# Patient Record
Sex: FEMALE | Race: WHITE | NOT HISPANIC OR LATINO | Employment: UNEMPLOYED | URBAN - NONMETROPOLITAN AREA
[De-identification: names, ages, dates, MRNs, and addresses within clinical notes are randomized per-mention and may not be internally consistent; named-entity substitution may affect disease eponyms.]

---

## 2017-06-22 ENCOUNTER — OFFICE VISIT (OUTPATIENT)
Dept: OTOLARYNGOLOGY | Facility: OTHER | Age: 37
End: 2017-06-22
Attending: PHYSICIAN ASSISTANT
Payer: MEDICARE

## 2017-06-22 VITALS
HEIGHT: 55 IN | DIASTOLIC BLOOD PRESSURE: 70 MMHG | BODY MASS INDEX: 17.13 KG/M2 | TEMPERATURE: 97.9 F | SYSTOLIC BLOOD PRESSURE: 106 MMHG | HEART RATE: 101 BPM | WEIGHT: 74 LBS

## 2017-06-22 DIAGNOSIS — H61.23 IMPACTED CERUMEN OF BOTH EARS: Primary | ICD-10-CM

## 2017-06-22 DIAGNOSIS — H90.6 MIXED CONDUCTIVE AND SENSORINEURAL HEARING LOSS, BILATERAL: ICD-10-CM

## 2017-06-22 DIAGNOSIS — H61.303 EXTERNAL EAR CANAL STENOSIS, ACQUIRED, BILATERAL: ICD-10-CM

## 2017-06-22 DIAGNOSIS — H66.93 BILATERAL CHRONIC OTITIS MEDIA: ICD-10-CM

## 2017-06-22 DIAGNOSIS — L30.9 DERMATITIS: ICD-10-CM

## 2017-06-22 PROCEDURE — 99212 OFFICE O/P EST SF 10 MIN: CPT

## 2017-06-22 PROCEDURE — 99213 OFFICE O/P EST LOW 20 MIN: CPT | Performed by: PHYSICIAN ASSISTANT

## 2017-06-22 ASSESSMENT — PAIN SCALES - GENERAL: PAINLEVEL: NO PAIN (0)

## 2017-06-22 NOTE — MR AVS SNAPSHOT
"              After Visit Summary   6/22/2017    Efren Saavedra    MRN: [id number]           Patient Information     Date Of Birth          1980        Visit Information        Provider Department      6/22/2017 9:00 AM Marcel[facility]al [facility]        Care Instructions    Ears look good.   Violet was applied to both canals-   Keep ears dry  Follow up in 6 months  If there are concerns or questions, Call [phone] and ask for nurse          Follow-ups after your visit        Follow-up notes from your care team     Return in about 6 months (around 12/22/2017).      Who to contact     If you have questions or need follow up information about today's clinic visit or your schedule please contact [facility] directly at [phone].  Normal or non-critical lab and imaging results will be communicated to you by MyChart, letter or phone within 4 business days after the clinic has received the results. If you do not hear from us within 7 days, please contact the clinic through MyChart or phone. If you have a critical or abnormal lab result, we will notify you by phone as soon as possible.  Submit refill requests through [employer] or call your pharmacy and they will forward the refill request to us. Please allow 3 business days for your refill to be completed.          Additional Information About Your Visit        MyChart Information     [employer] lets you send messages to your doctor, view your test results, renew your prescriptions, schedule appointments and more. To sign up, go to [email or web address] . Click on \"Log in\" on the left side of the screen, which will take you to the Welcome page. Then click on \"Sign up Now\" on the right side of the page.     You will be asked to enter the access code listed below, as well as some personal information. Please follow the directions to create your username and password.     Your access code is: [email or web address]  Expires: 9/20/2017  9:29 AM   " "  Your access code will  in 90 days. If you need help or a new code, please call your [place] clinic or [phone].        Care EveryWhere ID     This is your Care EveryWhere ID. This could be used by other organizations to access your [place] medical records  [id number]        Your Vitals Were     Pulse Temperature Height BMI (Body Mass Index)          101 97.9  F (36.6  C) (Tympanic) 4' 4.5\" (1.334 m) 18.88 kg/m2         Blood Pressure from Last 3 Encounters:   17 106/70   16 114/70   16 110/62    Weight from Last 3 Encounters:   17 74 lb (33.6 kg)   16 74 lb (33.6 kg)   16 74 lb (33.6 kg)              Today, you had the following     No orders found for display       Primary Care Provider Office Phone # Fax #    Dominic DAVI Sweeney [phone]       [place] [address] [place] [id number]        Equal Access to Services     [facility]: Hadii aad ku hadasho Soomaali, waaxda luqadaha, qaybta kaalmada adeegyastacy, yahir ortez . So [facility] [phone].    ATENCIÓN: Si habla español, tiene a roa disposición servicios gratuitos de asistencia lingüística. [facility] [phone].    We comply with applicable federal civil rights laws and [place] laws. We do not discriminate on the basis of race, color, national origin, age, disability sex, sexual orientation or gender identity.            Thank you!     Thank you for choosing [facility]  for your care. Our goal is always to provide you with excellent care. Hearing back from our patients is one way we can continue to improve our services. Please take a few minutes to complete the written survey that you may receive in the mail after your visit with us. Thank you!             Your Updated Medication List - Protect others around you: Learn how to safely use, store and throw away your medicines at www.disposemymeds.org.          This list is accurate as " of: 6/22/17  9:29 AM.  Always use your most recent med list.                   Brand Name Dispense Instructions for use Diagnosis    ciprofloxacin 0.3 % ophthalmic solution    CILOXAN    1 Bottle    Apply 4 drops to both ears twice a day for 5 days    Impacted cerumen of both ears, Mixed conductive and sensorineural hearing loss, bilateral, Irritation of external ear canal, left, External ear canal stenosis, acquired, bilateral       mometasone 0.1 % cream    ELOCON    15 g    Apply sparingly to affected area twice daily for 7-10 days. Do not apply for longer than 14 days    Dermatitis

## 2017-06-22 NOTE — PROGRESS NOTES
"Chief Complaint   Patient presents with     Cerumen Impaction     Ear cleaning.  Decreased hearing.  Resolved right ear drainage.     She has been c/o plugged feeling. Efren did have drainage, and used otics and symptoms improvement. She has decreased hearing.   No otalgia.  She has aids but does not wear them at this time.   She did have URI symptoms but did not feel like it was related to otorrhea.   She has been without nasal congestion, runny nose, sneezing or other symptoms at this time.     Her talita bowls improve with Elocon. She does not use her aids.   No recent otorrhea.    No bloody drainage.    She has bilateral t-tubes and reports that she has been c/o popping/ opening.      Distant hx of BTT with placement of t-tubes. Tubes have been in place for several years.   History reviewed. No pertinent past medical history.   No Known Allergies  Current Outpatient Prescriptions   Medication     mometasone (ELOCON) 0.1 % cream     ciprofloxacin (CILOXAN) 0.3 % ophthalmic solution     No current facility-administered medications for this visit.       ROS: 10 point ROS neg other than the symptoms noted above in the HPI.   /70 (BP Location: Left arm, Patient Position: Sitting, Cuff Size: Child)  Pulse 101  Temp 97.9  F (36.6  C) (Tympanic)  Ht 4' 4.5\" (1.334 m)  Wt 74 lb (33.6 kg)  BMI 18.88 kg/m2  Gerenal: Craniofacial abnormality.   Eyes: conjuctiva clear, PERRL, EOM intact   Ears: Otorrhea left. Right with cerumen. Narrow canals. . Low set auricles with poor antihelical folds, defined. t-tube bilaterally. Talita bowl bilateral dermatitis   Nose: Nares normal   Mouth: normal   Neck: Supple, no cervical adenopathy, no thyromegaly   The ear canals were examined underneath the operating microscope and with an otologic speculum. The canals were cleaned of all debris with gently suctioning and use of alligator forceps. There is no granulation tissue or sign of cholesteatoma.   Canals, stenotic   Bilateral " t-tubes patent. Cerumen removed  Roxann was applied to bilateral ear canals.       Audiogram from 7/4/16.   Unable to seal for tympanograms.   Thresholds are improved from 2014.   ASSESSMENT:    ICD-10-CM    1. Impacted cerumen of both ears H61.23    2. Bilateral chronic otitis media H66.93    3. Mixed conductive and sensorineural hearing loss, bilateral H90.6    4. External ear canal stenosis, acquired, bilateral H61.303    5. Dermatitis L30.9      Her ears overall look well today on exam  Canals were cleaned. I did apply Violet to bilateral canals. Hopefully this will aid in relief of irritation/ otorrhea.  If drainage occurs, contact. May need to have powder applied as drops tend not to get in her ears very well.     Follow up in 6 months  Tubes are in good place and in position.      Monitor hearing loss, if progressive hearing loss, consider MRI.   She has aids, but defers to use them.      Elocon to outer ear as directed.   Consider Clotrimazole cream if no improvement.        Marcelina Oneal PA-C  ENT  [facility], [place]  [phone]

## 2017-06-22 NOTE — PATIENT INSTRUCTIONS
Ears look good.   Violet was applied to both canals-   Keep ears dry  Follow up in 6 months  If there are concerns or questions, Call [phone] and ask for nurse

## 2017-06-22 NOTE — NURSING NOTE
"Chief Complaint   Patient presents with     Cerumen Impaction     Ear cleaning.  Decreased hearing.  Resolved right ear drainage.       Initial /70 (BP Location: Left arm, Patient Position: Sitting, Cuff Size: Child)  Pulse 101  Temp 97.9  F (36.6  C) (Tympanic)  Ht 4' 4.5\" (1.334 m)  Wt 74 lb (33.6 kg)  BMI 18.88 kg/m2 Estimated body mass index is 18.88 kg/(m^2) as calculated from the following:    Height as of this encounter: 4' 4.5\" (1.334 m).    Weight as of this encounter: 74 lb (33.6 kg).  Medication Reconciliation: complete     HECTOR TYSON      "

## 2017-12-07 ENCOUNTER — OFFICE VISIT (OUTPATIENT)
Dept: OTOLARYNGOLOGY | Facility: OTHER | Age: 37
End: 2017-12-07
Attending: PHYSICIAN ASSISTANT
Payer: MEDICARE

## 2017-12-07 VITALS
HEART RATE: 100 BPM | BODY MASS INDEX: 18.05 KG/M2 | WEIGHT: 78 LBS | OXYGEN SATURATION: 99 % | SYSTOLIC BLOOD PRESSURE: 88 MMHG | DIASTOLIC BLOOD PRESSURE: 58 MMHG | TEMPERATURE: 98.2 F | RESPIRATION RATE: 16 BRPM | HEIGHT: 55 IN

## 2017-12-07 DIAGNOSIS — H90.5 CONGENITAL HEARING LOSS: ICD-10-CM

## 2017-12-07 DIAGNOSIS — H90.6 MIXED CONDUCTIVE AND SENSORINEURAL HEARING LOSS, BILATERAL: ICD-10-CM

## 2017-12-07 DIAGNOSIS — H92.11 OTORRHEA, RIGHT: ICD-10-CM

## 2017-12-07 DIAGNOSIS — H66.91 RIGHT ACUTE OTITIS MEDIA: Primary | ICD-10-CM

## 2017-12-07 DIAGNOSIS — H61.23 IMPACTED CERUMEN OF BOTH EARS: ICD-10-CM

## 2017-12-07 DIAGNOSIS — H61.303 EXTERNAL EAR CANAL STENOSIS, ACQUIRED, BILATERAL: ICD-10-CM

## 2017-12-07 PROCEDURE — 92504 EAR MICROSCOPY EXAMINATION: CPT | Performed by: PHYSICIAN ASSISTANT

## 2017-12-07 PROCEDURE — 99213 OFFICE O/P EST LOW 20 MIN: CPT | Mod: 25 | Performed by: PHYSICIAN ASSISTANT

## 2017-12-07 PROCEDURE — 92504 EAR MICROSCOPY EXAMINATION: CPT

## 2017-12-07 PROCEDURE — 99213 OFFICE O/P EST LOW 20 MIN: CPT

## 2017-12-07 RX ORDER — CEFDINIR 250 MG/5ML
14 POWDER, FOR SUSPENSION ORAL DAILY
Qty: 100 ML | Refills: 0 | Status: SHIPPED | OUTPATIENT
Start: 2017-12-07 | End: 2017-12-17

## 2017-12-07 RX ORDER — OFLOXACIN 3 MG/ML
10 SOLUTION AURICULAR (OTIC) 2 TIMES DAILY
Qty: 10 ML | Refills: 1 | Status: SHIPPED | OUTPATIENT
Start: 2017-12-07 | End: 2017-12-14

## 2017-12-07 ASSESSMENT — PAIN SCALES - GENERAL: PAINLEVEL: NO PAIN (0)

## 2017-12-07 NOTE — NURSING NOTE
"Chief Complaint   Patient presents with     Ear Problem     Ear cleaning last seen 06/22/2017.       Initial BP (!) 88/58  Pulse 100  Temp 98.2  F (36.8  C) (Tympanic)  Resp 16  Ht 4' 4.5\" (1.334 m)  Wt 78 lb (35.4 kg)  SpO2 99%  BMI 19.9 kg/m2 Estimated body mass index is 19.9 kg/(m^2) as calculated from the following:    Height as of this encounter: 4' 4.5\" (1.334 m).    Weight as of this encounter: 78 lb (35.4 kg).  Medication Reconciliation: complete   India Maddox      "

## 2017-12-07 NOTE — PATIENT INSTRUCTIONS
Start Floxin ear drops. Twice a day for 7 days to right ear  Start oral Omnicef once a day for 10 days.     If right ear hearing/ drainage does not improve within 2 weeks return for recheck    Due in June/ July for hearing test and ear cleaning/ tube check    Thank you for allowing [facility] and our ENT team to participate in your care.  If your medications are too expensive, please give the nurse a call.  We can possibly change this medication.  If you have a scheduling or an appointment question please contact [facility] Unit Coordinator at their direct line [phone].   ALL nursing questions or concerns can be directed to your ENT nurse at: [phone] [place]

## 2017-12-07 NOTE — PROGRESS NOTES
"Chief Complaint   Patient presents with     Ear Problem     Ear cleaning last seen 06/22/2017.   She has bilateral t-tubes and reports that she has been c/o popping/ opening. Over the last few weeks and parents have noticed possible decreased hearing.       Her talita bowls improve with Elocon. She does not use her aids.   No recent otorrhea.    No bloody drainage.      Distant hx of BTT with placement of t-tubes. Tubes have been in place for several years. She has aids but does not wear them at this time.   No recent illness. No other concerns.   She has been without nasal congestion, runny nose, sneezing or other symptoms at this time     History reviewed. No pertinent past medical history.   No Known Allergies  Current Outpatient Prescriptions   Medication     mometasone (ELOCON) 0.1 % cream     No current facility-administered medications for this visit.       ROS: 10 point ROS neg other than the symptoms noted above in the HPI.  BP (!) 88/58  Pulse 100  Temp 98.2  F (36.8  C) (Tympanic)  Resp 16  Ht 4' 4.5\" (1.334 m)  Wt 78 lb (35.4 kg)  SpO2 99%  BMI 19.9 kg/m2  Gerenal: Craniofacial abnormality.   Eyes: conjuctiva clear, PERRL, EOM intact   Ears: Otorrhea left. Right with cerumen. Narrow canals. . Low set auricles with poor antihelical folds, defined. t-tube bilaterally. Talita bowl bilateral dermatitis   Nose: Nares normal   Mouth: normal   Neck: Supple, no cervical adenopathy, no thyromegaly   The ear canals were examined underneath the operating microscope and with an otologic speculum. The canals were cleaned of all debris with gently suctioning and use of alligator forceps. There is no granulation tissue or sign of cholesteatoma.   Canals, stenotic   Bilateral t-tubes. Right TM with erythema, effusion, otorrhea. Canal suctioned.   Audiogram from 7/4/16.   Unable to seal for tympanograms.   Thresholds are improved from 2014. Moderate to severe mixed hearing loss, bilateral    ASSESSMENT:    " ICD-10-CM    1. Right acute otitis media H66.91 ofloxacin (FLOXIN) 0.3 % otic solution     cefdinir (OMNICEF) 250 MG/5ML suspension   2. Otorrhea, right H92.11 ofloxacin (FLOXIN) 0.3 % otic solution     cefdinir (OMNICEF) 250 MG/5ML suspension   3. Impacted cerumen of both ears H61.23    4. 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 External ear canal stenosis, acquired, bilateral H61.303    6. Congenital hearing loss H90.5      Start Floxin BID for 7 days  Start Omnicef per weight based. Provided oral suspension.   Recheck ears in 2 weeks. If otorrhea/ effusion remain, will need culture obtained.     Monitor hearing loss, if progressive hearing loss, consider MRI.   She has aids, but defers to use them.       Elocon to outer ear as directed.   Consider Clotrimazole cream if no improvement.     Marcelina Oneal PA-C  ENT  [facility], [place]  [phone]

## 2017-12-07 NOTE — MR AVS SNAPSHOT
After Visit Summary   12/7/2017    Efren Saavedra    MRN: [id number]           Patient Information     Date Of Birth          1980        Visit Information        Provider Department      12/7/2017 11:15 AM Marcel[facility]        Today's Diagnoses     Right acute otitis media    -  1    Otorrhea, right          Care Instructions    Start Floxin ear drops. Twice a day for 7 days to right ear  Start oral Omnicef once a day for 10 days.     If right ear hearing/ drainage does not improve within 2 weeks return for recheck    Due in June/ July for hearing test and ear cleaning/ tube check    Thank you for allowing [facility] and our ENT team to participate in your care.  If your medications are too expensive, please give the nurse a call.  We can possibly change this medication.  If you have a scheduling or an appointment question please contact [facility] Unit Coordinator at their direct line [phone].   ALL nursing questions or concerns can be directed to your ENT nurse at: [phone] [facility]              Follow-ups after your visit        Follow-up notes from your care team     Return in about 2 weeks (around 12/21/2017).      Who to contact     If you have questions or need follow up information about today's clinic visit or your schedule please contact [facility] directly at [phone].  Normal or non-critical lab and imaging results will be communicated to you by MyChart, letter or phone within 4 business days after the clinic has received the results. If you do not hear from us within 7 days, please contact the clinic through MyChart or phone. If you have a critical or abnormal lab result, we will notify you by phone as soon as possible.  Submit refill requests through [employer] or call your pharmacy and they will forward the refill request to us. Please allow 3 business days for your refill to be completed.          Additional Information  "About Your Visit        [employer] Information     [employer] lets you send messages to your doctor, view your test results, renew your prescriptions, schedule appointments and more. To sign up, go to [email or web address] . Click on \"Log in\" on the left side of the screen, which will take you to the Welcome page. Then click on \"Sign up Now\" on the right side of the page.     You will be asked to enter the access code listed below, as well as some personal information. Please follow the directions to create your username and password.     Your access code is: [email or web address]  Expires: 3/7/2018 11:37 AM     Your access code will  in 90 days. If you need help or a new code, please call your [place] clinic or [phone].        Care EveryWhere ID     This is your Care EveryWhere ID. This could be used by other organizations to access your [place] medical records  [id number]        Your Vitals Were     Pulse Temperature Respirations Height Pulse Oximetry BMI (Body Mass Index)    100 98.2  F (36.8  C) (Tympanic) 16 4' 4.5\" (1.334 m) 99% 19.9 kg/m2       Blood Pressure from Last 3 Encounters:   17 (!) 88/58   17 106/70   16 114/70    Weight from Last 3 Encounters:   17 78 lb (35.4 kg)   17 74 lb (33.6 kg)   16 74 lb (33.6 kg)              Today, you had the following     No orders found for display         Today's Medication Changes          These changes are accurate as of: 17 11:37 AM.  If you have any questions, ask your nurse or doctor.               Start taking these medicines.        Dose/Directions    cefdinir 250 MG/5ML suspension   Commonly known as:  OMNICEF   Used for:  Right acute otitis media, Otorrhea, right   Started by:  Marcelina Oneal PA-C        Dose:  14 mg/kg/day   Take 10 mLs (500 mg) by mouth daily for 10 days   Quantity:  100 mL   Refills:  0       ofloxacin 0.3 % otic solution   Commonly known as:  FLOXIN   Used for:  Right acute otitis media, Otorrhea, " right   Started by:  Marcelina Oneal PA-C        Dose:  10 drop   Place 10 drops into the right ear 2 times daily for 7 days   Quantity:  10 mL   Refills:  1         Stop taking these medicines if you haven't already. Please contact your care team if you have questions.     ciprofloxacin 0.3 % ophthalmic solution   Commonly known as:  CILOXAN   Stopped by:  Marcelina Oneal PA-C                Where to get your medicines      These medications were sent to [id number] - [place], [place] - [address]  [address], [facility] [address]     Phone:  [phone]     cefdinir 250 MG/5ML suspension    ofloxacin 0.3 % otic solution                Primary Care Provider Office Phone # Fax #    Dominic Sweeney [phone]       [facility] [address] [facility] [address]        Equal Access to Services     [facility]: Hadii aad ku hadasho Soomaali, waaxda luqadaha, qaybta kaalmada adeegyada, waxay idiin hayaan hima ortez . So [facility] [phone].    ATENCIÓN: Si habla español, tiene a roa disposición servicios gratuitos de asistencia lingüística. Llame al [phone].    We comply with applicable federal civil rights laws and [place] laws. We do not discriminate on the basis of race, color, national origin, age, disability, sex, sexual orientation, or gender identity.            Thank you!     Thank you for choosing [facility]  for your care. Our goal is always to provide you with excellent care. Hearing back from our patients is one way we can continue to improve our services. Please take a few minutes to complete the written survey that you may receive in the mail after your visit with us. Thank you!             Your Updated Medication List - Protect others around you: Learn how to safely use, store and throw away your medicines at www.disposemymeds.org.          This list is accurate as of: 12/7/17 11:37 AM.  Always use your most recent med list.                    Brand Name Dispense Instructions for use Diagnosis    cefdinir 250 MG/5ML suspension    OMNICEF    100 mL    Take 10 mLs (500 mg) by mouth daily for 10 days    Right acute otitis media, Otorrhea, right       mometasone 0.1 % cream    ELOCON    15 g    Apply sparingly to affected area twice daily for 7-10 days. Do not apply for longer than 14 days    Dermatitis       ofloxacin 0.3 % otic solution    FLOXIN    10 mL    Place 10 drops into the right ear 2 times daily for 7 days    Right acute otitis media, Otorrhea, right

## 2017-12-21 ENCOUNTER — OFFICE VISIT (OUTPATIENT)
Dept: OTOLARYNGOLOGY | Facility: OTHER | Age: 37
End: 2017-12-21
Attending: PHYSICIAN ASSISTANT
Payer: MEDICARE

## 2017-12-21 VITALS
BODY MASS INDEX: 18.05 KG/M2 | SYSTOLIC BLOOD PRESSURE: 92 MMHG | TEMPERATURE: 97.8 F | DIASTOLIC BLOOD PRESSURE: 50 MMHG | OXYGEN SATURATION: 98 % | HEART RATE: 92 BPM | RESPIRATION RATE: 16 BRPM | HEIGHT: 55 IN | WEIGHT: 78 LBS

## 2017-12-21 DIAGNOSIS — H92.11 OTORRHEA, RIGHT: Primary | ICD-10-CM

## 2017-12-21 DIAGNOSIS — H90.6 MIXED CONDUCTIVE AND SENSORINEURAL HEARING LOSS, BILATERAL: ICD-10-CM

## 2017-12-21 DIAGNOSIS — H90.5 CONGENITAL HEARING LOSS: ICD-10-CM

## 2017-12-21 DIAGNOSIS — H61.303 EXTERNAL EAR CANAL STENOSIS, ACQUIRED, BILATERAL: ICD-10-CM

## 2017-12-21 PROCEDURE — 99213 OFFICE O/P EST LOW 20 MIN: CPT

## 2017-12-21 PROCEDURE — 99213 OFFICE O/P EST LOW 20 MIN: CPT | Performed by: PHYSICIAN ASSISTANT

## 2017-12-21 ASSESSMENT — PAIN SCALES - GENERAL: PAINLEVEL: NO PAIN (0)

## 2017-12-21 NOTE — MR AVS SNAPSHOT
After Visit Summary   12/21/2017    Efren Saavedra    MRN: [id number]           Patient Information     Date Of Birth          1980        Visit Information        Provider Department      12/21/2017 1:15 PM Marcelina Oneal PA-C [facility]        Today's Diagnoses     Otorrhea, right    -  1    Mixed conductive and sensorineural hearing loss, bilateral        Congenital hearing loss        External ear canal stenosis, acquired, bilateral          Care Instructions    Ears look well.   Tubes are in good position and open.     Recheck ears in 6 months with cleaning and audiogram    Thank you for allowing [facility]  and our ENT team to participate in your care.  If your medications are too expensive, please give the nurse a call.  We can possibly change this medication.  If you have a scheduling or an appointment question please contact [facility] Unit Coordinator at their direct line [phone].   ALL nursing questions or concerns can be directed to your ENT nurse at: [phone] [place]              Follow-ups after your visit        Who to contact     If you have questions or need follow up information about today's clinic visit or your schedule please contact [facility] directly at [phone].  Normal or non-critical lab and imaging results will be communicated to you by [employer], letter or phone within 4 business days after the clinic has received the results. If you do not hear from us within 7 days, please contact the clinic through [employer] or phone. If you have a critical or abnormal lab result, we will notify you by phone as soon as possible.  Submit refill requests through [employer] or call your pharmacy and they will forward the refill request to us. Please allow 3 business days for your refill to be completed.          Additional Information About Your Visit        Dash [employer]ro Jock Information     [employer] lets you send messages to your doctor, view your test  "results, renew your prescriptions, schedule appointments and more. To sign up, go to [email or web address] . Click on \"Log in\" on the left side of the screen, which will take you to the Welcome page. Then click on \"Sign up Now\" on the right side of the page.     You will be asked to enter the access code listed below, as well as some personal information. Please follow the directions to create your username and password.     Your access code is: [email or web address]  Expires: 3/7/2018 11:37 AM     Your access code will  in 90 days. If you need help or a new code, please call your [place] clinic or [phone].        Care EveryWhere ID     This is your Care EveryWhere ID. This could be used by other organizations to access your [place] medical records  [id number]        Your Vitals Were     Pulse Temperature Respirations Height Pulse Oximetry BMI (Body Mass Index)    92 97.8  F (36.6  C) (Tympanic) 16 4' 4.5\" (1.334 m) 98% 19.9 kg/m2       Blood Pressure from Last 3 Encounters:   17 92/50   17 (!) 88/58   17 106/70    Weight from Last 3 Encounters:   17 78 lb (35.4 kg)   17 78 lb (35.4 kg)   17 74 lb (33.6 kg)              Today, you had the following     No orders found for display       Primary Care Provider Office Phone # Fax #    Dominic DAVI Sweeney [phone]       [facility] [place] [address]  [facility] [address]        Equal Access to Services     [facility]: Hadii leah Hoskins, dylan griffin, qayahir mcnally . So [facility] [phone].    ATENCIÓN: Si habla español, tiene a roa disposición servicios gratuitos de asistencia lingüística. Llame al [phone].    We comply with applicable federal civil rights laws and [place] laws. We do not discriminate on the basis of race, color, national origin, age, disability, sex, sexual orientation, or gender identity.            Thank you! "     Thank you for choosing [facility]  [facility]. Our goal is always to provide you with excellent care. Hearing back from our patients is one way we can continue to improve our services. Please take a few minutes to complete the written survey that you may receive in the mail after your visit with us. Thank you!             Your Updated Medication List - Protect others around you: Learn how to safely use, store and throw away your medicines at www.disposemymeds.org.          This list is accurate as of: 12/21/17  1:16 PM.  Always use your most recent med list.                   Brand Name Dispense Instructions for use Diagnosis    mometasone 0.1 % cream    ELOCON    15 g    Apply sparingly to affected area twice daily for 7-10 days. Do not apply for longer than 14 days    Dermatitis

## 2017-12-21 NOTE — PATIENT INSTRUCTIONS
Ears look well.   Tubes are in good position and open.     Recheck ears in 6 months with cleaning and audiogram    Thank you for allowing [facility] Avit[facility]  and our ENT team to participate in your care.  If your medications are too expensive, please give the nurse a call.  We can possibly change this medication.  If you have a scheduling or an appointment question please contact [facility] Unit Coordinator at their direct line [phone].   ALL nursing questions or concerns can be directed to your ENT nurse at: [phone] [place]

## 2017-12-21 NOTE — NURSING NOTE
"Chief Complaint   Patient presents with     Ear Problem     Follow up from 12/07/2017 visit regarding right acute OM, right otorrhea-floxin and omnicef       Initial BP 92/50  Pulse 92  Temp 97.8  F (36.6  C) (Tympanic)  Resp 16  Ht 4' 4.5\" (1.334 m)  Wt 78 lb (35.4 kg)  SpO2 98%  BMI 19.9 kg/m2 Estimated body mass index is 19.9 kg/(m^2) as calculated from the following:    Height as of this encounter: 4' 4.5\" (1.334 m).    Weight as of this encounter: 78 lb (35.4 kg).  Medication Reconciliation: complete   India Maddox      "

## 2017-12-21 NOTE — PROGRESS NOTES
"Chief Complaint   Patient presents with     Ear Problem     Follow up from 12/07/2017 visit regarding right acute OM, right otorrhea-floxin and omnicef         Efren returns for recheck of right ear. She was last seen on 12/7/17 for ear cleaning. At her visit, she had right otorrhea/ OM. Efren was started on Floxin and Omnicef Recommended recheck to ensure resolution.   Otics went well. No concerns with continued otorrhea.     Distant hx of BTT with placement of t-tubes. Tubes have been in place for several years. She has aids but does not wear them at this time.   No recent illness. No other concerns.   She has been without nasal congestion, runny nose, sneezing or other symptoms at this time      No past medical history on file.   No Known Allergies  Current Outpatient Prescriptions   Medication     mometasone (ELOCON) 0.1 % cream     No current facility-administered medications for this visit.       ROS: 10 point ROS neg other than the symptoms noted above in the HPI.  BP 92/50  Pulse 92  Temp 97.8  F (36.6  C) (Tympanic)  Resp 16  Ht 4' 4.5\" (1.334 m)  Wt 78 lb (35.4 kg)  SpO2 98%  BMI 19.9 kg/m2      Gerenal: Craniofacial abnormality.   Eyes: conjuctiva clear, PERRL, EOM intact   Ears: Otorrhea left. Right with cerumen. Narrow canals. . Low set auricles with poor antihelical folds, defined. t-tube bilaterally. Gila bowl bilateral dermatitis   Nose: Nares normal   Mouth: normal   Neck: Supple, no cervical adenopathy, no thyromegaly   The ear canals were examined underneath the operating microscope and with an otologic speculum. No otorrhea.  There is no granulation tissue or sign of cholesteatoma.   Canals, stenotic   Bilateral t-tubes. Resolved OM, right. No granulation tissue.       Audiogram from 7/4/16.   Unable to seal for tympanograms.   Thresholds are improved from 2014. Moderate to severe mixed hearing loss, bilateral       ASSESSMENT:    ICD-10-CM    1. Otorrhea, right H92.11    2. Mixed " conductive and sensorineural hearing loss, bilateral H90.6    3. External ear canal stenosis, acquired, bilateral H61.303    4. Congenital hearing loss H90.5      Ears look well.   Tubes are in good position and open.     Recheck ears in 6 months with cleaning and audiogram      Marcelina Oneal PA-C  ENT  [facility], [place]  [phone]

## 2018-04-25 ENCOUNTER — HOSPITAL ENCOUNTER (OUTPATIENT)
Dept: BONE DENSITY | Facility: HOSPITAL | Age: 38
Discharge: HOME | End: 2018-04-25
Attending: FAMILY MEDICINE | Admitting: FAMILY MEDICINE
Payer: MEDICARE

## 2018-04-25 DIAGNOSIS — M81.8 OTHER OSTEOPOROSIS WITHOUT CURRENT PATHOLOGICAL FRACTURE: ICD-10-CM

## 2018-04-25 PROCEDURE — 77080 DXA BONE DENSITY AXIAL: CPT | Mod: TC

## 2018-06-19 DIAGNOSIS — H91.93 DECREASED HEARING OF BOTH EARS: Primary | ICD-10-CM

## 2018-06-21 ENCOUNTER — OFFICE VISIT (OUTPATIENT)
Dept: OTOLARYNGOLOGY | Facility: OTHER | Age: 38
End: 2018-06-21
Attending: PHYSICIAN ASSISTANT
Payer: MEDICARE

## 2018-06-21 ENCOUNTER — OFFICE VISIT (OUTPATIENT)
Dept: AUDIOLOGY | Facility: OTHER | Age: 38
End: 2018-06-21
Attending: AUDIOLOGIST
Payer: MEDICARE

## 2018-06-21 VITALS
DIASTOLIC BLOOD PRESSURE: 58 MMHG | HEART RATE: 96 BPM | SYSTOLIC BLOOD PRESSURE: 94 MMHG | BODY MASS INDEX: 17.82 KG/M2 | OXYGEN SATURATION: 99 % | HEIGHT: 55 IN | TEMPERATURE: 98.3 F | WEIGHT: 77 LBS

## 2018-06-21 DIAGNOSIS — H61.23 IMPACTED CERUMEN OF BOTH EARS: ICD-10-CM

## 2018-06-21 DIAGNOSIS — H90.5 CONGENITAL HEARING LOSS: ICD-10-CM

## 2018-06-21 DIAGNOSIS — H61.303 EXTERNAL EAR CANAL STENOSIS, ACQUIRED, BILATERAL: ICD-10-CM

## 2018-06-21 DIAGNOSIS — H90.6 MIXED HEARING LOSS, BILATERAL: Primary | ICD-10-CM

## 2018-06-21 DIAGNOSIS — H90.6 MIXED CONDUCTIVE AND SENSORINEURAL HEARING LOSS, BILATERAL: Primary | ICD-10-CM

## 2018-06-21 DIAGNOSIS — Z45.89 TYMPANOSTOMY TUBE CHECK: ICD-10-CM

## 2018-06-21 PROCEDURE — 99213 OFFICE O/P EST LOW 20 MIN: CPT | Mod: 25 | Performed by: PHYSICIAN ASSISTANT

## 2018-06-21 PROCEDURE — 92567 TYMPANOMETRY: CPT | Performed by: AUDIOLOGIST

## 2018-06-21 PROCEDURE — 69210 REMOVE IMPACTED EAR WAX UNI: CPT | Performed by: PHYSICIAN ASSISTANT

## 2018-06-21 PROCEDURE — 92557 COMPREHENSIVE HEARING TEST: CPT | Performed by: AUDIOLOGIST

## 2018-06-21 PROCEDURE — 92504 EAR MICROSCOPY EXAMINATION: CPT | Performed by: PHYSICIAN ASSISTANT

## 2018-06-21 PROCEDURE — G0463 HOSPITAL OUTPT CLINIC VISIT: HCPCS | Mod: 25

## 2018-06-21 ASSESSMENT — PAIN SCALES - GENERAL: PAINLEVEL: NO PAIN (0)

## 2018-06-21 NOTE — PROGRESS NOTES
Audiology Evaluation Completed. Please refer SCANNED AUDIOGRAM and/or TYMPANOGRAM for BACKGROUND, RESULTS, RECOMMENDATIONS.    UNDER RECOMMENDATIONS ON AUDIOGRAM PATIENT REFERRED TO ENT WITH SYMPTOMS      Santa CONWAY, [facility]  Audiologist #[id number]        NO EPIC REFERRAL/ORDER NEEDED TO ENT BY AUDIOLOGY AS PATIENT ALREADY HAS AN APPOINTMENT WITH ENT

## 2018-06-21 NOTE — LETTER
"    6/21/2018         RE: Efren Saavedra  [address]  [place] [address]        Dear Colleague,    [facility] you [facility] Please see a copy of my visit note below.    Chief Complaint   Patient presents with     Cerumen Impaction     ear cleaning      Efren returns for recheck of tubes and ear cleaning. Her last visit was on 12/21/17 for f/u after acute otorrhea.   Mom has no new concerns.   Distant hx of BTT with placement of t-tubes. Tubes have been in place for several years. She has aids but does not wear them at this time.   No recent illness. No other concerns.   She has been without nasal congestion, runny nose, sneezing or other symptoms at this time  No past medical history on file.   No Known Allergies  Current Outpatient Prescriptions   Medication     mometasone (ELOCON) 0.1 % cream     No current facility-administered medications for this visit.       ROS: 10 point ROS neg other than the symptoms noted above in the HPI.  BP 94/58 (BP Location: Right arm, Patient Position: Chair, Cuff Size: Adult Regular)  Pulse 96  Temp 98.3  F (36.8  C) (Tympanic)  Ht 4' 6\" (1.372 m)  Wt 77 lb (34.9 kg)  SpO2 99%  BMI 18.57 kg/m2    Gerenal: Craniofacial abnormality.   Eyes: conjuctiva clear, PERRL, EOM intact   Ears: Otorrhea left. Right with cerumen. Narrow canals. . Low set auricles with poor antihelical folds defined. t-tube bilaterally. Gila bowl bilateral dermatitis   Nose: Nares normal   Mouth: normal   Neck: Supple, no cervical adenopathy, no thyromegaly   The ear canals were examined underneath the operating microscope and with an otologic speculum. No otorrhea.  There is no granulation tissue or sign of cholesteatoma.   Canals, stenotic   Bilateral t-tubes. No otorrhea. Patent tubes.         Audiogram from 7/4/16.   Unable to seal for tympanograms.   Thresholds are improved from 2014. Moderate to severe mixed hearing loss, " bilateral      ASSESSMENT:    ICD-10-CM    1. Mixed conductive and sensorineural hearing loss, bilateral H90.6    2. Congenital hearing loss H90.5    3. External ear canal stenosis, acquired, bilateral H61.303    4. Tympanostomy tube check Z45.89    5. Impacted cerumen of both ears H61.23      Audiogram is pending.   Ears look well. No otorrhea seen on exam. No active OM.      Return to ENT in 6 month for tube check and ear cleaning.        Marcelina Oneal PA-C  ENT  [facility], [place]  [phone]      Again, thank you for allowing me to participate in the care of your patient.        Sincerely,        Marcelina Oneal PA-C

## 2018-06-21 NOTE — MR AVS SNAPSHOT
After Visit Summary   6/21/2018    Efren Saavedra    MRN: [id number]           Patient Information     Date Of Birth          1980        Visit Information        Provider Department      6/21/2018 1:15 PM Marcelina Oneal PA-C [facility] [place]        Today's Diagnoses     Mixed conductive and sensorineural hearing loss, bilateral    -  1    Congenital hearing loss        External ear canal stenosis, acquired, bilateral        Tympanostomy tube check        Impacted cerumen of both ears          Care Instructions    6 month tube check/ cleaning  Complete audiogram.     Thank you for allowing [facility] and our ENT team to participate in your care.  If your medications are too expensive, please give the nurse a call.  We can possibly change this medication.  If you have a scheduling or an appointment question please contact [facility] Unit Coordinator at their direct line [phone].   ALL nursing questions or concerns can be directed to your ENT nurse at: [phone] [facility]              Follow-ups after your visit        Follow-up notes from your care team     Return in about 6 months (around 12/21/2018).      Your next 10 appointments already scheduled     Jun 21, 2018  1:45 PM CDT   (Arrive by 1:30 PM)   Hearing Eval with Franco Stinson   [facility] [place] ([facility] )    [address] [place] [address]   [phone]              Who to contact     If you have questions or need follow up information about today's clinic visit or your schedule please contact [facility] directly at [phone].  Normal or non-critical lab and imaging results will be communicated to you by MyChart, letter or phone within 4 business days after the clinic has received the results. If you do not hear from us within 7 days, please contact the clinic through MyChart or phone. If you have a critical or abnormal lab result, we will notify you by  "phone as soon as possible.  Submit refill requests through [employer] or call your pharmacy and they will forward the refill request to us. Please allow 3 business days for your refill to be completed.          Additional Information About Your Visit        Care EveryWhere ID     This is your Care EveryWhere ID. This could be used by other organizations to access your [place] medical records  [id number]        Your Vitals Were     Pulse Temperature Height Pulse Oximetry BMI (Body Mass Index)       96 98.3  F (36.8  C) (Tympanic) 4' 6\" (1.372 m) 99% 18.57 kg/m2        Blood Pressure from Last 3 Encounters:   06/21/18 94/58   12/21/17 92/50   12/07/17 (!) 88/58    Weight from Last 3 Encounters:   06/21/18 77 lb (34.9 kg)   12/21/17 78 lb (35.4 kg)   12/07/17 78 lb (35.4 kg)              Today, you had the following     No orders found for display       Primary Care Provider Office Phone # Fax #    Dominic DAVI Sweeney [phone]       [facility] [address] [place] [address]        Equal Access to Services     [facility]: Hadii aad ku hadasho Soomaali, waaxda luqadaha, qaybta kaalmada adeegyada, yahir ortez . So [facility] [place] [phone].    ATENCIÓN: Si habla español, tiene a roa disposición servicios gratuitos de asistencia lingüística. Lexii al [phone].    We comply with applicable federal civil rights laws and [place] laws. We do not discriminate on the basis of race, color, national origin, age, disability, sex, sexual orientation, or gender identity.            Thank you!     Thank you for choosing [facility]  for your care. Our goal is always to provide you with excellent care. Hearing back from our patients is one way we can continue to improve our services. Please take a few minutes to complete the written survey that you may receive in the mail after your visit with us. Thank you!             Your Updated Medication List - Protect " others around you: Learn how to safely use, store and throw away your medicines at www.disposemymeds.org.          This list is accurate as of 6/21/18  1:32 PM.  Always use your most recent med list.                   Brand Name Dispense Instructions for use Diagnosis    mometasone 0.1 % cream    ELOCON    15 g    Apply sparingly to affected area twice daily for 7-10 days. Do not apply for longer than 14 days    Dermatitis

## 2018-06-21 NOTE — MR AVS SNAPSHOT
After Visit Summary   6/21/2018    Efren Saavedra    MRN: [id number]           Patient Information     Date Of Birth          1980        Visit Information        Provider Department      6/21/2018 1:45 PM Santa Carr AuD [facility] [place]        Today's Diagnoses     Mixed hearing loss, bilateral    -  1       Follow-ups after your visit        Your next 10 appointments already scheduled     Dec 20, 2018 11:00 AM CST   (Arrive by 10:45 AM)   Return Visit with Marcelina Oneal PA-C   [facility] [place] ([facility] )    [address]  [place] [address]   [phone]              Who to contact     If you have questions or need follow up information about today's clinic visit or your schedule please contact [facility] directly at [phone].  Normal or non-critical lab and imaging results will be communicated to you by MyChart, letter or phone within 4 business days after the clinic has received the results. If you do not hear from us within 7 days, please contact the clinic through MyChart or phone. If you have a critical or abnormal lab result, we will notify you by phone as soon as possible.  Submit refill requests through [employer] or call your pharmacy and they will forward the refill request to us. Please allow 3 business days for your refill to be completed.          Additional Information About Your Visit        Care EveryWhere ID     This is your Care EveryWhere ID. This could be used by other organizations to access your [place] medical records  [id number]         Blood Pressure from Last 3 Encounters:   06/21/18 94/58   12/21/17 92/50   12/07/17 (!) 88/58    Weight from Last 3 Encounters:   06/21/18 77 lb (34.9 kg)   12/21/17 78 lb (35.4 kg)   12/07/17 78 lb (35.4 kg)              We Performed the Following     AUDIOGRAM/TYMPANOGRAM - INTERFACE        Primary Care Provider Office Phone # Fax #    Dominic Sweeney [phone]  [phone]       [facility] [place] [address]  [place] [address]        Equal Access to Services     [place] [facility] : Hadii aad ku hadshanelillian Hoskins, jeanneda lynnettemagalyha, nehashonna wangmildredda agustovijay, yahir liain hayaashivani lizkim stark neelima schuster. So [facility] [phone].    ATENCIÓN: Si habla español, tiene a roa disposición servicios gratuitos de asistencia lingüística. Llame al [phone].    We comply with applicable federal civil rights laws and [place] laws. We do not discriminate on the basis of race, color, national origin, age, disability, sex, sexual orientation, or gender identity.            Thank you!     Thank you for choosing [facility]  for your care. Our goal is always to provide you with excellent care. Hearing back from our patients is one way we can continue to improve our services. Please take a few minutes to complete the written survey that you may receive in the mail after your visit with us. Thank you!             Your Updated Medication List - Protect others around you: Learn how to safely use, store and throw away your medicines at www.disposemymeds.org.          This list is accurate as of 6/21/18  1:47 PM.  Always use your most recent med list.                   Brand Name Dispense Instructions for use Diagnosis    mometasone 0.1 % cream    ELOCON    15 g    Apply sparingly to affected area twice daily for 7-10 days. Do not apply for longer than 14 days    Dermatitis

## 2018-06-21 NOTE — NURSING NOTE
"Chief Complaint   Patient presents with     Cerumen Impaction     ear cleaning        Initial BP 94/58 (BP Location: Right arm, Patient Position: Chair, Cuff Size: Adult Regular)  Pulse 96  Temp 98.3  F (36.8  C) (Tympanic)  Ht 4' 6\" (1.372 m)  Wt 77 lb (34.9 kg)  SpO2 99%  BMI 18.57 kg/m2 Estimated body mass index is 18.57 kg/(m^2) as calculated from the following:    Height as of this encounter: 4' 6\" (1.372 m).    Weight as of this encounter: 77 lb (34.9 kg).  Medication Reconciliation: complete    Emelyn Warren LPN    "

## 2018-06-21 NOTE — PATIENT INSTRUCTIONS
6 month tube check/ cleaning  Complete audiogram.     Thank you for allowing LEONOR Avitia and our ENT team to participate in your care.  If your medications are too expensive, please give the nurse a call.  We can possibly change this medication.  If you have a scheduling or an appointment question please contact [facility] Unit Coordinator at their direct line [phone].   ALL nursing questions or concerns can be directed to your ENT nurse at: [phone] [place]

## 2018-06-21 NOTE — PROGRESS NOTES
"Chief Complaint   Patient presents with     Cerumen Impaction     ear cleaning      Efren returns for recheck of tubes and ear cleaning. Her last visit was on 12/21/17 for f/u after acute otorrhea.   Mom has no new concerns.   Distant hx of BTT with placement of t-tubes. Tubes have been in place for several years. She has aids but does not wear them at this time.   No recent illness. No other concerns.   She has been without nasal congestion, runny nose, sneezing or other symptoms at this time  No past medical history on file.   No Known Allergies  Current Outpatient Prescriptions   Medication     mometasone (ELOCON) 0.1 % cream     No current facility-administered medications for this visit.       ROS: 10 point ROS neg other than the symptoms noted above in the HPI.  BP 94/58 (BP Location: Right arm, Patient Position: Chair, Cuff Size: Adult Regular)  Pulse 96  Temp 98.3  F (36.8  C) (Tympanic)  Ht 4' 6\" (1.372 m)  Wt 77 lb (34.9 kg)  SpO2 99%  BMI 18.57 kg/m2    Gerenal: Craniofacial abnormality.   Eyes: conjuctiva clear, PERRL, EOM intact   Ears: Otorrhea left. Right with cerumen. Narrow canals. . Low set auricles with poor antihelical folds defined. t-tube bilaterally. Gila bowl bilateral dermatitis   Nose: Nares normal   Mouth: normal   Neck: Supple, no cervical adenopathy, no thyromegaly   The ear canals were examined underneath the operating microscope and with an otologic speculum. No otorrhea.  There is no granulation tissue or sign of cholesteatoma.   Canals, stenotic   Bilateral t-tubes. No otorrhea. Patent tubes.         Audiogram from 7/4/16.   Unable to seal for tympanograms.   Thresholds are improved from 2014. Moderate to severe mixed hearing loss, bilateral      ASSESSMENT:    ICD-10-CM    1. Mixed conductive and sensorineural hearing loss, bilateral H90.6    2. Congenital hearing loss H90.5    3. External ear canal stenosis, acquired, bilateral H61.303    4. Tympanostomy tube check Z45.89  "   5. Impacted cerumen of both ears H61.23      Audiogram is pending.   Ears look well. No otorrhea seen on exam. No active OM.      Return to ENT in 6 month for tube check and ear cleaning.        Marcelina Oneal PA-C  ENT  [facility], [place]  [phone]

## 2018-11-01 ENCOUNTER — OFFICE VISIT (OUTPATIENT)
Dept: OTOLARYNGOLOGY | Facility: OTHER | Age: 38
End: 2018-11-01
Attending: PHYSICIAN ASSISTANT
Payer: MEDICARE

## 2018-11-01 VITALS
SYSTOLIC BLOOD PRESSURE: 98 MMHG | WEIGHT: 70 LBS | BODY MASS INDEX: 16.88 KG/M2 | OXYGEN SATURATION: 99 % | HEART RATE: 85 BPM | DIASTOLIC BLOOD PRESSURE: 56 MMHG | TEMPERATURE: 97.8 F

## 2018-11-01 DIAGNOSIS — Z45.89 TYMPANOSTOMY TUBE CHECK: ICD-10-CM

## 2018-11-01 DIAGNOSIS — H92.13 OTORRHEA OF BOTH EARS: ICD-10-CM

## 2018-11-01 DIAGNOSIS — H90.5 CONGENITAL HEARING LOSS: ICD-10-CM

## 2018-11-01 DIAGNOSIS — H66.93 BILATERAL ACUTE OTITIS MEDIA: Primary | ICD-10-CM

## 2018-11-01 DIAGNOSIS — H61.303 EXTERNAL EAR CANAL STENOSIS, ACQUIRED, BILATERAL: ICD-10-CM

## 2018-11-01 PROCEDURE — G0463 HOSPITAL OUTPT CLINIC VISIT: HCPCS

## 2018-11-01 PROCEDURE — 92504 EAR MICROSCOPY EXAMINATION: CPT

## 2018-11-01 PROCEDURE — 92504 EAR MICROSCOPY EXAMINATION: CPT | Performed by: PHYSICIAN ASSISTANT

## 2018-11-01 PROCEDURE — 99213 OFFICE O/P EST LOW 20 MIN: CPT | Mod: 25 | Performed by: PHYSICIAN ASSISTANT

## 2018-11-01 RX ORDER — OFLOXACIN 3 MG/ML
5 SOLUTION AURICULAR (OTIC) 2 TIMES DAILY
Qty: 4 ML | Refills: 0 | Status: SHIPPED | OUTPATIENT
Start: 2018-11-01 | End: 2019-04-22

## 2018-11-01 RX ORDER — CEFDINIR 250 MG/5ML
14 POWDER, FOR SUSPENSION ORAL DAILY
Qty: 90 ML | Refills: 0 | Status: SHIPPED | OUTPATIENT
Start: 2018-11-01 | End: 2019-04-22

## 2018-11-01 ASSESSMENT — PAIN SCALES - GENERAL: PAINLEVEL: NO PAIN (0)

## 2018-11-01 NOTE — LETTER
11/1/2018         RE: Efren Saavedra  [address] [place] [address]        Dear Colleague,    Thank you for referring your patient, Efren Saavedra, to the [facility]. Please see a copy of my visit note below.    Chief Complaint   Patient presents with     Ear Problem     Ear drainage.  Left ear drainage for 2 days   Efren returns for recheck of her ears. She was complaining of left ear being painful and plugged and 2 days ago her ear opened.     Distant hx of BTT with placement of t-tubes. Tubes have been in place for several years. She has aids but does not wear them at this time.   No recent illness. No other concerns.   She has been without nasal congestion, runny nose, sneezing or other symptoms at this time    History reviewed. No pertinent past medical history.   No Known Allergies  Current Outpatient Prescriptions   Medication     cefdinir (OMNICEF) 250 MG/5ML suspension     mometasone (ELOCON) 0.1 % cream     ofloxacin (FLOXIN) 0.3 % otic solution     No current facility-administered medications for this visit.       ROS: 10 point ROS neg other than the symptoms noted above in the HPI.  BP 98/56 (BP Location: Right arm, Patient Position: Sitting, Cuff Size: Child)  Pulse 85  Temp 97.8  F (36.6  C) (Tympanic)  Wt 31.8 kg (70 lb)  SpO2 99%  BMI 16.88 kg/m2  Gerenal: Craniofacial abnormality.   Eyes: conjuctiva clear, PERRL, EOM intact   Ears: Otorrhea left. Right with cerumen. Narrow canals. . Low set auricles with poor antihelical folds defined. t-tube bilaterally. Gila bowl bilateral dermatitis   Nose: Nares normal   Mouth: normal   Neck: Supple, no cervical adenopathy, no thyromegaly   The ear canals were examined underneath the operating microscope and with an otologic speculum. Thick otorrhea bilaterally.  There is no granulation tissue or sign of cholesteatoma.   Canals, stenotic   Bilateral t-tubes.  Patent tubes after suctioning. However, there is continued  drainage from ME.           Audiogram from 7/4/16.   Unable to seal for tympanograms.   Thresholds are improved from 2014. Moderate to severe mixed hearing loss, bilateral    ASSESSMENT:    ICD-10-CM    1. Bilateral acute otitis media H66.93 ofloxacin (FLOXIN) 0.3 % otic solution     cefdinir (OMNICEF) 250 MG/5ML suspension   2. Otorrhea of both ears H92.13 ofloxacin (FLOXIN) 0.3 % otic solution     cefdinir (OMNICEF) 250 MG/5ML suspension   3. Congenital hearing loss H90.5    4. External ear canal stenosis, acquired, bilateral H61.303    5. Tympanostomy tube check Z45.89      Start Floxin BID for 7 days  Start Omnicef daily for 10 days  Return for recheck within 2 weeks    They agree with this plan  If ongoing otorrhea and no improvement- she will need culture completed.     Marcelina Oneal PA-C  ENT  [facility], [place]  [phone]      Again, thank you for allowing me to participate in the care of your patient.        Sincerely,        Marcelina Oneal PA-C

## 2018-11-01 NOTE — NURSING NOTE
"Chief Complaint   Patient presents with     Ear Problem     Ear drainage.  Left ear drainage for 2 days       Initial BP 98/56 (BP Location: Right arm, Patient Position: Sitting, Cuff Size: Child)  Pulse 85  Temp 97.8  F (36.6  C) (Tympanic)  Wt 31.8 kg (70 lb)  SpO2 99%  BMI 16.88 kg/m2 Estimated body mass index is 16.88 kg/(m^2) as calculated from the following:    Height as of 6/21/18: 1.372 m (4' 6\").    Weight as of this encounter: 31.8 kg (70 lb).  Medication Reconciliation: complete    HECTOR TYSON LPN    "

## 2018-11-01 NOTE — PATIENT INSTRUCTIONS
Start Floxin ear drops 5 drops twice a day for 7 days to both ears  Start Oral Omnicef once a day for 10 days.   Return in 7-10 days for recheck    Thank you for allowing LEONOR Avitia and our ENT team to participate in your care.  If your medications are too expensive, please give the nurse a call.  We can possibly change this medication.  If you have a scheduling or an appointment question please contact our Health Unit Coordinator at their direct line [phone].   ALL nursing questions or concerns can be directed to your ENT nurse at: [phone] [place]

## 2018-11-01 NOTE — PROGRESS NOTES
Chief Complaint   Patient presents with     Ear Problem     Ear drainage.  Left ear drainage for 2 days   Efren returns for recheck of her ears. She was complaining of left ear being painful and plugged and 2 days ago her ear opened.     Distant hx of BTT with placement of t-tubes. Tubes have been in place for several years. She has aids but does not wear them at this time.   No recent illness. No other concerns.   She has been without nasal congestion, runny nose, sneezing or other symptoms at this time    History reviewed. No pertinent past medical history.   No Known Allergies  Current Outpatient Prescriptions   Medication     cefdinir (OMNICEF) 250 MG/5ML suspension     mometasone (ELOCON) 0.1 % cream     ofloxacin (FLOXIN) 0.3 % otic solution     No current facility-administered medications for this visit.       ROS: 10 point ROS neg other than the symptoms noted above in the HPI.  BP 98/56 (BP Location: Right arm, Patient Position: Sitting, Cuff Size: Child)  Pulse 85  Temp 97.8  F (36.6  C) (Tympanic)  Wt 31.8 kg (70 lb)  SpO2 99%  BMI 16.88 kg/m2  Gerenal: Craniofacial abnormality.   Eyes: conjuctiva clear, PERRL, EOM intact   Ears: Otorrhea left. Right with cerumen. Narrow canals. . Low set auricles with poor antihelical folds defined. t-tube bilaterally. Gila bowl bilateral dermatitis   Nose: Nares normal   Mouth: normal   Neck: Supple, no cervical adenopathy, no thyromegaly   The ear canals were examined underneath the operating microscope and with an otologic speculum. Thick otorrhea bilaterally.  There is no granulation tissue or sign of cholesteatoma.   Canals, stenotic   Bilateral t-tubes.  Patent tubes after suctioning. However, there is continued drainage from ME.           Audiogram from 7/4/16.   Unable to seal for tympanograms.   Thresholds are improved from 2014. Moderate to severe mixed hearing loss, bilateral    ASSESSMENT:    ICD-10-CM    1. Bilateral acute otitis media H66.93  ofloxacin (FLOXIN) 0.3 % otic solution     cefdinir (OMNICEF) 250 MG/5ML suspension   2. Otorrhea of both ears H92.13 ofloxacin (FLOXIN) 0.3 % otic solution     cefdinir (OMNICEF) 250 MG/5ML suspension   3. Congenital hearing loss H90.5    4. External ear canal stenosis, acquired, bilateral H61.303    5. Tympanostomy tube check Z45.89      Start Floxin BID for 7 days  Start Omnicef daily for 10 days  Return for recheck within 2 weeks    They agree with this plan  If ongoing otorrhea and no improvement- she will need culture completed.     Marcelina Oneal PA-C  ENT  [facility], [place]  [phone]

## 2018-11-01 NOTE — MR AVS SNAPSHOT
After Visit Summary   11/1/2018    Efren Saavedra    MRN: [id number]           Patient Information     Date Of Birth          1980        Visit Information        Provider Department      11/1/2018 11:30 AM Marcelina Oneal PA-C [facility]        Today's Diagnoses     Bilateral acute otitis media    -  1    Otorrhea of both ears        Congenital hearing loss        External ear canal stenosis, acquired, bilateral        Tympanostomy tube check          Care Instructions    Start Floxin ear drops 5 drops twice a day for 7 days to both ears  Start Oral Omnicef once a day for 10 days.   Return in 7-10 days for recheck    Thank you for allowing [facility] Av[facility] and our ENT team to participate in your care.  If your medications are too expensive, please give the nurse a call.  We can possibly change this medication.  If you have a scheduling or an appointment question please contact our Health Unit Coordinator at their direct line [phone].   ALL nursing questions or concerns can be directed to your ENT nurse at: [phone] [facility]               Follow-ups after your visit        Your next 10 appointments already scheduled     Dec 20, 2018 11:00 AM CST   (Arrive by 10:45 AM)   Return Visit with Marcelina Oneal PA-C   [facility] ([facility] )    [address]  [place] [address]   [phone]              Who to contact     If you have questions or need follow up information about today's clinic visit or your schedule please contact [facility] directly at [phone].  Normal or non-critical lab and imaging results will be communicated to you by MyChart, letter or phone within 4 business days after the clinic has received the results. If you do not hear from us within 7 days, please contact the clinic through MyChart or phone. If you have a critical or abnormal lab result, we will notify you by phone as soon as  possible.  Submit refill requests through [employer] or call your pharmacy and they will forward the refill request to us. Please allow 3 business days for your refill to be completed.          Additional Information About Your Visit        Care EveryWhere ID     This is your Care EveryWhere ID. This could be used by other organizations to access your [place] medical records  [id number]        Your Vitals Were     Pulse Temperature Pulse Oximetry BMI (Body Mass Index)          85 97.8  F (36.6  C) (Tympanic) 99% 16.88 kg/m2         Blood Pressure from Last 3 Encounters:   11/01/18 98/56   06/21/18 94/58   12/21/17 92/50    Weight from Last 3 Encounters:   11/01/18 31.8 kg (70 lb)   06/21/18 34.9 kg (77 lb)   12/21/17 35.4 kg (78 lb)              Today, you had the following     No orders found for display         Today's Medication Changes          These changes are accurate as of 11/1/18 12:02 PM.  If you have any questions, ask your nurse or doctor.               Start taking these medicines.        Dose/Directions    cefdinir 250 MG/5ML suspension   Commonly known as:  OMNICEF   Used for:  Bilateral acute otitis media, Otorrhea of both ears   Started by:  Marcelina Oneal PA-C        Dose:  14 mg/kg/day   Take 9 mLs (450 mg) by mouth daily for 10 days   Quantity:  90 mL   Refills:  0       ofloxacin 0.3 % otic solution   Commonly known as:  FLOXIN   Used for:  Bilateral acute otitis media, Otorrhea of both ears   Started by:  Marcelina Oneal PA-C        Dose:  5 drop   Place 5 drops into both ears 2 times daily for 7 days   Quantity:  4 mL   Refills:  0            Where to get your medicines      These medications were sent to [id number] - [place] - [address]  [address] [place] [address]     Phone:  [phone]     cefdinir 250 MG/5ML suspension    ofloxacin 0.3 % otic solution                Primary Care Provider Office Phone # Fax #    Dominic Sweeney [phone]        [facility] [address] [place] [address]        Equal Access to Services     ALYSSACHRISTIE PREM : Hadii aad ku hadshanelillian Sotemi, waaxda luqadaha, qaybta kaalmada trinity, yahir girard haytamra callahanericaramon schuster. So [facility] [phone].    ATENCIÓN: Si habla español, tiene a roa disposición servicios gratuitos de asistencia lingüística. Llame al [phone].    We comply with applicable federal civil rights laws and [place] laws. We do not discriminate on the basis of race, color, national origin, age, disability, sex, sexual orientation, or gender identity.            Thank you!     Thank you for choosing [facility]  for your care. Our goal is always to provide you with excellent care. Hearing back from our patients is one way we can continue to improve our services. Please take a few minutes to complete the written survey that you may receive in the mail after your visit with us. Thank you!             Your Updated Medication List - Protect others around you: Learn how to safely use, store and throw away your medicines at www.disposemymeds.org.          This list is accurate as of 11/1/18 12:02 PM.  Always use your most recent med list.                   Brand Name Dispense Instructions for use Diagnosis    cefdinir 250 MG/5ML suspension    OMNICEF    90 mL    Take 9 mLs (450 mg) by mouth daily for 10 days    Bilateral acute otitis media, Otorrhea of both ears       mometasone 0.1 % cream    ELOCON    15 g    Apply sparingly to affected area twice daily for 7-10 days. Do not apply for longer than 14 days    Dermatitis       ofloxacin 0.3 % otic solution    FLOXIN    4 mL    Place 5 drops into both ears 2 times daily for 7 days    Bilateral acute otitis media, Otorrhea of both ears

## 2018-11-13 ENCOUNTER — OFFICE VISIT (OUTPATIENT)
Dept: OTOLARYNGOLOGY | Facility: OTHER | Age: 38
End: 2018-11-13
Attending: PHYSICIAN ASSISTANT
Payer: MEDICARE

## 2018-11-13 VITALS
BODY MASS INDEX: 17.36 KG/M2 | TEMPERATURE: 99 F | OXYGEN SATURATION: 99 % | SYSTOLIC BLOOD PRESSURE: 96 MMHG | HEART RATE: 110 BPM | DIASTOLIC BLOOD PRESSURE: 54 MMHG | WEIGHT: 75 LBS | HEIGHT: 55 IN

## 2018-11-13 DIAGNOSIS — H74.41: Primary | ICD-10-CM

## 2018-11-13 DIAGNOSIS — H73.20 MYRINGITIS: ICD-10-CM

## 2018-11-13 DIAGNOSIS — H90.5 CONGENITAL HEARING LOSS: ICD-10-CM

## 2018-11-13 DIAGNOSIS — Z45.89 TYMPANOSTOMY TUBE CHECK: ICD-10-CM

## 2018-11-13 DIAGNOSIS — H90.6 MIXED CONDUCTIVE AND SENSORINEURAL HEARING LOSS, BILATERAL: ICD-10-CM

## 2018-11-13 PROCEDURE — G0463 HOSPITAL OUTPT CLINIC VISIT: HCPCS

## 2018-11-13 PROCEDURE — 99213 OFFICE O/P EST LOW 20 MIN: CPT | Performed by: PHYSICIAN ASSISTANT

## 2018-11-13 RX ORDER — CIPROFLOXACIN AND DEXAMETHASONE 3; 1 MG/ML; MG/ML
4 SUSPENSION/ DROPS AURICULAR (OTIC) 2 TIMES DAILY
Qty: 4 ML | Refills: 0 | Status: SHIPPED | OUTPATIENT
Start: 2018-11-13 | End: 2019-04-22

## 2018-11-13 ASSESSMENT — PAIN SCALES - GENERAL: PAINLEVEL: NO PAIN (0)

## 2018-11-13 NOTE — PROGRESS NOTES
"Chief Complaint   Patient presents with     Follow Up For     B OM, otorrhea B ears, CHL- Floxin, Omnicef     Efren presents for ear check w/ her mom. Mom provides hx and details. Ears feel better. No otorrhea, but ears feels plugged intermittently.   She had completed Omnicef and developed rash about 24 hours after completing course. She developed rash along abdomen, chest, arms, legs. She has since seen her PCP and started her on Claritin, Zantac.   Efren had completed Omnicef in 2014 w/o concerns.     Distant hx of BTT with placement of t-tubes. Tubes have been in place for several years. She has aids but does not wear them at this time.   No recent illness. No other concerns.   She has been without nasal congestion, runny nose, sneezing or other symptoms at this time    No past medical history on file.     Allergies   Allergen Reactions     Omnicef [Cefdinir] Rash     Itchy and bad rash     Current Outpatient Prescriptions   Medication     ciprofloxacin-dexamethasone (CIPRODEX) otic suspension     mometasone (ELOCON) 0.1 % cream     No current facility-administered medications for this visit.       ROS: See HPI. Mom provides hx.   BP 96/54 (BP Location: Left arm, Patient Position: Sitting, Cuff Size: Adult Regular)  Pulse 110  Temp 99  F (37.2  C) (Tympanic)  Ht 1.372 m (4' 6\")  Wt 34 kg (75 lb)  SpO2 99%  BMI 18.08 kg/m2  Gerenal: Craniofacial abnormality.   Eyes: conjuctiva clear, PERRL, EOM intact   Ears: Otorrhea left. Right with cerumen. Narrow canals. . Low set auricles with poor antihelical folds defined. t-tube bilaterally. Gila bowl bilateral dermatitis   Nose: Nares normal   Mouth: normal   Neck: Supple, no cervical adenopathy, no thyromegaly   The ear canals were examined underneath the operating microscope and with an otologic speculum. Scant debris suctioned. Tolerated well.   Canals, stenotic   Bilateral tubes appear in place. Right TM with ME polyp extending thru lumen of tube. Scant " granulation along superior margin.   ASSESSMENT:    ICD-10-CM    1. Polyp, middle ear, right H74.41 ciprofloxacin-dexamethasone (CIPRODEX) otic suspension   2. Myringitis H73.20 ciprofloxacin-dexamethasone (CIPRODEX) otic suspension   3. Congenital hearing loss H90.5    4. 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 Tympanostomy tube check Z45.89      Start Ciprodex BID for 10 days  She will require PA due to past hx.   She does have a middle ear polyp which has good results following use of a topical steroid. At this time, I declined use of Cortisporin administration due to possible hearing loss w/ use and patient already has HL.     Follow up is within upcoming weeks. She may need ear culture if otorrhea returns.   Discussed if there is no improvement, may consider further options including surgery/ ear exam under anesthesia. However, I would hope improvement.       Marcelina Oneal PA-C  ENT  [facility], [place]  [phone]

## 2018-11-13 NOTE — MR AVS SNAPSHOT
After Visit Summary   11/13/2018    Efren Saavedra    MRN: [id number]           Patient Information     Date Of Birth          1980        Visit Information        Provider Department      11/13/2018 3:00 PM Marcelina Oneal PA-C [facility]        Today's Diagnoses     Polyp, middle ear, right    -  1    Myringitis        Congenital hearing loss        Mixed conductive and sensorineural hearing loss, bilateral        Tympanostomy tube check          Care Instructions    Start Ciprodex 4 drops twice a day to both ears for 7-10 days.   Return for recheck in December  Sooner if ear concerns    Allergy to Omnicef added.   Start Claritin, Zantac as directed from PCP.     Thank you for allowing [facility] Av[facility] and our ENT team to participate in your care.  If your medications are too expensive, please give the nurse a call.  We can possibly change this medication.  If you have a scheduling or an appointment question please contact our Health Unit Coordinator at their direct line [phone].   ALL nursing questions or concerns can be directed to your ENT nurse at: [phone] [facility]              Follow-ups after your visit        Your next 10 appointments already scheduled     Dec 20, 2018 11:00 AM CST   (Arrive by 10:45 AM)   Return Visit with Marcelina Oneal PA-C   [facility]    [address] [place] [address] [facility] [id number] [phone]              Who to contact     If you have questions or need follow up information about today's clinic visit or your schedule please contact [facility] directly at [phone].  Normal or non-critical lab and imaging results will be communicated to you by MyChart, letter or phone within 4 business days after the clinic has received the results. If you do not hear from us within 7 days, please contact the clinic through MyChart or phone. If you have a critical or abnormal lab  "result, we will notify you by phone as soon as possible.  Submit refill requests through [email or web address] or call your pharmacy and they will forward the refill request to us. Please allow 3 business days for your refill to be completed.          Additional Information About Your Visit        Care EveryWhere ID     This is your Care EveryWhere ID. This could be used by other organizations to access your [place] medical records  [id number]        Your Vitals Were     Pulse Temperature Height Pulse Oximetry BMI (Body Mass Index)       110 99  F (37.2  C) (Tympanic) 1.372 m (4' 6\") 99% 18.08 kg/m2        Blood Pressure from Last 3 Encounters:   11/13/18 96/54   11/01/18 98/56   06/21/18 94/58    Weight from Last 3 Encounters:   11/13/18 34 kg (75 lb)   11/01/18 31.8 kg (70 lb)   06/21/18 34.9 kg (77 lb)              Today, you had the following     No orders found for display         Today's Medication Changes          These changes are accurate as of 11/13/18  3:30 PM.  If you have any questions, ask your nurse or doctor.               Start taking these medicines.        Dose/Directions    ciprofloxacin-dexamethasone otic suspension   Commonly known as:  CIPRODEX   Used for:  Polyp, middle ear, right, Myringitis   Started by:  Marcelina Oneal PA-C        Dose:  4 drop   Place 4 drops into both ears 2 times daily for 10 days   Quantity:  4 mL   Refills:  0            Where to get your medicines      These medications were sent to [id number] - [place], [place] - [address]  [address] [facility] [address]     Phone:  [phone]     ciprofloxacin-dexamethasone otic suspension                Primary Care Provider Office Phone # Fax #    Dominic Sweeney [phone]       [facility] [address] [facility] [address]        Equal Access to Services     [facility] PREM AH: Albania Hoskins, waaxda luqadaha, qaybta kaalmada trinity, yahir ortez " ah. So [facility] [phone].    ATENCIÓN: Si coty calix, tiene a roa disposición servicios gratuitos de asistencia lingüística. Lexii al [phone].    We comply with applicable federal civil rights laws and [place] laws. We do not discriminate on the basis of race, color, national origin, age, disability, sex, sexual orientation, or gender identity.            Thank you!     Thank you for choosing [facility]  for your care. Our goal is always to provide you with excellent care. Hearing back from our patients is one way we can continue to improve our services. Please take a few minutes to complete the written survey that you may receive in the mail after your visit with us. Thank you!             Your Updated Medication List - Protect others around you: Learn how to safely use, store and throw away your medicines at www.disposemymeds.org.          This list is accurate as of 11/13/18  3:30 PM.  Always use your most recent med list.                   Brand Name Dispense Instructions for use Diagnosis    ciprofloxacin-dexamethasone otic suspension    CIPRODEX    4 mL    Place 4 drops into both ears 2 times daily for 10 days    Polyp, middle ear, right, Myringitis       mometasone 0.1 % cream    ELOCON    15 g    Apply sparingly to affected area twice daily for 7-10 days. Do not apply for longer than 14 days    Dermatitis

## 2018-11-13 NOTE — NURSING NOTE
"Chief Complaint   Patient presents with     Follow Up For     B OM, otorrhea B ears, CHL- Floxin, Omnicef       Initial BP 96/54 (BP Location: Left arm, Patient Position: Sitting, Cuff Size: Adult Regular)  Pulse 110  Temp 99  F (37.2  C) (Tympanic)  Ht 1.372 m (4' 6\")  Wt 34 kg (75 lb)  SpO2 99%  BMI 18.08 kg/m2 Estimated body mass index is 18.08 kg/(m^2) as calculated from the following:    Height as of this encounter: 1.372 m (4' 6\").    Weight as of this encounter: 34 kg (75 lb).  Medication Reconciliation: complete    Cinda Noonan LPN  "

## 2018-11-13 NOTE — LETTER
"    11/13/2018         RE: Efren Saavedra  [address]  [place] [address]        Dear Colleague,    Thank you for referring your patient, Efren Saavedra, to the [facility] [place]. Please see a copy of my visit note below.    Chief Complaint   Patient presents with     Follow Up For     B OM, otorrhea B ears, CHL- Floxin, Omnicef     Efren presents for ear check w/ her mom. Mom provides hx and details. Ears feel better. No otorrhea, but ears feels plugged intermittently.   She had completed Omnicef and developed rash about 24 hours after completing course. She developed rash along abdomen, chest, arms, legs. She has since seen her PCP and started her on Claritin, Zantac.   Efren had completed Omnicef in 2014 w/o concerns.     Distant hx of BTT with placement of t-tubes. Tubes have been in place for several years. She has aids but does not wear them at this time.   No recent illness. No other concerns.   She has been without nasal congestion, runny nose, sneezing or other symptoms at this time    No past medical history on file.     Allergies   Allergen Reactions     Omnicef [Cefdinir] Rash     Itchy and bad rash     Current Outpatient Prescriptions   Medication     ciprofloxacin-dexamethasone (CIPRODEX) otic suspension     mometasone (ELOCON) 0.1 % cream     No current facility-administered medications for this visit.       ROS: See HPI. Mom provides hx.   BP 96/54 (BP Location: Left arm, Patient Position: Sitting, Cuff Size: Adult Regular)  Pulse 110  Temp 99  F (37.2  C) (Tympanic)  Ht 1.372 m (4' 6\")  Wt 34 kg (75 lb)  SpO2 99%  BMI 18.08 kg/m2  Gerenal: Craniofacial abnormality.   Eyes: conjuctiva clear, PERRL, EOM intact   Ears: Otorrhea left. Right with cerumen. Narrow canals. . Low set auricles with poor antihelical folds defined. t-tube bilaterally. Gila bowl bilateral dermatitis   Nose: Nares normal   Mouth: normal   Neck: Supple, no cervical adenopathy, no thyromegaly "   The ear canals were examined underneath the operating microscope and with an otologic speculum. Scant debris suctioned. Tolerated well.   Canals, stenotic   Bilateral tubes appear in place. Right TM with ME polyp extending thru lumen of tube. Scant granulation along superior margin.   ASSESSMENT:    ICD-10-CM    1. Polyp, middle ear, right H74.41 ciprofloxacin-dexamethasone (CIPRODEX) otic suspension   2. Myringitis H73.20 ciprofloxacin-dexamethasone (CIPRODEX) otic suspension   3. Congenital hearing loss H90.5    4. 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 Tympanostomy tube check Z45.89      Start Ciprodex BID for 10 days  She will require PA due to past hx.   She does have a middle ear polyp which has good results following use of a topical steroid. At this time, I declined use of Cortisporin administration due to possible hearing loss w/ use and patient already has HL.     Follow up is within upcoming weeks. She may need ear culture if otorrhea returns.   Discussed if there is no improvement, may consider further options including surgery/ ear exam under anesthesia. However, I would hope improvement.       Marcelina Oneal PA-C  ENT  [facility], [place]  [phone]      Again, thank you for allowing me to participate in the care of your patient.        Sincerely,        Marcelina Oneal PA-C

## 2018-11-13 NOTE — PATIENT INSTRUCTIONS
Start Ciprodex 4 drops twice a day to both ears for 7-10 days.   Return for recheck in December  Sooner if ear concerns    Allergy to Omnicef added.   Start Claritin, Zantac as directed from PCP.     Thank you for allowing LEONOR Avitia and our ENT team to participate in your care.  If your medications are too expensive, please give the nurse a call.  We can possibly change this medication.  If you have a scheduling or an appointment question please contact our Health Unit Coordinator at their direct line [phone].   ALL nursing questions or concerns can be directed to your ENT nurse at: [phone] [place]

## 2018-12-20 ENCOUNTER — OFFICE VISIT (OUTPATIENT)
Dept: OTOLARYNGOLOGY | Facility: OTHER | Age: 38
End: 2018-12-20
Attending: PHYSICIAN ASSISTANT
Payer: MEDICARE

## 2018-12-20 VITALS
WEIGHT: 74.96 LBS | OXYGEN SATURATION: 100 % | DIASTOLIC BLOOD PRESSURE: 56 MMHG | BODY MASS INDEX: 17.35 KG/M2 | SYSTOLIC BLOOD PRESSURE: 94 MMHG | HEIGHT: 55 IN | TEMPERATURE: 97.5 F | HEART RATE: 90 BPM

## 2018-12-20 DIAGNOSIS — Z45.89 TYMPANOSTOMY TUBE CHECK: ICD-10-CM

## 2018-12-20 DIAGNOSIS — H90.5 CONGENITAL HEARING LOSS: Primary | ICD-10-CM

## 2018-12-20 DIAGNOSIS — H90.6 MIXED CONDUCTIVE AND SENSORINEURAL HEARING LOSS, BILATERAL: ICD-10-CM

## 2018-12-20 PROCEDURE — G0463 HOSPITAL OUTPT CLINIC VISIT: HCPCS

## 2018-12-20 PROCEDURE — 99213 OFFICE O/P EST LOW 20 MIN: CPT | Performed by: PHYSICIAN ASSISTANT

## 2018-12-20 ASSESSMENT — PAIN SCALES - GENERAL: PAINLEVEL: NO PAIN (0)

## 2018-12-20 ASSESSMENT — MIFFLIN-ST. JEOR: SCORE: 851.25

## 2018-12-20 NOTE — PROGRESS NOTES
"Chief Complaint   Patient presents with     Ear Problem     follow up right middle ear polyp- Ciprodex     Efren presents for ear check w/ her mom. Mom (Sophia) provides hx and details. Ears feel better. No otorrhea, but ears feels plugged intermittently.   She completed Ciprodex. No otorrhea. No complaints.   She does have hearing loss and has declined use of aids.   Distant hx of BTT with placement of t-tubes. Tubes have been in place for several years. She has aids but does not wear them at this time.   No recent illness. No other concerns.   She has been without nasal congestion, runny nose, sneezing or other symptoms at this time    No past medical history on file.     Allergies   Allergen Reactions     Omnicef [Cefdinir] Rash     Itchy and bad rash     Current Outpatient Medications   Medication     mometasone (ELOCON) 0.1 % cream     No current facility-administered medications for this visit.       ROS: 10 point ROS neg other than the symptoms noted above in the HPI.  BP 94/56 (BP Location: Right arm, Patient Position: Sitting, Cuff Size: Adult Small)   Pulse 90   Temp 97.5  F (36.4  C) (Tympanic)   Ht 1.372 m (4' 6\")   Wt 34 kg (74 lb 15.3 oz)   SpO2 100%   BMI 18.07 kg/m    Gerenal: Craniofacial abnormality.   Eyes: conjuctiva clear, PERRL, EOM intact   Ears: Otorrhea left. Right with cerumen. Narrow canals. . Low set auricles with poor antihelical folds defined. t-tube bilaterally. Gila bowl bilateral dermatitis   Nose: Nares normal   Mouth: normal   Neck: Supple, no cervical adenopathy, no thyromegaly   The ear canals were examined underneath the operating microscope and with an otologic speculum. Scant debris suctioned. Tolerated well.   Canals, stenotic   Bilateral tubes appear in place.Right ME polyp has resolved.   No otorrhea. Dry canals. Tubes remain patent.       ASSESSMENT:    ICD-10-CM    1. Congenital hearing loss H90.5    2. Mixed conductive and sensorineural hearing loss, bilateral " H90.6    3. Tympanostomy tube check Z45.89      Ears look well  Right ME polyp has resolved.   Return to ENT if there are concerns for otorrhea or contact nurse.   Return for ear check in 4-5 months          Marcelina Oneal PA-C  ENT  [facility], [place]  [phone]

## 2018-12-20 NOTE — PATIENT INSTRUCTIONS
Ears look well  Polyp resolved at this time.   Keep ears dry    Follow up in 4-5 months  Thank you for allowing LEONOR Avitia and our ENT team to participate in your care.  If your medications are too expensive, please give the nurse a call.  We can possibly change this medication.  If you have a scheduling or an appointment question please contact our Health Unit Coordinator at their direct line [phone].   ALL nursing questions or concerns can be directed to your ENT nurse at: [phone] [place]

## 2018-12-20 NOTE — NURSING NOTE
"Chief Complaint   Patient presents with     Ear Problem     follow up right middle ear polyp- Ciprodex       Initial BP 94/56 (BP Location: Right arm, Patient Position: Sitting, Cuff Size: Adult Small)   Pulse 90   Temp 97.5  F (36.4  C) (Tympanic)   Ht 1.372 m (4' 6\")   Wt 34 kg (74 lb 15.3 oz)   SpO2 100%   BMI 18.07 kg/m   Estimated body mass index is 18.07 kg/m  as calculated from the following:    Height as of this encounter: 1.372 m (4' 6\").    Weight as of this encounter: 34 kg (74 lb 15.3 oz).  Medication Reconciliation: complete    Cinda Noonan LPN  "

## 2018-12-20 NOTE — LETTER
"    12/20/2018         RE: Efren Saavedra  [address] [place] [address]        Dear Colleague,    Thank you for referring your patient, Efren Saavedra, to the [facility] [place]. Please see a copy of my visit note below.    Chief Complaint   Patient presents with     Ear Problem     follow up right middle ear polyp- Ciprodex     Efren presents for ear check w/ her mom. Mom (Sophia) provides hx and details. Ears feel better. No otorrhea, but ears feels plugged intermittently.   She completed Ciprodex. No otorrhea. No complaints.   She does have hearing loss and has declined use of aids.   Distant hx of BTT with placement of t-tubes. Tubes have been in place for several years. She has aids but does not wear them at this time.   No recent illness. No other concerns.   She has been without nasal congestion, runny nose, sneezing or other symptoms at this time    No past medical history on file.     Allergies   Allergen Reactions     Omnicef [Cefdinir] Rash     Itchy and bad rash     Current Outpatient Medications   Medication     mometasone (ELOCON) 0.1 % cream     No current facility-administered medications for this visit.       ROS: 10 point ROS neg other than the symptoms noted above in the HPI.  BP 94/56 (BP Location: Right arm, Patient Position: Sitting, Cuff Size: Adult Small)   Pulse 90   Temp 97.5  F (36.4  C) (Tympanic)   Ht 1.372 m (4' 6\")   Wt 34 kg (74 lb 15.3 oz)   SpO2 100%   BMI 18.07 kg/m     Gerenal: Craniofacial abnormality.   Eyes: conjuctiva clear, PERRL, EOM intact   Ears: Otorrhea left. Right with cerumen. Narrow canals. . Low set auricles with poor antihelical folds defined. t-tube bilaterally. Gila bowl bilateral dermatitis   Nose: Nares normal   Mouth: normal   Neck: Supple, no cervical adenopathy, no thyromegaly   The ear canals were examined underneath the operating microscope and with an otologic speculum. Scant debris suctioned. Tolerated well.   Canals, " stenotic   Bilateral tubes appear in place.Right ME polyp has resolved.   No otorrhea. Dry canals. Tubes remain patent.       ASSESSMENT:    ICD-10-CM    1. Congenital hearing loss H90.5    2. Mixed conductive and sensorineural hearing loss, bilateral H90.6    3. Tympanostomy tube check Z45.89      Ears look well  Right ME polyp has resolved.   Return to ENT if there are concerns for otorrhea or contact nurse.   Return for ear check in 4-5 months          Marcelina Oneal PA-C  ENT  [facility], [place]  [phone]      Again, thank you for allowing me to participate in the care of your patient.        Sincerely,        Marcelina Oneal PA-C

## 2019-04-22 ENCOUNTER — OFFICE VISIT (OUTPATIENT)
Dept: OTOLARYNGOLOGY | Facility: OTHER | Age: 39
End: 2019-04-22
Attending: PHYSICIAN ASSISTANT
Payer: MEDICARE

## 2019-04-22 VITALS
TEMPERATURE: 97.6 F | HEART RATE: 90 BPM | BODY MASS INDEX: 17.59 KG/M2 | SYSTOLIC BLOOD PRESSURE: 98 MMHG | HEIGHT: 55 IN | WEIGHT: 76 LBS | DIASTOLIC BLOOD PRESSURE: 56 MMHG | OXYGEN SATURATION: 100 %

## 2019-04-22 DIAGNOSIS — L30.9 DERMATITIS: ICD-10-CM

## 2019-04-22 DIAGNOSIS — H90.6 MIXED CONDUCTIVE AND SENSORINEURAL HEARING LOSS, BILATERAL: ICD-10-CM

## 2019-04-22 DIAGNOSIS — H61.23 EXCESSIVE CERUMEN IN BOTH EAR CANALS: ICD-10-CM

## 2019-04-22 DIAGNOSIS — H90.5 CONGENITAL HEARING LOSS: Primary | ICD-10-CM

## 2019-04-22 PROCEDURE — 99213 OFFICE O/P EST LOW 20 MIN: CPT | Performed by: PHYSICIAN ASSISTANT

## 2019-04-22 PROCEDURE — G0463 HOSPITAL OUTPT CLINIC VISIT: HCPCS

## 2019-04-22 RX ORDER — MOMETASONE FUROATE 1 MG/G
CREAM TOPICAL
Qty: 15 G | Refills: 1 | Status: SHIPPED | OUTPATIENT
Start: 2019-04-22 | End: 2019-10-22

## 2019-04-22 ASSESSMENT — MIFFLIN-ST. JEOR: SCORE: 850.98

## 2019-04-22 ASSESSMENT — PAIN SCALES - GENERAL: PAINLEVEL: NO PAIN (0)

## 2019-04-22 NOTE — PROGRESS NOTES
"Chief Complaint   Patient presents with     Cerumen Impaction     Pt is here for an ear cleaning.     Efren presents for ear check w/ her mom. Mom (Sophia) provides hx and details.   Ears feel better. No otorrhea, but ears feels plugged intermittently. She had a beeping sensation with ears at times. She has some crusting around outer ears.   She completed Ciprodex. No otorrhea. No complaints.   She does have hearing loss and has declined use of aids. She has aids but does not wear them at this time as she does not like them.       Distant hx of BTT with placement of t-tubes. Tubes have been in place for several years.   No recent illness. No other concerns.       No past medical history on file.     Allergies   Allergen Reactions     Omnicef [Cefdinir] Rash     Itchy and bad rash     Current Outpatient Medications   Medication     mometasone (ELOCON) 0.1 % cream     No current facility-administered medications for this visit.       ROS: 10 point ROS neg other than the symptoms noted above in the HPI.  BP 98/56   Pulse 90   Temp 97.6  F (36.4  C) (Tympanic)   Ht 1.372 m (4' 6\")   Wt 34.5 kg (76 lb)   SpO2 100%   BMI 18.32 kg/m      Gerenal: Craniofacial abnormality.   Eyes: conjuctiva clear, PERRL, EOM intact   Ears: Otorrhea left. Right with cerumen. Narrow canals. . Low set auricles with poor antihelical folds defined. t-tube bilaterally. Gila bowl bilateral dermatitis   Nose: Nares normal   Mouth: normal   Neck: Supple, no cervical adenopathy, no thyromegaly   The ear canals were examined underneath the operating microscope and with an otologic speculum. Scant debris suctioned. Tolerated well.   Canals, stenotic   Bilateral tubes appear in place.  No otorrhea. Dry canals. Tubes remain patent.        ASSESSMENT:    ICD-10-CM    1. Congenital hearing loss H90.5    2. Excessive cerumen in both ear canals H61.23    3. Mixed conductive and sensorineural hearing loss, bilateral H90.6    4. Dermatitis L30.9 " mometasone (ELOCON) 0.1 % external cream   Ears look well. No otorrhea seen on exam. No active OM.      Return to ENT in 6 month for tube check and ear cleaning.      Marcelina Oneal PA-C  ENT  [facility], [place]  [phone]

## 2019-04-22 NOTE — NURSING NOTE
"Chief Complaint   Patient presents with     Cerumen Impaction     Pt is here for an ear cleaning.       Initial BP 98/56   Pulse 90   Temp 97.6  F (36.4  C) (Tympanic)   Ht 1.372 m (4' 6\")   Wt 34.5 kg (76 lb)   SpO2 100%   BMI 18.32 kg/m   Estimated body mass index is 18.32 kg/m  as calculated from the following:    Height as of this encounter: 1.372 m (4' 6\").    Weight as of this encounter: 34.5 kg (76 lb).  Medication Reconciliation: complete    Saira Tran LPN  "

## 2019-04-22 NOTE — LETTER
"    4/22/2019         RE: Efren Saavedra  [address]  [place] [address]        Dear Colleague,    Thank you for referring your patient, Efren Saavedra, to the [facility] [place]. Please see a copy of my visit note below.    Chief Complaint   Patient presents with     Cerumen Impaction     Pt is here for an ear cleaning.     Efren presents for ear check w/ her mom. Mom (Sophia) provides hx and details.   Ears feel better. No otorrhea, but ears feels plugged intermittently. She had a beeping sensation with ears at times. She has some crusting around outer ears.   She completed Ciprodex. No otorrhea. No complaints.   She does have hearing loss and has declined use of aids. She has aids but does not wear them at this time as she does not like them.       Distant hx of BTT with placement of t-tubes. Tubes have been in place for several years.   No recent illness. No other concerns.       No past medical history on file.     Allergies   Allergen Reactions     Omnicef [Cefdinir] Rash     Itchy and bad rash     Current Outpatient Medications   Medication     mometasone (ELOCON) 0.1 % cream     No current facility-administered medications for this visit.       ROS: 10 point ROS neg other than the symptoms noted above in the HPI.  BP 98/56   Pulse 90   Temp 97.6  F (36.4  C) (Tympanic)   Ht 1.372 m (4' 6\")   Wt 34.5 kg (76 lb)   SpO2 100%   BMI 18.32 kg/m       Gerenal: Craniofacial abnormality.   Eyes: conjuctiva clear, PERRL, EOM intact   Ears: Otorrhea left. Right with cerumen. Narrow canals. . Low set auricles with poor antihelical folds defined. t-tube bilaterally. Gila bowl bilateral dermatitis   Nose: Nares normal   Mouth: normal   Neck: Supple, no cervical adenopathy, no thyromegaly   The ear canals were examined underneath the operating microscope and with an otologic speculum. Scant debris suctioned. Tolerated well.   Canals, stenotic   Bilateral tubes appear in place.  No otorrhea. " Dry canals. Tubes remain patent.        ASSESSMENT:    ICD-10-CM    1. Congenital hearing loss H90.5    2. Excessive cerumen in both ear canals H61.23    3. Mixed conductive and sensorineural hearing loss, bilateral H90.6    4. Dermatitis L30.9 mometasone (ELOCON) 0.1 % external cream   Ears look well. No otorrhea seen on exam. No active OM.      Return to ENT in 6 month for tube check and ear cleaning.      Marcelina Oneal PA-C  ENT  [facility], [place]  [phone]          Again, thank you for allowing me to participate in the care of your patient.        Sincerely,        Marcelina Oneal PA-C

## 2019-04-22 NOTE — PATIENT INSTRUCTIONS
Ears look well  Canals cleaned.   Keep ears dry  Return in 4-6 months    Thank you for allowing Marcelina Oneal PA-C and our ENT team to participate in your care.  If your medications are too expensive, please give the nurse a call.  We can possibly change this medication.  If you have a scheduling or an appointment question please contact our Health Unit Coordinator at their direct line [phone].   ALL nursing questions or concerns can be directed to your ENT nurse at: [phone] [place]

## 2019-10-22 ENCOUNTER — OFFICE VISIT (OUTPATIENT)
Dept: OTOLARYNGOLOGY | Facility: OTHER | Age: 39
End: 2019-10-22
Attending: PHYSICIAN ASSISTANT
Payer: MEDICARE

## 2019-10-22 VITALS
HEART RATE: 80 BPM | WEIGHT: 76 LBS | SYSTOLIC BLOOD PRESSURE: 100 MMHG | DIASTOLIC BLOOD PRESSURE: 70 MMHG | BODY MASS INDEX: 18.32 KG/M2 | OXYGEN SATURATION: 99 % | TEMPERATURE: 97.5 F

## 2019-10-22 DIAGNOSIS — H90.5 CONGENITAL HEARING LOSS: Primary | ICD-10-CM

## 2019-10-22 DIAGNOSIS — Z45.89 TYMPANOSTOMY TUBE CHECK: ICD-10-CM

## 2019-10-22 DIAGNOSIS — H61.23 EXCESSIVE CERUMEN IN BOTH EAR CANALS: ICD-10-CM

## 2019-10-22 DIAGNOSIS — H90.6 MIXED CONDUCTIVE AND SENSORINEURAL HEARING LOSS, BILATERAL: ICD-10-CM

## 2019-10-22 PROCEDURE — 99213 OFFICE O/P EST LOW 20 MIN: CPT | Mod: 25 | Performed by: PHYSICIAN ASSISTANT

## 2019-10-22 PROCEDURE — 92504 EAR MICROSCOPY EXAMINATION: CPT | Performed by: PHYSICIAN ASSISTANT

## 2019-10-22 PROCEDURE — G0463 HOSPITAL OUTPT CLINIC VISIT: HCPCS

## 2019-10-22 ASSESSMENT — PAIN SCALES - GENERAL: PAINLEVEL: NO PAIN (0)

## 2019-10-22 NOTE — PATIENT INSTRUCTIONS
Ears were cleaned.   Follow up in 4 months for cleaning.       Thank you for allowing Marcelina Oneal PA-C and our ENT team to participate in your care.  If your medications are too expensive, please give the nurse a call.  We can possibly change this medication.  If you have a scheduling or an appointment question please contact our Health Unit Coordinator at their direct line [phone].   ALL nursing questions or concerns can be directed to your ENT nurse at: [phone] [place]

## 2019-10-22 NOTE — LETTER
10/22/2019         RE: Efren Saavedra  [address]  [place] [address]        Dear Colleague,    Thank you for referring your patient, Efren Saavedra, to the [facility] [place]. Please see a copy of my visit note below.    Chief Complaint   Patient presents with     Cerumen Impaction     Pt is here for an ear cleaning.       Efren presents for ear check w/ her mom. Mom (Sophia) provides hx and details.   She was last seen on 4/22/19 and was doing well. Curing the fall, winter she did have some otorrhea which was treated with otics.   She was doing well at her last visit.   About 2 weeks ago, she had a little drainage from her right ear. It cleared on its own. No drainage on pillow.   She does complain about beeping at times. Her left ear is better w/ hearing.     Ears feel better. No otorrhea, but ears feels plugged intermittently. She had a beeping sensation with ears at times. She has some crusting around outer ears.   She completed Ciprodex. No otorrhea. No complaints.   She does have hearing loss and has declined use of aids. She has aids but does not wear them at this time as she does not like them.      Distant hx of BTT with placement of t-tubes. Tubes have been in place for several years.   No recent illness. No other concerns.        No past medical history on file.     Allergies   Allergen Reactions     Clotrimazole Rash     Omnicef [Cefdinir] Rash     Itchy and bad rash       Current Outpatient Medications   Medication     mometasone (ELOCON) 0.1 % external cream     No current facility-administered medications for this visit.       ROS: 10 point ROS neg other than the symptoms noted above in the HPI.  /70   Pulse 80   Temp 97.5  F (36.4  C) (Tympanic)   Wt 34.5 kg (76 lb)   SpO2 99%   BMI 18.32 kg/m       Gerenal: Craniofacial abnormality.   Eyes: conjuctiva clear, PERRL, EOM intact   Ears: Otorrhea left. Right with cerumen. Narrow canals. . Low set auricles with  poor antihelical folds defined. t-tube bilaterally. Gila bowl bilateral dermatitis   Nose: Nares normal   Mouth: crowded.   Neck: Supple, no cervical adenopathy, no thyromegaly     The ear canals were examined underneath the operating microscope and with an otologic speculum. Scant debris suctioned. Tolerated well.   Canals, stenotic   Bilateral tubes appear in place.  No otorrhea. Dry canals. Tubes remain patent.     ASSESSMENT:    ICD-10-CM    1. Congenital hearing loss H90.5    2. Excessive cerumen in both ear canals H61.23    3. Mixed conductive and sensorineural hearing loss, bilateral H90.6    4. Tympanostomy tube check Z45.89          Ears look well. No otorrhea seen on exam. No active OM/ otorrhea.      Return to ENT in 4 month for tube check and ear cleaning      Marcelina Oneal PA-C  ENT  [facility]  [phone]      Again, thank you for allowing me to participate in the care of your patient.        Sincerely,        Marcelina Oneal PA-C

## 2019-10-22 NOTE — NURSING NOTE
"Chief Complaint   Patient presents with     Cerumen Impaction     Pt is here for an ear cleaning.       Initial /70   Pulse 80   Temp 97.5  F (36.4  C) (Tympanic)   Wt 34.5 kg (76 lb)   SpO2 99%   BMI 18.32 kg/m   Estimated body mass index is 18.32 kg/m  as calculated from the following:    Height as of 4/22/19: 1.372 m (4' 6\").    Weight as of this encounter: 34.5 kg (76 lb).  Medication Reconciliation: complete  Viviane Sood LPN  "

## 2019-10-22 NOTE — PROGRESS NOTES
Chief Complaint   Patient presents with     Cerumen Impaction     Pt is here for an ear cleaning.       Efren presents for ear check w/ her mom. Mom (Sophia) provides hx and details.   She was last seen on 4/22/19 and was doing well. Curing the fall, winter she did have some otorrhea which was treated with otics.   She was doing well at her last visit.   About 2 weeks ago, she had a little drainage from her right ear. It cleared on its own. No drainage on pillow.   She does complain about beeping at times. Her left ear is better w/ hearing.     Ears feel better. No otorrhea, but ears feels plugged intermittently. She had a beeping sensation with ears at times. She has some crusting around outer ears.   She completed Ciprodex. No otorrhea. No complaints.   She does have hearing loss and has declined use of aids. She has aids but does not wear them at this time as she does not like them.      Distant hx of BTT with placement of t-tubes. Tubes have been in place for several years.   No recent illness. No other concerns.        No past medical history on file.     Allergies   Allergen Reactions     Clotrimazole Rash     Omnicef [Cefdinir] Rash     Itchy and bad rash       Current Outpatient Medications   Medication     mometasone (ELOCON) 0.1 % external cream     No current facility-administered medications for this visit.       ROS: 10 point ROS neg other than the symptoms noted above in the HPI.  /70   Pulse 80   Temp 97.5  F (36.4  C) (Tympanic)   Wt 34.5 kg (76 lb)   SpO2 99%   BMI 18.32 kg/m      Gerenal: Craniofacial abnormality.   Eyes: conjuctiva clear, PERRL, EOM intact   Ears: Otorrhea left. Right with cerumen. Narrow canals. . Low set auricles with poor antihelical folds defined. t-tube bilaterally. Gila bowl bilateral dermatitis   Nose: Nares normal   Mouth: crowded.   Neck: Supple, no cervical adenopathy, no thyromegaly     The ear canals were examined underneath the operating microscope and  with an otologic speculum. Scant debris suctioned. Tolerated well.   Canals, stenotic   Bilateral tubes appear in place.  No otorrhea. Dry canals. Tubes remain patent.     ASSESSMENT:    ICD-10-CM    1. Congenital hearing loss H90.5    2. Excessive cerumen in both ear canals H61.23    3. Mixed conductive and sensorineural hearing loss, bilateral H90.6    4. Tympanostomy tube check Z45.89          Ears look well. No otorrhea seen on exam. No active OM/ otorrhea.      Return to ENT in 4 month for tube check and ear cleaning      Marcelina Oneal PA-C  ENT  [facility], [place]  [phone]

## 2020-01-31 ENCOUNTER — OFFICE VISIT (OUTPATIENT)
Dept: OTOLARYNGOLOGY | Facility: OTHER | Age: 40
End: 2020-01-31
Attending: PHYSICIAN ASSISTANT
Payer: MEDICARE

## 2020-01-31 VITALS
WEIGHT: 78 LBS | BODY MASS INDEX: 18.81 KG/M2 | HEART RATE: 108 BPM | DIASTOLIC BLOOD PRESSURE: 50 MMHG | SYSTOLIC BLOOD PRESSURE: 100 MMHG | TEMPERATURE: 98 F | OXYGEN SATURATION: 99 %

## 2020-01-31 DIAGNOSIS — H73.20 MYRINGITIS: ICD-10-CM

## 2020-01-31 DIAGNOSIS — H66.93 BILATERAL ACUTE OTITIS MEDIA: Primary | ICD-10-CM

## 2020-01-31 DIAGNOSIS — H90.5 CONGENITAL HEARING LOSS: ICD-10-CM

## 2020-01-31 PROCEDURE — 92504 EAR MICROSCOPY EXAMINATION: CPT | Performed by: PHYSICIAN ASSISTANT

## 2020-01-31 PROCEDURE — 92504 EAR MICROSCOPY EXAMINATION: CPT

## 2020-01-31 PROCEDURE — 99213 OFFICE O/P EST LOW 20 MIN: CPT | Mod: 25 | Performed by: PHYSICIAN ASSISTANT

## 2020-01-31 PROCEDURE — G0463 HOSPITAL OUTPT CLINIC VISIT: HCPCS

## 2020-01-31 RX ORDER — OFLOXACIN 3 MG/ML
5 SOLUTION AURICULAR (OTIC) 2 TIMES DAILY
Qty: 4 ML | Refills: 0 | Status: SHIPPED | OUTPATIENT
Start: 2020-01-31 | End: 2020-02-21

## 2020-01-31 RX ORDER — AMOXICILLIN 400 MG/5ML
50 POWDER, FOR SUSPENSION ORAL 2 TIMES DAILY
Qty: 226 ML | Refills: 0 | Status: SHIPPED | OUTPATIENT
Start: 2020-01-31 | End: 2020-02-21

## 2020-01-31 ASSESSMENT — PAIN SCALES - GENERAL: PAINLEVEL: MILD PAIN (3)

## 2020-01-31 NOTE — NURSING NOTE
"Chief Complaint   Patient presents with     Consult     Ear  Drainage Left Side       Initial /50   Pulse 108   Temp 98  F (36.7  C) (Tympanic)   Wt 35.4 kg (78 lb)   SpO2 99%   BMI 18.81 kg/m   Estimated body mass index is 18.81 kg/m  as calculated from the following:    Height as of 4/22/19: 1.372 m (4' 6\").    Weight as of this encounter: 35.4 kg (78 lb).  Medication Reconciliation: complete  Viviane Sood LPN  "

## 2020-01-31 NOTE — PROGRESS NOTES
Chief Complaint   Patient presents with     Consult     Ear  Drainage Left Side       Efren returns to ENT for concerns of otorrhea.     She was last seen this past fall and was doing well.   She has developed otorrhea from left ear.   She had developed left drainage over the last 1 week. It has been bothering her at home, but denies otalgia now.   Hair has been crusting and build up over the last week.   No fevers, nausea.   No flu and o/w staying healthy.     Mom (Sophia) provides hx and details.     Ears feel better. No otorrhea, but ears feels plugged intermittently. She had a beeping sensation with ears at times. She has some crusting around outer ears.   She completed Ciprodex. No otorrhea. No complaints.   She does have hearing loss and has declined use of aids. She has aids but does not wear them at this time as she does not like them.      Distant hx of BTT with placement of t-tubes. Tubes have been in place for several years.   No recent illness. No other concerns    No past medical history on file.     Allergies   Allergen Reactions     Clotrimazole Rash     Omnicef [Cefdinir] Rash     Itchy and bad rash     No current outpatient medications on file.     No current facility-administered medications for this visit.       ROS: 10 point ROS neg other than the symptoms noted above in the HPI.  /50   Pulse 108   Temp 98  F (36.7  C) (Tympanic)   Wt 35.4 kg (78 lb)   SpO2 99%   BMI 18.81 kg/m      Gerenal: Craniofacial abnormality.   Eyes: conjuctiva clear, PERRL, EOM intact   Ears: Otorrhea left. Right with cerumen. Narrow canals. . Low set auricles with poor antihelical folds defined. t-tube bilaterally. Gila bowl bilateral dermatitis   Nose: Nares normal   Mouth: crowded.   Neck: Supple, no cervical adenopathy, no thyromegaly      The ear canals were examined underneath the operating microscope and with an otologic speculum. Scant debris suctioned. Tolerated well.   Canals, stenotic   Bilateral  tubes appear in place.  Tubes remain patent.   Active drainage from left tube.       ASSESSMENT:    ICD-10-CM    1. Bilateral acute otitis media H66.93 ofloxacin (FLOXIN) 0.3 % otic solution     amoxicillin (AMOXIL) 400 MG/5ML suspension   2. Myringitis H73.20 ofloxacin (FLOXIN) 0.3 % otic solution     amoxicillin (AMOXIL) 400 MG/5ML suspension   3. Congenital hearing loss H90.5 ofloxacin (FLOXIN) 0.3 % otic solution     amoxicillin (AMOXIL) 400 MG/5ML suspension     Start Floxin 5 drops twice a day for 7 days to both ears.   Start oral Amoxil twice a day for 10 days. (she has tolerated in past w/o concerns)  Discontinue if rash develops.   Eat yogurt / probiotic.     Maintain good fluid intake  If no improvement- contact nurse within 7-14 days.     If no improvement, will need ear culture.                 Marcelina Oneal PA-C  ENT  [facility]  [phone]

## 2020-01-31 NOTE — LETTER
1/31/2020         RE: Efren Saavedra  [address] [place] [address]        Dear Colleague,    Thank you for referring your patient, Efren Saavedra, to the [facility]. Please see a copy of my visit note below.    Chief Complaint   Patient presents with     Consult     Ear  Drainage Left Side       Efren returns to ENT for concerns of otorrhea.     She was last seen this past fall and was doing well.   She has developed otorrhea from left ear.   She had developed left drainage over the last 1 week. It has been bothering her at home, but denies otalgia now.   Hair has been crusting and build up over the last week.   No fevers, nausea.   No flu and o/w staying healthy.     Mom (Sophia) provides hx and details.     Ears feel better. No otorrhea, but ears feels plugged intermittently. She had a beeping sensation with ears at times. She has some crusting around outer ears.   She completed Ciprodex. No otorrhea. No complaints.   She does have hearing loss and has declined use of aids. She has aids but does not wear them at this time as she does not like them.      Distant hx of BTT with placement of t-tubes. Tubes have been in place for several years.   No recent illness. No other concerns    No past medical history on file.     Allergies   Allergen Reactions     Clotrimazole Rash     Omnicef [Cefdinir] Rash     Itchy and bad rash     No current outpatient medications on file.     No current facility-administered medications for this visit.       ROS: 10 point ROS neg other than the symptoms noted above in the HPI.  /50   Pulse 108   Temp 98  F (36.7  C) (Tympanic)   Wt 35.4 kg (78 lb)   SpO2 99%   BMI 18.81 kg/m       Gerenal: Craniofacial abnormality.   Eyes: conjuctiva clear, PERRL, EOM intact   Ears: Otorrhea left. Right with cerumen. Narrow canals. . Low set auricles with poor antihelical folds defined. t-tube bilaterally. Gila bowl bilateral dermatitis   Nose: Nares  normal   Mouth: crowded.   Neck: Supple, no cervical adenopathy, no thyromegaly      The ear canals were examined underneath the operating microscope and with an otologic speculum. Scant debris suctioned. Tolerated well.   Canals, stenotic   Bilateral tubes appear in place.  Tubes remain patent.   Active drainage from left tube.       ASSESSMENT:    ICD-10-CM    1. Bilateral acute otitis media H66.93 ofloxacin (FLOXIN) 0.3 % otic solution     amoxicillin (AMOXIL) 400 MG/5ML suspension   2. Myringitis H73.20 ofloxacin (FLOXIN) 0.3 % otic solution     amoxicillin (AMOXIL) 400 MG/5ML suspension   3. Congenital hearing loss H90.5 ofloxacin (FLOXIN) 0.3 % otic solution     amoxicillin (AMOXIL) 400 MG/5ML suspension     Start Floxin 5 drops twice a day for 7 days to both ears.   Start oral Amoxil twice a day for 10 days. (she has tolerated in past w/o concerns)  Discontinue if rash develops.   Eat yogurt / probiotic.     Maintain good fluid intake  If no improvement- contact nurse within 7-14 days.     If no improvement, will need ear culture.                 Marcelina Oneal PA-C  ENT  [facility]  [phone]      Again, thank you for allowing me to participate in the care of your patient.        Sincerely,        Marcelina Oneal PA-C

## 2020-01-31 NOTE — PATIENT INSTRUCTIONS
Start Floxin 5 drops twice a day for 7 days to both ears.   Start oral Amoxil twice a day for 10 days.   Eat yogurt / probiotic.     Maintain good fluid intake  If no improvement- contact nurse within 7-14 days.       Thank you for allowing Marcelina Oneal PA-C and our ENT team to participate in your care.  If your medications are too expensive, please give the nurse a call.  We can possibly change this medication.  If you have a scheduling or an appointment question please contact our Health Unit Coordinator at their direct line [phone].   ALL nursing questions or concerns can be directed to your ENT nurse at: [phone] [place]

## 2020-02-21 ENCOUNTER — OFFICE VISIT (OUTPATIENT)
Dept: OTOLARYNGOLOGY | Facility: OTHER | Age: 40
End: 2020-02-21
Attending: PHYSICIAN ASSISTANT
Payer: MEDICARE

## 2020-02-21 VITALS
DIASTOLIC BLOOD PRESSURE: 60 MMHG | OXYGEN SATURATION: 99 % | BODY MASS INDEX: 19.05 KG/M2 | HEART RATE: 85 BPM | WEIGHT: 79 LBS | TEMPERATURE: 97 F | SYSTOLIC BLOOD PRESSURE: 90 MMHG

## 2020-02-21 DIAGNOSIS — H90.5 CONGENITAL HEARING LOSS: Primary | ICD-10-CM

## 2020-02-21 DIAGNOSIS — L30.9 DERMATITIS: ICD-10-CM

## 2020-02-21 DIAGNOSIS — Z45.89 TYMPANOSTOMY TUBE CHECK: ICD-10-CM

## 2020-02-21 PROCEDURE — 92504 EAR MICROSCOPY EXAMINATION: CPT | Performed by: PHYSICIAN ASSISTANT

## 2020-02-21 PROCEDURE — 92504 EAR MICROSCOPY EXAMINATION: CPT

## 2020-02-21 PROCEDURE — G0463 HOSPITAL OUTPT CLINIC VISIT: HCPCS

## 2020-02-21 PROCEDURE — 99213 OFFICE O/P EST LOW 20 MIN: CPT | Mod: 25 | Performed by: PHYSICIAN ASSISTANT

## 2020-02-21 ASSESSMENT — PAIN SCALES - GENERAL: PAINLEVEL: NO PAIN (0)

## 2020-02-21 NOTE — PROGRESS NOTES
Chief Complaint   Patient presents with     Ear Problem     Pt is here for a f/u bilateral acute om.       Efren returns to ENT for concerns of otorrhea. She was last seen on 1/31/20 and tolerated cleaning well. She was started on Floxin and Amoxil.  She tolerated drops and oral abx well.   Reports no active concerns to her ears.   Hearing has not been well at home.      No flu and o/w staying healthy.   She does have hearing loss and has declined use of aids. She has aids but does not wear them at this time as she does not like them.     Mom (Sophia) provides hx and details.    Distant hx of BTT with placement of t-tubes. Tubes have been in place for several years.   No recent illness. No other concerns       No past medical history on file.     Allergies   Allergen Reactions     Clotrimazole Rash     Omnicef [Cefdinir] Rash     Itchy and bad rash     No current outpatient medications on file.     No current facility-administered medications for this visit.       ROS: 10 point ROS neg other than the symptoms noted above in the HPI.    BP 90/60   Pulse 85   Temp 97  F (36.1  C) (Tympanic)   Wt 35.8 kg (79 lb)   SpO2 99%   BMI 19.05 kg/m      Gerenal: Craniofacial abnormality.   Eyes: conjuctiva clear, PERRL, EOM intact   Ears: Otorrhea left. Right with cerumen. Narrow canals. . Low set auricles with poor antihelical folds defined. t-tube bilaterally. Gila bowl bilateral dermatitis   Nose: Nares normal   Mouth: crowded.   Neck: Supple, no cervical adenopathy, no thyromegaly      The ear canals were examined underneath the operating microscope and with an otologic speculum. Scant debris suctioned. Tolerated well.   Canals, stenotic   Bilateral tubes appear in place.  Tubes remain patent.   There is no worrisome otorrhea. ME appears dry. Narrow canals which limits examination.   Left ear had thin white debris. Miconazole powder was applied to canal.         ASSESSMENT:    ICD-10-CM    1. Congenital hearing loss  H90.5    2. Tympanostomy tube check Z45.89    3. Dermatitis L30.9      Overall her ears look well. I do not appreciate otorrhea today.   Powder was applied to left ear as precaution to prevent yeast due to past otic use.     Keep ears dry  She will return in 4-6 months for ear cleaning.       We discussed repeat audiogram. Mom declines at this time. She has aids at home, but refuses to use.     They will return for concerns of active otorrhea, etc.       Marcelina Oneal PA-C  ENT  [facility], [place]  [phone]

## 2020-02-21 NOTE — NURSING NOTE
"Chief Complaint   Patient presents with     Ear Problem     Pt is here for a f/u bilateral acute om.       Initial BP 90/60   Pulse 85   Temp 97  F (36.1  C) (Tympanic)   Wt 35.8 kg (79 lb)   SpO2 99%   BMI 19.05 kg/m   Estimated body mass index is 19.05 kg/m  as calculated from the following:    Height as of 4/22/19: 1.372 m (4' 6\").    Weight as of this encounter: 35.8 kg (79 lb).  Medication Reconciliation: complete  Viviane Sood LPN  "

## 2020-02-21 NOTE — LETTER
2/21/2020         RE: Efren Saavedra  [address] [place] [address]        Dear Colleague,    Thank you for referring your patient, Efren Saavedra, to the [facility]. Please see a copy of my visit note below.    Chief Complaint   Patient presents with     Ear Problem     Pt is here for a f/u bilateral acute om.       Efren returns to ENT for concerns of otorrhea. She was last seen on 1/31/20 and tolerated cleaning well. She was started on Floxin and Amoxil.  She tolerated drops and oral abx well.   Reports no active concerns to her ears.   Hearing has not been well at home.      No flu and o/w staying healthy.   She does have hearing loss and has declined use of aids. She has aids but does not wear them at this time as she does not like them.     Mom (Sophia) provides hx and details.    Distant hx of BTT with placement of t-tubes. Tubes have been in place for several years.   No recent illness. No other concerns       No past medical history on file.     Allergies   Allergen Reactions     Clotrimazole Rash     Omnicef [Cefdinir] Rash     Itchy and bad rash     No current outpatient medications on file.     No current facility-administered medications for this visit.       ROS: 10 point ROS neg other than the symptoms noted above in the HPI.    BP 90/60   Pulse 85   Temp 97  F (36.1  C) (Tympanic)   Wt 35.8 kg (79 lb)   SpO2 99%   BMI 19.05 kg/m       Gerenal: Craniofacial abnormality.   Eyes: conjuctiva clear, PERRL, EOM intact   Ears: Otorrhea left. Right with cerumen. Narrow canals. . Low set auricles with poor antihelical folds defined. t-tube bilaterally. Gila bowl bilateral dermatitis   Nose: Nares normal   Mouth: crowded.   Neck: Supple, no cervical adenopathy, no thyromegaly      The ear canals were examined underneath the operating microscope and with an otologic speculum. Scant debris suctioned. Tolerated well.   Canals, stenotic   Bilateral tubes appear in  place.  Tubes remain patent.   There is no worrisome otorrhea. ME appears dry. Narrow canals which limits examination.   Left ear had thin white debris. Miconazole powder was applied to canal.         ASSESSMENT:    ICD-10-CM    1. Congenital hearing loss H90.5    2. Tympanostomy tube check Z45.89    3. Dermatitis L30.9      Overall her ears look well. I do not appreciate otorrhea today.   Powder was applied to left ear as precaution to prevent yeast due to past otic use.     Keep ears dry  She will return in 4-6 months for ear cleaning.       We discussed repeat audiogram. Mom declines at this time. She has aids at home, but refuses to use.     They will return for concerns of active otorrhea, etc.       Marcelina Oneal PA-C  ENT  [facility], [place]  [phone]      Again, thank you for allowing me to participate in the care of your patient.        Sincerely,        Marcelina Oneal PA-C

## 2020-02-21 NOTE — PATIENT INSTRUCTIONS
Ears look well.   Powder applied to left ear.   Keep ears dry    If ear drainage starts, may use ear drops.     Return in 5 months      Thank you for allowing Marcelina Oneal PA-C and our ENT team to participate in your care.  If your medications are too expensive, please give the nurse a call.  We can possibly change this medication.  If you have a scheduling or an appointment question please contact our Health Unit Coordinator at their direct line [phone].   ALL nursing questions or concerns can be directed to your ENT nurse at: [phone] [place]

## 2020-07-24 ENCOUNTER — OFFICE VISIT (OUTPATIENT)
Dept: OTOLARYNGOLOGY | Facility: OTHER | Age: 40
End: 2020-07-24
Attending: PHYSICIAN ASSISTANT
Payer: MEDICARE

## 2020-07-24 VITALS
SYSTOLIC BLOOD PRESSURE: 96 MMHG | WEIGHT: 80 LBS | TEMPERATURE: 98.5 F | HEART RATE: 95 BPM | BODY MASS INDEX: 19.29 KG/M2 | OXYGEN SATURATION: 99 % | DIASTOLIC BLOOD PRESSURE: 54 MMHG

## 2020-07-24 DIAGNOSIS — H90.5 CONGENITAL HEARING LOSS: ICD-10-CM

## 2020-07-24 DIAGNOSIS — H92.13 OTORRHEA OF BOTH EARS: Primary | ICD-10-CM

## 2020-07-24 DIAGNOSIS — Z45.89 TYMPANOSTOMY TUBE CHECK: ICD-10-CM

## 2020-07-24 DIAGNOSIS — H90.6 MIXED CONDUCTIVE AND SENSORINEURAL HEARING LOSS, BILATERAL: ICD-10-CM

## 2020-07-24 PROCEDURE — G0463 HOSPITAL OUTPT CLINIC VISIT: HCPCS

## 2020-07-24 PROCEDURE — 99213 OFFICE O/P EST LOW 20 MIN: CPT | Mod: 25 | Performed by: PHYSICIAN ASSISTANT

## 2020-07-24 PROCEDURE — 92504 EAR MICROSCOPY EXAMINATION: CPT

## 2020-07-24 PROCEDURE — 92504 EAR MICROSCOPY EXAMINATION: CPT | Performed by: PHYSICIAN ASSISTANT

## 2020-07-24 RX ORDER — OFLOXACIN 3 MG/ML
5 SOLUTION AURICULAR (OTIC) 2 TIMES DAILY
Qty: 4 ML | Refills: 0 | Status: SHIPPED | OUTPATIENT
Start: 2020-07-24 | End: 2020-08-07

## 2020-07-24 ASSESSMENT — PAIN SCALES - GENERAL: PAINLEVEL: MILD PAIN (2)

## 2020-07-24 NOTE — PROGRESS NOTES
Chief Complaint   Patient presents with     Cerumen Impaction     Pt is here for an ear cleaning.     Efren started to have some drainage develop over this week. She has been c/o right ear. Reports hearing has decreased.   She has not been using   Hearing has not been well at home.      No flu and o/w staying healthy.   She does have hearing loss and has declined use of aids. She has aids but does not wear them at this time as she does not like them  Mom (Sophia) provides hx and details.    Distant hx of BTT with placement of t-tubes. Tubes have been in place for several years.   No recent illness. No other concerns     No past medical history on file.     Allergies   Allergen Reactions     Clotrimazole Rash     Omnicef [Cefdinir] Rash     Itchy and bad rash     No current outpatient medications on file.     No current facility-administered medications for this visit.       ROS: 10 point ROS neg other than the symptoms noted above in the HPI.  BP 96/54   Pulse 95   Temp 98.5  F (36.9  C) (Tympanic)   Wt 36.3 kg (80 lb)   SpO2 99%   BMI 19.29 kg/m    Gerenal: Craniofacial abnormality.   Eyes: conjuctiva clear, PERRL, EOM intact   Ears: Otorrhea left. Right with cerumen. Narrow canals. . Low set auricles with poor antihelical folds defined. t-tube bilaterally. Gila bowl bilateral dermatitis   Nose: Nares normal   Mouth: crowded.   Neck: Supple, no cervical adenopathy, no thyromegaly      The ear canals were examined underneath the operating microscope and with an otologic speculum. Scant debris suctioned. Tolerated well.   Canals, stenotic   Bilateral tubes appear in place.    There is no worrisome otorrhea. ME appears dry. Narrow canals which limits examination.   RIght tube with active drainage. Left ear with scant otorrhea.       ASSESSMENT:    ICD-10-CM    1. Otorrhea of both ears  H92.13 ofloxacin (FLOXIN) 0.3 % otic solution   2. Tympanostomy tube check  Z45.89    3. Congenital hearing loss  H90.5    4.  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   Return in 2-3 weeks for Miconazole powder application.       Marcelina Oneal PA-C  ENT  [facility], [place]  [phone]\

## 2020-07-24 NOTE — NURSING NOTE
"Chief Complaint   Patient presents with     Cerumen Impaction     Pt is here for an ear cleaning.       Initial BP 96/54   Pulse 95   Temp 98.5  F (36.9  C) (Tympanic)   Wt 36.3 kg (80 lb)   SpO2 99%   BMI 19.29 kg/m   Estimated body mass index is 19.29 kg/m  as calculated from the following:    Height as of 4/22/19: 1.372 m (4' 6\").    Weight as of this encounter: 36.3 kg (80 lb).  Medication Reconciliation: complete  Viviane Sood LPN  "

## 2020-07-24 NOTE — LETTER
7/24/2020         RE: Efren Saavedra  [address] [place] [address]        Dear Colleague,    Thank you for referring your patient, Efren Saavedra, to the [facility]. Please see a copy of my visit note below.    Chief Complaint   Patient presents with     Cerumen Impaction     Pt is here for an ear cleaning.     Efren started to have some drainage develop over this week. She has been c/o right ear. Reports hearing has decreased.   She has not been using   Hearing has not been well at home.      No flu and o/w staying healthy.   She does have hearing loss and has declined use of aids. She has aids but does not wear them at this time as she does not like them  Mom (Sophia) provides hx and details.    Distant hx of BTT with placement of t-tubes. Tubes have been in place for several years.   No recent illness. No other concerns     No past medical history on file.     Allergies   Allergen Reactions     Clotrimazole Rash     Omnicef [Cefdinir] Rash     Itchy and bad rash     No current outpatient medications on file.     No current facility-administered medications for this visit.       ROS: 10 point ROS neg other than the symptoms noted above in the HPI.  BP 96/54   Pulse 95   Temp 98.5  F (36.9  C) (Tympanic)   Wt 36.3 kg (80 lb)   SpO2 99%   BMI 19.29 kg/m    Gerenal: Craniofacial abnormality.   Eyes: conjuctiva clear, PERRL, EOM intact   Ears: Otorrhea left. Right with cerumen. Narrow canals. . Low set auricles with poor antihelical folds defined. t-tube bilaterally. Gila bowl bilateral dermatitis   Nose: Nares normal   Mouth: crowded.   Neck: Supple, no cervical adenopathy, no thyromegaly      The ear canals were examined underneath the operating microscope and with an otologic speculum. Scant debris suctioned. Tolerated well.   Canals, stenotic   Bilateral tubes appear in place.    There is no worrisome otorrhea. ME appears dry. Narrow canals which limits examination.    RIght tube with active drainage. Left ear with scant otorrhea.       ASSESSMENT:    ICD-10-CM    1. Otorrhea of both ears  H92.13 ofloxacin (FLOXIN) 0.3 % otic solution   2. Tympanostomy tube check  Z45.89    3. Congenital hearing loss  H90.5    4. 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   Return in 2-3 weeks for Miconazole powder application.       Marcelina Oneal PA-C  ENT  [facility], [place]  [phone]\    Again, thank you for allowing me to participate in the care of your patient.        Sincerely,        Marcelina Oneal PA-C

## 2020-07-24 NOTE — PATIENT INSTRUCTIONS
Start Floxin 5 drops twice a day to both ears.   Keep ears dry otherwise.   If ear pain remains, contact nurse.     Tubes are in position and open.   Return in 2-3 weeks for powder application.     Thank you for allowing LEONOR Avitia and our ENT team to participate in your care.  If your medications are too expensive, please give the nurse a call.  We can possibly change this medication.  If you have a scheduling or an appointment question please contact our Health Unit Coordinator at their direct line [phone].   ALL nursing questions or concerns can be directed to your ENT nurse at: [phone]--[facility]

## 2020-08-07 ENCOUNTER — OFFICE VISIT (OUTPATIENT)
Dept: OTOLARYNGOLOGY | Facility: OTHER | Age: 40
End: 2020-08-07
Attending: PHYSICIAN ASSISTANT
Payer: MEDICARE

## 2020-08-07 VITALS
DIASTOLIC BLOOD PRESSURE: 60 MMHG | BODY MASS INDEX: 17.59 KG/M2 | TEMPERATURE: 98.3 F | HEART RATE: 102 BPM | OXYGEN SATURATION: 100 % | SYSTOLIC BLOOD PRESSURE: 100 MMHG | HEIGHT: 55 IN | WEIGHT: 76 LBS

## 2020-08-07 DIAGNOSIS — H92.13 OTORRHEA OF BOTH EARS: Primary | ICD-10-CM

## 2020-08-07 DIAGNOSIS — Z45.89 TYMPANOSTOMY TUBE CHECK: ICD-10-CM

## 2020-08-07 DIAGNOSIS — H90.5 CONGENITAL HEARING LOSS: ICD-10-CM

## 2020-08-07 DIAGNOSIS — H90.6 MIXED CONDUCTIVE AND SENSORINEURAL HEARING LOSS, BILATERAL: ICD-10-CM

## 2020-08-07 PROCEDURE — 92504 EAR MICROSCOPY EXAMINATION: CPT | Performed by: PHYSICIAN ASSISTANT

## 2020-08-07 PROCEDURE — 99213 OFFICE O/P EST LOW 20 MIN: CPT | Mod: 25 | Performed by: PHYSICIAN ASSISTANT

## 2020-08-07 PROCEDURE — G0463 HOSPITAL OUTPT CLINIC VISIT: HCPCS

## 2020-08-07 ASSESSMENT — MIFFLIN-ST. JEOR: SCORE: 845.98

## 2020-08-07 ASSESSMENT — PAIN SCALES - GENERAL: PAINLEVEL: MILD PAIN (2)

## 2020-08-07 NOTE — LETTER
"    8/7/2020         RE: Efren Saavedra  [address]  [place] [address]        Dear Colleague,    Thank you for referring your patient, Efren Saavedra, to the [facility] [place]. Please see a copy of my visit note below.    Chief Complaint   Patient presents with     Ear Problem     Pt is here for a f/u otorrhea bilateral.       Efren returns for a recheck of her ears. She was last seen on 7/24/20 for otorrhea. She tolerated cleaning, but did have active drainage from tubes.   She was started on otics and returns for powder application.   Today, she returns for recheck. She has mild discomfort with her ear along left.      No flu and o/w staying healthy.   She does have hearing loss and has declined use of aids. She has aids but does not wear them at this time as she does not like them  Mom (Sophia) provides hx and details.    Distant hx of BTT with placement of t-tubes. Tubes have been in place for several years.   No recent illness. No other concerns  No past medical history on file.     Allergies   Allergen Reactions     Clotrimazole Rash     Omnicef [Cefdinir] Rash     Itchy and bad rash     No current outpatient medications on file.     No current facility-administered medications for this visit.       ROS: 10 point ROS neg other than the symptoms noted above in the HPI.  /60 (BP Location: Right arm, Patient Position: Chair, Cuff Size: Adult Regular)   Pulse 102   Temp 98.3  F (36.8  C) (Tympanic)   Ht 1.372 m (4' 6\")   Wt 34.5 kg (76 lb)   SpO2 100%   BMI 18.32 kg/m      Gerenal: Craniofacial abnormality.   Eyes: conjuctiva clear, PERRL, EOM intact   Ears: Otorrhea left. Right with cerumen. Narrow canals. . Low set auricles with poor antihelical folds defined. t-tube bilaterally. Gila bowl bilateral dermatitis   Nose: Nares normal   Mouth: crowded.   Neck: Supple, no cervical adenopathy, no thyromegaly      The ear canals were examined underneath the operating microscope " and with an otologic speculum. Scant debris suctioned. Tolerated well.   Canals, stenotic   Bilateral tubes appear in place.     There is no worrisome otorrhea. ME appears dry. Narrow canals which limits examination. Canals are narrow. Tympanic membranes appear with tubes. TMs are thickened.   Racheal active drainage  Miconazole powder applied.       ASSESSMENT:    ICD-10-CM    1. Otorrhea of both ears  H92.13    2. Tympanostomy tube check  Z45.89    3. Congenital hearing loss  H90.5    4. 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   Powder applied  Keep ears dry  RTC in 4 months    Audiogram to be completed  If concerns for hearing changes, ear pain; further consider CT of ME, TB and mastoid.         Marcelina Oneal PA-C  ENT  [facility]  [phone]      Again, thank you for allowing me to participate in the care of your patient.        Sincerely,        Marcelina Oneal PA-C

## 2020-08-07 NOTE — PATIENT INSTRUCTIONS
Ears look well.   No fluid or infection    Powder applied.   Complete audiogram   Follow up in 4 months for cleaning/ exam.       Thank you for allowing Marcelina Oneal PA-C and our ENT team to participate in your care.  If your medications are too expensive, please give the nurse a call.  We can possibly change this medication.  If you have a scheduling or an appointment question please contact our Health Unit Coordinator at their direct line [phone].   ALL nursing questions or concerns can be directed to your ENT nurse at: [phone] [place]

## 2020-08-07 NOTE — NURSING NOTE
"Chief Complaint   Patient presents with     Ear Problem     Pt is here for a f/u otorrhea bilateral.       Initial /60 (BP Location: Right arm, Patient Position: Chair, Cuff Size: Adult Regular)   Pulse 102   Temp 98.3  F (36.8  C) (Tympanic)   Ht 1.372 m (4' 6\")   Wt 34.5 kg (76 lb)   SpO2 100%   BMI 18.32 kg/m   Estimated body mass index is 18.32 kg/m  as calculated from the following:    Height as of this encounter: 1.372 m (4' 6\").    Weight as of this encounter: 34.5 kg (76 lb).  Medication Reconciliation: complete  ANU SALMON LPN    "

## 2020-08-07 NOTE — PROGRESS NOTES
"Chief Complaint   Patient presents with     Ear Problem     Pt is here for a f/u otorrhea bilateral.       Efren returns for a recheck of her ears. She was last seen on 7/24/20 for otorrhea. She tolerated cleaning, but did have active drainage from tubes.   She was started on otics and returns for powder application.   Today, she returns for recheck. She has mild discomfort with her ear along left.      No flu and o/w staying healthy.   She does have hearing loss and has declined use of aids. She has aids but does not wear them at this time as she does not like them  Mom (Sophia) provides hx and details.    Distant hx of BTT with placement of t-tubes. Tubes have been in place for several years.   No recent illness. No other concerns  No past medical history on file.     Allergies   Allergen Reactions     Clotrimazole Rash     Omnicef [Cefdinir] Rash     Itchy and bad rash     No current outpatient medications on file.     No current facility-administered medications for this visit.       ROS: 10 point ROS neg other than the symptoms noted above in the HPI.  /60 (BP Location: Right arm, Patient Position: Chair, Cuff Size: Adult Regular)   Pulse 102   Temp 98.3  F (36.8  C) (Tympanic)   Ht 1.372 m (4' 6\")   Wt 34.5 kg (76 lb)   SpO2 100%   BMI 18.32 kg/m      Gerenal: Craniofacial abnormality.   Eyes: conjuctiva clear, PERRL, EOM intact   Ears: Otorrhea left. Right with cerumen. Narrow canals. . Low set auricles with poor antihelical folds defined. t-tube bilaterally. Gila bowl bilateral dermatitis   Nose: Nares normal   Mouth: crowded.   Neck: Supple, no cervical adenopathy, no thyromegaly      The ear canals were examined underneath the operating microscope and with an otologic speculum. Scant debris suctioned. Tolerated well.   Canals, stenotic   Bilateral tubes appear in place.     There is no worrisome otorrhea. ME appears dry. Narrow canals which limits examination. Canals are narrow. Tympanic " membranes appear with tubes. TMs are thickened.   Racheal active drainage  Miconazole powder applied.       ASSESSMENT:    ICD-10-CM    1. Otorrhea of both ears  H92.13    2. Tympanostomy tube check  Z45.89    3. Congenital hearing loss  H90.5    4. 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   Powder applied  Keep ears dry  RTC in 4 months    Audiogram to be completed  If concerns for hearing changes, ear pain; further consider CT of ME, TB and mastoid.         Marcelina Oneal PA-C  ENT  [facility], [place]  [phone]

## 2020-08-11 DIAGNOSIS — H91.93 DECREASED HEARING OF BOTH EARS: Primary | ICD-10-CM

## 2020-09-10 ENCOUNTER — OFFICE VISIT (OUTPATIENT)
Dept: AUDIOLOGY | Facility: OTHER | Age: 40
End: 2020-09-10
Attending: AUDIOLOGIST
Payer: MEDICARE

## 2020-09-10 ENCOUNTER — TELEPHONE (OUTPATIENT)
Dept: OTOLARYNGOLOGY | Facility: OTHER | Age: 40
End: 2020-09-10

## 2020-09-10 DIAGNOSIS — H91.93 DECREASED HEARING OF BOTH EARS: ICD-10-CM

## 2020-09-10 PROCEDURE — 92567 TYMPANOMETRY: CPT | Performed by: AUDIOLOGIST

## 2020-09-10 PROCEDURE — 92557 COMPREHENSIVE HEARING TEST: CPT | Performed by: AUDIOLOGIST

## 2020-09-10 NOTE — PROGRESS NOTES
Audiology Evaluation Completed. Please refer SCANNED AUDIOGRAM and/or TYMPANOGRAM for BACKGROUND, RESULTS, RECOMMENDATIONS.      Santa CONWAY, [facility]  Audiologist #[id number]

## 2020-09-10 NOTE — TELEPHONE ENCOUNTER
Pt called and just wanted to let Marcelina Jm know that she in not interested in the [facility].

## 2020-12-11 ENCOUNTER — OFFICE VISIT (OUTPATIENT)
Dept: OTOLARYNGOLOGY | Facility: OTHER | Age: 40
End: 2020-12-11
Attending: PHYSICIAN ASSISTANT
Payer: MEDICARE

## 2020-12-11 VITALS
OXYGEN SATURATION: 100 % | DIASTOLIC BLOOD PRESSURE: 58 MMHG | WEIGHT: 79 LBS | BODY MASS INDEX: 18.28 KG/M2 | TEMPERATURE: 97.4 F | SYSTOLIC BLOOD PRESSURE: 98 MMHG | HEART RATE: 89 BPM | HEIGHT: 55 IN

## 2020-12-11 DIAGNOSIS — H90.6 MIXED CONDUCTIVE AND SENSORINEURAL HEARING LOSS, BILATERAL: ICD-10-CM

## 2020-12-11 DIAGNOSIS — H90.5 CONGENITAL HEARING LOSS: ICD-10-CM

## 2020-12-11 DIAGNOSIS — Z45.89 TYMPANOSTOMY TUBE CHECK: Primary | ICD-10-CM

## 2020-12-11 PROCEDURE — G0463 HOSPITAL OUTPT CLINIC VISIT: HCPCS

## 2020-12-11 PROCEDURE — 92504 EAR MICROSCOPY EXAMINATION: CPT | Performed by: PHYSICIAN ASSISTANT

## 2020-12-11 PROCEDURE — 99213 OFFICE O/P EST LOW 20 MIN: CPT | Mod: 25 | Performed by: PHYSICIAN ASSISTANT

## 2020-12-11 ASSESSMENT — PAIN SCALES - GENERAL: PAINLEVEL: NO PAIN (0)

## 2020-12-11 ASSESSMENT — MIFFLIN-ST. JEOR: SCORE: 843.72

## 2020-12-11 NOTE — PATIENT INSTRUCTIONS
Ears look well.   Powder applied to both ears.   May use cream to outer ears as needed.   Return in 4-5 months for ear cleaning.       Thank you for allowing Marcelina Oneal PA-C and our ENT team to participate in your care.  If your medications are too expensive, please give the nurse a call.  We can possibly change this medication.  If you have a scheduling or an appointment question please contact our Health Unit Coordinator at their direct line [phone].   ALL nursing questions or concerns can be directed to your ENT nurse at: [phone] [place]

## 2020-12-11 NOTE — PROGRESS NOTES
"Chief Complaint   Patient presents with     Ear Problem     Pt is here for a f/u bilateral otorrhea.  No further drainage.         Efren returns for a recheck of her ears. She was last seen on 8/7/20 for otorrhea. She tolerated cleaning, and had powder applied. Today, there has been no recent drainage and she has not c/o of her ears.   She does have hearing loss and has declined use of aids. She has aids but does not wear them at this time as she does not like them  Mom (Sophia) provides hx and details.    Distant hx of BTT with placement of t-tubes. Tubes have been in place for several years.   No recent illness. No other concerns  Discussed audiograms. Stable mixed hearing loss. Audiologist did mention consideration for BAHA, but patient did not wish to proceed.          History reviewed. No pertinent past medical history.     Allergies   Allergen Reactions     Clotrimazole Rash     Omnicef [Cefdinir] Rash     Itchy and bad rash     No current outpatient medications on file.     No current facility-administered medications for this visit.       ROS: 10 point ROS neg other than the symptoms noted above in the HPI.  BP 98/58 (BP Location: Right arm, Cuff Size: Adult Regular)   Pulse 89   Temp 97.4  F (36.3  C) (Tympanic)   Ht 1.346 m (4' 5\")   Wt 35.8 kg (79 lb)   SpO2 100%   BMI 19.77 kg/m    Gerenal: Craniofacial abnormality.   Eyes: conjuctiva clear, PERRL, EOM intact   Ears: Otorrhea left. Right with cerumen. Narrow canals. . Low set auricles with poor antihelical folds defined. t-tube bilaterally. Gila bowl bilateral dermatitis   Nose: Nares normal   Mouth: crowded.   Neck: Supple, no cervical adenopathy, no thyromegaly      The ear canals were examined underneath the operating microscope and with an otologic speculum. Scant debris suctioned. Tolerated well.   Canals, stenotic   Bilateral tubes appear in place.     There is no worrisome otorrhea. ME appears dry. Narrow canals which limits " examination. Canals are narrow. Canals overall clear with cerumen.  Tympanic membranes appear with tubes. TMs are thickened.   No active drainage  Miconazole powder applied      ASSESSMENT:      ICD-10-CM    1. Tympanostomy tube check  Z45.89    2. Congenital hearing loss  H90.5    3. Mixed conductive and sensorineural hearing loss, bilateral  H90.6        Ears are doing better. Her ears appear dry and she is doing well at this time.   Powder applied  Keep ears dry  RTC in 4-5months       If concerns for hearing changes, ear pain; further consider CT of ME, TB and mastoid.       They decline consideration for BAHA.         Marcelina Oneal PA-C  ENT  [facility], [place]  [phone]

## 2020-12-11 NOTE — NURSING NOTE
"Chief Complaint   Patient presents with     Ear Problem     Pt is here for a f/u bilateral otorrhea.  No further drainage.       Initial BP 98/58 (BP Location: Right arm, Cuff Size: Adult Regular)   Pulse 89   Temp 97.4  F (36.3  C) (Tympanic)   Ht 1.346 m (4' 5\")   Wt 35.8 kg (79 lb)   SpO2 100%   BMI 19.77 kg/m   Estimated body mass index is 19.77 kg/m  as calculated from the following:    Height as of this encounter: 1.346 m (4' 5\").    Weight as of this encounter: 35.8 kg (79 lb).  Medication Reconciliation: complete  [place] Sweeney LPN    "

## 2020-12-11 NOTE — LETTER
"    12/11/2020         RE: Efren Saavedra  [address]  [place] [address]        Dear Colleague,    Thank you for referring your patient, Efren Saavedra, to the [facility] [place]. Please see a copy of my visit note below.    Chief Complaint   Patient presents with     Ear Problem     Pt is here for a f/u bilateral otorrhea.  No further drainage.         Efren returns for a recheck of her ears. She was last seen on 8/7/20 for otorrhea. She tolerated cleaning, and had powder applied. Today, there has been no recent drainage and she has not c/o of her ears.   She does have hearing loss and has declined use of aids. She has aids but does not wear them at this time as she does not like them  Mom (Sophia) provides hx and details.    Distant hx of BTT with placement of t-tubes. Tubes have been in place for several years.   No recent illness. No other concerns  Discussed audiograms. Stable mixed hearing loss. Audiologist did mention consideration for BAHA, but patient did not wish to proceed.          History reviewed. No pertinent past medical history.     Allergies   Allergen Reactions     Clotrimazole Rash     Omnicef [Cefdinir] Rash     Itchy and bad rash     No current outpatient medications on file.     No current facility-administered medications for this visit.       ROS: 10 point ROS neg other than the symptoms noted above in the HPI.  BP 98/58 (BP Location: Right arm, Cuff Size: Adult Regular)   Pulse 89   Temp 97.4  F (36.3  C) (Tympanic)   Ht 1.346 m (4' 5\")   Wt 35.8 kg (79 lb)   SpO2 100%   BMI 19.77 kg/m    Gerenal: Craniofacial abnormality.   Eyes: conjuctiva clear, PERRL, EOM intact   Ears: Otorrhea left. Right with cerumen. Narrow canals. . Low set auricles with poor antihelical folds defined. t-tube bilaterally. Gila bowl bilateral dermatitis   Nose: Nares normal   Mouth: crowded.   Neck: Supple, no cervical adenopathy, no thyromegaly      The ear canals were examined " underneath the operating microscope and with an otologic speculum. Scant debris suctioned. Tolerated well.   Canals, stenotic   Bilateral tubes appear in place.     There is no worrisome otorrhea. ME appears dry. Narrow canals which limits examination. Canals are narrow. Canals overall clear with cerumen.  Tympanic membranes appear with tubes. TMs are thickened.   No active drainage  Miconazole powder applied      ASSESSMENT:      ICD-10-CM    1. Tympanostomy tube check  Z45.89    2. Congenital hearing loss  H90.5    3. Mixed conductive and sensorineural hearing loss, bilateral  H90.6        Ears are doing better. Her ears appear dry and she is doing well at this time.   Powder applied  Keep ears dry  RTC in 4-5months       If concerns for hearing changes, ear pain; further consider CT of ME, TB and mastoid.       They decline consideration for BAHA.         Marcelina Oneal PA-C  ENT  [facility]  [phone]                 Again, thank you for allowing me to participate in the care of your patient.        Sincerely,        Marcelina Oneal PA-C

## 2021-04-16 ENCOUNTER — OFFICE VISIT (OUTPATIENT)
Dept: OTOLARYNGOLOGY | Facility: OTHER | Age: 41
End: 2021-04-16
Attending: PHYSICIAN ASSISTANT
Payer: MEDICARE

## 2021-04-16 VITALS
BODY MASS INDEX: 17.36 KG/M2 | SYSTOLIC BLOOD PRESSURE: 94 MMHG | DIASTOLIC BLOOD PRESSURE: 60 MMHG | OXYGEN SATURATION: 98 % | HEIGHT: 55 IN | TEMPERATURE: 97.4 F | HEART RATE: 86 BPM | WEIGHT: 75 LBS

## 2021-04-16 DIAGNOSIS — H90.6 MIXED CONDUCTIVE AND SENSORINEURAL HEARING LOSS, BILATERAL: ICD-10-CM

## 2021-04-16 DIAGNOSIS — H92.11 OTORRHEA, RIGHT: ICD-10-CM

## 2021-04-16 DIAGNOSIS — H90.5 CONGENITAL HEARING LOSS: Primary | ICD-10-CM

## 2021-04-16 DIAGNOSIS — Z45.89 TYMPANOSTOMY TUBE CHECK: ICD-10-CM

## 2021-04-16 DIAGNOSIS — L30.9 DERMATITIS: ICD-10-CM

## 2021-04-16 PROCEDURE — G0463 HOSPITAL OUTPT CLINIC VISIT: HCPCS

## 2021-04-16 PROCEDURE — 99213 OFFICE O/P EST LOW 20 MIN: CPT | Performed by: PHYSICIAN ASSISTANT

## 2021-04-16 RX ORDER — OFLOXACIN 3 MG/ML
5 SOLUTION AURICULAR (OTIC) 2 TIMES DAILY
Qty: 4 ML | Refills: 0 | Status: SHIPPED | OUTPATIENT
Start: 2021-04-16 | End: 2021-04-23

## 2021-04-16 ASSESSMENT — PAIN SCALES - GENERAL: PAINLEVEL: NO PAIN (0)

## 2021-04-16 ASSESSMENT — MIFFLIN-ST. JEOR: SCORE: 820.58

## 2021-04-16 NOTE — PROGRESS NOTES
"Chief Complaint   Patient presents with     Cerumen Impaction     Pt is here for an ear cleaning.       Efren returns for a recheck of her ears. She was last seen on 12/11/20 for otorrhea. She tolerated cleaning, and had powder applied. Today, there has been no recent drainage and she has not c/o of her ears.   She does have hearing loss and has declined use of aids. She has aids but does not wear them at this time as she does not like them  Mom (Sophia) provides hx and details.    Distant hx of BTT with placement of t-tubes. Tubes have been in place for several years.   No recent illness. No other concerns  Discussed audiograms. Stable mixed hearing loss. Audiologist did mention consideration for BAHA, but patient did not wish to proceed.         No past medical history on file.     Allergies   Allergen Reactions     Clotrimazole Rash     Omnicef [Cefdinir] Rash     Itchy and bad rash     No current outpatient medications on file.     No current facility-administered medications for this visit.       ROS: 10 point ROS neg other than the symptoms noted above in the HPI.  BP 94/60 (BP Location: Left arm, Patient Position: Sitting, Cuff Size: Child)   Pulse 86   Temp 97.4  F (36.3  C) (Tympanic)   Ht 1.346 m (4' 5\")   Wt 34 kg (75 lb)   SpO2 98%   BMI 18.77 kg/m      Gerenal: Craniofacial abnormality.   Eyes: conjuctiva clear, PERRL, EOM intact   Ears: Otorrhea left. Right with otorrhea. Narrow canals. . Low set auricles with poor antihelical folds defined. t-tube bilaterally. Gila bowl bilateral dermatitis   Nose: Nares normal   Mouth: crowded.   Neck: Supple, no cervical adenopathy, no thyromegaly      The ear canals were examined underneath the operating microscope and with an otologic speculum. Scant debris suctioned on left, cerumen.   Right ear had mild otorrhea suctioned.   Canals, stenotic   Bilateral tubes appear in place.    Narrow canals which limits examination. Canals are narrow.  Tympanic " membranes appear with tubes. TMs are thickened.     Miconazole powder applied to Left.     ASSESSMENT:    ICD-10-CM    1. Congenital hearing loss  H90.5 ofloxacin (FLOXIN) 0.3 % otic solution   2. Otorrhea, right  H92.11 ofloxacin (FLOXIN) 0.3 % otic solution   3. Tympanostomy tube check  Z45.89 ofloxacin (FLOXIN) 0.3 % otic solution   4. Mixed conductive and sensorineural hearing loss, bilateral  H90.6    5. Dermatitis  L30.9            Overall her ears are doing very well with current therapy.   Floxin BID for 7 days to Right   Powder applied to Left.   Keep ears dry  RTC in 4-5months     If concerns for hearing changes, ear pain; further consider CT of ME, TB and mastoid. They have declined imaging in past.   In the past declined consideration for BAHA.   Efren does not use her OROZCO.        Marcelina Oneal PA-C  ENT  [facility], [place]  [phone]

## 2021-04-16 NOTE — PATIENT INSTRUCTIONS
Start Floxin 5 drops twice a day for 7 days to Right ear  Left ear was dry and powder applied.   Follow up in 4 months.         Thank you for allowing Marcelina Oneal PA-C and our ENT team to participate in your care.  If your medications are too expensive, please give the nurse a call.  We can possibly change this medication.  If you have a scheduling or an appointment question please contact our Health Unit Coordinator at [phone], Ext. [phone].    ALL nursing questions or concerns can be directed to your ENT nurse at: [phone] [place]

## 2021-04-16 NOTE — LETTER
"    4/16/2021         RE: Efren Saavedra  [address] [place] [address]        Dear Colleague,    Thank you for referring your patient, Efren Saavedra, to the [facility] [place]. Please see a copy of my visit note below.    Chief Complaint   Patient presents with     Cerumen Impaction     Pt is here for an ear cleaning.       Efren returns for a recheck of her ears. She was last seen on 12/11/20 for otorrhea. She tolerated cleaning, and had powder applied. Today, there has been no recent drainage and she has not c/o of her ears.   She does have hearing loss and has declined use of aids. She has aids but does not wear them at this time as she does not like them  Mom (Sophia) provides hx and details.    Distant hx of BTT with placement of t-tubes. Tubes have been in place for several years.   No recent illness. No other concerns  Discussed audiograms. Stable mixed hearing loss. Audiologist did mention consideration for BAHA, but patient did not wish to proceed.         No past medical history on file.     Allergies   Allergen Reactions     Clotrimazole Rash     Omnicef [Cefdinir] Rash     Itchy and bad rash     No current outpatient medications on file.     No current facility-administered medications for this visit.       ROS: 10 point ROS neg other than the symptoms noted above in the HPI.  BP 94/60 (BP Location: Left arm, Patient Position: Sitting, Cuff Size: Child)   Pulse 86   Temp 97.4  F (36.3  C) (Tympanic)   Ht 1.346 m (4' 5\")   Wt 34 kg (75 lb)   SpO2 98%   BMI 18.77 kg/m      Gerenal: Craniofacial abnormality.   Eyes: conjuctiva clear, PERRL, EOM intact   Ears: Otorrhea left. Right with otorrhea. Narrow canals. . Low set auricles with poor antihelical folds defined. t-tube bilaterally. Gila bowl bilateral dermatitis   Nose: Nares normal   Mouth: crowded.   Neck: Supple, no cervical adenopathy, no thyromegaly      The ear canals were examined underneath the operating " microscope and with an otologic speculum. Scant debris suctioned on left, cerumen.   Right ear had mild otorrhea suctioned.   Canals, stenotic   Bilateral tubes appear in place.    Narrow canals which limits examination. Canals are narrow.  Tympanic membranes appear with tubes. TMs are thickened.     Miconazole powder applied to Left.     ASSESSMENT:    ICD-10-CM    1. Congenital hearing loss  H90.5 ofloxacin (FLOXIN) 0.3 % otic solution   2. Otorrhea, right  H92.11 ofloxacin (FLOXIN) 0.3 % otic solution   3. Tympanostomy tube check  Z45.89 ofloxacin (FLOXIN) 0.3 % otic solution   4. Mixed conductive and sensorineural hearing loss, bilateral  H90.6    5. Dermatitis  L30.9            Overall her ears are doing very well with current therapy.   Floxin BID for 7 days to Right   Powder applied to Left.   Keep ears dry  RTC in 4-5months     If concerns for hearing changes, ear pain; further consider CT of ME, TB and mastoid. They have declined imaging in past.   In the past declined consideration for BAHA.   Efren does not use her OROZCO.        Marcelina Oneal PA-C  ENT  [facility], [place]  [phone]        Again, thank you for allowing me to participate in the care of your patient.        Sincerely,        Marcelina Oneal PA-C

## 2021-04-16 NOTE — NURSING NOTE
"Chief Complaint   Patient presents with     Cerumen Impaction     Pt is here for an ear cleaning.       Initial BP 94/60 (BP Location: Left arm, Patient Position: Sitting, Cuff Size: Child)   Pulse 86   Temp 97.4  F (36.3  C) (Tympanic)   Ht 1.346 m (4' 5\")   Wt 34 kg (75 lb)   SpO2 98%   BMI 18.77 kg/m   Estimated body mass index is 18.77 kg/m  as calculated from the following:    Height as of this encounter: 1.346 m (4' 5\").    Weight as of this encounter: 34 kg (75 lb).  Medication Reconciliation: complete  Beverly Cam LPN    "

## 2021-06-30 ENCOUNTER — OFFICE VISIT (OUTPATIENT)
Dept: OTOLARYNGOLOGY | Facility: OTHER | Age: 41
End: 2021-06-30
Attending: PHYSICIAN ASSISTANT
Payer: MEDICARE

## 2021-06-30 VITALS
TEMPERATURE: 97.6 F | HEART RATE: 97 BPM | OXYGEN SATURATION: 99 % | BODY MASS INDEX: 17.36 KG/M2 | WEIGHT: 75 LBS | HEIGHT: 55 IN | SYSTOLIC BLOOD PRESSURE: 102 MMHG | DIASTOLIC BLOOD PRESSURE: 64 MMHG

## 2021-06-30 DIAGNOSIS — Z45.89 TYMPANOSTOMY TUBE CHECK: ICD-10-CM

## 2021-06-30 DIAGNOSIS — H90.5 CONGENITAL HEARING LOSS: Primary | ICD-10-CM

## 2021-06-30 DIAGNOSIS — H93.11 TINNITUS, RIGHT: ICD-10-CM

## 2021-06-30 DIAGNOSIS — H90.6 MIXED CONDUCTIVE AND SENSORINEURAL HEARING LOSS, BILATERAL: ICD-10-CM

## 2021-06-30 PROCEDURE — 99213 OFFICE O/P EST LOW 20 MIN: CPT | Mod: 25 | Performed by: PHYSICIAN ASSISTANT

## 2021-06-30 PROCEDURE — 92504 EAR MICROSCOPY EXAMINATION: CPT | Performed by: PHYSICIAN ASSISTANT

## 2021-06-30 PROCEDURE — G0463 HOSPITAL OUTPT CLINIC VISIT: HCPCS

## 2021-06-30 ASSESSMENT — PAIN SCALES - GENERAL: PAINLEVEL: NO PAIN (0)

## 2021-06-30 ASSESSMENT — MIFFLIN-ST. JEOR: SCORE: 820.58

## 2021-06-30 NOTE — NURSING NOTE
"Chief Complaint   Patient presents with     Ear Problem     The patient has c/o noise in her right ear. Denies any pain or drainage.        Initial /64 (Cuff Size: Child)   Pulse 97   Temp 97.6  F (36.4  C) (Tympanic)   Ht 1.346 m (4' 5\")   Wt 34 kg (75 lb)   SpO2 99%   BMI 18.77 kg/m   Estimated body mass index is 18.77 kg/m  as calculated from the following:    Height as of this encounter: 1.346 m (4' 5\").    Weight as of this encounter: 34 kg (75 lb).  Medication Reconciliation: complete  Tracie Wallace LPN    "

## 2021-06-30 NOTE — PROGRESS NOTES
"Chief Complaint   Patient presents with     Ear Problem     The patient has c/o noise in her right ear. Denies any pain or drainage.        Patient returns for recheck of her ears. She was last seen on 4/16/21   Today,. Efren has mentioned to her Mom, that she hears sounds in her right ear. The sounds occur randomly, do not occur at any specific time or following exposure.   Efren only states she hears sounds and unable to differentiate further.   No associated otalgia, hearing change, otorrhea. No vertigo or falls.     She does have hearing loss and has declined use of aids. She has aids but does not wear them at this time as she does not like them  Mom (Sophia) provides hx and details.    Distant hx of BTT with placement of t-tubes. Tubes have been in place for 30 years.   No recent illness. No other concerns  Discussed audiograms. Stable mixed hearing loss. Audiologist did mention consideration for BAHA, but patient did not wish to proceed.         History reviewed. No pertinent past medical history.     Allergies   Allergen Reactions     Clotrimazole Rash     Omnicef [Cefdinir] Rash     Itchy and bad rash     No current outpatient medications on file.     No current facility-administered medications for this visit.      ROS- SEE HPI  /64 (Cuff Size: Child)   Pulse 97   Temp 97.6  F (36.4  C) (Tympanic)   Ht 1.346 m (4' 5\")   Wt 34 kg (75 lb)   SpO2 99%   BMI 18.77 kg/m    Gerenal: Craniofacial abnormality.   Eyes: conjuctiva clear, PERRL, EOM intact   Ears: Canals overall clear. Narrow canals. . Low set auricles with poor antihelical folds defined. t-tube bilaterally. Gila bowl bilateral dermatitis   Nose: Nares normal   Mouth: crowded.   Neck: Supple, no cervical adenopathy, no thyromegaly      The ear canals were examined underneath the operating microscope and with an otologic speculum. Scant debris suctioned on left, cerumen.   Right ear had dried debris, removed. Tolerated well.   Canals, " stenotic   Bilateral tubes appear in place.    Narrow canals which limits examination. Canals are narrow.  Tympanic membranes appear with tubes. TMs are thickened.   ME appears dry. I do not appreciate otorrhea or active infection.       ASSESSMENT:    ICD-10-CM    1. Congenital hearing loss  H90.5    2. Tympanostomy tube check  Z45.89    3. 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 Tinnitus, right  H93.11            Discussed past audiogram with mom and Efren, she does have MHL which she could be a source or tinnitus. However, the noises are not felt to occur frequently.   Mom will monitor symptoms and track. Discussed clenching or TMJ noises while eating.   Recommended use of HA.   Consider imaging, CT of ears if there is no improvement  Tubes have been present for 30 years. However, have been stable.   They have declined imaging in past.   In the past declined consideration for BAHA.   Efren does not use her OROZCO.     Marcelina Oneal PA-C  ENT  [facility], [place]

## 2021-06-30 NOTE — LETTER
"    6/30/2021         RE: Efren Saavedra  [address] [place] [address]        Dear Colleague,    Thank you for referring your patient, Efren Saavedra, to the [facility]. Please see a copy of my visit note below.    Chief Complaint   Patient presents with     Ear Problem     The patient has c/o noise in her right ear. Denies any pain or drainage.        Patient returns for recheck of her ears. She was last seen on 4/16/21   Today,. Efren has mentioned to her Mom, that she hears sounds in her right ear. The sounds occur randomly, do not occur at any specific time or following exposure.   Efren only states she hears sounds and unable to differentiate further.   No associated otalgia, hearing change, otorrhea. No vertigo or falls.     She does have hearing loss and has declined use of aids. She has aids but does not wear them at this time as she does not like them  Mom (Sophia) provides hx and details.    Distant hx of BTT with placement of t-tubes. Tubes have been in place for 30 years.   No recent illness. No other concerns  Discussed audiograms. Stable mixed hearing loss. Audiologist did mention consideration for BAHA, but patient did not wish to proceed.         History reviewed. No pertinent past medical history.     Allergies   Allergen Reactions     Clotrimazole Rash     Omnicef [Cefdinir] Rash     Itchy and bad rash     No current outpatient medications on file.     No current facility-administered medications for this visit.      ROS- SEE HPI  /64 (Cuff Size: Child)   Pulse 97   Temp 97.6  F (36.4  C) (Tympanic)   Ht 1.346 m (4' 5\")   Wt 34 kg (75 lb)   SpO2 99%   BMI 18.77 kg/m    Gerenal: Craniofacial abnormality.   Eyes: conjuctiva clear, PERRL, EOM intact   Ears: Canals overall clear. Narrow canals. . Low set auricles with poor antihelical folds defined. t-tube bilaterally. Gila bowl bilateral dermatitis   Nose: Nares normal   Mouth: crowded.   Neck: Supple, " no cervical adenopathy, no thyromegaly      The ear canals were examined underneath the operating microscope and with an otologic speculum. Scant debris suctioned on left, cerumen.   Right ear had dried debris, removed. Tolerated well.   Canals, stenotic   Bilateral tubes appear in place.    Narrow canals which limits examination. Canals are narrow.  Tympanic membranes appear with tubes. TMs are thickened.   ME appears dry. I do not appreciate otorrhea or active infection.       ASSESSMENT:    ICD-10-CM    1. Congenital hearing loss  H90.5    2. Tympanostomy tube check  Z45.89    3. 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 Tinnitus, right  H93.11            Discussed past audiogram with mom and Efren, she does have MHL which she could be a source or tinnitus. However, the noises are not felt to occur frequently.   Mom will monitor symptoms and track. Discussed clenching or TMJ noises while eating.   Recommended use of HA.   Consider imaging, CT of ears if there is no improvement  Tubes have been present for 30 years. However, have been stable.   They have declined imaging in past.   In the past declined consideration for BAHA.   Efren does not use her OROZCO.     Marcelina Oneal PA-C  ENT  [facility], [place]                 Again, thank you for allowing me to participate in the care of your patient.        Sincerely,        Marcelina Oneal PA-C

## 2021-06-30 NOTE — PATIENT INSTRUCTIONS
Ears look well. Tubes are in good position and open.   Hearing overall stable  Monitor symptoms. ? Chewing/ eating vs. Tinnitus  Could try hearing aids  Maintain appointment.   May consider imaging if symptoms worsen.       Thank you for allowing Marcelina Oneal PA-C and our ENT team to participate in your care.  If your medications are too expensive, please give the nurse a call.  We can possibly change this medication.  If you have a scheduling or an appointment question please contact our Health Unit Coordinator at [phone], Ext. [phone].    ALL nursing questions or concerns can be directed to your ENT nurse at: [phone] [place]

## 2021-07-26 ENCOUNTER — MEDICAL CORRESPONDENCE (OUTPATIENT)
Dept: BONE DENSITY | Facility: HOSPITAL | Age: 41
End: 2021-07-26

## 2021-08-04 ENCOUNTER — HOSPITAL ENCOUNTER (OUTPATIENT)
Dept: BONE DENSITY | Facility: HOSPITAL | Age: 41
Discharge: HOME | End: 2021-08-04
Attending: FAMILY MEDICINE | Admitting: FAMILY MEDICINE
Payer: MEDICARE

## 2021-08-04 DIAGNOSIS — M81.8 OTHER OSTEOPOROSIS WITHOUT CURRENT PATHOLOGICAL FRACTURE: ICD-10-CM

## 2021-08-04 PROCEDURE — 77080 DXA BONE DENSITY AXIAL: CPT

## 2021-08-20 ENCOUNTER — OFFICE VISIT (OUTPATIENT)
Dept: OTOLARYNGOLOGY | Facility: OTHER | Age: 41
End: 2021-08-20
Attending: PHYSICIAN ASSISTANT
Payer: MEDICARE

## 2021-08-20 VITALS
TEMPERATURE: 98.5 F | RESPIRATION RATE: 16 BRPM | OXYGEN SATURATION: 99 % | SYSTOLIC BLOOD PRESSURE: 100 MMHG | HEART RATE: 80 BPM | DIASTOLIC BLOOD PRESSURE: 52 MMHG

## 2021-08-20 DIAGNOSIS — H90.5 CONGENITAL HEARING LOSS: Primary | ICD-10-CM

## 2021-08-20 DIAGNOSIS — Z45.89 TYMPANOSTOMY TUBE CHECK: ICD-10-CM

## 2021-08-20 DIAGNOSIS — H90.6 MIXED CONDUCTIVE AND SENSORINEURAL HEARING LOSS, BILATERAL: ICD-10-CM

## 2021-08-20 PROCEDURE — G0463 HOSPITAL OUTPT CLINIC VISIT: HCPCS

## 2021-08-20 PROCEDURE — 92504 EAR MICROSCOPY EXAMINATION: CPT | Performed by: PHYSICIAN ASSISTANT

## 2021-08-20 PROCEDURE — 99213 OFFICE O/P EST LOW 20 MIN: CPT | Mod: 25 | Performed by: PHYSICIAN ASSISTANT

## 2021-08-20 PROCEDURE — 92504 EAR MICROSCOPY EXAMINATION: CPT

## 2021-08-20 ASSESSMENT — PAIN SCALES - GENERAL: PAINLEVEL: NO PAIN (0)

## 2021-08-20 NOTE — LETTER
8/20/2021         RE: Efren Saavedra  [address] [place] [address]        Dear Colleague,    Thank you for referring your patient, Efren Saavedra, to the [facility]. Please see a copy of my visit note below.    Chief Complaint   Patient presents with     Cerumen Impaction     Pt is here for an ear cleaning.       Patient returns for recheck of her ears. She was last seen on 6/30/21. She has been doing well with continued routine ear cleaning, powder application.   Today,. Efren has mentioned to her Mom, that she hears sounds in her right ear. The sounds occur randomly, do not occur at any specific time or following exposure.   Efren only states she hears sounds and unable to differentiate further.   No associated otalgia, hearing change, otorrhea. No vertigo or falls.      She does have hearing loss and has declined use of aids. She has aids but does not wear them at this time as she does not like them  Mom (Sophia) provides hx and details.    Distant hx of BTT with placement of t-tubes. Tubes have been in place for 30 years.   No recent illness. No other concerns  Discussed audiograms. Stable mixed hearing loss. Audiologist did mention consideration for BAHA, but patient did not wish to proceed.    She has HA but does not tolerate or wear.           No past medical history on file.     Allergies   Allergen Reactions     Clotrimazole Rash     Omnicef [Cefdinir] Rash     Itchy and bad rash     No current outpatient medications on file.     No current facility-administered medications for this visit.      ROS: 10 point ROS neg other than the symptoms noted above in the HPI.  /52   Pulse 80   Temp 98.5  F (36.9  C) (Tympanic)   Resp 16   SpO2 99%     Gerenal: Craniofacial abnormality.   Eyes: conjuctiva clear, PERRL, EOM intact   Ears: Canals overall clear. Narrow canals. . Low set auricles with poor antihelical folds defined. t-tube bilaterally. Gila bowl bilateral  dermatitis   Nose: Nares normal   Mouth: crowded.   Neck: Supple, no cervical adenopathy, no thyromegaly      The ear canals were examined underneath the operating microscope and with an otologic speculum. Scant debris suctioned on left, cerumen.   Right ear had dried debris, removed. Tolerated well.   Canals, stenotic   Bilateral tubes appear in place.    Narrow canals which limits examination. Canals are narrow.  Tympanic membranes appear with tubes. TMs are thickened and appear with sclerosis.   ME appears dry. I do not appreciate otorrhea or active infection    Powder applied to both ears.       ASSESSMENT:    ICD-10-CM    1. Congenital hearing loss  H90.5    2. Tympanostomy tube check  Z45.89    3. Mixed conductive and sensorineural hearing loss, bilateral  H90.6      Discussed past audiogram with mom and Efren, she does have MHL which has appeared stable.   Due for audiogram.   Recommended use of HA.   Consider imaging, CT of ears if there is no improvement  Tubes have been present for 30 years. However, have been stable.   They have declined imaging in past.   In the past declined consideration for BAHA.   Efren does not use her OROZCO.      Marcelina Oneal PA-C  ENT  [facility], [place]      Again, thank you for allowing me to participate in the care of your patient.        Sincerely,        Marcelina Oneal PA-C

## 2021-08-20 NOTE — PROGRESS NOTES
Chief Complaint   Patient presents with     Cerumen Impaction     Pt is here for an ear cleaning.       Patient returns for recheck of her ears. She was last seen on 6/30/21. She has been doing well with continued routine ear cleaning, powder application.   Today,. Efren has mentioned to her Mom, that she hears sounds in her right ear. The sounds occur randomly, do not occur at any specific time or following exposure.   Efren only states she hears sounds and unable to differentiate further.   No associated otalgia, hearing change, otorrhea. No vertigo or falls.      She does have hearing loss and has declined use of aids. She has aids but does not wear them at this time as she does not like them  Mom (Sophia) provides hx and details.    Distant hx of BTT with placement of t-tubes. Tubes have been in place for 30 years.   No recent illness. No other concerns  Discussed audiograms. Stable mixed hearing loss. Audiologist did mention consideration for BAHA, but patient did not wish to proceed.    She has HA but does not tolerate or wear.           No past medical history on file.     Allergies   Allergen Reactions     Clotrimazole Rash     Omnicef [Cefdinir] Rash     Itchy and bad rash     No current outpatient medications on file.     No current facility-administered medications for this visit.      ROS: 10 point ROS neg other than the symptoms noted above in the HPI.  /52   Pulse 80   Temp 98.5  F (36.9  C) (Tympanic)   Resp 16   SpO2 99%     Gerenal: Craniofacial abnormality.   Eyes: conjuctiva clear, PERRL, EOM intact   Ears: Canals overall clear. Narrow canals. . Low set auricles with poor antihelical folds defined. t-tube bilaterally. Gila bowl bilateral dermatitis   Nose: Nares normal   Mouth: crowded.   Neck: Supple, no cervical adenopathy, no thyromegaly      The ear canals were examined underneath the operating microscope and with an otologic speculum. Scant debris suctioned on left, cerumen.    Right ear had dried debris, removed. Tolerated well.   Canals, stenotic   Bilateral tubes appear in place.    Narrow canals which limits examination. Canals are narrow.  Tympanic membranes appear with tubes. TMs are thickened and appear with sclerosis.   ME appears dry. I do not appreciate otorrhea or active infection    Powder applied to both ears.       ASSESSMENT:    ICD-10-CM    1. Congenital hearing loss  H90.5    2. Tympanostomy tube check  Z45.89    3. Mixed conductive and sensorineural hearing loss, bilateral  H90.6      Discussed past audiogram with mom and Efren, she does have MHL which has appeared stable.   Due for audiogram.   Recommended use of HA.   Consider imaging, CT of ears if there is no improvement  Tubes have been present for 30 years. However, have been stable.   They have declined imaging in past.   In the past declined consideration for BAHA.   Efren does not use her OROZCO.      Marcelina Oneal PA-C  ENT  [facility], [place]

## 2021-08-20 NOTE — PATIENT INSTRUCTIONS
Ear were cleaned   Powder was applied.   Keep ears dry  Follow up in 2-3 months        Thank you for allowing Marcelina Oneal PA-C and our ENT team to participate in your care.  If your medications are too expensive, please give the nurse a call.  We can possibly change this medication.  If you have a scheduling or an appointment question please contact our Health Unit Coordinator at [phone], Ext. [phone].    ALL nursing questions or concerns can be directed to your ENT nurse at: [phone] [place]

## 2021-08-20 NOTE — NURSING NOTE
"Chief Complaint   Patient presents with     Cerumen Impaction     Pt is here for an ear cleaning.       Initial /52   Pulse 80   Temp 98.5  F (36.9  C) (Tympanic)   Resp 16   SpO2 99%  Estimated body mass index is 18.77 kg/m  as calculated from the following:    Height as of 6/30/21: 1.346 m (4' 5\").    Weight as of 6/30/21: 34 kg (75 lb).  Medication Reconciliation: complete  India Maddox LPN      "

## 2021-12-03 ENCOUNTER — OFFICE VISIT (OUTPATIENT)
Dept: OTOLARYNGOLOGY | Facility: OTHER | Age: 41
End: 2021-12-03
Attending: PHYSICIAN ASSISTANT
Payer: MEDICARE

## 2021-12-03 VITALS
DIASTOLIC BLOOD PRESSURE: 62 MMHG | HEIGHT: 55 IN | BODY MASS INDEX: 17.36 KG/M2 | HEART RATE: 99 BPM | TEMPERATURE: 98 F | OXYGEN SATURATION: 97 % | WEIGHT: 75 LBS | SYSTOLIC BLOOD PRESSURE: 110 MMHG

## 2021-12-03 DIAGNOSIS — H90.6 MIXED CONDUCTIVE AND SENSORINEURAL HEARING LOSS, BILATERAL: ICD-10-CM

## 2021-12-03 DIAGNOSIS — Z45.89 TYMPANOSTOMY TUBE CHECK: ICD-10-CM

## 2021-12-03 DIAGNOSIS — H92.13 OTORRHEA OF BOTH EARS: ICD-10-CM

## 2021-12-03 DIAGNOSIS — H90.5 CONGENITAL HEARING LOSS: Primary | ICD-10-CM

## 2021-12-03 PROCEDURE — G0463 HOSPITAL OUTPT CLINIC VISIT: HCPCS

## 2021-12-03 PROCEDURE — 92504 EAR MICROSCOPY EXAMINATION: CPT

## 2021-12-03 PROCEDURE — 99213 OFFICE O/P EST LOW 20 MIN: CPT | Mod: 25 | Performed by: PHYSICIAN ASSISTANT

## 2021-12-03 PROCEDURE — 92504 EAR MICROSCOPY EXAMINATION: CPT | Performed by: PHYSICIAN ASSISTANT

## 2021-12-03 ASSESSMENT — MIFFLIN-ST. JEOR: SCORE: 820.58

## 2021-12-03 ASSESSMENT — PAIN SCALES - GENERAL: PAINLEVEL: NO PAIN (0)

## 2021-12-03 NOTE — PATIENT INSTRUCTIONS
Ears look well.   Tubes remain in position.   Powder applied to both ears.     Follow up in 4 months.       Thank you for allowing Marcelina Oneal PA-C and our ENT team to participate in your care.  If your medications are too expensive, please give the nurse a call.  We can possibly change this medication.  If you have a scheduling or an appointment question please contact our Health Unit Coordinator at [phone], Ext. [phone].    ALL nursing questions or concerns can be directed to your ENT nurse at: [phone] [place]

## 2021-12-03 NOTE — LETTER
"    12/3/2021         RE: Efren Saavedra  [address] [place] [address]        Dear Colleague,    Thank you for referring your patient, Efren Saavedra, to the [facility]. Please see a copy of my visit note below.    Chief Complaint   Patient presents with     Cerumen Impaction     Pt is here for an ear cleaning.       Patient returns for recheck of her ears. She was last seen on 6/30/21. She has been doing well with continued routine ear cleaning, powder application.   Today, Efren has been doing well. No nisreen otalgia, otorrhea  Intermittent ear fullness at one time.   Efren only states she hears sounds and unable to differentiate further.   No associated otalgia, hearing change, otorrhea. No vertigo or falls.      She does have hearing loss and has declined use of aids. She has aids but does not wear them at this time as she does not like them  Mom (Sophia) provides hx and details.    Distant hx of BTT with placement of t-tubes. Tubes have been in place for 30 years.   No recent illness. No other concerns  Discussed audiograms. Stable mixed hearing loss. Audiologist did mention consideration for BAHA, but patient did not wish to proceed.    She has HA but does not tolerate or wear.       No past medical history on file.     Allergies   Allergen Reactions     Clotrimazole Rash     Omnicef [Cefdinir] Rash     Itchy and bad rash     No current outpatient medications on file.     No current facility-administered medications for this visit.      ROS: SEE HPI  /62 (BP Location: Right arm, Cuff Size: Child)   Pulse 99   Temp 98  F (36.7  C) (Tympanic)   Ht 1.346 m (4' 5\")   Wt 34 kg (75 lb)   SpO2 97%   BMI 18.77 kg/m      Gerenal: Craniofacial abnormality.   Eyes: conjuctiva clear, PERRL, EOM intact   Ears: Canals overall clear. Narrow canals. . Low set auricles with poor antihelical folds defined. t-tube bilaterally. Gila bowl bilateral dermatitis   Nose: Nares normal "   Mouth: crowded.   Neck: Supple, no cervical adenopathy, no thyromegaly      The ear canals were examined underneath the operating microscope and with an otologic speculum. Scant debris suctioned on left, cerumen.   Right ear had dried debris, removed. Tolerated well.   Canals, stenotic   Bilateral tubes appear in place.    Narrow canals which limits examination. Canals are narrow.  Tympanic membranes appear with tubes. TMs are thickened and appear with sclerosis.   ME appears dry. I do not appreciate otorrhea or active infection     Powder applied to both ears.       ASSESSMENT:      ICD-10-CM    1. Congenital hearing loss  H90.5    2. Tympanostomy tube check  Z45.89    3. 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 Otorrhea of both ears  H92.13      Discussed past audiogram with mom and Efren, she does have MHL which has appeared stable.   Due for audiogram.   Recommended use of HA.   Consider imaging, CT of ears if there is no improvement  Tubes have been present for 30 years. However, have been stable.   They have declined imaging in past.   In the past declined consideration for BAHA.   Efren does not use her OROZCO.       Marcelina Oneal PA-C  ENT  [facility], [place]             Again, thank you for allowing me to participate in the care of your patient.        Sincerely,        Marcelina Oneal PA-C

## 2021-12-03 NOTE — PROGRESS NOTES
"Chief Complaint   Patient presents with     Cerumen Impaction     Pt is here for an ear cleaning.       Patient returns for recheck of her ears. She was last seen on 6/30/21. She has been doing well with continued routine ear cleaning, powder application.   Today, Efren has been doing well. No nisreen otalgia, otorrhea  Intermittent ear fullness at one time.   Efren only states she hears sounds and unable to differentiate further.   No associated otalgia, hearing change, otorrhea. No vertigo or falls.      She does have hearing loss and has declined use of aids. She has aids but does not wear them at this time as she does not like them  Mom (Sophia) provides hx and details.    Distant hx of BTT with placement of t-tubes. Tubes have been in place for 30 years.   No recent illness. No other concerns  Discussed audiograms. Stable mixed hearing loss. Audiologist did mention consideration for BAHA, but patient did not wish to proceed.    She has HA but does not tolerate or wear.       No past medical history on file.     Allergies   Allergen Reactions     Clotrimazole Rash     Omnicef [Cefdinir] Rash     Itchy and bad rash     No current outpatient medications on file.     No current facility-administered medications for this visit.      ROS: SEE HPI  /62 (BP Location: Right arm, Cuff Size: Child)   Pulse 99   Temp 98  F (36.7  C) (Tympanic)   Ht 1.346 m (4' 5\")   Wt 34 kg (75 lb)   SpO2 97%   BMI 18.77 kg/m      Gerenal: Craniofacial abnormality.   Eyes: conjuctiva clear, PERRL, EOM intact   Ears: Canals overall clear. Narrow canals. . Low set auricles with poor antihelical folds defined. t-tube bilaterally. Gila bowl bilateral dermatitis   Nose: Nares normal   Mouth: crowded.   Neck: Supple, no cervical adenopathy, no thyromegaly      The ear canals were examined underneath the operating microscope and with an otologic speculum. Scant debris suctioned on left, cerumen.   Right ear had dried debris, " removed. Tolerated well.   Canals, stenotic   Bilateral tubes appear in place.    Narrow canals which limits examination. Canals are narrow.  Tympanic membranes appear with tubes. TMs are thickened and appear with sclerosis.   ME appears dry. I do not appreciate otorrhea or active infection     Powder applied to both ears.       ASSESSMENT:      ICD-10-CM    1. Congenital hearing loss  H90.5    2. Tympanostomy tube check  Z45.89    3. 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 Otorrhea of both ears  H92.13      Discussed past audiogram with mom and Efren, she does have MHL which has appeared stable.   Due for audiogram.   Recommended use of HA.   Consider imaging, CT of ears if there is no improvement  Tubes have been present for 30 years. However, have been stable.   They have declined imaging in past.   In the past declined consideration for BAHA.   Efren does not use her OROZCO.       Marcelina Oneal PA-C  ENT  [facility], [place]

## 2021-12-03 NOTE — NURSING NOTE
"Chief Complaint   Patient presents with     Cerumen Impaction     Pt is here for an ear cleaning.       Initial /62 (BP Location: Right arm, Cuff Size: Child)   Pulse 99   Temp 98  F (36.7  C) (Tympanic)   Ht 1.346 m (4' 5\")   Wt 34 kg (75 lb)   SpO2 97%   BMI 18.77 kg/m   Estimated body mass index is 18.77 kg/m  as calculated from the following:    Height as of this encounter: 1.346 m (4' 5\").    Weight as of this encounter: 34 kg (75 lb).  Medication Reconciliation: complete  [place] Sweeney LPN    "

## 2022-04-01 NOTE — PROGRESS NOTES
"Chief Complaint   Patient presents with     Cerumen Impaction     Ear cleaning   Patient returns to ENT for routine ear cleaning.  Patient was last seen 12/3/2021 for routine cleaning.  Angel has a history of congenital hearing loss, mixed hearing loss bilateral.  She does have patent tympanostomy tubes which have been in position for several years.  She does have cleanings completed and at times powder application.  At her last visit application of miconazole is applied to both ears.  She does have hearing loss which has been overall stable and recommended use of hearing aids.  Sylvie does not tolerate use and declines a to use on a daily basis.  We reviewed consideration for repeat imaging and at this time.  Have declined.  They have declined further options of possible consideration for [place] referral to otology    Today Efren has been doing well since her last visit. She was doing well with her ears until the last week. She had c/o intermittent plugged feeling.   No active drainage.   Powder application has been aiding in relief with otorrhea.       She does have hearing loss and has declined use of aids. She has aids but does not wear them at this time as she does not like them  Mom (Sophia) provides hx and details.    Distant hx of BTT with placement of t-tubes. Tubes have been in place for 30 years.   No recent illness. No other concerns  Discussed audiograms. Stable mixed hearing loss. Audiologist did mention consideration for BAHA, but patient did not wish to proceed.    She has HA but does not tolerate or wear.     No past medical history on file.     Allergies   Allergen Reactions     Clotrimazole Rash     Omnicef [Cefdinir] Rash     Itchy and bad rash     No current outpatient medications on file.     No current facility-administered medications for this visit.     ROS- SEE HPI  /66 (Cuff Size: Child)   Pulse 103   Temp 97.2  F (36.2  C) (Tympanic)   Ht 1.346 m (4' 5\")   Wt 34 kg (75 lb)   SpO2 " 99%   BMI 18.77 kg/m      General: Craniofacial abnormality.   Eyes: conjuctiva clear, PERRL, EOM intact   Ears: Canals overall clear. Narrow canals. . Low set auricles with poor antihelical folds defined. t-tube bilaterally. Gila bowl bilateral overall look well.   Nose: Nares normal   Mouth: crowded.   Neck: Supple, no cervical adenopathy, no thyromegaly      The ear canals were examined underneath the operating microscope and with an otologic speculum. Scant debris suctioned on left, cerumen.   Right ear had dried debris, removed. Tolerated well.   Canals, stenotic   Bilateral tubes appear in place.    Narrow canals which limits examination. Canals are narrow.  Tympanic membranes appear with tubes. TMs are thickened and appear with sclerosis.   ME appears dry. I do not appreciate otorrhea or active infection. Removed scant crusting/ cerumen.      Miconazole Powder applied to both ears.       ASSESSMENT/ PLAN:    ICD-10-CM    1. Congenital hearing loss  H90.5    2. Tympanostomy tube check  Z45.89    3. Mixed conductive and sensorineural hearing loss, bilateral  H90.6        Powder was applied to canals. She has done very well with this. Return in 5 months for cleaning, if increase in drainage return to ENT sooner.     Discussed past audiogram with mom and Efren, she does have MHL which has appeared stable.   Due for audiogram.   Recommended use of HA.   Consider imaging, CT of ears if there is no improvement  Tubes have been present for 30 years. However, have been stable.   They have declined imaging in past.   In the past declined consideration for BAHA.   Efren does not use her OROZCO.            Marcelina Oneal PA-C  ENT  [facility], [place]

## 2022-04-05 ENCOUNTER — OFFICE VISIT (OUTPATIENT)
Dept: OTOLARYNGOLOGY | Facility: OTHER | Age: 42
End: 2022-04-05
Attending: PHYSICIAN ASSISTANT
Payer: MEDICARE

## 2022-04-05 VITALS
HEIGHT: 55 IN | OXYGEN SATURATION: 99 % | HEART RATE: 103 BPM | BODY MASS INDEX: 17.36 KG/M2 | DIASTOLIC BLOOD PRESSURE: 66 MMHG | SYSTOLIC BLOOD PRESSURE: 104 MMHG | TEMPERATURE: 97.2 F | WEIGHT: 75 LBS

## 2022-04-05 DIAGNOSIS — Z45.89 TYMPANOSTOMY TUBE CHECK: ICD-10-CM

## 2022-04-05 DIAGNOSIS — H90.6 MIXED CONDUCTIVE AND SENSORINEURAL HEARING LOSS, BILATERAL: ICD-10-CM

## 2022-04-05 DIAGNOSIS — H90.5 CONGENITAL HEARING LOSS: Primary | ICD-10-CM

## 2022-04-05 PROCEDURE — G0463 HOSPITAL OUTPT CLINIC VISIT: HCPCS

## 2022-04-05 PROCEDURE — 99213 OFFICE O/P EST LOW 20 MIN: CPT | Mod: 25 | Performed by: PHYSICIAN ASSISTANT

## 2022-04-05 PROCEDURE — 92504 EAR MICROSCOPY EXAMINATION: CPT

## 2022-04-05 PROCEDURE — 92504 EAR MICROSCOPY EXAMINATION: CPT | Performed by: PHYSICIAN ASSISTANT

## 2022-04-05 ASSESSMENT — PAIN SCALES - GENERAL: PAINLEVEL: NO PAIN (0)

## 2022-04-05 NOTE — LETTER
4/5/2022         RE: Efren Saavedra  [address] [place] [address]        Dear Colleague,    Thank you for referring your patient, Efren Saavedra, to the [facility]. Please see a copy of my visit note below.    Chief Complaint   Patient presents with     Cerumen Impaction     Ear cleaning   Patient returns to ENT for routine ear cleaning.  Patient was last seen 12/3/2021 for routine cleaning.  Angel has a history of congenital hearing loss, mixed hearing loss bilateral.  She does have patent tympanostomy tubes which have been in position for several years.  She does have cleanings completed and at times powder application.  At her last visit application of miconazole is applied to both ears.  She does have hearing loss which has been overall stable and recommended use of hearing aids.  Sylvie does not tolerate use and declines a to use on a daily basis.  We reviewed consideration for repeat imaging and at this time.  Have declined.  They have declined further options of possible consideration for [facility] referral to otology    Today Efren has been doing well since her last visit. She was doing well with her ears until the last week. She had c/o intermittent plugged feeling.   No active drainage.   Powder application has been aiding in relief with otorrhea.       She does have hearing loss and has declined use of aids. She has aids but does not wear them at this time as she does not like them  Mom (Sophia) provides hx and details.    Distant hx of BTT with placement of t-tubes. Tubes have been in place for 30 years.   No recent illness. No other concerns  Discussed audiograms. Stable mixed hearing loss. Audiologist did mention consideration for BAHA, but patient did not wish to proceed.    She has HA but does not tolerate or wear.     No past medical history on file.     Allergies   Allergen Reactions     Clotrimazole Rash     Omnicef [Cefdinir] Rash     Itchy and bad rash     No  "current outpatient medications on file.     No current facility-administered medications for this visit.     ROS- SEE HPI  /66 (Cuff Size: Child)   Pulse 103   Temp 97.2  F (36.2  C) (Tympanic)   Ht 1.346 m (4' 5\")   Wt 34 kg (75 lb)   SpO2 99%   BMI 18.77 kg/m      General: Craniofacial abnormality.   Eyes: conjuctiva clear, PERRL, EOM intact   Ears: Canals overall clear. Narrow canals. . Low set auricles with poor antihelical folds defined. t-tube bilaterally. Gila bowl bilateral overall look well.   Nose: Nares normal   Mouth: crowded.   Neck: Supple, no cervical adenopathy, no thyromegaly      The ear canals were examined underneath the operating microscope and with an otologic speculum. Scant debris suctioned on left, cerumen.   Right ear had dried debris, removed. Tolerated well.   Canals, stenotic   Bilateral tubes appear in place.    Narrow canals which limits examination. Canals are narrow.  Tympanic membranes appear with tubes. TMs are thickened and appear with sclerosis.   ME appears dry. I do not appreciate otorrhea or active infection. Removed scant crusting/ cerumen.      Miconazole Powder applied to both ears.       ASSESSMENT/ PLAN:    ICD-10-CM    1. Congenital hearing loss  H90.5    2. Tympanostomy tube check  Z45.89    3. Mixed conductive and sensorineural hearing loss, bilateral  H90.6        Powder was applied to canals. She has done very well with this. Return in 5 months for cleaning, if increase in drainage return to ENT sooner.     Discussed past audiogram with mom and Efren, she does have MHL which has appeared stable.   Due for audiogram.   Recommended use of HA.   Consider imaging, CT of ears if there is no improvement  Tubes have been present for 30 years. However, have been stable.   They have declined imaging in past.   In the past declined consideration for BAHA.   Efren does not use her OROZCO.            Marcelina Oneal PA-C  ENT  [facility], [place]          Again, thank " you for allowing me to participate in the care of your patient.        Sincerely,        Marcelina Oneal PA-C

## 2022-04-05 NOTE — NURSING NOTE
"Chief Complaint   Patient presents with     Cerumen Impaction     Ear cleaning       Initial /66 (Cuff Size: Child)   Pulse 103   Temp 97.2  F (36.2  C) (Tympanic)   Ht 1.346 m (4' 5\")   Wt 34 kg (75 lb)   SpO2 99%   BMI 18.77 kg/m   Estimated body mass index is 18.77 kg/m  as calculated from the following:    Height as of this encounter: 1.346 m (4' 5\").    Weight as of this encounter: 34 kg (75 lb).  Medication Reconciliation: complete  Tracie Wallace LPN    "

## 2022-07-22 ENCOUNTER — HOSPITAL ENCOUNTER (EMERGENCY)
Facility: HOSPITAL | Age: 42
Discharge: HOME | End: 2022-07-22
Attending: NURSE PRACTITIONER | Admitting: NURSE PRACTITIONER
Payer: MEDICARE

## 2022-07-22 ENCOUNTER — APPOINTMENT (OUTPATIENT)
Dept: GENERAL RADIOLOGY | Facility: HOSPITAL | Age: 42
End: 2022-07-22
Attending: NURSE PRACTITIONER
Payer: MEDICARE

## 2022-07-22 VITALS
RESPIRATION RATE: 18 BRPM | TEMPERATURE: 98.3 F | HEART RATE: 118 BPM | SYSTOLIC BLOOD PRESSURE: 127 MMHG | OXYGEN SATURATION: 100 % | DIASTOLIC BLOOD PRESSURE: 84 MMHG

## 2022-07-22 DIAGNOSIS — M54.2 NECK PAIN ON RIGHT SIDE: ICD-10-CM

## 2022-07-22 DIAGNOSIS — M25.511 ACUTE PAIN OF RIGHT SHOULDER: Primary | ICD-10-CM

## 2022-07-22 PROCEDURE — 72040 X-RAY EXAM NECK SPINE 2-3 VW: CPT

## 2022-07-22 PROCEDURE — G0463 HOSPITAL OUTPT CLINIC VISIT: HCPCS

## 2022-07-22 PROCEDURE — 73030 X-RAY EXAM OF SHOULDER: CPT | Mod: RT

## 2022-07-22 PROCEDURE — 99213 OFFICE O/P EST LOW 20 MIN: CPT | Performed by: NURSE PRACTITIONER

## 2022-07-22 ASSESSMENT — ENCOUNTER SYMPTOMS
NUMBNESS: 0
JOINT SWELLING: 0
NECK STIFFNESS: 0
NECK PAIN: 1
HEADACHES: 0
WEAKNESS: 0
DIZZINESS: 0
MYALGIAS: 1
BACK PAIN: 0

## 2022-07-22 NOTE — ED PROVIDER NOTES
History     Chief Complaint   Patient presents with     Neck Pain     HPI  Efren Saavedra is a 42 year old female with primary medical history of seizure disorder who is brought in by mom for evaluation of right shoulder and neck pain.  Mom tells me that 1 week ago patient was sitting down on a chair when she started having a seizure and had an unwitnessed fall.  Initially after her seizure she was just complaining of bilateral knee pain which has since resolved.  In the last few days patient has not been complaining of right neck and shoulder pain.  Mom has been giving her ibuprofen which seems to help with the pain.        Allergies:  Allergies   Allergen Reactions     Clotrimazole Rash     Omnicef [Cefdinir] Rash     Itchy and bad rash       Problem List:    Patient Active Problem List    Diagnosis Date Noted     Seizure (H) 02/27/2015     Priority: Medium     Overview:   At least two events (maybe 3), mom opted no treatment or mri unless further events       Osteoporosis 11/23/2013     Priority: Medium     Overview:   IMO Update       Congenital hearing loss 05/14/2013     Priority: Medium     Impacted cerumen 05/14/2013     Priority: Medium     Mixed conductive and sensorineural hearing loss, bilateral 05/14/2013     Priority: Medium     Dermatitis 05/14/2013     Priority: Medium     Mitral valve disease 03/30/2005     Priority: Medium     Overview:   Hx of possible mitral valve stenosis with known mitral valve prolapse in need of follow-up. Echo/doppler 4/19/05.   IMO Update 10/11       Other kyphoscoliosis and scoliosis 03/30/2005     Priority: Medium     Overview:   Refer to Dr. Michel. Scoliosis x-ray 4/19/05.       Unspecified intellectual disabilities 03/30/2005     Priority: Medium     Overview:   IMO Update 10/11       Dysmenorrhea 08/11/2004     Priority: Medium     Overview:   Will switch from Alesse to Loestrin       Agenesis of corpus callosum (H) 04/08/2004     Priority: Medium     Overview:         Other psoriasis 01/08/2004     Priority: Medium     Overview:   Scalp psoriasis. Under the care of Dr. Raines, Dermatology       Irregular menstrual cycle 04/15/2002     Priority: Medium        Past Medical History:    History reviewed. No pertinent past medical history.    Past Surgical History:    Past Surgical History:   Procedure Laterality Date     feeding tube       ventilation tubes, bilateral         Family History:    Family History   Problem Relation Age of Onset     Cerebrovascular Disease Paternal Grandmother         CVA     Heart Disease Maternal Grandfather         disease     Hypertension Father      Other - See Comments Father         post polio     Parkinsonism Maternal Grandmother      Heart Disease Paternal Grandfather        Social History:  Marital Status:  Single [1]  Social History     Tobacco Use     Smoking status: Never Smoker     Smokeless tobacco: Never Used     Tobacco comment: no passive exposure   Substance Use Topics     Alcohol use: No     Drug use: No        Medications:    No current outpatient medications on file.        Review of Systems   Musculoskeletal: Positive for myalgias and neck pain. Negative for back pain, gait problem, joint swelling and neck stiffness.   Neurological: Negative for dizziness, weakness, numbness and headaches.   All other systems reviewed and are negative.      Physical Exam   BP: 127/84  Pulse: 118  Temp: 98.3  F (36.8  C)  Resp: 18  SpO2: 100 %      Physical Exam  Vitals and nursing note reviewed.   Constitutional:       Appearance: Normal appearance. She is not ill-appearing or toxic-appearing.   HENT:      Head: Atraumatic.   Eyes:      Pupils: Pupils are equal, round, and reactive to light.   Cardiovascular:      Rate and Rhythm: Normal rate and regular rhythm.   Pulmonary:      Effort: Pulmonary effort is normal. No respiratory distress.      Breath sounds: Normal breath sounds.   Musculoskeletal:         General: Tenderness present. No  swelling or deformity.      Right shoulder: Tenderness present. No swelling, deformity, effusion, laceration, bony tenderness or crepitus. Normal range of motion. Normal strength. Normal pulse.      Cervical back: Neck supple. Tenderness present. No swelling, edema, deformity, rigidity, torticollis or bony tenderness. Pain with movement present. Decreased range of motion (mild).      Comments: Full range of motion to right shoulder.  Tenderness to anterior right shoulder.    Tenderness to right side of neck with mildly decreased range of motion to the left.    No thoracic or lumbar spine tenderness to palpation.   Skin:     General: Skin is warm and dry.      Capillary Refill: Capillary refill takes less than 2 seconds.   Neurological:      Mental Status: She is alert and oriented to person, place, and time.         ED Course                 Procedures              Results for orders placed or performed during the hospital encounter of 07/22/22 (from the past 24 hour(s))   Cervical spine XR, 2-3 views    Narrative    XR CERVICAL SPINE 2/3 VIEWS    HISTORY: 42 years Female right neck pain post seizure with a fall 1  week ago. Mild decreased ROM    COMPARISON: None    TECHNIQUE: Cervical spine 3 views    FINDINGS: There is gentle kyphosis of the cervical spine without  evidence of subluxation suggestive of cervical muscle spasm. There is  no concerning prevertebral soft tissue edema. There is no evidence of  fracture or subluxation.    There is disc space narrowing of mild severity at C4-C5 and of  moderate severity at C5-C6. There is mild disc space narrowing of  C6-C7.      Impression    IMPRESSION: Kyphosis of the cervical spine suggestive of cervical  muscle spasm. There is no evidence of subluxation or fracture.    Multilevel degenerative changes as described above.    OPAL MCGINNIS MD         SYSTEM ID:  [id number]   XR Shoulder Right G/E 3 Views    Narrative    XR SHOULDER RIGHT G/E 3 VIEWS    HISTORY: 42  years Female had seizure 1 week ago where she fell from  sitting position. C/o right shoulder pain    COMPARISON: None    TECHNIQUE: Right shoulder 4 views    FINDINGS: The acromioclavicular and glenohumeral articulations are  congruent. There is no evidence of fracture or dislocation.      Impression    IMPRESSION: Negative study. No evidence of fracture or dislocation.    OPAL MCGINNIS MD         SYSTEM ID:  [id number]       Medications - No data to display    Assessments & Plan (with Medical Decision Making)     I have reviewed the nursing notes.    42-year-old female that was brought in by mom for evaluation of right neck and shoulder pain.  History of seizures and had an episode 1 week ago where she fell possibly landing on the right side.  She does have full range of motion to her right shoulder.  Mildly decreased range of motion due to cervical spine.  Tenderness to palpation to the right side of the neck.    Negative x-rays of right shoulder and cervical spine.  Discussed these findings with mom and patient.  Likely muscular pain.  Recommended continue with ibuprofen as needed for the pain.  Alternate heat and cold to the affected side of the neck and shoulder.  Follow-up with primary care provider if no improvement in symptoms.  Return to ED/UC for worsening or concerning symptoms.    I have reviewed the findings, diagnosis, plan and need for follow up with the patient.  This document was prepared using a combination of typing and voice generated software.  While every attempt was made for accuracy, spelling and grammatical errors may exist.    There are no discharge medications for this patient.      Final diagnoses:   Neck pain on right side   Acute pain of right shoulder       7/22/2022   [facility]     Mpofu, Giacomonce, CNP  07/22/22 2842

## 2022-07-22 NOTE — ED TRIAGE NOTES
Patient brought in by mom as she states she had a seizure last Saturday and fell, but starting Wed she's been complaining of neck and back pain.

## 2022-07-22 NOTE — ED TRIAGE NOTES
Pt presents with neck and shoulder pain from a fall on Saturday. Pt has good ROM. Pt took ibuprofen for pain.

## 2022-07-22 NOTE — DISCHARGE INSTRUCTIONS
Her neck x-ray and shoulder x-ray did not show any broken bones.  She likely has muscular pain.  Continue giving her ibuprofen as needed for her pain.  You can apply cold packs alternating with heat to the affected side of her neck and shoulder.    Follow-up with your doctor if no improvement in symptoms.  Return to the emergency department for any worsening or concerning symptoms.

## 2022-09-06 ENCOUNTER — OFFICE VISIT (OUTPATIENT)
Dept: OTOLARYNGOLOGY | Facility: OTHER | Age: 42
End: 2022-09-06
Attending: PHYSICIAN ASSISTANT
Payer: MEDICARE

## 2022-09-06 VITALS
BODY MASS INDEX: 18.52 KG/M2 | HEART RATE: 110 BPM | SYSTOLIC BLOOD PRESSURE: 107 MMHG | DIASTOLIC BLOOD PRESSURE: 72 MMHG | HEIGHT: 55 IN | WEIGHT: 80 LBS | TEMPERATURE: 98.5 F | OXYGEN SATURATION: 99 %

## 2022-09-06 DIAGNOSIS — Z45.89 TYMPANOSTOMY TUBE CHECK: ICD-10-CM

## 2022-09-06 DIAGNOSIS — H90.5 CONGENITAL HEARING LOSS: ICD-10-CM

## 2022-09-06 DIAGNOSIS — H92.11 OTORRHEA, RIGHT: Primary | ICD-10-CM

## 2022-09-06 DIAGNOSIS — H66.91 ACUTE RIGHT OTITIS MEDIA: ICD-10-CM

## 2022-09-06 PROCEDURE — 92504 EAR MICROSCOPY EXAMINATION: CPT

## 2022-09-06 PROCEDURE — 99213 OFFICE O/P EST LOW 20 MIN: CPT | Mod: 25 | Performed by: PHYSICIAN ASSISTANT

## 2022-09-06 PROCEDURE — G0463 HOSPITAL OUTPT CLINIC VISIT: HCPCS

## 2022-09-06 PROCEDURE — 92504 EAR MICROSCOPY EXAMINATION: CPT | Performed by: PHYSICIAN ASSISTANT

## 2022-09-06 RX ORDER — AZITHROMYCIN 200 MG/5ML
POWDER, FOR SUSPENSION ORAL
Qty: 30 ML | Refills: 0 | Status: SHIPPED | OUTPATIENT
Start: 2022-09-06 | End: 2022-09-09

## 2022-09-06 RX ORDER — CIPROFLOXACIN AND DEXAMETHASONE 3; 1 MG/ML; MG/ML
4 SUSPENSION/ DROPS AURICULAR (OTIC) 2 TIMES DAILY
Qty: 4 ML | Refills: 1 | Status: SHIPPED | OUTPATIENT
Start: 2022-09-06 | End: 2022-09-16

## 2022-09-06 ASSESSMENT — PAIN SCALES - GENERAL: PAINLEVEL: NO PAIN (0)

## 2022-09-06 NOTE — PROGRESS NOTES
Chief Complaint   Patient presents with     Cerumen Impaction     Ear cleaning.       Patient returns to ENT for routine ear cleaning.  Patient was last seen 4/5/22 for routine cleaning.  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  She does have patent tympanostomy tubes which have been in position for several years.  She does have cleanings completed and at times powder application.  At her last visit application of miconazole is applied to both ears.  She does have hearing loss which has been overall stable and recommended use of hearing aids.  Efren does not tolerate use and declines a to use on a daily basis.  We reviewed consideration for repeat imaging and at this time.  Have declined.  They have declined further options of possible consideration for [facility] referral to otology     Today Efren has been doing well since her last visit. She was doing well with her ears until the last week. She had c/o intermittent plugged feeling.   No active drainage.   Powder application has been aiding in relief with otorrhea.         She does have hearing loss and has declined use of aids. She has aids but does not wear them at this time as she does not like them  Mom (Sophia) provides hx and details.    Distant hx of BTT with placement of t-tubes. Tubes have been in place for 30 years.   No recent illness. No other concerns  Discussed audiograms. Stable mixed hearing loss. Audiologist did mention consideration for BAHA, but patient did not wish to proceed.    She has HA but does not tolerate or wear.      Past Medical History       No current outpatient medications on file.     No current facility-administered medications for this visit.        Allergies   Allergen Reactions     Clotrimazole Rash     Omnicef [Cefdinir] Rash     Itchy and bad rash       ROS- SEE HPI  /72 (BP Location: Left arm, Patient Position: Sitting, Cuff Size: Adult Regular)   Pulse 110   Temp 98.5  F (36.9  C)  "(Tympanic)   Ht 1.346 m (4' 5\")   Wt 36.3 kg (80 lb)   SpO2 99%   BMI 20.02 kg/m        General: Craniofacial abnormality.   Eyes: conjuctiva clear, PERRL, EOM intact   Ears: Canals overall clear. Narrow canals. . Low set auricles with poor antihelical folds defined. t-tube bilaterally. Gila bowl bilateral overall look well.   Nose: Nares normal   Mouth: crowded.   Neck: Supple, no cervical adenopathy, no thyromegaly      The ear canals were examined underneath the operating microscope and with an otologic speculum. Scant debris suctioned on left, cerumen.  Right ear with otorrhea.     Canals, stenotic   Bilateral tubes appear in place.    Narrow canals which limits examination. Canals are narrow.  Tympanic membranes appear with tubes. TMs are thickened and appear with sclerosis.   Left tube appears patent.  TM appears thickened there is no active otorrhea.  Canal is narrow.  Right EAC with otorrhea.  Fluid is there encompassing around margins of tube and lumen.  Lumen was suctioned to with #3 and appears with polypoid structure obstructing.  TM appears w/  Myringitis.  Ciprodex applied to right  Miconazole  applied to left EAC.    ASSESSMENT/ PLAN:    ICD-10-CM    1. Otorrhea, right  H92.11 ciprofloxacin-dexamethasone (CIPRODEX) 0.3-0.1 % otic suspension     azithromycin (ZITHROMAX) 200 MG/5ML suspension   2. Acute right otitis media  H66.91 ciprofloxacin-dexamethasone (CIPRODEX) 0.3-0.1 % otic suspension     azithromycin (ZITHROMAX) 200 MG/5ML suspension   3. Congenital hearing loss  H90.5    4. Tympanostomy tube check  Z45.89        Powder was applied to left canal.  Keep ears dry.  Start Ciprodex 4 drops twice daily for 10 days to right ear.  Start oral azithromycin as directed   Recheck ears in 2 to 3 weeks.     Discussed past audiogram with mom and Efren, she does have MHL which has appeared stable.   Due for audiogram.   Recommended use of HA.   Consider imaging, CT of ears if there is no " improvement  Tubes have been present for 30 years. However, have been stable.     They have declined imaging in past.   In the past declined consideration for BAHA.   Efren does not use her OROZCO.               Marcelina Oneal PA-C  ENT  [facility], [place]

## 2022-09-06 NOTE — PATIENT INSTRUCTIONS
Start Ciprodex 4 drops twice a day to right ear for 10 days  Start oral Azithromycin as directed    Powder applied to left ear.     Return in 2-3 weeks for recheck    Thank you for allowing Marcelina Oneal PA-C and our ENT team to participate in your care.  If your medications are too expensive, please give the nurse a call.  We can possibly change this medication.  If you have a scheduling or an appointment question please contact our Health Unit Coordinator at [phone], Ext. [phone].    ALL nursing questions or concerns can be directed to your ENT nurse at: [phone] [place]

## 2022-09-09 ENCOUNTER — HOSPITAL ENCOUNTER (EMERGENCY)
Facility: HOSPITAL | Age: 42
Discharge: HOME | End: 2022-09-09
Attending: INTERNAL MEDICINE | Admitting: INTERNAL MEDICINE
Payer: MEDICARE

## 2022-09-09 VITALS
RESPIRATION RATE: 22 BRPM | HEART RATE: 97 BPM | DIASTOLIC BLOOD PRESSURE: 65 MMHG | SYSTOLIC BLOOD PRESSURE: 100 MMHG | TEMPERATURE: 97.8 F | OXYGEN SATURATION: 100 %

## 2022-09-09 DIAGNOSIS — R10.84 ABDOMINAL PAIN, GENERALIZED: ICD-10-CM

## 2022-09-09 LAB
ALBUMIN SERPL-MCNC: 4 G/DL (ref 3.4–5)
ALP SERPL-CCNC: 47 U/L (ref 40–150)
ALT SERPL W P-5'-P-CCNC: 19 U/L (ref 0–50)
ANION GAP SERPL CALCULATED.3IONS-SCNC: 5 MMOL/L (ref 3–14)
AST SERPL W P-5'-P-CCNC: 14 U/L (ref 0–45)
BASOPHILS # BLD AUTO: 0 10E3/UL (ref 0–0.2)
BASOPHILS NFR BLD AUTO: 0 %
BILIRUB SERPL-MCNC: 0.7 MG/DL (ref 0.2–1.3)
BUN SERPL-MCNC: 11 MG/DL (ref 7–30)
CALCIUM SERPL-MCNC: 8.9 MG/DL (ref 8.5–10.1)
CHLORIDE BLD-SCNC: 104 MMOL/L (ref 94–109)
CO2 SERPL-SCNC: 25 MMOL/L (ref 20–32)
CREAT SERPL-MCNC: 0.53 MG/DL (ref 0.52–1.04)
CRP SERPL-MCNC: <2.9 MG/L (ref 0–8)
EOSINOPHIL # BLD AUTO: 0.1 10E3/UL (ref 0–0.7)
EOSINOPHIL NFR BLD AUTO: 1 %
ERYTHROCYTE [DISTWIDTH] IN BLOOD BY AUTOMATED COUNT: 13.2 % (ref 10–15)
GFR SERPL CREATININE-BSD FRML MDRD: >90 ML/MIN/1.73M2
GLUCOSE BLD-MCNC: 99 MG/DL (ref 70–99)
HCT VFR BLD AUTO: 41.1 % (ref 35–47)
HGB BLD-MCNC: 13.9 G/DL (ref 11.7–15.7)
IMM GRANULOCYTES # BLD: 0.1 10E3/UL
IMM GRANULOCYTES NFR BLD: 1 %
LACTATE SERPL-SCNC: 0.9 MMOL/L (ref 0.7–2)
LIPASE SERPL-CCNC: 196 U/L (ref 73–393)
LYMPHOCYTES # BLD AUTO: 1 10E3/UL (ref 0.8–5.3)
LYMPHOCYTES NFR BLD AUTO: 10 %
MCH RBC QN AUTO: 29.2 PG (ref 26.5–33)
MCHC RBC AUTO-ENTMCNC: 33.8 G/DL (ref 31.5–36.5)
MCV RBC AUTO: 86 FL (ref 78–100)
MONOCYTES # BLD AUTO: 1.2 10E3/UL (ref 0–1.3)
MONOCYTES NFR BLD AUTO: 11 %
NEUTROPHILS # BLD AUTO: 8 10E3/UL (ref 1.6–8.3)
NEUTROPHILS NFR BLD AUTO: 77 %
NRBC # BLD AUTO: 0 10E3/UL
NRBC BLD AUTO-RTO: 0 /100
PLATELET # BLD AUTO: 168 10E3/UL (ref 150–450)
POTASSIUM BLD-SCNC: 4 MMOL/L (ref 3.4–5.3)
PROT SERPL-MCNC: 7.2 G/DL (ref 6.8–8.8)
RBC # BLD AUTO: 4.76 10E6/UL (ref 3.8–5.2)
SODIUM SERPL-SCNC: 134 MMOL/L (ref 133–144)
WBC # BLD AUTO: 10.3 10E3/UL (ref 4–11)

## 2022-09-09 PROCEDURE — 80053 COMPREHEN METABOLIC PANEL: CPT | Performed by: INTERNAL MEDICINE

## 2022-09-09 PROCEDURE — 85025 COMPLETE CBC W/AUTO DIFF WBC: CPT | Performed by: INTERNAL MEDICINE

## 2022-09-09 PROCEDURE — 99283 EMERGENCY DEPT VISIT LOW MDM: CPT

## 2022-09-09 PROCEDURE — 83690 ASSAY OF LIPASE: CPT | Performed by: INTERNAL MEDICINE

## 2022-09-09 PROCEDURE — 86140 C-REACTIVE PROTEIN: CPT | Performed by: INTERNAL MEDICINE

## 2022-09-09 PROCEDURE — 83605 ASSAY OF LACTIC ACID: CPT | Performed by: INTERNAL MEDICINE

## 2022-09-09 PROCEDURE — 36415 COLL VENOUS BLD VENIPUNCTURE: CPT | Performed by: INTERNAL MEDICINE

## 2022-09-09 PROCEDURE — 99284 EMERGENCY DEPT VISIT MOD MDM: CPT | Performed by: INTERNAL MEDICINE

## 2022-09-09 ASSESSMENT — ENCOUNTER SYMPTOMS
ARTHRALGIAS: 0
WHEEZING: 0
CHEST TIGHTNESS: 0
DIAPHORESIS: 0
DIZZINESS: 0
HEMATURIA: 0
VOMITING: 0
COUGH: 0
NECK PAIN: 0
MYALGIAS: 0
LIGHT-HEADEDNESS: 0
ANAL BLEEDING: 0
NUMBNESS: 0
COLOR CHANGE: 0
SHORTNESS OF BREATH: 0
ABDOMINAL DISTENTION: 0
FLANK PAIN: 0
WOUND: 0
PALPITATIONS: 0
ABDOMINAL PAIN: 1
HEADACHES: 0
FEVER: 0
BLOOD IN STOOL: 0
CHILLS: 0
VOICE CHANGE: 0
NAUSEA: 1
BACK PAIN: 0
DIARRHEA: 1
NECK STIFFNESS: 0
CONFUSION: 0
DYSURIA: 0

## 2022-09-09 ASSESSMENT — ACTIVITIES OF DAILY LIVING (ADL): ADLS_ACUITY_SCORE: 35

## 2022-09-09 NOTE — ED NOTES
Mother states that pt woke up with abdominal pain and had a BM that was softer that usual. Pt has been on Zithromax for the past week for a right ear infection and has not been taking her probiotics this week. Pt's vitals stable. Afebrile. Labs being drawn. Pt appears comfortable at this time. Mother at bedside.

## 2022-09-09 NOTE — ED PROVIDER NOTES
History     Chief Complaint   Patient presents with     Abdominal Pain     Diarrhea     The history is provided by the patient.   Abdominal Pain  Pain location:  Periumbilical  Pain quality: aching    Pain radiates to:  Does not radiate  Pain severity:  Mild  Onset quality:  Gradual  Associated symptoms: diarrhea and nausea    Associated symptoms: no chest pain, no chills, no cough, no dysuria, no fever, no hematuria, no shortness of breath and no vomiting          Allergies:  Allergies   Allergen Reactions     Clotrimazole Rash     Omnicef [Cefdinir] Rash     Itchy and bad rash       Problem List:    Patient Active Problem List    Diagnosis Date Noted     Seizure (H) 02/27/2015     Priority: Medium     Overview:   At least two events (maybe 3), mom opted no treatment or mri unless further events       Osteoporosis 11/23/2013     Priority: Medium     Overview:   IMO Update       Congenital hearing loss 05/14/2013     Priority: Medium     Impacted cerumen 05/14/2013     Priority: Medium     Mixed conductive and sensorineural hearing loss, bilateral 05/14/2013     Priority: Medium     Dermatitis 05/14/2013     Priority: Medium     Mitral valve disease 03/30/2005     Priority: Medium     Overview:   Hx of possible mitral valve stenosis with known mitral valve prolapse in need of follow-up. Echo/doppler 4/19/05.   IMO Update 10/11       Other kyphoscoliosis and scoliosis 03/30/2005     Priority: Medium     Overview:   Refer to Dr. Michel. Scoliosis x-ray 4/19/05.       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 Under the care of Dr. Raines, Dermatology       Irregular menstrual cycle  04/15/2002     Priority: Medium        Past Medical History:    Past Medical History:   Diagnosis Date     Seizures (H)        Past Surgical History:    Past Surgical History:   Procedure Laterality Date     feeding tube       ventilation tubes, bilateral         Family History:    Family History   Problem Relation Age of Onset     Cerebrovascular Disease Paternal Grandmother         CVA     Heart Disease Maternal Grandfather         disease     Hypertension Father      Other - See Comments Father         post polio     Parkinsonism Maternal Grandmother      Heart Disease Paternal Grandfather        Social History:  Marital Status:  Single [1]  Social History     Tobacco Use     Smoking status: Never Smoker     Smokeless tobacco: Never Used     Tobacco comment: no passive exposure   Substance Use Topics     Alcohol use: No     Drug use: No        Medications:    ciprofloxacin-dexamethasone (CIPRODEX) 0.3-0.1 % otic suspension          Review of Systems   Constitutional: Negative for chills, diaphoresis and fever.   HENT: Negative for voice change.    Eyes: Negative for visual disturbance.   Respiratory: Negative for cough, chest tightness, shortness of breath and wheezing.    Cardiovascular: Negative for chest pain, palpitations and leg swelling.   Gastrointestinal: Positive for abdominal pain, diarrhea and nausea. Negative for abdominal distention, anal bleeding, blood in stool and vomiting.   Genitourinary: Negative for decreased urine volume, dysuria, flank pain and hematuria.   Musculoskeletal: Negative for arthralgias, back pain, gait problem, myalgias, neck pain and neck stiffness.   Skin: Negative for color change, pallor, rash and wound.   Neurological: Negative for dizziness, syncope, light-headedness, numbness and headaches.   Psychiatric/Behavioral: Negative for confusion and suicidal ideas.       Physical Exam   BP: 121/97  Pulse: 99  Temp: 97  F (36.1  C)  Resp: 20  SpO2: 100 %      Physical  Exam  Vitals and nursing note reviewed.   Constitutional:       Appearance: She is well-developed.   HENT:      Head: Normocephalic and atraumatic.      Mouth/Throat:      Pharynx: No oropharyngeal exudate.   Eyes:      Conjunctiva/sclera: Conjunctivae normal.      Pupils: Pupils are equal, round, and reactive to light.   Neck:      Thyroid: No thyromegaly.      Vascular: No JVD.      Trachea: No tracheal deviation.   Cardiovascular:      Rate and Rhythm: Normal rate and regular rhythm.      Heart sounds: Normal heart sounds. No murmur heard.    No friction rub. No gallop.   Pulmonary:      Effort: Pulmonary effort is normal. No respiratory distress.      Breath sounds: Normal breath sounds. No stridor. No wheezing or rales.   Chest:      Chest wall: No tenderness.   Abdominal:      General: Bowel sounds are normal. There is no distension.      Palpations: Abdomen is soft. There is no mass.      Tenderness: There is no abdominal tenderness. There is no guarding or rebound.   Musculoskeletal:         General: No tenderness. Normal range of motion.      Cervical back: Normal range of motion and neck supple.   Lymphadenopathy:      Cervical: No cervical adenopathy.   Skin:     General: Skin is warm and dry.      Coloration: Skin is not pale.      Findings: No erythema or rash.   Neurological:      Mental Status: She is alert and oriented to person, place, and time.   Psychiatric:         Behavior: Behavior normal.         ED Course                 Procedures        Results for orders placed or performed during the hospital encounter of 09/09/22 (from the past 24 hour(s))   CBC with Platelets & Differential    Narrative    The following orders were created for panel order CBC with Platelets & Differential.  Procedure                               Abnormality         Status                     ---------                               -----------         ------                     CBC with platelets and d...[[id number]]                       Final result                 Please view results for these tests on the individual orders.   Comprehensive metabolic panel   Result Value Ref Range    Sodium 134 133 - 144 mmol/L    Potassium 4.0 3.4 - 5.3 mmol/L    Chloride 104 94 - 109 mmol/L    Carbon Dioxide (CO2) 25 20 - 32 mmol/L    Anion Gap 5 3 - 14 mmol/L    Urea Nitrogen 11 7 - 30 mg/dL    Creatinine 0.53 0.52 - 1.04 mg/dL    Calcium 8.9 8.5 - 10.1 mg/dL    Glucose 99 70 - 99 mg/dL    Alkaline Phosphatase 47 40 - 150 U/L    AST 14 0 - 45 U/L    ALT 19 0 - 50 U/L    Protein Total 7.2 6.8 - 8.8 g/dL    Albumin 4.0 3.4 - 5.0 g/dL    Bilirubin Total 0.7 0.2 - 1.3 mg/dL    GFR Estimate >90 >60 mL/min/1.73m2   Lipase   Result Value Ref Range    Lipase 196 73 - 393 U/L   CRP inflammation   Result Value Ref Range    CRP Inflammation <2.9 0.0 - 8.0 mg/L   Lactic acid whole blood   Result Value Ref Range    Lactic Acid 0.9 0.7 - 2.0 mmol/L   CBC with platelets and differential   Result Value Ref Range    WBC Count 10.3 4.0 - 11.0 10e3/uL    RBC Count 4.76 3.80 - 5.20 10e6/uL    Hemoglobin 13.9 11.7 - 15.7 g/dL    Hematocrit 41.1 35.0 - 47.0 %    MCV 86 78 - 100 fL    MCH 29.2 26.5 - 33.0 pg    MCHC 33.8 31.5 - 36.5 g/dL    RDW 13.2 10.0 - 15.0 %    Platelet Count 168 150 - 450 10e3/uL    % Neutrophils 77 %    % Lymphocytes 10 %    % Monocytes 11 %    % Eosinophils 1 %    % Basophils 0 %    % Immature Granulocytes 1 %    NRBCs per 100 WBC 0 <1 /100    Absolute Neutrophils 8.0 1.6 - 8.3 10e3/uL    Absolute Lymphocytes 1.0 0.8 - 5.3 10e3/uL    Absolute Monocytes 1.2 0.0 - 1.3 10e3/uL    Absolute Eosinophils 0.1 0.0 - 0.7 10e3/uL    Absolute Basophils 0.0 0.0 - 0.2 10e3/uL    Absolute Immature Granulocytes 0.1 <=0.4 10e3/uL    Absolute NRBCs 0.0 10e3/uL       Medications   0.9% sodium chloride BOLUS (has no administration in time range)   famotidine (PEPCID) injection 20 mg (has no administration in time range)       Assessments & Plan (with Medical  Decision Making)   Abdominal pain, periumblical, soft stools  On zithro for otitis   Labs reviewed  Her pain improved after arrival and observation in ER  Mother prefer to observe her home instead of imaging study, I advised to return to ER if symptoms persisted  Stop taking zithromax as probably cause GI upset ,   Mother understood and agreed.   I have reviewed the nursing notes.    I have reviewed the findings, diagnosis, plan and need for follow up with the patient.      New Prescriptions    No medications on file       Final diagnoses:   Abdominal pain, generalized       9/9/2022   [facility]     Marcos Rosa MD  09/09/22 0428

## 2022-10-05 ENCOUNTER — OFFICE VISIT (OUTPATIENT)
Dept: OTOLARYNGOLOGY | Facility: OTHER | Age: 42
End: 2022-10-05
Attending: PHYSICIAN ASSISTANT
Payer: MEDICARE

## 2022-10-05 VITALS
WEIGHT: 75 LBS | DIASTOLIC BLOOD PRESSURE: 50 MMHG | SYSTOLIC BLOOD PRESSURE: 110 MMHG | OXYGEN SATURATION: 99 % | HEIGHT: 55 IN | TEMPERATURE: 98.3 F | HEART RATE: 115 BPM | BODY MASS INDEX: 17.36 KG/M2

## 2022-10-05 DIAGNOSIS — H90.6 MIXED CONDUCTIVE AND SENSORINEURAL HEARING LOSS, BILATERAL: ICD-10-CM

## 2022-10-05 DIAGNOSIS — Z45.89 TYMPANOSTOMY TUBE CHECK: Primary | ICD-10-CM

## 2022-10-05 DIAGNOSIS — H90.5 CONGENITAL HEARING LOSS: ICD-10-CM

## 2022-10-05 PROCEDURE — 99213 OFFICE O/P EST LOW 20 MIN: CPT | Mod: 25 | Performed by: PHYSICIAN ASSISTANT

## 2022-10-05 PROCEDURE — G0463 HOSPITAL OUTPT CLINIC VISIT: HCPCS

## 2022-10-05 PROCEDURE — 92504 EAR MICROSCOPY EXAMINATION: CPT | Performed by: PHYSICIAN ASSISTANT

## 2022-10-05 NOTE — LETTER
10/5/2022         RE: Efren Saavedra  [address] [place] [address]        Dear Colleague,    Thank you for referring your patient, Efren Saavedra, to the [facility]. Please see a copy of my visit note below.    Chief Complaint   Patient presents with     Ear Problem     Follow up otorrhea and acute right om       Patient returns to ENT for routine ear cleaning.  Patient was last seen 4/5/22 for routine cleaning.    She was started on Azithromycin, Ciprodex due to bilateral otorrhea at her last visit.   She developed abdominal discomfort on 9/9/22 and seen in ED, they discontinued azithromycin.     Spoke to Mother regarding reaction to Azithromycin. She has felt there was thrashing, abd pain, diarrhea. Symptoms have improved. No concerns.       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  She does have patent tympanostomy tubes which have been in position for several years.  She does have cleanings completed and at times powder application.  At her last visit application of miconazole is applied to both ears.  She does have hearing loss which has been overall stable and recommended use of hearing aids.  Efren does not tolerate use and declines a to use on a daily basis.  We reviewed consideration for repeat imaging and at this time.  Have declined.  They have declined further [facility]     Today Efren has been doing well since her last visit. She was doing well with her ears until the last week. She had c/o intermittent plugged feeling.   No active drainage.   Powder application has been aiding in relief with otorrhea.         She does have hearing loss and has declined use of aids. She has aids but does not wear them at this time as she does not like them  Mom (Sophia) provides hx and details.    Distant hx of BTT with placement of t-tubes. Tubes have been in place for 30 years.   No recent  "illness. No other concerns  Discussed audiograms. Stable mixed hearing loss. Audiologist did mention consideration for BAHA, but patient did not wish to proceed.    She has HA but does not tolerate or wear.       Past Medical History:   Diagnosis Date     Seizures (H)         Allergies   Allergen Reactions     Clotrimazole Rash     Omnicef [Cefdinir] Rash     Itchy and bad rash     No current outpatient medications on file.     No current facility-administered medications for this visit.     ROS- SEE HPI  /50 (BP Location: Left arm, Patient Position: Chair, Cuff Size: Adult Regular)   Pulse 115   Temp 98.3  F (36.8  C) (Tympanic)   Ht 1.334 m (4' 4.5\")   Wt 34 kg (75 lb)   SpO2 99%   BMI 19.13 kg/m        General: Craniofacial abnormality.   Eyes: conjuctiva clear, PERRL, EOM intact   Ears: Canals overall clear. Narrow canals. . Low set auricles with poor antihelical folds defined. t-tube bilaterally. Gila bowl bilateral overall look well.   Nose: Nares normal   Mouth: crowded.   Neck: Supple, no cervical adenopathy, no thyromegaly      The ear canals were examined underneath the operating microscope and with an otologic speculum. Scant debris suctioned on left, cerumen.  Right ear with otorrhea.      Canals, stenotic   Bilateral tubes appear in place.    Narrow canals which limits examination. Canals are narrow.  Tympanic membranes appear with tubes. TMs are thickened and appear with sclerosis.   Left tube appears patent.Bilateral Canals overall greatly improved.   No active otorrhea.   Tubes are both patent and clear.     Miconazole  applied to left EAC.          ASSESSMENT/ PLAN:      ICD-10-CM    1. Tympanostomy tube check  Z45.89    2. Congenital hearing loss  H90.5    3. Mixed conductive and sensorineural hearing loss, bilateral  H90.6      Ears look great.  Resolution of otorrhea from her last visit.  Miconazole powder applied bilaterally.    Return in 5 months for repeat ear cleaning.  She may " return sooner as needed.    Recommended hearing aid use.  Efren does not wear her hearing aids at this time.  Due for audiogram.   Recommended use of HA.   Consider imaging, CT of ears if there is no improvement  Tubes have been present for 30 years. However, have been stable.   They have declined imaging in past.       Marcelina Oneal PA-C  ENT  [facility], [place]          Again, thank you for allowing me to participate in the care of your patient.        Sincerely,        Marcelina Oneal PA-C

## 2022-10-05 NOTE — PATIENT INSTRUCTIONS
Ears look great.   Powder applied  Follow up in 5 months for ear cleaning.     Thank you for allowing Marcelina Oneal PA-C and our ENT team to participate in your care.  If your medications are too expensive, please give the nurse a call.  We can possibly change this medication.  If you have a scheduling or an appointment question please contact our Health Unit Coordinator at [phone], Ext. [phone].    ALL nursing questions or concerns can be directed to your ENT nurse at: [phone] [place]

## 2022-10-05 NOTE — PROGRESS NOTES
Chief Complaint   Patient presents with     Ear Problem     Follow up otorrhea and acute right om       Patient returns to ENT for routine ear cleaning.  Patient was last seen 4/5/22 for routine cleaning.    She was started on Azithromycin, Ciprodex due to bilateral otorrhea at her last visit.   She developed abdominal discomfort on 9/9/22 and seen in ED, they discontinued azithromycin.     Spoke to Mother regarding reaction to Azithromycin. She has felt there was thrashing, abd pain, diarrhea. Symptoms have improved. No concerns.       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  She does have patent tympanostomy tubes which have been in position for several years.  She does have cleanings completed and at times powder application.  At her last visit application of miconazole is applied to both ears.  She does have hearing loss which has been overall stable and recommended use of hearing aids.  Efren does not tolerate use and declines a to use on a daily basis.  We reviewed consideration for repeat imaging and at this time.  Have declined.  They have declined further options of possible consideration for [facility] referral to otology     Today Efren has been doing well since her last visit. She was doing well with her ears until the last week. She had c/o intermittent plugged feeling.   No active drainage.   Powder application has been aiding in relief with otorrhea.         She does have hearing loss and has declined use of aids. She has aids but does not wear them at this time as she does not like them  Mom (Sophia) provides hx and details.    Distant hx of BTT with placement of t-tubes. Tubes have been in place for 30 years.   No recent illness. No other concerns  Discussed audiograms. Stable mixed hearing loss. Audiologist did mention consideration for BAHA, but patient did not wish to proceed.    She has HA but does not tolerate or wear.       Past Medical History:   Diagnosis  "Date     Seizures (H)         Allergies   Allergen Reactions     Clotrimazole Rash     Omnicef [Cefdinir] Rash     Itchy and bad rash     No current outpatient medications on file.     No current facility-administered medications for this visit.     ROS- SEE HPI  /50 (BP Location: Left arm, Patient Position: Chair, Cuff Size: Adult Regular)   Pulse 115   Temp 98.3  F (36.8  C) (Tympanic)   Ht 1.334 m (4' 4.5\")   Wt 34 kg (75 lb)   SpO2 99%   BMI 19.13 kg/m        General: Craniofacial abnormality.   Eyes: conjuctiva clear, PERRL, EOM intact   Ears: Canals overall clear. Narrow canals. . Low set auricles with poor antihelical folds defined. t-tube bilaterally. Gila bowl bilateral overall look well.   Nose: Nares normal   Mouth: crowded.   Neck: Supple, no cervical adenopathy, no thyromegaly      The ear canals were examined underneath the operating microscope and with an otologic speculum. Scant debris suctioned on left, cerumen.  Right ear with otorrhea.      Canals, stenotic   Bilateral tubes appear in place.    Narrow canals which limits examination. Canals are narrow.  Tympanic membranes appear with tubes. TMs are thickened and appear with sclerosis.   Left tube appears patent.Bilateral Canals overall greatly improved.   No active otorrhea.   Tubes are both patent and clear.     Miconazole  applied to left EAC.          ASSESSMENT/ PLAN:      ICD-10-CM    1. Tympanostomy tube check  Z45.89    2. Congenital hearing loss  H90.5    3. Mixed conductive and sensorineural hearing loss, bilateral  H90.6      Ears look great.  Resolution of otorrhea from her last visit.  Miconazole powder applied bilaterally.    Return in 5 months for repeat ear cleaning.  She may return sooner as needed.    Recommended hearing aid use.  Efren does not wear her hearing aids at this time.  Due for audiogram.   Recommended use of HA.   Consider imaging, CT of ears if there is no improvement  Tubes have been present for 30 " years. However, have been stable.   They have declined imaging in past.       Marcelina Oneal PA-C  ENT  [facility], [place]

## 2022-10-05 NOTE — NURSING NOTE
"Chief Complaint   Patient presents with     Ear Problem     Follow up otorrhea and acute right om       Initial /50 (BP Location: Left arm, Patient Position: Chair, Cuff Size: Adult Regular)   Pulse 115   Temp 98.3  F (36.8  C) (Tympanic)   Ht 1.334 m (4' 4.5\")   Wt 34 kg (75 lb)   SpO2 99%   BMI 19.13 kg/m   Estimated body mass index is 19.13 kg/m  as calculated from the following:    Height as of this encounter: 1.334 m (4' 4.5\").    Weight as of this encounter: 34 kg (75 lb).  Medication Reconciliation: complete  ANU SALMON LPN    "

## 2022-10-17 ENCOUNTER — APPOINTMENT (OUTPATIENT)
Dept: GENERAL RADIOLOGY | Facility: HOSPITAL | Age: 42
End: 2022-10-17
Attending: EMERGENCY MEDICINE
Payer: MEDICARE

## 2022-10-17 ENCOUNTER — HOSPITAL ENCOUNTER (EMERGENCY)
Facility: HOSPITAL | Age: 42
Discharge: HOME | End: 2022-10-17
Attending: EMERGENCY MEDICINE | Admitting: EMERGENCY MEDICINE
Payer: MEDICARE

## 2022-10-17 VITALS
SYSTOLIC BLOOD PRESSURE: 96 MMHG | WEIGHT: 74.96 LBS | OXYGEN SATURATION: 96 % | BODY MASS INDEX: 19.12 KG/M2 | TEMPERATURE: 98.9 F | RESPIRATION RATE: 18 BRPM | DIASTOLIC BLOOD PRESSURE: 65 MMHG | HEART RATE: 94 BPM

## 2022-10-17 DIAGNOSIS — G40.909 SEIZURE DISORDER (H): ICD-10-CM

## 2022-10-17 LAB
ALBUMIN SERPL BCG-MCNC: 4.4 G/DL (ref 3.5–5.2)
ALBUMIN UR-MCNC: 70 MG/DL
ALP SERPL-CCNC: 41 U/L (ref 35–104)
ALT SERPL W P-5'-P-CCNC: 21 U/L (ref 10–35)
ANION GAP SERPL CALCULATED.3IONS-SCNC: 14 MMOL/L (ref 7–15)
APPEARANCE UR: ABNORMAL
AST SERPL W P-5'-P-CCNC: 20 U/L (ref 10–35)
BASOPHILS # BLD AUTO: 0.1 10E3/UL (ref 0–0.2)
BASOPHILS NFR BLD AUTO: 0 %
BILIRUB SERPL-MCNC: 0.7 MG/DL
BILIRUB UR QL STRIP: NEGATIVE
BUN SERPL-MCNC: 15.2 MG/DL (ref 6–20)
CALCIUM SERPL-MCNC: 9 MG/DL (ref 8.6–10)
CHLORIDE SERPL-SCNC: 100 MMOL/L (ref 98–107)
COLOR UR AUTO: YELLOW
CREAT SERPL-MCNC: 0.62 MG/DL (ref 0.51–0.95)
DEPRECATED HCO3 PLAS-SCNC: 21 MMOL/L (ref 22–29)
EOSINOPHIL # BLD AUTO: 0.1 10E3/UL (ref 0–0.7)
EOSINOPHIL NFR BLD AUTO: 1 %
ERYTHROCYTE [DISTWIDTH] IN BLOOD BY AUTOMATED COUNT: 13.2 % (ref 10–15)
GFR SERPL CREATININE-BSD FRML MDRD: >90 ML/MIN/1.73M2
GLUCOSE SERPL-MCNC: 135 MG/DL (ref 70–99)
GLUCOSE UR STRIP-MCNC: NEGATIVE MG/DL
HCT VFR BLD AUTO: 41.1 % (ref 35–47)
HGB BLD-MCNC: 14.1 G/DL (ref 11.7–15.7)
HGB UR QL STRIP: NEGATIVE
HOLD SPECIMEN: NORMAL
HOLD SPECIMEN: NORMAL
IMM GRANULOCYTES # BLD: 0.1 10E3/UL
IMM GRANULOCYTES NFR BLD: 0 %
KETONES UR STRIP-MCNC: NEGATIVE MG/DL
LACTATE SERPL-SCNC: 0.8 MMOL/L (ref 0.7–2)
LACTATE SERPL-SCNC: 4.7 MMOL/L (ref 0.7–2)
LEUKOCYTE ESTERASE UR QL STRIP: NEGATIVE
LYMPHOCYTES # BLD AUTO: 0.6 10E3/UL (ref 0.8–5.3)
LYMPHOCYTES NFR BLD AUTO: 4 %
MCH RBC QN AUTO: 29.3 PG (ref 26.5–33)
MCHC RBC AUTO-ENTMCNC: 34.3 G/DL (ref 31.5–36.5)
MCV RBC AUTO: 85 FL (ref 78–100)
MONOCYTES # BLD AUTO: 1 10E3/UL (ref 0–1.3)
MONOCYTES NFR BLD AUTO: 7 %
MUCOUS THREADS #/AREA URNS LPF: PRESENT /LPF
NEUTROPHILS # BLD AUTO: 12.6 10E3/UL (ref 1.6–8.3)
NEUTROPHILS NFR BLD AUTO: 88 %
NITRATE UR QL: NEGATIVE
NRBC # BLD AUTO: 0 10E3/UL
NRBC BLD AUTO-RTO: 0 /100
PH UR STRIP: 5.5 [PH] (ref 4.7–8)
PLATELET # BLD AUTO: 218 10E3/UL (ref 150–450)
POTASSIUM SERPL-SCNC: 4.1 MMOL/L (ref 3.4–5.3)
PROT SERPL-MCNC: 6.8 G/DL (ref 6.4–8.3)
RBC # BLD AUTO: 4.81 10E6/UL (ref 3.8–5.2)
RBC URINE: 1 /HPF
SODIUM SERPL-SCNC: 135 MMOL/L (ref 136–145)
SP GR UR STRIP: 1.02 (ref 1–1.03)
SQUAMOUS EPITHELIAL: 1 /HPF
UROBILINOGEN UR STRIP-MCNC: NORMAL MG/DL
WBC # BLD AUTO: 14.4 10E3/UL (ref 4–11)
WBC URINE: 1 /HPF

## 2022-10-17 PROCEDURE — 258N000003 HC RX IP 258 OP 636: Performed by: EMERGENCY MEDICINE

## 2022-10-17 PROCEDURE — 85025 COMPLETE CBC W/AUTO DIFF WBC: CPT | Performed by: EMERGENCY MEDICINE

## 2022-10-17 PROCEDURE — 83605 ASSAY OF LACTIC ACID: CPT | Mod: 91 | Performed by: EMERGENCY MEDICINE

## 2022-10-17 PROCEDURE — 71045 X-RAY EXAM CHEST 1 VIEW: CPT

## 2022-10-17 PROCEDURE — 96365 THER/PROPH/DIAG IV INF INIT: CPT

## 2022-10-17 PROCEDURE — 96361 HYDRATE IV INFUSION ADD-ON: CPT

## 2022-10-17 PROCEDURE — 81001 URINALYSIS AUTO W/SCOPE: CPT | Performed by: EMERGENCY MEDICINE

## 2022-10-17 PROCEDURE — 36415 COLL VENOUS BLD VENIPUNCTURE: CPT | Performed by: EMERGENCY MEDICINE

## 2022-10-17 PROCEDURE — 99284 EMERGENCY DEPT VISIT MOD MDM: CPT | Performed by: EMERGENCY MEDICINE

## 2022-10-17 PROCEDURE — 99285 EMERGENCY DEPT VISIT HI MDM: CPT | Mod: 25

## 2022-10-17 PROCEDURE — 93010 ELECTROCARDIOGRAM REPORT: CPT | Performed by: INTERNAL MEDICINE

## 2022-10-17 PROCEDURE — 80053 COMPREHEN METABOLIC PANEL: CPT | Performed by: EMERGENCY MEDICINE

## 2022-10-17 PROCEDURE — 93005 ELECTROCARDIOGRAM TRACING: CPT

## 2022-10-17 PROCEDURE — 250N000011 HC RX IP 250 OP 636: Performed by: EMERGENCY MEDICINE

## 2022-10-17 RX ORDER — LEVETIRACETAM 250 MG/1
250 TABLET ORAL 2 TIMES DAILY
Qty: 60 TABLET | Refills: 0 | Status: SHIPPED | OUTPATIENT
Start: 2022-10-17 | End: 2023-03-15

## 2022-10-17 RX ORDER — LEVETIRACETAM 10 MG/ML
1000 INJECTION INTRAVASCULAR ONCE
Status: COMPLETED | OUTPATIENT
Start: 2022-10-17 | End: 2022-10-17

## 2022-10-17 RX ADMIN — LEVETIRACETAM 1000 MG: 10 INJECTION INTRAVENOUS at 04:37

## 2022-10-17 RX ADMIN — SODIUM CHLORIDE 1000 ML: 9 INJECTION, SOLUTION INTRAVENOUS at 03:11

## 2022-10-17 ASSESSMENT — ACTIVITIES OF DAILY LIVING (ADL)
ADLS_ACUITY_SCORE: 35
ADLS_ACUITY_SCORE: 39
ADLS_ACUITY_SCORE: 39

## 2022-10-17 ASSESSMENT — ENCOUNTER SYMPTOMS
CHILLS: 0
FEVER: 0
COUGH: 0
SHORTNESS OF BREATH: 0

## 2022-10-17 NOTE — ED NOTES
Pt no longer being discharged. Attempted to stand up and pt unable to stand. Pt states that she is very dizzy. Pt escorted back to bed. Informed MD that mother would like pt to stay and be treated. Obtained rectal temperature. 20 gauge IV started in right forearm. Normal Saline 1000 ml bolus started. Pt to receive chest x ray shortly. Mother at bedside.

## 2022-10-17 NOTE — ED PROVIDER NOTES
History     Chief Complaint   Patient presents with     Seizures     Previous hx, but no current medications for seizures     HPI  Efren Saavedra is a 42 year old female who is here with seizures.  6 today.  Usually do not occur one after the other and rarely more than 1 in a day.  Last seizure about a few weeks ago but also had an episode several days ago where she stared blankly and was unresponsive consistent with possible absence seizure.  Per mom, no change in sleep, no symptoms consistent with infection.  Patient not prone to getting UTIs.  Not coughing.  No vomiting or diarrhea.  Patient not complaining of headache and is at baseline able to articulate any discomfort.  Historically has not been on seizure medication.  Seizure was described as arms flexing towards her torso.  Received 5 mg of intramuscular midazolam in route by EMS due to seizure activity.    Allergies:  Allergies   Allergen Reactions     Azithromycin      Abdominal pain/ cramping      Clotrimazole Rash     Omnicef [Cefdinir] Rash     Itchy and bad rash       Problem List:    Patient Active Problem List    Diagnosis Date Noted     Seizure (H) 02/27/2015     Priority: Medium     Overview:   At least two events (maybe 3), mom opted no treatment or mri unless further events       Osteoporosis 11/23/2013     Priority: Medium     Overview:   IMO Update       Congenital hearing loss 05/14/2013     Priority: Medium     Impacted cerumen 05/14/2013     Priority: Medium     Mixed conductive and sensorineural hearing loss, bilateral 05/14/2013     Priority: Medium     Dermatitis 05/14/2013     Priority: Medium     Mitral valve disease 03/30/2005     Priority: Medium     Overview:   Hx of possible mitral valve stenosis with known mitral valve prolapse in need of follow-up. Echo/doppler 4/19/05.   IMO Update 10/11       Other kyphoscoliosis and scoliosis 03/30/2005     Priority: Medium     Overview:   Refer to Dr. Michel. Scoliosis x-ray 4/19/05.        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 Under the care of Dr. Raines, Dermatology       Irregular menstrual cycle 04/15/2002     Priority: Medium        Past Medical History:    Past Medical History:   Diagnosis Date     Seizures (H)        Past Surgical History:    Past Surgical History:   Procedure Laterality Date     feeding tube       ventilation tubes, bilateral         Family History:    Family History   Problem Relation Age of Onset     Cerebrovascular Disease Paternal Grandmother         CVA     Heart Disease Maternal Grandfather         disease     Hypertension Father      Other - See Comments Father         post polio     Parkinsonism Maternal Grandmother      Heart Disease Paternal Grandfather        Social History:  Marital Status:  Single [1]  Social History     Tobacco Use     Smoking status: Never     Smokeless tobacco: Never     Tobacco comments:     no passive exposure   Substance Use Topics     Alcohol use: No     Drug use: No        Medications:    levETIRAcetam (KEPPRA) 250 MG tablet          Review of Systems   Constitutional: Negative for chills and fever.   Respiratory: Negative for cough and shortness of breath.    All other systems reviewed and are negative.      Physical Exam   BP: 104/68  Pulse: 120  Temp: 98.9  F (37.2  C)  Resp: (!) 33  Weight: 34 kg (74 lb 15.3 oz) (12 days ago per chart)  SpO2: 95 %      Physical Exam  Constitutional:       General: She is not in acute distress.     Appearance: Normal appearance. She is not diaphoretic.   HENT:      Head: Atraumatic.      Right Ear: External ear normal.      Left Ear: External ear normal.      Nose: No  congestion or rhinorrhea.      Mouth/Throat:      Pharynx: Oropharynx is clear. No oropharyngeal exudate.   Eyes:      General: No scleral icterus.     Conjunctiva/sclera: Conjunctivae normal.   Cardiovascular:      Rate and Rhythm: Regular rhythm. Tachycardia present.      Heart sounds: Normal heart sounds.   Pulmonary:      Effort: No respiratory distress.      Breath sounds: Normal breath sounds.   Abdominal:      General: Bowel sounds are normal.      Palpations: Abdomen is soft.      Tenderness: There is no abdominal tenderness.   Musculoskeletal:         General: No tenderness.      Cervical back: Normal range of motion and neck supple.      Right lower leg: No edema.      Left lower leg: No edema.   Skin:     General: Skin is warm.      Capillary Refill: Capillary refill takes less than 2 seconds.      Findings: No rash.   Neurological:      Mental Status: She is alert and oriented to person, place, and time. Mental status is at baseline.      Cranial Nerves: No cranial nerve deficit.   Psychiatric:         Mood and Affect: Mood normal.         Behavior: Behavior normal.         ED Course                 Procedures             Critical Care time:               Results for orders placed or performed during the hospital encounter of 10/17/22 (from the past 24 hour(s))   [place] Draw    Narrative    The following orders were created for panel order [place] Draw.  Procedure                               Abnormality         Status                     ---------                               -----------         ------                     Extra Blue Top Tube[[id number]]                              Final result               Extra Red Top Tube[[id number]]                               Final result                 Please view results for these tests on the individual orders.   CBC with platelets differential    Narrative    The following orders were created for panel order CBC with platelets differential.  Procedure                                Abnormality         Status                     ---------                               -----------         ------                     CBC with platelets and d...[[id number]]  Abnormal            Final result                 Please view results for these tests on the individual orders.   Comprehensive metabolic panel   Result Value Ref Range    Sodium 135 (L) 136 - 145 mmol/L    Potassium 4.1 3.4 - 5.3 mmol/L    Chloride 100 98 - 107 mmol/L    Carbon Dioxide (CO2) 21 (L) 22 - 29 mmol/L    Anion Gap 14 7 - 15 mmol/L    Urea Nitrogen 15.2 6.0 - 20.0 mg/dL    Creatinine 0.62 0.51 - 0.95 mg/dL    Calcium 9.0 8.6 - 10.0 mg/dL    Glucose 135 (H) 70 - 99 mg/dL    Alkaline Phosphatase 41 35 - 104 U/L    AST 20 10 - 35 U/L    ALT 21 10 - 35 U/L    Protein Total 6.8 6.4 - 8.3 g/dL    Albumin 4.4 3.5 - 5.2 g/dL    Bilirubin Total 0.7 <=1.2 mg/dL    GFR Estimate >90 >60 mL/min/1.73m2   Lactic acid whole blood   Result Value Ref Range    Lactic Acid 4.7 (HH) 0.7 - 2.0 mmol/L   Extra Blue Top Tube   Result Value Ref Range    Hold Specimen JIC    Extra Red Top Tube   Result Value Ref Range    Hold Specimen JIC    CBC with platelets and differential   Result Value Ref Range    WBC Count 14.4 (H) 4.0 - 11.0 10e3/uL    RBC Count 4.81 3.80 - 5.20 10e6/uL    Hemoglobin 14.1 11.7 - 15.7 g/dL    Hematocrit 41.1 35.0 - 47.0 %    MCV 85 78 - 100 fL    MCH 29.3 26.5 - 33.0 pg    MCHC 34.3 31.5 - 36.5 g/dL    RDW 13.2 10.0 - 15.0 %    Platelet Count 218 150 - 450 10e3/uL    % Neutrophils 88 %    % Lymphocytes 4 %    % Monocytes 7 %    % Eosinophils 1 %    % Basophils 0 %    % Immature Granulocytes 0 %    NRBCs per 100 WBC 0 <1 /100    Absolute Neutrophils 12.6 (H) 1.6 - 8.3 10e3/uL    Absolute Lymphocytes 0.6 (L) 0.8 - 5.3 10e3/uL    Absolute Monocytes 1.0 0.0 - 1.3 10e3/uL    Absolute Eosinophils 0.1 0.0 - 0.7 10e3/uL    Absolute Basophils 0.1 0.0 - 0.2 10e3/uL    Absolute Immature Granulocytes 0.1 <=0.4 10e3/uL     Absolute NRBCs 0.0 10e3/uL   UA with Microscopic reflex to Culture    Specimen: Urine, NOS   Result Value Ref Range    Color Urine Yellow Colorless, Straw, Light Yellow, Yellow    Appearance Urine Slightly Cloudy (A) Clear    Glucose Urine Negative Negative mg/dL    Bilirubin Urine Negative Negative    Ketones Urine Negative Negative mg/dL    Specific Gravity Urine 1.024 1.003 - 1.035    Blood Urine Negative Negative    pH Urine 5.5 4.7 - 8.0    Protein Albumin Urine 70 (A) Negative mg/dL    Urobilinogen Urine Normal Normal, 2.0 mg/dL    Nitrite Urine Negative Negative    Leukocyte Esterase Urine Negative Negative    Mucus Urine Present (A) None Seen /LPF    RBC Urine 1 <=2 /HPF    WBC Urine 1 <=5 /HPF    Squamous Epithelials Urine 1 <=1 /HPF    Narrative    Urine Culture not indicated   Lactic acid whole blood   Result Value Ref Range    Lactic Acid 0.8 0.7 - 2.0 mmol/L       Medications   0.9% sodium chloride BOLUS (0 mLs Intravenous Stopped 10/17/22 3959)   levETIRAcetam (KEPPRA) intermittent infusion 1,000 mg (0 mg Intravenous Stopped 10/17/22 2427)       Assessments & Plan (with Medical Decision Making)     I have reviewed the nursing notes.    I have reviewed the findings, diagnosis, plan and need for follow up with the patient.  42-year-old female here with recurrent seizures.  Has not had any seizure after receiving midazolam so not status epilepticus.  In department, patient was tachycardic to 120s and intermittently hypotensive with systolics in the 90s however MAP was 80.  Blood pressure could be due to the midazolam however I would not expect the heart rate to be that elevated.  I broached the possibility of dehydration however patient's mother tells me patient has been eating and drinking at baseline.  Mom explicitly denied any infectious symptoms.  Given low blood pressure and fast heart rate I was concerned for infection so we obtained a rectal temperature however that was thankfully normal.  Blood  work was already obtained, I obtained a chest x-ray and urinalysis neither which revealed any infection.  After 1 L her blood pressure improved and her heart rate markedly decreased to her baseline which is about 90 give or take.  She now has a steady gait and she will be discharged home.  I had loaded her with Keppra.  Patient had extensive discussions with neurologist at [facility] regarding starting medication, but mother was hesitant because she had them so infrequent it may be more burdensome than helpful however after 6 use today, mother is requesting restart this and I have done so.  Was loaded with Keppra at 30 mix per cake and will be started on to 50 mg twice daily.  Mother will make appointment with neurologist.    New Prescriptions    LEVETIRACETAM (KEPPRA) 250 MG TABLET    Take 1 tablet (250 mg) by mouth 2 times daily for 30 days       Final diagnoses:   Seizure disorder (H)       10/17/2022   [facility]     Maco Payne MD  10/17/22 0558

## 2022-10-17 NOTE — ED TRIAGE NOTES
Patient arrives from home after having 2 seizures since 2200. EMS reports that patient has has seizures in the past, but per EMS and mom patient is not currently taking any medications. EMS reports that patient had some seizure like activity in the rig, reported that patient brought arms up to chest and had a more rigid posture, no overt shaking of the entire body. EMS did give 5mg versed IM around 0020 for seizure like activity. Patient into bed, seizure pads in place. EKG and vitals obtained.

## 2022-10-17 NOTE — ED NOTES
DATE:  10/17/2022   TIME OF RECEIPT FROM LAB:  0134  LAB TEST:  Lactic  LAB VALUE:  4.7  RESULTS GIVEN WITH READ-BACK TO (PROVIDER):  Kerry MERCADO LAB VALUE REPORTED TO PROVIDER:   0135

## 2022-10-17 NOTE — ED NOTES
Mother at bedside. Pt drowsy and not answering questions. Speech is garbled at this time. Did receive Versed 5 mg per EMS. All vitals within normal range. Attempted IV but pt pulled out. Mother did not want additional IV attempts.

## 2022-10-17 NOTE — ED NOTES
"Pt continues to deny pain. When asked if she is feeling sick, pt nods her head \"yes.\" Vitals currently stable. Remains afebrile. Keppra currently running.   "

## 2022-11-01 NOTE — NURSING NOTE
"Chief Complaint   Patient presents with     Cerumen Impaction     Ear cleaning.       Initial /72 (BP Location: Left arm, Patient Position: Sitting, Cuff Size: Adult Regular)   Pulse 110   Temp 98.5  F (36.9  C) (Tympanic)   Ht 1.346 m (4' 5\")   Wt 36.3 kg (80 lb)   SpO2 99%   BMI 20.02 kg/m   Estimated body mass index is 20.02 kg/m  as calculated from the following:    Height as of this encounter: 1.346 m (4' 5\").    Weight as of this encounter: 36.3 kg (80 lb).  Medication Reconciliation: complete  Beverly Cam LPN    "
No assistance needed

## 2023-03-15 ENCOUNTER — OFFICE VISIT (OUTPATIENT)
Dept: OTOLARYNGOLOGY | Facility: OTHER | Age: 43
End: 2023-03-15
Attending: PHYSICIAN ASSISTANT
Payer: MEDICARE

## 2023-03-15 VITALS
OXYGEN SATURATION: 99 % | DIASTOLIC BLOOD PRESSURE: 60 MMHG | WEIGHT: 74 LBS | SYSTOLIC BLOOD PRESSURE: 112 MMHG | HEART RATE: 109 BPM | HEIGHT: 55 IN | TEMPERATURE: 97.8 F | BODY MASS INDEX: 17.13 KG/M2

## 2023-03-15 DIAGNOSIS — H90.6 MIXED CONDUCTIVE AND SENSORINEURAL HEARING LOSS, BILATERAL: ICD-10-CM

## 2023-03-15 DIAGNOSIS — H90.5 CONGENITAL HEARING LOSS: ICD-10-CM

## 2023-03-15 DIAGNOSIS — Z45.89 TYMPANOSTOMY TUBE CHECK: Primary | ICD-10-CM

## 2023-03-15 PROCEDURE — 92504 EAR MICROSCOPY EXAMINATION: CPT | Performed by: PHYSICIAN ASSISTANT

## 2023-03-15 PROCEDURE — 99213 OFFICE O/P EST LOW 20 MIN: CPT | Mod: 25 | Performed by: PHYSICIAN ASSISTANT

## 2023-03-15 PROCEDURE — G0463 HOSPITAL OUTPT CLINIC VISIT: HCPCS

## 2023-03-15 RX ORDER — LEVETIRACETAM 250 MG/1
500 TABLET ORAL 2 TIMES DAILY
COMMUNITY

## 2023-03-15 ASSESSMENT — PAIN SCALES - GENERAL: PAINLEVEL: NO PAIN (0)

## 2023-03-15 NOTE — PATIENT INSTRUCTIONS
Ears were cleaned.   Powder applied.   Tubes are both open and in good position.   Follow up in 5-6 months      Thank you for allowing Marcelina Oneal PA-C and our ENT team to participate in your care.  If your medications are too expensive, please give the nurse a call.  We can possibly change this medication.  If you have a scheduling or an appointment question please contact our Health Unit Coordinator at [phone], Ext. [phone].    ALL nursing questions or concerns can be directed to your ENT nurse at: [phone] [place]

## 2023-03-15 NOTE — PROGRESS NOTES
Chief Complaint   Patient presents with     Cerumen Impaction     Ear cleaning          Patient returns to ENT for routine ear cleaning.    She was started on Azithromycin, Ciprodex due to bilateral otorrhea at her last visit.   She developed abdominal discomfort on 9/9/22 and seen in ED, they discontinued azithromycin.   Spoke to Mother regarding reaction to Azithromycin. She has felt there was thrashing, abd pain, diarrhea. Symptoms have improved. No concerns.        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  She does have patent tympanostomy tubes which have been in position for several years.  She does have cleanings completed and at times powder application.  At her last visit application of miconazole is applied to both ears.  She does have hearing loss which has been overall stable and recommended use of hearing aids.  Efren does not tolerate use and declines a to use on a daily basis.  We reviewed consideration for repeat imaging and at this time.  Have declined.  They have declined further options of possible consideration for [facility] referral to otology     Today Efren has been doing well since her last visit. She was doing well with her ears until the last week. She had c/o intermittent plugged feeling.   No active drainage.   Powder application has been aiding in relief with otorrhea.         She does have hearing loss and has declined use of aids. She has aids but does not wear them at this time as she does not like them  Mom (Sophia) provides hx and details.    Distant hx of BTT with placement of t-tubes. Tubes have been in place for 30 years.   No recent illness. No other concerns  Discussed audiograms. Stable mixed hearing loss. Audiologist did mention consideration for BAHA, but patient did not wish to proceed.    She has HA but does not tolerate or wear.           Past Medical History:   Diagnosis Date     Seizures (H)         Allergies   Allergen Reactions      "Azithromycin      Abdominal pain/ cramping      Clotrimazole Rash     Omnicef [Cefdinir] Rash     Itchy and bad rash     Current Outpatient Medications   Medication     levETIRAcetam (KEPPRA) 250 MG tablet     No current facility-administered medications for this visit.     ROS- SEE HPI  /60 (BP Location: Left arm, Cuff Size: Child)   Pulse 109   Temp 97.8  F (36.6  C) (Tympanic)   Ht 1.346 m (4' 5\")   Wt 33.6 kg (74 lb)   SpO2 99%   BMI 18.52 kg/m      General: Craniofacial abnormality.   Eyes: conjuctiva clear, PERRL, EOM intact   Ears: Canals overall clear. Narrow canals. . Low set auricles with poor antihelical folds defined. t-tube bilaterally. Gila bowl bilateral overall look well.   Nose: Nares normal   Mouth: crowded.   Neck: Supple, no cervical adenopathy, no thyromegaly      The ear canals were examined underneath the operating microscope and with an otologic speculum. Scant debris suctioned on left, cerumen.  Right ear with otorrhea.      Canals, stenotic   Bilateral tubes appear in place.    Narrow canals which limits examination. Canals are narrow.  Tympanic membranes appear with tubes. TMs are thickened and appear with sclerosis.   Left tube appears patent.Bilateral Canals overall greatly improved.   No active otorrhea.   Tubes are both patent and clear.   Miconazole  applied to bilateral EAC.       ASSESSMENT/ PLAN:    ICD-10-CM    1. Tympanostomy tube check  Z45.89       2. Congenital hearing loss  H90.5       3. Mixed conductive and sensorineural hearing loss, bilateral  H90.6           Overall ears look well.   No active otorrhea, canals cleaned.   Powder applied (she has tolerate miconazole in past w/o concern)     Return in 6 months.   If any concerns of otalgia, otorrhea return to ENT sooner.       Recommended OROZCO use. Efren has declined.       Marcelina Oneal PA-C  ENT  [facility] [place]      "

## 2023-03-15 NOTE — LETTER
3/15/2023         RE: Efren Saavedra  [address] [place] [address]        Dear Colleague,    Thank you for referring your patient, Efren Saavedra, to the [facility]. Please see a copy of my visit note below.    Chief Complaint   Patient presents with     Cerumen Impaction     Ear cleaning          Patient returns to ENT for routine ear cleaning.    She was started on Azithromycin, Ciprodex due to bilateral otorrhea at her last visit.   She developed abdominal discomfort on 9/9/22 and seen in ED, they discontinued azithromycin.   Spoke to Mother regarding reaction to Azithromycin. She has felt there was thrashing, abd pain, diarrhea. Symptoms have improved. No concerns.        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  She does have patent tympanostomy tubes which have been in position for several years.  She does have cleanings completed and at times powder application.  At her last visit application of miconazole is applied to both ears.  She does have hearing loss which has been overall stable and recommended use of hearing aids.  Efren does not tolerate use and declines a to use on a daily basis.  We reviewed consideration for repeat imaging and at this time.  Have declined.  They have declined further options of possible consideration for [facility] referral to otology     Today Efren has been doing well since her last visit. She was doing well with her ears until the last week. She had c/o intermittent plugged feeling.   No active drainage.   Powder application has been aiding in relief with otorrhea.         She does have hearing loss and has declined use of aids. She has aids but does not wear them at this time as she does not like them  Mom (Sophia) provides hx and details.    Distant hx of BTT with placement of t-tubes. Tubes have been in place for 30 years.   No recent illness. No other concerns  Discussed audiograms. Stable mixed  "hearing loss. Audiologist did mention consideration for BAHA, but patient did not wish to proceed.    She has HA but does not tolerate or wear.           Past Medical History:   Diagnosis Date     Seizures (H)         Allergies   Allergen Reactions     Azithromycin      Abdominal pain/ cramping      Clotrimazole Rash     Omnicef [Cefdinir] Rash     Itchy and bad rash     Current Outpatient Medications   Medication     levETIRAcetam (KEPPRA) 250 MG tablet     No current facility-administered medications for this visit.     ROS- SEE HPI  /60 (BP Location: Left arm, Cuff Size: Child)   Pulse 109   Temp 97.8  F (36.6  C) (Tympanic)   Ht 1.346 m (4' 5\")   Wt 33.6 kg (74 lb)   SpO2 99%   BMI 18.52 kg/m      General: Craniofacial abnormality.   Eyes: conjuctiva clear, PERRL, EOM intact   Ears: Canals overall clear. Narrow canals. . Low set auricles with poor antihelical folds defined. t-tube bilaterally. Gila bowl bilateral overall look well.   Nose: Nares normal   Mouth: crowded.   Neck: Supple, no cervical adenopathy, no thyromegaly      The ear canals were examined underneath the operating microscope and with an otologic speculum. Scant debris suctioned on left, cerumen.  Right ear with otorrhea.      Canals, stenotic   Bilateral tubes appear in place.    Narrow canals which limits examination. Canals are narrow.  Tympanic membranes appear with tubes. TMs are thickened and appear with sclerosis.   Left tube appears patent.Bilateral Canals overall greatly improved.   No active otorrhea.   Tubes are both patent and clear.   Miconazole  applied to bilateral EAC.       ASSESSMENT/ PLAN:    ICD-10-CM    1. Tympanostomy tube check  Z45.89       2. Congenital hearing loss  H90.5       3. Mixed conductive and sensorineural hearing loss, bilateral  H90.6           Overall ears look well.   No active otorrhea, canals cleaned.   Powder applied (she has tolerate miconazole in past w/o concern)     Return in 6 months. "   If any concerns of otalgia, otorrhea return to ENT sooner.       Recommended OROZCO use. Efren has declined.       Marcelina Oneal PA-C  ENT  [facility], [place]          Again, thank you for allowing me to participate in the care of your patient.        Sincerely,        Marcelina Oneal PA-C

## 2023-06-03 ENCOUNTER — HOSPITAL ENCOUNTER (EMERGENCY)
Facility: HOSPITAL | Age: 43
Discharge: HOME | End: 2023-06-03
Attending: PHYSICIAN ASSISTANT | Admitting: PHYSICIAN ASSISTANT
Payer: MEDICARE

## 2023-06-03 VITALS
OXYGEN SATURATION: 100 % | RESPIRATION RATE: 16 BRPM | DIASTOLIC BLOOD PRESSURE: 76 MMHG | SYSTOLIC BLOOD PRESSURE: 117 MMHG | HEART RATE: 90 BPM | TEMPERATURE: 98.5 F

## 2023-06-03 DIAGNOSIS — Z20.822 EXPOSURE TO 2019 NOVEL CORONAVIRUS: ICD-10-CM

## 2023-06-03 LAB
FLUAV RNA SPEC QL NAA+PROBE: NEGATIVE
FLUBV RNA RESP QL NAA+PROBE: NEGATIVE
RSV RNA SPEC NAA+PROBE: NEGATIVE
SARS-COV-2 RNA RESP QL NAA+PROBE: POSITIVE

## 2023-06-03 PROCEDURE — C9803 HOPD COVID-19 SPEC COLLECT: HCPCS

## 2023-06-03 PROCEDURE — G0463 HOSPITAL OUTPT CLINIC VISIT: HCPCS

## 2023-06-03 PROCEDURE — 87637 SARSCOV2&INF A&B&RSV AMP PRB: CPT | Performed by: PHYSICIAN ASSISTANT

## 2023-06-03 PROCEDURE — 99213 OFFICE O/P EST LOW 20 MIN: CPT | Performed by: PHYSICIAN ASSISTANT

## 2023-06-03 ASSESSMENT — ACTIVITIES OF DAILY LIVING (ADL): ADLS_ACUITY_SCORE: 35

## 2023-06-03 NOTE — DISCHARGE INSTRUCTIONS
We will call you with the covid results.   If positive, I will send in an RX for Paxlovid, as discussed.

## 2023-06-03 NOTE — ED PROVIDER NOTES
History     Chief Complaint   Patient presents with     Covid 19 Testing     HPI  Efren Saavedra is a 43 year old female who has special needs, brought in by mom today for a covid-19 test. Mom is positive for covid-19 and is being treated with paxlovid. She would like Efren treated with paxlovid as well if she comes back positive. Efren has c/o a headache, otherwise denies any symptoms. No fevers or cough.     Allergies:  Allergies   Allergen Reactions     Azithromycin      Abdominal pain/ cramping      Clotrimazole Rash     Omnicef [Cefdinir] Rash     Itchy and bad rash       Problem List:    Patient Active Problem List    Diagnosis Date Noted     Seizure (H) 02/27/2015     Priority: Medium     Overview:   At least two events (maybe 3), mom opted no treatment or mri unless further events       Osteoporosis 11/23/2013     Priority: Medium     Overview:   IMO Update       Congenital hearing loss 05/14/2013     Priority: Medium     Impacted cerumen 05/14/2013     Priority: Medium     Mixed conductive and sensorineural hearing loss, bilateral 05/14/2013     Priority: Medium     Dermatitis 05/14/2013     Priority: Medium     Mitral valve disease 03/30/2005     Priority: Medium     Overview:   Hx of possible mitral valve stenosis with known mitral valve prolapse in need of follow-up. Echo/doppler 4/19/05.   IMO Update 10/11       Other kyphoscoliosis and scoliosis 03/30/2005     Priority: Medium     Overview:   Refer to Dr. Michel. Scoliosis x-ray 4/19/05.       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 Under the care of Dr. Raines, Dermatology       Irregular menstrual cycle 04/15/2002      Priority: Medium        Past Medical History:    Past Medical History:   Diagnosis Date     Seizures (H)        Past Surgical History:    Past Surgical History:   Procedure Laterality Date     feeding tube       ventilation tubes, bilateral         Family History:    Family History   Problem Relation Age of Onset     Cerebrovascular Disease Paternal Grandmother         CVA     Heart Disease Maternal Grandfather         disease     Hypertension Father      Other - See Comments Father         post polio     Parkinsonism Maternal Grandmother      Heart Disease Paternal Grandfather        Social History:  Marital Status:  Single [1]  Social History     Tobacco Use     Smoking status: Never     Smokeless tobacco: Never     Tobacco comments:     no passive exposure   Substance Use Topics     Alcohol use: No     Drug use: No        Medications:    levETIRAcetam (KEPPRA) 250 MG tablet          Review of Systems   All other systems reviewed and are negative.      Physical Exam   BP: 117/76  Pulse: 90  Temp: 98.5  F (36.9  C)  Resp: 16  SpO2: 100 %      Physical Exam  Vitals and nursing note reviewed.   Constitutional:       General: She is not in acute distress.     Appearance: She is well-developed. She is not ill-appearing or diaphoretic.   HENT:      Head: Normocephalic and atraumatic.      Right Ear: External ear normal.      Left Ear: External ear normal.      Nose: Nose normal.      Mouth/Throat:      Pharynx: No oropharyngeal exudate.   Eyes:      General: No scleral icterus.        Right eye: No discharge.         Left eye: No discharge.      Conjunctiva/sclera: Conjunctivae normal.      Pupils: Pupils are equal, round, and reactive to light.   Cardiovascular:      Rate and Rhythm: Normal rate and regular rhythm.      Heart sounds: Normal heart sounds. No murmur heard.     No friction rub. No gallop.   Pulmonary:      Effort: Pulmonary effort is normal. No respiratory distress.      Breath sounds: Normal breath  sounds. No wheezing or rales.   Chest:      Chest wall: No tenderness.   Abdominal:      General: Bowel sounds are normal.      Palpations: Abdomen is soft.      Tenderness: There is no abdominal tenderness.   Musculoskeletal:      Cervical back: Normal range of motion and neck supple.   Lymphadenopathy:      Cervical: No cervical adenopathy.   Skin:     General: Skin is warm and dry.      Capillary Refill: Capillary refill takes less than 2 seconds.      Coloration: Skin is not pale.      Findings: No erythema or rash.   Neurological:      Mental Status: She is alert.      Cranial Nerves: No cranial nerve deficit.      Coordination: Coordination normal.         ED Course                 Procedures            No results found for this or any previous visit (from the past 24 hour(s)).    Medications - No data to display    Assessments & Plan (with Medical Decision Making)   Pt is well appearing, VS are WNL. Normal exam. Covid-19 test pending. Mom would like her treated with Paxlovid if test comes back positive. Discussed risks/benefits. We will call her with results and send paxolovid RX if positive.       I have reviewed the nursing notes.    I have reviewed the findings, diagnosis, plan and need for follow up with the patient.  New Prescriptions    No medications on file       Final diagnoses:   Exposure to 2019 novel coronavirus       6/3/2023   [facility]

## 2023-06-03 NOTE — ED TRIAGE NOTES
Patient presents to urgent care with mom for COVID exposure to mom. Mom tested positive last night. Symptoms started last night with congestion, headache. Fatigue.

## 2023-06-03 NOTE — ED NOTES
"       6/3/2023    12:09 PM   MASSBP Score   Age Greater than or equal to 65 years 0   BMI greater than or equal to 35 kg/m2 0   Has Diabetes Mellitus 0   Has Chronic Kidney Disease 0   Has Cardiovascular Disease and 55 years or older 0   Has Chronic Respiratory Disease and 55 years or older 0   Has Hypertension and 55 years or older 0   Is Immunocompromised 0   Is Pregnant 0   Member of [facility] community (Black/, /, ,  or , or  or [place] Native)  0   MASSBP Score 0   Has the patient had a positive COVID test outside our system?  Yes   What day did symptoms start?  6/2/2023       Estimated body mass index is 18.52 kg/m  as calculated from the following:    Height as of 3/15/23: 1.346 m (4' 5\").    Weight as of 3/15/23: 33.6 kg (74 lb).     GFR Estimate   Date Value Ref Range Status   10/17/2022 >90 >60 mL/min/1.73m2 Final     Comment:     Effective December 21, 2021 eGFRcr in adults is calculated using the 2021 CKD-EPI creatinine equation which includes age and gender (Liz albert al., NEJM, DOI: 10.1056/EYBTag9419381)        FDA Facts Sheet  Lexicomp Drug Interaction review    Medications were reviewed with the patient and held or adjusted where applicable.    No current facility-administered medications for this encounter.     Current Outpatient Medications   Medication     levETIRAcetam (KEPPRA) 250 MG tablet     nirmatrelvir and ritonavir (PAXLOVID) 300 mg/100 mg therapy pack         ications metabolized by CYP3A    CONTRAINDICATED with drugs that are highly dependent on CYP3A for clearance and for which elevated concentrations are associated with serious and/or life-threatening reactions   - Alpha1-adrenoreceptor antagonist- alfuzosin (Uroxatral)  - Analgesics- pethidine, piroxicam, propoxyphene   - Antianginal- ranolazine   - Antiarrhythmic- amiodarone, dronedarone, flecainide, propafenone, quinidine   - Anti-gout- colchicine   - " Antipsychotics- lurasidone, pimozide, clozapine   - Ergot derivatives- dihydroergotamine, ergotamine, methylergonovine   - HMG-CoA reductase inhibitors- lovastatin, simvastatin   stop 12 hours before dose  - PDE5 inhibitor- sildenafil (Revatio ) when used for pulmonary arterial hypertension  - Sedative/hypnotics- triazolam, oral midazolam     CONTRAINDICATED with drugs that are potent CYP3A inducers where significantly reduced nirmatrelvir or ritonavir plasma concentrations may be associated with the potential for loss of virologic response and possible resistance.  PAXLOVID cannot be started immediately after discontinuation of any of the following medications due to the delayed offset of the recently discontinued CYP3A inducer  - Anticancer drugs- apalutamide   Also, avoid co-administration of encorafenib, ivosidenib, neratinib, venetoclax or ibrutinib, as well as vincristine and vinblastine  - Anticonvulsant- carbamazepine, phenobarbital, phenytoin    - Antimycobacterials- rifampin   - Herbal products- Bulmaro's Wort (hypericum perforatum)    Other Meds to consider  - Monitor INR closely in patient on warfarin.     - Effects of bupropion can be diminished  - Decrease dose of trazodone, quetiapine  - Avoid antifungals  - Effects of calcium channel blockers may increase  - Effects of digoxin may increase  - Ethinyl estradiol may fail, use other contraceptive  - Monitor drug levels of cyclosporine, tacrolimus, sirolimus, or don't give Paxlovid  - Fentanyl effects can increase, watch for resp suppression  - Methadone effects can decrease, watch for withdrawal :[id number]}

## 2023-06-20 ENCOUNTER — TELEPHONE (OUTPATIENT)
Dept: OTOLARYNGOLOGY | Facility: OTHER | Age: 43
End: 2023-06-20

## 2023-06-20 DIAGNOSIS — H92.13 OTORRHEA OF BOTH EARS: ICD-10-CM

## 2023-06-20 DIAGNOSIS — Z45.89 TYMPANOSTOMY TUBE CHECK: Primary | ICD-10-CM

## 2023-06-20 RX ORDER — OFLOXACIN 3 MG/ML
5 SOLUTION AURICULAR (OTIC) 2 TIMES DAILY
Qty: 4 ML | Refills: 0 | Status: SHIPPED | OUTPATIENT
Start: 2023-06-20 | End: 2023-06-28

## 2023-06-20 NOTE — TELEPHONE ENCOUNTER
Mom calls because Efren is having ear drainage.  She would like it looked at.  We do not have any openings soon.  Please advise.

## 2023-06-28 ENCOUNTER — OFFICE VISIT (OUTPATIENT)
Dept: OTOLARYNGOLOGY | Facility: OTHER | Age: 43
End: 2023-06-28
Attending: PHYSICIAN ASSISTANT
Payer: MEDICARE

## 2023-06-28 VITALS
HEART RATE: 113 BPM | TEMPERATURE: 98.3 F | DIASTOLIC BLOOD PRESSURE: 58 MMHG | HEIGHT: 55 IN | OXYGEN SATURATION: 99 % | SYSTOLIC BLOOD PRESSURE: 102 MMHG | WEIGHT: 72 LBS | BODY MASS INDEX: 16.66 KG/M2

## 2023-06-28 DIAGNOSIS — H90.5 CONGENITAL HEARING LOSS: ICD-10-CM

## 2023-06-28 DIAGNOSIS — Z45.89 TYMPANOSTOMY TUBE CHECK: ICD-10-CM

## 2023-06-28 DIAGNOSIS — H92.13 OTORRHEA OF BOTH EARS: Primary | ICD-10-CM

## 2023-06-28 PROCEDURE — 92504 EAR MICROSCOPY EXAMINATION: CPT | Performed by: PHYSICIAN ASSISTANT

## 2023-06-28 PROCEDURE — 99213 OFFICE O/P EST LOW 20 MIN: CPT | Mod: 25 | Performed by: PHYSICIAN ASSISTANT

## 2023-06-28 PROCEDURE — G0463 HOSPITAL OUTPT CLINIC VISIT: HCPCS

## 2023-06-28 ASSESSMENT — PAIN SCALES - GENERAL: PAINLEVEL: MODERATE PAIN (4)

## 2023-06-28 NOTE — PATIENT INSTRUCTIONS
Ears were cleaned.   Powder applied  Continue to keep ears dry.   Follow up in September.     Thank you for allowing Marcelina Oneal PA-C and our ENT team to participate in your care.  If your medications are too expensive, please give the nurse a call.  We can possibly change this medication.  If you have a scheduling or an appointment question please contact our Health Unit Coordinator at [phone], Ext. [phone].    ALL nursing questions or concerns can be directed to your ENT nurse at: [phone] [place]

## 2023-06-28 NOTE — PROGRESS NOTES
Chief Complaint   Patient presents with     Ear Problem     Ear drainage       Efren had ear drainage started last week, we started her on Floxin for 1 week. They completed Floxin to both ears for the last week.   Intermittent otalgia since completing the drops.   Completed on Sunday, 6/25.   Mom (Sophia) provides hx and details.     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  She does have patent tympanostomy tubes which have been in position for several years.  She does have cleanings completed and at times powder application.  At her last visit application of miconazole is applied to both ears.  She does have hearing loss which has been overall stable and recommended use of hearing aids.  Efren does not tolerate use and declines a to use on a daily basis.  We reviewed consideration for repeat imaging and at this time.  Have declined.  They have declined further options of possible consideration for [facility] referral to otology     She does have hearing loss and has declined use of aids. She has aids but does not wear them at this time as she does not like them       Distant hx of BTT with placement of t-tubes. Tubes have been in place for 30 years.   No recent illness. No other concerns  Discussed audiograms. Stable mixed hearing loss. Audiologist did mention consideration for BAHA, but patient did not wish to proceed.    She has HA but does not tolerate or wear.       her seizures have been doing well w/ her medications.   She has been on medications w/o concerns.            Past Medical History:   Diagnosis Date     Seizures (H)         Allergies   Allergen Reactions     Azithromycin      Abdominal pain/ cramping      Clotrimazole Rash     Omnicef [Cefdinir] Rash     Itchy and bad rash     Current Outpatient Medications   Medication     levETIRAcetam (KEPPRA) 250 MG tablet     No current facility-administered medications for this visit.     ROS- SEE HPI  /58 (BP Location:  "Right arm, Cuff Size: Child)   Pulse 113   Temp 98.3  F (36.8  C) (Tympanic)   Ht 1.346 m (4' 5\")   Wt 32.7 kg (72 lb)   SpO2 99%   BMI 18.02 kg/m      General: Craniofacial abnormality.   Eyes: conjuctiva clear, PERRL, EOM intact   Ears: Canals overall clear. Narrow canals. . Low set auricles with poor antihelical folds defined. t-tube bilaterally. Gila bowl bilateral overall look well.   Nose: Nares normal   Mouth: crowded.   Neck: Supple, no cervical adenopathy, no thyromegaly      The ear canals were examined underneath the operating microscope and with an otologic speculum. Scant debris suctioned on left, cerumen.  Right ear with otorrhea.      Canals, stenotic   Bilateral tubes appear in place.    Narrow canals which limits examination. Canals are narrow.  Tympanic membranes appear with tubes. TMs are thickened and appear with sclerosis, prominent vasculature.   Left tube appears patent.Bilateral Canals overall greatly improved.   No active otorrhea.   Tubes are both patent and clear.   Miconazole  applied to bilateral EAC.     ASSESSMENT/ PLAN:      ICD-10-CM    1. Otorrhea of both ears  H92.13       2. Tympanostomy tube check  Z45.89       3. Congenital hearing loss  H90.5           Overall ears look well.   Ears have improved, no otorrhea on exam.     Powder applied (she has tolerate miconazole in past w/o concern)      Return in 3 months.   If any concerns of otalgia, otorrhea return to ENT sooner.         Recommended OROZCO use. Efren has declined.       Marcelina Oneal PA-C  ENT  [facility], [place]      "

## 2023-06-28 NOTE — LETTER
6/28/2023         RE: Efren Saavedra  [address] [place] [address]        Dear Colleague,    Thank you for referring your patient, Efren Saavedra, to the [facility]. Please see a copy of my visit note below.    Chief Complaint   Patient presents with     Ear Problem     Ear drainage       Efren had ear drainage started last week, we started her on Floxin for 1 week. They completed Floxin to both ears for the last week.   Intermittent otalgia since completing the drops.   Completed on Sunday, 6/25.   Mom (Sophia) provides hx and details.     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  She does have patent tympanostomy tubes which have been in position for several years.  She does have cleanings completed and at times powder application.  At her last visit application of miconazole is applied to both ears.  She does have hearing loss which has been overall stable and recommended use of hearing aids.  Efren does not tolerate use and declines a to use on a daily basis.  We reviewed consideration for repeat imaging and at this time.  Have declined.  They have declined further options of possible consideration for [facility] referral to otology     She does have hearing loss and has declined use of aids. She has aids but does not wear them at this time as she does not like them       Distant hx of BTT with placement of t-tubes. Tubes have been in place for 30 years.   No recent illness. No other concerns  Discussed audiograms. Stable mixed hearing loss. Audiologist did mention consideration for BAHA, but patient did not wish to proceed.    She has HA but does not tolerate or wear.       her seizures have been doing well w/ her medications.   She has been on medications w/o concerns.            Past Medical History:   Diagnosis Date     Seizures (H)         Allergies   Allergen Reactions     Azithromycin      Abdominal pain/ cramping      Clotrimazole  "Rash     Omnicef [Cefdinir] Rash     Itchy and bad rash     Current Outpatient Medications   Medication     levETIRAcetam (KEPPRA) 250 MG tablet     No current facility-administered medications for this visit.     ROS- SEE HPI  /58 (BP Location: Right arm, Cuff Size: Child)   Pulse 113   Temp 98.3  F (36.8  C) (Tympanic)   Ht 1.346 m (4' 5\")   Wt 32.7 kg (72 lb)   SpO2 99%   BMI 18.02 kg/m      General: Craniofacial abnormality.   Eyes: conjuctiva clear, PERRL, EOM intact   Ears: Canals overall clear. Narrow canals. . Low set auricles with poor antihelical folds defined. t-tube bilaterally. Gila bowl bilateral overall look well.   Nose: Nares normal   Mouth: crowded.   Neck: Supple, no cervical adenopathy, no thyromegaly      The ear canals were examined underneath the operating microscope and with an otologic speculum. Scant debris suctioned on left, cerumen.  Right ear with otorrhea.      Canals, stenotic   Bilateral tubes appear in place.    Narrow canals which limits examination. Canals are narrow.  Tympanic membranes appear with tubes. TMs are thickened and appear with sclerosis, prominent vasculature.   Left tube appears patent.Bilateral Canals overall greatly improved.   No active otorrhea.   Tubes are both patent and clear.   Miconazole  applied to bilateral EAC.     ASSESSMENT/ PLAN:      ICD-10-CM    1. Otorrhea of both ears  H92.13       2. Tympanostomy tube check  Z45.89       3. Congenital hearing loss  H90.5           Overall ears look well.   Ears have improved, no otorrhea on exam.     Powder applied (she has tolerate miconazole in past w/o concern)      Return in 3 months.   If any concerns of otalgia, otorrhea return to ENT sooner.         Recommended OROZCO use. Efren has declined.       Marcelina Oneal PA-C  ENT  [facility], [place]          Again, thank you for allowing me to participate in the care of your patient.        Sincerely,        Marcelina Oneal PA-C    "

## 2023-09-22 ENCOUNTER — MEDICAL CORRESPONDENCE (OUTPATIENT)
Dept: HEALTH INFORMATION MANAGEMENT | Facility: HOSPITAL | Age: 43
End: 2023-09-22

## 2023-11-06 NOTE — PROGRESS NOTES
Chief Complaint   Patient presents with    Procedure     Ear cleaning       Efren had ear drainage started last week, we started her on Floxin for 1 week. They completed Floxin to both ears for the last week.   Intermittent otalgia since completing the drops.   Completed on Sunday, 6/25.   Mom (Sophia) provides hx and details.      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  She does have patent tympanostomy tubes which have been in position for several years.  She does have cleanings completed and at times powder application.  At her last visit application of miconazole is applied to both ears.  She does have hearing loss which has been overall stable and recommended use of hearing aids.  Efren does not tolerate use and declines a to use on a daily basis.  We reviewed consideration for repeat imaging and at this time.  Have declined.  They have declined further options of possible consideration for [facility] referral to otology     She does have hearing loss and has declined use of aids. She has aids but does not wear them at this time as she does not like them      Distant hx of BTT with placement of t-tubes. Tubes have been in place for 30 years.   No recent illness. No other concerns  Discussed audiograms. Stable mixed hearing loss. Audiologist did mention consideration for BAHA, but patient did not wish to proceed.    She has HA but does not tolerate or wear.       her seizures have been doing well w/ her medications.   She has been on medications w/o concerns.       Past Medical History:   Diagnosis Date    Seizures (H)         Allergies   Allergen Reactions    Azithromycin      Abdominal pain/ cramping     Clotrimazole Rash    Omnicef [Cefdinir] Rash     Itchy and bad rash     Current Outpatient Medications   Medication    levETIRAcetam (KEPPRA) 250 MG tablet     No current facility-administered medications for this visit.     ROS- SEE HPI  BP 99/58 (BP Location: Right arm,  "Patient Position: Right side, Cuff Size: Adult Small)   Pulse 94   Temp 97.4  F (36.3  C) (Tympanic)   Ht 1.334 m (4' 4.5\")   Wt 31.8 kg (70 lb)   SpO2 99%   BMI 17.86 kg/m      General: Craniofacial abnormality.   Eyes: conjuctiva clear, PERRL, EOM intact   Ears: Canals overall clear. Narrow canals. . Low set auricles with poor antihelical folds defined. t-tube bilaterally. Gila bowl bilateral overall look well.   Nose: Nares normal   Mouth: crowded.   Neck: Supple, no cervical adenopathy, no thyromegaly      The ear canals were examined underneath the operating microscope and with an otologic speculum. Large amount of debris suctioned on left, cerumen.  Right ear with otorrhea.      Canals, stenotic   Bilateral tubes appear in place with active drainage, right greater than left    Narrow canals which limits examination.  Tympanic membranes appear with tubes. TMs are thickened and appear with sclerosis, prominent vasculature.   Right ear with active otorrhea.  Left tube there is copious amount of drainage and debris removed.     ASSESSMENT/ PLAN:    ICD-10-CM    1. Otorrhea of both ears  H92.13 ofloxacin (FLOXIN) 0.3 % otic solution      2. Tympanostomy tube check  Z45.89 ofloxacin (FLOXIN) 0.3 % otic solution      3. Mixed conductive and sensorineural hearing loss, bilateral  H90.6 ofloxacin (FLOXIN) 0.3 % otic solution      4. Congenital hearing loss  H90.5 ofloxacin (FLOXIN) 0.3 % otic solution              Start Floxin 5 drops twice daily for 7 days to bilateral ears.    Continue with water precautions.  Follow-up in 2 to 3 weeks for miconazole powder application.        Marcelina Oneal PA-C  ENT  [facility], [place]    "

## 2023-11-07 ENCOUNTER — OFFICE VISIT (OUTPATIENT)
Dept: OTOLARYNGOLOGY | Facility: OTHER | Age: 43
End: 2023-11-07
Attending: PHYSICIAN ASSISTANT
Payer: MEDICARE

## 2023-11-07 VITALS
BODY MASS INDEX: 16.2 KG/M2 | TEMPERATURE: 97.4 F | SYSTOLIC BLOOD PRESSURE: 99 MMHG | HEIGHT: 55 IN | WEIGHT: 70 LBS | DIASTOLIC BLOOD PRESSURE: 58 MMHG | OXYGEN SATURATION: 99 % | HEART RATE: 94 BPM

## 2023-11-07 DIAGNOSIS — Z45.89 TYMPANOSTOMY TUBE CHECK: ICD-10-CM

## 2023-11-07 DIAGNOSIS — H90.6 MIXED CONDUCTIVE AND SENSORINEURAL HEARING LOSS, BILATERAL: ICD-10-CM

## 2023-11-07 DIAGNOSIS — H92.13 OTORRHEA OF BOTH EARS: Primary | ICD-10-CM

## 2023-11-07 DIAGNOSIS — H90.5 CONGENITAL HEARING LOSS: ICD-10-CM

## 2023-11-07 PROCEDURE — 99213 OFFICE O/P EST LOW 20 MIN: CPT | Mod: 25 | Performed by: PHYSICIAN ASSISTANT

## 2023-11-07 PROCEDURE — 92504 EAR MICROSCOPY EXAMINATION: CPT | Performed by: PHYSICIAN ASSISTANT

## 2023-11-07 PROCEDURE — G0463 HOSPITAL OUTPT CLINIC VISIT: HCPCS

## 2023-11-07 RX ORDER — OFLOXACIN 3 MG/ML
5 SOLUTION AURICULAR (OTIC) 2 TIMES DAILY
Qty: 5 ML | Refills: 1 | Status: SHIPPED | OUTPATIENT
Start: 2023-11-07 | End: 2023-11-14

## 2023-11-07 ASSESSMENT — PAIN SCALES - GENERAL: PAINLEVEL: MODERATE PAIN (4)

## 2023-11-07 NOTE — PATIENT INSTRUCTIONS
Ears were cleaned.   Stable ear exam, active drainage from right ear.   Start Floxin 5 drops twice a day   Follow up in 2-3 weeks for powder application.         Thank you for allowing LEONOR Avitia and our ENT team to participate in your care.  If your medications are too expensive, please give the nurse a call.  We can possibly change this medication.  If you have a scheduling or an appointment question please contact our Health Unit Coordinator at their direct line [phone].   ALL nursing questions or concerns can be directed to your ENT nurseMecca at: [phone].

## 2023-11-08 ENCOUNTER — HOSPITAL ENCOUNTER (OUTPATIENT)
Dept: BONE DENSITY | Facility: HOSPITAL | Age: 43
Discharge: HOME | End: 2023-11-08
Attending: FAMILY MEDICINE | Admitting: FAMILY MEDICINE
Payer: MEDICARE

## 2023-11-08 DIAGNOSIS — E28.39 ESTROGEN DEFICIENCY: ICD-10-CM

## 2023-11-08 PROCEDURE — 77080 DXA BONE DENSITY AXIAL: CPT

## 2023-12-01 ENCOUNTER — OFFICE VISIT (OUTPATIENT)
Dept: OTOLARYNGOLOGY | Facility: OTHER | Age: 43
End: 2023-12-01
Attending: PHYSICIAN ASSISTANT
Payer: MEDICARE

## 2023-12-01 VITALS
DIASTOLIC BLOOD PRESSURE: 60 MMHG | HEART RATE: 91 BPM | WEIGHT: 74 LBS | OXYGEN SATURATION: 98 % | TEMPERATURE: 97.2 F | HEIGHT: 56 IN | SYSTOLIC BLOOD PRESSURE: 108 MMHG | BODY MASS INDEX: 16.65 KG/M2

## 2023-12-01 DIAGNOSIS — Z45.89 TYMPANOSTOMY TUBE CHECK: ICD-10-CM

## 2023-12-01 DIAGNOSIS — H90.5 CONGENITAL HEARING LOSS: ICD-10-CM

## 2023-12-01 DIAGNOSIS — H92.13 OTORRHEA OF BOTH EARS: Primary | ICD-10-CM

## 2023-12-01 DIAGNOSIS — H90.6 MIXED CONDUCTIVE AND SENSORINEURAL HEARING LOSS, BILATERAL: ICD-10-CM

## 2023-12-01 PROCEDURE — 99213 OFFICE O/P EST LOW 20 MIN: CPT | Mod: 25 | Performed by: PHYSICIAN ASSISTANT

## 2023-12-01 PROCEDURE — G0463 HOSPITAL OUTPT CLINIC VISIT: HCPCS

## 2023-12-01 PROCEDURE — 92504 EAR MICROSCOPY EXAMINATION: CPT | Performed by: PHYSICIAN ASSISTANT

## 2023-12-01 ASSESSMENT — PAIN SCALES - GENERAL: PAINLEVEL: NO PAIN (0)

## 2023-12-01 NOTE — PROGRESS NOTES
Chief Complaint   Patient presents with    Ear Problem     Follow-up ear cleaning       Patient returns for follow-up exam.  Patient was last seen on 11/7/2023 with bilateral otorrhea.  Ears were debrided and started on Floxin.  Today she returns for repeat exam.  There has been no concerns at home mom has noted no otorrhea or complaints of otalgia.  Mom (Sophia) provides hx and details.      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  She does have patent tympanostomy tubes which have been in position for several years.  She does have cleanings completed and at times powder application.  At her last visit application of miconazole is applied to both ears.  She does have hearing loss which has been overall stable and recommended use of hearing aids.  Efern does not tolerate use and declines a to use on a daily basis.  We reviewed consideration for repeat imaging and at this time.  Have declined.  They have declined further options of possible consideration for [facility] referral to otology     She does have hearing loss and has declined use of aids. She has aids but does not wear them at this time as she does not like them      Distant hx of BTT with placement of t-tubes. Tubes have been in place for 30 years.   No recent illness. No other concerns  Discussed audiograms. Stable mixed hearing loss. Audiologist did mention consideration for BAHA, but patient did not wish to proceed.    She has HA but does not tolerate or wear.       her seizures have been doing well w/ her medications.   She has been on medications w/o concerns.        Past Medical History:   Diagnosis Date    Seizures (H)         Allergies   Allergen Reactions    Azithromycin      Abdominal pain/ cramping     Clotrimazole Rash    Omnicef [Cefdinir] Rash     Itchy and bad rash     Current Outpatient Medications   Medication    levETIRAcetam (KEPPRA) 250 MG tablet     No current facility-administered medications for this  "visit.     ROS- SEE HPI  /60 (BP Location: Right arm, Cuff Size: Adult Regular)   Pulse 91   Temp 97.2  F (36.2  C) (Tympanic)   Ht 1.422 m (4' 8\")   Wt 33.6 kg (74 lb)   SpO2 98%   BMI 16.59 kg/m    General: Craniofacial abnormality.   Eyes: conjuctiva clear, PERRL, EOM intact   Ears: Canals overall clear. Narrow canals. . Low set auricles with poor antihelical folds defined. t-tube bilaterally. Gila bowl bilateral overall look well.   Nose: Nares normal   Mouth: crowded.   Neck: Supple, no cervical adenopathy, no thyromegaly      The ear canals were examined underneath the operating microscope and with an otologic speculum. Large amount of debris suctioned on left, cerumen.  Right ear with dry debris.  Canals, stenotic   Bilateral tubes appear in place and patent.    Narrow canals which limits examination.  Tympanic membranes appear with tubes. TMs are thickened and appear with sclerosis, prominent vasculature.   Miconazole powder applied bilaterally.      ASSESSMENT/ PLAN:    ICD-10-CM    1. Otorrhea of both ears  H92.13 miconazole (MICATIN) 2 % powder      2. Tympanostomy tube check  Z45.89       3. Mixed conductive and sensorineural hearing loss, bilateral  H90.6       4. Congenital hearing loss  H90.5             Patient's ears have improved at this time.  I did not appreciate otorrhea upon examination.  Bilateral tubes remain open and clear.  Powder was applied to the bilateral canals today.  She has tolerated this multiple times in the past.    Follow-up in 2 to 3 months for repeat exam debridement.  If otorrhea or new symptoms worsen present to ENT sooner.    Marcelina Oneal PA-C  ENT  [facility], [place]    "

## 2023-12-01 NOTE — PATIENT INSTRUCTIONS
Ears were cleaned.   Powder applied to both ears.   No fluid or drainage present.     Follow up in 2-3 months    Thank you for allowing Marcelina Oneal PA-C and our ENT team to participate in your care.  If your medications are too expensive, please give the nurse a call.  We can possibly change this medication.  If you have a scheduling or an appointment question please contact our Health Unit Coordinator at [phone], Ext. [phone].    ALL nursing questions or concerns can be directed to your ENT nurse at: [phone] [place]

## 2023-12-01 NOTE — LETTER
12/1/2023         RE: Efren Saavedra  [address] [place] [address]        Dear Colleague,    Thank you for referring your patient, Efren Saavedra, to the [facility]. Please see a copy of my visit note below.    Chief Complaint   Patient presents with     Ear Problem     Follow-up ear cleaning       Patient returns for follow-up exam.  Patient was last seen on 11/7/2023 with bilateral otorrhea.  Ears were debrided and started on Floxin.  Today she returns for repeat exam.  There has been no concerns at home mom has noted no otorrhea or complaints of otalgia.  Mom (Sophia) provides hx and details.      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  She does have patent tympanostomy tubes which have been in position for several years.  She does have cleanings completed and at times powder application.  At her last visit application of miconazole is applied to both ears.  She does have hearing loss which has been overall stable and recommended use of hearing aids.  Efren does not tolerate use and declines a to use on a daily basis.  We reviewed consideration for repeat imaging and at this time.  Have declined.  They have declined further options of possible consideration for [facility] referral to otology     She does have hearing loss and has declined use of aids. She has aids but does not wear them at this time as she does not like them      Distant hx of BTT with placement of t-tubes. Tubes have been in place for 30 years.   No recent illness. No other concerns  Discussed audiograms. Stable mixed hearing loss. Audiologist did mention consideration for BAHA, but patient did not wish to proceed.    She has HA but does not tolerate or wear.       her seizures have been doing well w/ her medications.   She has been on medications w/o concerns.        Past Medical History:   Diagnosis Date     Seizures (H)         Allergies   Allergen Reactions      "Azithromycin      Abdominal pain/ cramping      Clotrimazole Rash     Omnicef [Cefdinir] Rash     Itchy and bad rash     Current Outpatient Medications   Medication     levETIRAcetam (KEPPRA) 250 MG tablet     No current facility-administered medications for this visit.     ROS- SEE HPI  /60 (BP Location: Right arm, Cuff Size: Adult Regular)   Pulse 91   Temp 97.2  F (36.2  C) (Tympanic)   Ht 1.422 m (4' 8\")   Wt 33.6 kg (74 lb)   SpO2 98%   BMI 16.59 kg/m    General: Craniofacial abnormality.   Eyes: conjuctiva clear, PERRL, EOM intact   Ears: Canals overall clear. Narrow canals. . Low set auricles with poor antihelical folds defined. t-tube bilaterally. Gila bowl bilateral overall look well.   Nose: Nares normal   Mouth: crowded.   Neck: Supple, no cervical adenopathy, no thyromegaly      The ear canals were examined underneath the operating microscope and with an otologic speculum. Large amount of debris suctioned on left, cerumen.  Right ear with dry debris.  Canals, stenotic   Bilateral tubes appear in place and patent.    Narrow canals which limits examination.  Tympanic membranes appear with tubes. TMs are thickened and appear with sclerosis, prominent vasculature.   Miconazole powder applied bilaterally.      ASSESSMENT/ PLAN:    ICD-10-CM    1. Otorrhea of both ears  H92.13 miconazole (MICATIN) 2 % powder      2. Tympanostomy tube check  Z45.89       3. Mixed conductive and sensorineural hearing loss, bilateral  H90.6       4. Congenital hearing loss  H90.5             Patient's ears have improved at this time.  I did not appreciate otorrhea upon examination.  Bilateral tubes remain open and clear.  Powder was applied to the bilateral canals today.  She has tolerated this multiple times in the past.    Follow-up in 2 to 3 months for repeat exam debridement.  If otorrhea or new symptoms worsen present to ENT sooner.    Marcelina Oneal PA-C  ENT  [facility], [place]      Again, thank you for " allowing me to participate in the care of your patient.        Sincerely,        Marcelina Oneal PA-C

## 2024-02-21 ENCOUNTER — OFFICE VISIT (OUTPATIENT)
Dept: OTOLARYNGOLOGY | Facility: OTHER | Age: 44
End: 2024-02-21
Attending: PHYSICIAN ASSISTANT
Payer: MEDICARE

## 2024-02-21 VITALS
DIASTOLIC BLOOD PRESSURE: 68 MMHG | HEART RATE: 104 BPM | TEMPERATURE: 98.8 F | WEIGHT: 75 LBS | SYSTOLIC BLOOD PRESSURE: 102 MMHG | BODY MASS INDEX: 16.87 KG/M2 | OXYGEN SATURATION: 99 % | RESPIRATION RATE: 18 BRPM | HEIGHT: 56 IN

## 2024-02-21 DIAGNOSIS — H61.23 EXCESSIVE CERUMEN IN BOTH EAR CANALS: ICD-10-CM

## 2024-02-21 DIAGNOSIS — H90.5 CONGENITAL HEARING LOSS: ICD-10-CM

## 2024-02-21 DIAGNOSIS — H60.63 CHRONIC NON-INFECTIVE OTITIS EXTERNA OF BOTH EARS, UNSPECIFIED TYPE: ICD-10-CM

## 2024-02-21 DIAGNOSIS — Z45.89 TYMPANOSTOMY TUBE CHECK: Primary | ICD-10-CM

## 2024-02-21 PROCEDURE — 92504 EAR MICROSCOPY EXAMINATION: CPT | Performed by: PHYSICIAN ASSISTANT

## 2024-02-21 PROCEDURE — 99212 OFFICE O/P EST SF 10 MIN: CPT | Mod: 25 | Performed by: PHYSICIAN ASSISTANT

## 2024-02-21 PROCEDURE — G0463 HOSPITAL OUTPT CLINIC VISIT: HCPCS

## 2024-02-21 ASSESSMENT — PAIN SCALES - GENERAL: PAINLEVEL: NO PAIN (0)

## 2024-02-21 NOTE — PROGRESS NOTES
"Chief Complaint   Patient presents with    Procedure     Ear cleaning       Patient returns for follow-up exam.  Patient was last seen on 11/7/2023 with bilateral otorrhea.  Ears were debrided and started on Floxin.  Today she returns for repeat exam.  There has been no concerns at home mom has noted no otorrhea or complaints of otalgia.  Mom (Sophia) provides hx and details.      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  She does have patent tympanostomy tubes which have been in position for several years.  She does have cleanings completed and at times powder application.    At her last visit application of miconazole is applied to both ears.  She does have hearing loss which has been overall stable and recommended use of hearing aids.  Efren does not tolerate use and declines a to use on a daily basis.  We reviewed consideration for repeat imaging and at this time.  Have declined.  They have declined further options of possible consideration for [facility] referral to otology       Distant hx of BTT with placement of t-tubes. Tubes have been in place for 30 years.    her seizures have been doing well w/ her medications.   She has been on medications w/o concerns.             Past Medical History:   Diagnosis Date    Seizures (H)         Allergies   Allergen Reactions    Azithromycin      Abdominal pain/ cramping     Clotrimazole Rash    Omnicef [Cefdinir] Rash     Itchy and bad rash       Current Outpatient Medications   Medication    levETIRAcetam (KEPPRA) 250 MG tablet     No current facility-administered medications for this visit.     ROS- SEE HPI    Ht 1.422 m (4' 8\")   BMI 16.59 kg/m      General: Craniofacial abnormality.   Eyes: conjuctiva clear, PERRL, EOM intact   Ears: Canals overall clear. Narrow canals. . Low set auricles with poor antihelical folds defined. t-tube bilaterally. Gila bowl bilateral overall look well.   Nose: Nares normal   Mouth: crowded.   Neck: " Supple, no cervical adenopathy, no thyromegaly      The ear canals were examined underneath the operating microscope and with an otologic speculum. Large amount of debris suctioned on left, cerumen.  Right ear with dry debris.  Canals, stenotic   Bilateral tubes appear in place and patent.    Narrow canals which limits examination.  Tympanic membranes appear with tubes. TMs are thickened and appear with sclerosis, prominent vasculature.   Miconazole powder applied bilaterally.        ASSESSMENT/ PLAN:    ICD-10-CM    1. Tympanostomy tube check  Z45.89 miconazole (MICATIN) 2 % powder      2. Congenital hearing loss  H90.5 miconazole (MICATIN) 2 % powder      3. Chronic non-infective otitis externa of both ears, unspecified type  H60.63 miconazole (MICATIN) 2 % powder      4. Excessive cerumen in both ear canals  H61.23             Ears appear well at this time.  There is no active otorrhea.  Canals were cleaned.  Powder was applied bilaterally.    Water precautions follow-up in 2 to 3 months for repeat ear exam.      Marcelina Oneal PA-C  ENT  [facility]

## 2024-02-21 NOTE — LETTER
"    2/21/2024         RE: Efren Saavedra  [address] [place] [address]        Dear Colleague,    Thank you for referring your patient, Efren Saavedra, to the [facility]. Please see a copy of my visit note below.    Chief Complaint   Patient presents with     Procedure     Ear cleaning       Patient returns for follow-up exam.  Patient was last seen on 11/7/2023 with bilateral otorrhea.  Ears were debrided and started on Floxin.  Today she returns for repeat exam.  There has been no concerns at home mom has noted no otorrhea or complaints of otalgia.  Mom (Sophia) provides hx and details.      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  She does have patent tympanostomy tubes which have been in position for several years.  She does have cleanings completed and at times powder application.    At her last visit application of miconazole is applied to both ears.  She does have hearing loss which has been overall stable and recommended use of hearing aids.  Efren does not tolerate use and declines a to use on a daily basis.  We reviewed consideration for repeat imaging and at this time.  Have declined.  They have declined further options of possible consideration for [place] referral to otology       Distant hx of BTT with placement of t-tubes. Tubes have been in place for 30 years.    her seizures have been doing well w/ her medications.   She has been on medications w/o concerns.             Past Medical History:   Diagnosis Date     Seizures (H)         Allergies   Allergen Reactions     Azithromycin      Abdominal pain/ cramping      Clotrimazole Rash     Omnicef [Cefdinir] Rash     Itchy and bad rash       Current Outpatient Medications   Medication     levETIRAcetam (KEPPRA) 250 MG tablet     No current facility-administered medications for this visit.     ROS- SEE HPI    Ht 1.422 m (4' 8\")   BMI 16.59 kg/m      General: Craniofacial abnormality. "   Eyes: conjuctiva clear, PERRL, EOM intact   Ears: Canals overall clear. Narrow canals. . Low set auricles with poor antihelical folds defined. t-tube bilaterally. Gila bowl bilateral overall look well.   Nose: Nares normal   Mouth: crowded.   Neck: Supple, no cervical adenopathy, no thyromegaly      The ear canals were examined underneath the operating microscope and with an otologic speculum. Large amount of debris suctioned on left, cerumen.  Right ear with dry debris.  Canals, stenotic   Bilateral tubes appear in place and patent.    Narrow canals which limits examination.  Tympanic membranes appear with tubes. TMs are thickened and appear with sclerosis, prominent vasculature.   Miconazole powder applied bilaterally.        ASSESSMENT/ PLAN:    ICD-10-CM    1. Tympanostomy tube check  Z45.89 miconazole (MICATIN) 2 % powder      2. Congenital hearing loss  H90.5 miconazole (MICATIN) 2 % powder      3. Chronic non-infective otitis externa of both ears, unspecified type  H60.63 miconazole (MICATIN) 2 % powder      4. Excessive cerumen in both ear canals  H61.23             Ears appear well at this time.  There is no active otorrhea.  Canals were cleaned.  Powder was applied bilaterally.    Water precautions follow-up in 2 to 3 months for repeat ear exam.      Marcelina Oneal PA-C  ENT  [facility], [place]      Again, thank you for allowing me to participate in the care of your patient.        Sincerely,        Marcelina Oneal PA-C

## 2024-02-21 NOTE — PATIENT INSTRUCTIONS
Ears were cleaned  Powder applied to both ear canals  Continue w/ water precautions  Follow up in 2-3 months      Thank you for allowing LEONOR Avitia and our ENT team to participate in your care.  If your medications are too expensive, please give the nurse a call.  We can possibly change this medication.  If you have a scheduling or an appointment question please contact our Health Unit Coordinator at their direct line [phone].   ALL nursing questions or concerns can be directed to your ENT nurse, [place] at: [phone].

## 2024-04-19 NOTE — PROGRESS NOTES
"Chief Complaint   Patient presents with    Procedure     Ear cleaning          Patient returns for follow-up exam.      Today she returns for repeat exam.  There has been no concerns at home mom has noted no otorrhea or complaints of otalgia.  Mom (Sophia) provides hx and details.      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  She does have patent tympanostomy tubes which have been in position for several years.  She does have cleanings completed and at times powder application.     At her last visit application of miconazole is applied to both ears.  She does have hearing loss which has been overall stable and recommended use of hearing aids.  Efren does not tolerate use and declines a to use on a daily basis.  We reviewed consideration for repeat imaging and at this time.  Have declined.  They have declined further options of possible consideration for [place] referral to otology        Distant hx of BTT with placement of t-tubes. Tubes have been in place for 30 years.    her seizures have been doing well w/ her medications.   She has been on medications w/o concerns.        Past Medical History:   Diagnosis Date    Seizures (H)         Allergies   Allergen Reactions    Azithromycin      Abdominal pain/ cramping     Clotrimazole Rash    Omnicef [Cefdinir] Rash     Itchy and bad rash     Current Outpatient Medications   Medication Sig Dispense Refill    levETIRAcetam (KEPPRA) 250 MG tablet Take 500 mg by mouth 2 times daily       No current facility-administered medications for this visit.     ROS- SEE HPI  BP 97/65 (BP Location: Left arm, Patient Position: Sitting, Cuff Size: Child)   Pulse 97   Temp 98.1  F (36.7  C) (Temporal)   Resp 18   Ht 1.422 m (4' 7.98\")   Wt 34 kg (74 lb 15.3 oz)   SpO2 98%   BMI 16.81 kg/m      General: Craniofacial abnormality.   Eyes: conjuctiva clear, PERRL, EOM intact   Ears: Canals overall clear. Narrow canals. . Low set auricles with poor " antihelical folds defined. t-tube bilaterally. Gila bowl bilateral overall look well.   Nose: Nares normal   Mouth: crowded.   Neck: Supple, no cervical adenopathy, no thyromegaly      The ear canals were examined underneath the operating microscope and with an otologic speculum. Bilateral canals with dried epithelium, cerumen removed.   Canals, stenotic   Bilateral tubes appear in place and patent.    Narrow canals which limits examination.  Tympanic membranes appear with tubes. TMs are thickened and appear with sclerosis, prominent vasculature.   Miconazole powder applied bilateral          ASSESSMENT/ PLAN:    ICD-10-CM    1. Tympanostomy tube check  Z45.89 miconazole (MICATIN) 2 % powder      2. Chronic non-infective otitis externa of both ears, unspecified type  H60.63 miconazole (MICATIN) 2 % powder      3. Excessive cerumen in both ear canals  H61.23       4. Congenital hearing loss  H90.5             Ears were cleaned  Stable ear exam  Recommended follow up in 2-3 months for ear cleaning  Return sooner PRN.       Marcelina Oneal PA-C  [facility]  [facility]

## 2024-04-23 ENCOUNTER — OFFICE VISIT (OUTPATIENT)
Dept: OTOLARYNGOLOGY | Facility: OTHER | Age: 44
End: 2024-04-23
Attending: PHYSICIAN ASSISTANT
Payer: MEDICARE

## 2024-04-23 VITALS
OXYGEN SATURATION: 98 % | HEART RATE: 97 BPM | TEMPERATURE: 98.1 F | RESPIRATION RATE: 18 BRPM | BODY MASS INDEX: 16.86 KG/M2 | SYSTOLIC BLOOD PRESSURE: 97 MMHG | DIASTOLIC BLOOD PRESSURE: 65 MMHG | HEIGHT: 56 IN | WEIGHT: 74.96 LBS

## 2024-04-23 DIAGNOSIS — H61.23 EXCESSIVE CERUMEN IN BOTH EAR CANALS: ICD-10-CM

## 2024-04-23 DIAGNOSIS — Z45.89 TYMPANOSTOMY TUBE CHECK: Primary | ICD-10-CM

## 2024-04-23 DIAGNOSIS — H90.5 CONGENITAL HEARING LOSS: ICD-10-CM

## 2024-04-23 DIAGNOSIS — H60.63 CHRONIC NON-INFECTIVE OTITIS EXTERNA OF BOTH EARS, UNSPECIFIED TYPE: ICD-10-CM

## 2024-04-23 PROCEDURE — 92504 EAR MICROSCOPY EXAMINATION: CPT | Performed by: PHYSICIAN ASSISTANT

## 2024-04-23 PROCEDURE — 99213 OFFICE O/P EST LOW 20 MIN: CPT | Mod: 25 | Performed by: PHYSICIAN ASSISTANT

## 2024-04-23 PROCEDURE — G0463 HOSPITAL OUTPT CLINIC VISIT: HCPCS

## 2024-04-23 RX ORDER — BENZONATATE 100 MG/1
CAPSULE ORAL
COMMUNITY
End: 2024-08-12

## 2024-04-23 RX ORDER — MEDROXYPROGESTERONE ACETATE 150 MG/ML
150 INJECTION, SUSPENSION INTRAMUSCULAR
COMMUNITY
Start: 2024-03-28 | End: 2025-03-26

## 2024-04-23 ASSESSMENT — PAIN SCALES - GENERAL: PAINLEVEL: NO PAIN (0)

## 2024-04-23 NOTE — LETTER
4/23/2024         RE: Efren Saavedra  [address] [place] [address]        Dear Colleague,    Thank you for referring your patient, Efren Saavedra, to the [facility]. Please see a copy of my visit note below.    Chief Complaint   Patient presents with     Procedure     Ear cleaning          Patient returns for follow-up exam.      Today she returns for repeat exam.  There has been no concerns at home mom has noted no otorrhea or complaints of otalgia.  Mom (Sophia) provides hx and details.      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  She does have patent tympanostomy tubes which have been in position for several years.  She does have cleanings completed and at times powder application.     At her last visit application of miconazole is applied to both ears.  She does have hearing loss which has been overall stable and recommended use of hearing aids.  Efren does not tolerate use and declines a to use on a daily basis.  We reviewed consideration for repeat imaging and at this time.  Have declined.  They have declined further options of possible consideration for [place] referral to otology        Distant hx of BTT with placement of t-tubes. Tubes have been in place for 30 years.    her seizures have been doing well w/ her medications.   She has been on medications w/o concerns.        Past Medical History:   Diagnosis Date     Seizures (H)         Allergies   Allergen Reactions     Azithromycin      Abdominal pain/ cramping      Clotrimazole Rash     Omnicef [Cefdinir] Rash     Itchy and bad rash     Current Outpatient Medications   Medication Sig Dispense Refill     levETIRAcetam (KEPPRA) 250 MG tablet Take 500 mg by mouth 2 times daily       No current facility-administered medications for this visit.     ROS- SEE HPI  BP 97/65 (BP Location: Left arm, Patient Position: Sitting, Cuff Size: Child)   Pulse 97   Temp 98.1  F (36.7  C)  "(Temporal)   Resp 18   Ht 1.422 m (4' 7.98\")   Wt 34 kg (74 lb 15.3 oz)   SpO2 98%   BMI 16.81 kg/m      General: Craniofacial abnormality.   Eyes: conjuctiva clear, PERRL, EOM intact   Ears: Canals overall clear. Narrow canals. . Low set auricles with poor antihelical folds defined. t-tube bilaterally. Gila bowl bilateral overall look well.   Nose: Nares normal   Mouth: crowded.   Neck: Supple, no cervical adenopathy, no thyromegaly      The ear canals were examined underneath the operating microscope and with an otologic speculum. Bilateral canals with dried epithelium, cerumen removed.   Canals, stenotic   Bilateral tubes appear in place and patent.    Narrow canals which limits examination.  Tympanic membranes appear with tubes. TMs are thickened and appear with sclerosis, prominent vasculature.   Miconazole powder applied bilateral          ASSESSMENT/ PLAN:    ICD-10-CM    1. Tympanostomy tube check  Z45.89 miconazole (MICATIN) 2 % powder      2. Chronic non-infective otitis externa of both ears, unspecified type  H60.63 miconazole (MICATIN) 2 % powder      3. Excessive cerumen in both ear canals  H61.23       4. Congenital hearing loss  H90.5             Ears were cleaned  Stable ear exam  Recommended follow up in 2-3 months for ear cleaning  Return sooner PRN.       Marcelina Oneal PA-C  ENT  [facility], [place]      Again, thank you for allowing me to participate in the care of your patient.        Sincerely,        Marcelina Oneal PA-C  "

## 2024-04-23 NOTE — PATIENT INSTRUCTIONS
Ears look well.   No fluid or drainage.   Powder applied today  Follow up in 2-3 months for repeat ear exam/ powder application      Thank you for allowing LEONOR Avitia and our ENT team to participate in your care.  If your medications are too expensive, please give the nurse a call.  We can possibly change this medication.  If you have a scheduling or an appointment question please contact our Health Unit Coordinator at their direct line [phone].   ALL nursing questions or concerns can be directed to your ENT nurse, [place] at: [phone].

## 2024-06-04 ENCOUNTER — TRANSFERRED RECORDS (OUTPATIENT)
Dept: HEALTH INFORMATION MANAGEMENT | Facility: CLINIC | Age: 44
End: 2024-06-04

## 2024-06-16 ENCOUNTER — APPOINTMENT (OUTPATIENT)
Dept: GENERAL RADIOLOGY | Facility: HOSPITAL | Age: 44
End: 2024-06-16
Attending: NURSE PRACTITIONER
Payer: MEDICARE

## 2024-06-16 ENCOUNTER — HOSPITAL ENCOUNTER (EMERGENCY)
Facility: HOSPITAL | Age: 44
Discharge: HOME | End: 2024-06-16
Attending: NURSE PRACTITIONER | Admitting: NURSE PRACTITIONER
Payer: MEDICARE

## 2024-06-16 VITALS
HEART RATE: 114 BPM | RESPIRATION RATE: 17 BRPM | SYSTOLIC BLOOD PRESSURE: 124 MMHG | HEIGHT: 58 IN | WEIGHT: 75.2 LBS | TEMPERATURE: 98.4 F | DIASTOLIC BLOOD PRESSURE: 77 MMHG | OXYGEN SATURATION: 100 % | BODY MASS INDEX: 15.79 KG/M2

## 2024-06-16 DIAGNOSIS — J31.0 RHINITIS, UNSPECIFIED TYPE: ICD-10-CM

## 2024-06-16 DIAGNOSIS — R05.3 CHRONIC COUGH: ICD-10-CM

## 2024-06-16 PROCEDURE — 94640 AIRWAY INHALATION TREATMENT: CPT

## 2024-06-16 PROCEDURE — 99213 OFFICE O/P EST LOW 20 MIN: CPT | Performed by: NURSE PRACTITIONER

## 2024-06-16 PROCEDURE — 71046 X-RAY EXAM CHEST 2 VIEWS: CPT

## 2024-06-16 PROCEDURE — 250N000009 HC RX 250: Performed by: NURSE PRACTITIONER

## 2024-06-16 PROCEDURE — G0463 HOSPITAL OUTPT CLINIC VISIT: HCPCS | Mod: 25

## 2024-06-16 RX ORDER — IPRATROPIUM BROMIDE 21 UG/1
2 SPRAY, METERED NASAL EVERY 12 HOURS
Qty: 30 ML | Refills: 0 | Status: SHIPPED | OUTPATIENT
Start: 2024-06-16 | End: 2024-08-12

## 2024-06-16 RX ORDER — IPRATROPIUM BROMIDE AND ALBUTEROL SULFATE 2.5; .5 MG/3ML; MG/3ML
3 SOLUTION RESPIRATORY (INHALATION) ONCE
Status: COMPLETED | OUTPATIENT
Start: 2024-06-16 | End: 2024-06-16

## 2024-06-16 RX ORDER — INHALER, ASSIST DEVICES
SPACER (EA) MISCELLANEOUS
Qty: 1 EACH | Refills: 0 | Status: SHIPPED | OUTPATIENT
Start: 2024-06-16 | End: 2024-08-12

## 2024-06-16 RX ORDER — CETIRIZINE HYDROCHLORIDE 10 MG/1
1 TABLET ORAL DAILY
COMMUNITY
Start: 2024-06-04 | End: 2024-08-12

## 2024-06-16 RX ORDER — GUAIFENESIN AND DEXTROMETHORPHAN HYDROBROMIDE 600; 30 MG/1; MG/1
1 TABLET, EXTENDED RELEASE ORAL EVERY 12 HOURS PRN
Qty: 20 TABLET | Refills: 0 | Status: SHIPPED | OUTPATIENT
Start: 2024-06-16 | End: 2024-08-12

## 2024-06-16 RX ADMIN — IPRATROPIUM BROMIDE AND ALBUTEROL SULFATE 3 ML: .5; 3 SOLUTION RESPIRATORY (INHALATION) at 12:26

## 2024-06-16 ASSESSMENT — ACTIVITIES OF DAILY LIVING (ADL)
ADLS_ACUITY_SCORE: 35
ADLS_ACUITY_SCORE: 35

## 2024-06-16 ASSESSMENT — ENCOUNTER SYMPTOMS
SINUS PAIN: 0
WHEEZING: 0
COUGH: 1
FATIGUE: 0
APPETITE CHANGE: 0
CHOKING: 0
STRIDOR: 0
FEVER: 0
SHORTNESS OF BREATH: 0

## 2024-06-16 NOTE — ED PROVIDER NOTES
History     Chief Complaint   Patient presents with    Cough     HPI  Efren Saavedra is a 44 year old female with a history of agenesis of corpus callosum, mitral valve disease, seizure disorder, intellectual disability who presents today with mom who is her legal guardian a CC of cough since March.  The cough was preceded by a URI.  Other URI symptoms have resolved, however the cough persists.    She saw a family practice provider on 6/4/2024 and started on Zyrtec without improvement.    She has trouble with speaking at times, hard to get her breath out.  She has to be coached into taking a breath and restarting.  Then she is able to talk.  No audible wheezing noted.  No shortness of breath with ambulating, or laying down.      No previous history of asthma    She endorses epigastric pain at this time but mom notes no previous concerns.   She has no outward signs of indigestion or previous GERD    No previous issues with cough.      She is able to breath through her mouth, smell and taste.  No concerns for recurrent sinusitis.     Mom attempted an inhaler without much improvement.      The cough is intermittent throughout the day, and worsened at night when laying down.      No fever or chills.  Appetite has been good.      No previous history of seasonal allergies.  Never been tested.      They have not tried any OTC cough medications.     She has a history of mitral valve disease, saw Cardiology in the distant past.  Mom is not aware of a previous murmur however.  No outward signs of chest pain.    She sees ENT on a regular basis.  NO previous allergy testing completed.      Allergies:  Allergies   Allergen Reactions    Azithromycin      Abdominal pain/ cramping     Clotrimazole Rash    Omnicef [Cefdinir] Rash     Itchy and bad rash       Problem List:    Patient Active Problem List    Diagnosis Date Noted    Seizure (H) 02/27/2015     Priority: Medium     Overview:   At least two events (maybe 3), mom opted  no treatment or mri unless further events      Osteoporosis 11/23/2013     Priority: Medium     Overview:   IMO Update      Congenital hearing loss 05/14/2013     Priority: Medium    Impacted cerumen 05/14/2013     Priority: Medium    Mixed conductive and sensorineural hearing loss, 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 Echo/doppler 4/19/05.   IMO Update 10/11      Other kyphoscoliosis and scoliosis 03/30/2005     Priority: Medium     Overview:   Refer to Dr. Michel. Scoliosis x-ray 4/19/05.      Unspecified intellectual disabilities 03/30/2005     Priority: Medium     Overview:   IMO Update 10/11      Dysmenorrhea 08/11/2004     Priority: Medium     Overview:   Will switch from Alesse to Loestrin      Agenesis of corpus callosum (H) 04/08/2004     Priority: Medium     Overview:       Other psoriasis 01/08/2004     Priority: Medium     Overview:   Scalp psoriasis. Under the care of Dr. Raines, Dermatology      Irregular menstrual cycle 04/15/2002     Priority: Medium        Past Medical History:    Past Medical History:   Diagnosis Date    Seizures (H)        Past Surgical History:    Past Surgical History:   Procedure Laterality Date    feeding tube      ventilation tubes, bilateral         Family History:    Family History   Problem Relation Age of Onset    Cerebrovascular Disease Paternal Grandmother         CVA    Heart Disease Maternal Grandfather         disease    Hypertension Father     Other - See Comments Father         post polio    Parkinsonism Maternal Grandmother     Heart Disease Paternal Grandfather        Social History:  Marital Status:  Single [1]  Social History     Tobacco Use    Smoking status: Never    Smokeless tobacco: Never    Tobacco comments:     no passive exposure   Substance Use  "Topics    Alcohol use: No    Drug use: No        Medications:    cetirizine (ZYRTEC) 10 MG tablet  dextromethorphan-guaiFENesin (MUCINEX DM)  MG 12 hr tablet  ipratropium (ATROVENT) 0.03 % nasal spray  ipratropium-albuterol (COMBIVENT RESPIMAT)  MCG/ACT inhaler  levETIRAcetam (KEPPRA) 250 MG tablet  medroxyPROGESTERone (DEPO-PROVERA) 150 MG/ML IM injection  spacer (OPTICHAMBER KALEN) holding chamber  benzonatate (TESSALON) 100 MG capsule          Review of Systems   Constitutional:  Negative for appetite change, fatigue and fever.   HENT:  Negative for congestion, ear pain and sinus pain.         Denies throat clearing   Respiratory:  Positive for cough. Negative for choking, shortness of breath, wheezing and stridor.    Cardiovascular:  Negative for leg swelling.        She does not endorse palpitations however, she does not contribute much to her HPI       Physical Exam   BP: 124/77  Pulse: 114  Temp: 98.4  F (36.9  C)  Resp: 17  Height: 147.3 cm (4' 10\")  Weight: 34.1 kg (75 lb 3.2 oz)  SpO2: 100 %      Physical Exam  Vitals and nursing note reviewed.   Constitutional:       General: She is not in acute distress.     Appearance: Normal appearance. She is not ill-appearing.   HENT:      Head: Normocephalic and atraumatic.      Right Ear: A PE tube (cerumen debris in canal) is present.      Left Ear: A PE tube (cerumen debris in canal) is present.      Nose: Rhinorrhea (frequent sniffling) present. No nasal tenderness or mucosal edema.   Cardiovascular:      Rate and Rhythm: Regular rhythm.      Heart sounds: Murmur (systolic murmur) heard.   Pulmonary:      Effort: Pulmonary effort is normal. No respiratory distress.      Breath sounds: Normal breath sounds. No stridor. No wheezing, rhonchi or rales.   Musculoskeletal:      Cervical back: Normal range of motion and neck supple. No rigidity or tenderness.      Comments: Independently ambulatory with standby assist, she was able to move herself on and " off exam room table with standby assist   Lymphadenopathy:      Cervical: No cervical adenopathy.   Skin:     General: Skin is warm and dry.      Findings: No rash.   Neurological:      Mental Status: She is alert.         ED Course     Medications   ipratropium - albuterol 0.5 mg/2.5 mg/3 mL (DUONEB) neb solution 3 mL (3 mLs Nebulization $Given 6/16/24 1226)     She noted improvement with cough after neb    Results for orders placed or performed during the hospital encounter of 06/16/24   Chest XR,  PA & LAT     Status: None    Narrative    PROCEDURE:  XR CHEST 2 VIEWS    HISTORY:  cough x 3 months.     COMPARISON:  None.    FINDINGS:   The heart is enlarged The pulmonary vasculature is normal.  There is  interstitial thickening seen in both lungs. The interstitial  thickening has remained unchanged as compared to previous examination.  No pleural effusion or pneumothorax.      Impression    IMPRESSION:  Cardiomegaly. Mild chronic interstitial thickening.      ANA BURGOS MD         SYSTEM ID:  [id number]       Assessments & Plan (with Medical Decision Making)     I have reviewed the nursing notes.    I have reviewed the findings, diagnosis, plan and need for follow up with the patient.    Medical Decision Making  The patient's presentation was of low complexity (a stable chronic illness).    The patient's evaluation involved:  ordering and/or review of 1 test(s) in this encounter (see separate area of note for details)    The patient's management necessitated moderate risk (prescription drug management including medications given in the ED).        Discharge Medication List as of 6/16/2024  1:29 PM        START taking these medications    Details   dextromethorphan-guaiFENesin (MUCINEX DM)  MG 12 hr tablet Take 1 tablet by mouth every 12 hours as needed for cough, Disp-20 tablet, R-0, E-Prescribe      ipratropium (ATROVENT) 0.03 % nasal spray Spray 2 sprays into both nostrils every 12 hours, Disp-30 mL,  R-0, E-Prescribe      ipratropium-albuterol (COMBIVENT RESPIMAT)  MCG/ACT inhaler Inhale 1 puff into the lungs 4 times daily as needed for shortness of breath, wheezing or cough, Disp-4 g, R-0, E-Prescribe      spacer (OPTICHAMBER KALEN) holding chamber Use with combivent inhaler, Disp-1 each, R-0, E-Prescribe             Final diagnoses:   Chronic cough   Rhinitis, unspecified type     She showed some improvement with the addition of DuoNeb.  Mom feels that she may do well with Combivent with the use of the spacer.  Will start that now, she does not do okay we can always change her to nebs with compressor.  Will also give her some Atrovent nasal spray to help with the drippy nose.  We can start her on some Mucinex DM for bedtime to help with cough.  Strongly recommend she follow-up with primary care for reevaluation of cardiomegaly.  Cardiomegaly does appear stable from previous chest x-ray completed in 2022.  Systolic murmur is noted on exam today, mom does not recall previously being told she had a murmur, however does have a long history of mitral valve disorder.    Recommend continue to follow-up with ENT, discuss flexible laryngoscopy for chronic cough.  We can also discuss doing allergy testing.  Will for to [facility] for this.    India YUSUFC  [facility]      6/16/2024   [facility]       India Luis NP  06/16/24 3450

## 2024-06-16 NOTE — ED TRIAGE NOTES
Mom/legal guardian brings pt in with c/o persistent cough. Sx started end of march. Reports pt was seen on 6/4/24 and was put on zyrtec. Pt has been taking antihistamine once a day without relief. Denies other flu-like sx. Pt had ibuprofen this morning.

## 2024-06-26 NOTE — PROGRESS NOTES
Chief Complaint   Patient presents with    Cerumen Impaction    Procedure     Ear cleaning   Patient returns for follow-up exam.         Today she returns for repeat exam.  There has been no concerns at home mom has noted no otorrhea or complaints of otalgia.  Mom (Sophia) provides hx and details.   She has upcoming Echo for cardiolmegaly.        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  She does have patent tympanostomy tubes which have been in position for several years.  She does have cleanings completed and at times powder application.     At her last visit application of miconazole is applied to both ears.  She does have hearing loss which has been overall stable and recommended use of hearing aids.  Efren does not tolerate use and declines a to use on a daily basis.  We reviewed consideration for repeat imaging and at this time.  Have declined.  They have declined further options of possible consideration for [place] referral to otology        Distant hx of BTT with placement of t-tubes. Tubes have been in place for 30 years.    her seizures have been doing well w/ her medications.   She has been on medications w/o concerns.        Past Medical History:   Diagnosis Date    Seizures (H)         Allergies   Allergen Reactions    Azithromycin      Abdominal pain/ cramping     Clotrimazole Rash    Omnicef [Cefdinir] Rash     Itchy and bad rash     Current Outpatient Medications   Medication Sig Dispense Refill    benzonatate (TESSALON) 100 MG capsule TAKE 1 CAPSULE BY MOUTH THREE TIMES A DAY. SWALLOW CAPSULE WHOLE (DO NOT BREAK OR CHEW). (Patient not taking: Reported on 4/23/2024)      cetirizine (ZYRTEC) 10 MG tablet Take 1 tablet by mouth daily      dextromethorphan-guaiFENesin (MUCINEX DM)  MG 12 hr tablet Take 1 tablet by mouth every 12 hours as needed for cough 20 tablet 0    ipratropium (ATROVENT) 0.03 % nasal spray Spray 2 sprays into both nostrils every 12 hours 30  "mL 0    ipratropium-albuterol (COMBIVENT RESPIMAT)  MCG/ACT inhaler Inhale 1 puff into the lungs 4 times daily as needed for shortness of breath, wheezing or cough 4 g 0    levETIRAcetam (KEPPRA) 250 MG tablet Take 500 mg by mouth 2 times daily      medroxyPROGESTERone (DEPO-PROVERA) 150 MG/ML IM injection Inject 150 mg into the muscle      spacer (OPTICHAMBER KALEN) holding chamber Use with combivent inhaler 1 each 0     No current facility-administered medications for this visit.     ROS- SEE HPI  /68 (BP Location: Right arm, Patient Position: Sitting, Cuff Size: Child)   Pulse 111   Temp 98.5  F (36.9  C) (Tympanic)   Resp 18   Ht 1.473 m (4' 9.99\")   Wt 34.1 kg (75 lb 2.8 oz)   SpO2 98%   BMI 15.72 kg/m      General: Craniofacial abnormality.   Eyes: conjuctiva clear, PERRL, EOM intact   Ears: Canals overall clear. Narrow canals. . Low set auricles with poor antihelical folds defined. t-tube bilaterally. Gila bowl bilateral overall look well.   Nose: Nares normal   Mouth: crowded.   Neck: Supple, no cervical adenopathy, no thyromegaly      The ear canals were examined underneath the operating microscope and with an otologic speculum. Bilateral canals with dried epithelium, cerumen removed.   Canals, stenotic   Bilateral tubes appear in place and patent.    Narrow canals which limits examination.  Tympanic membranes appear with tubes. TMs are thickened and appear with sclerosis, prominent vasculature.   Miconazole powder applied bilateral              ASSESSMENT/ PLAN:      ICD-10-CM    1. Tympanostomy tube check  Z45.89 miconazole (MICATIN) 2 % powder      2. Chronic non-infective otitis externa of both ears, unspecified type  H60.63 miconazole (MICATIN) 2 % powder      3. Congenital hearing loss  H90.5       4. Mixed conductive and sensorineural hearing loss, bilateral  H90.6             Ears were cleaned  Stable ear exam.   No active otorrhea.   Miconazole applied  Follow up in 2-3 " months      Marcelina Oneal PA-C  ENT  [facility], [place]

## 2024-06-27 ENCOUNTER — HOSPITAL ENCOUNTER (EMERGENCY)
Facility: HOSPITAL | Age: 44
Discharge: HOME | End: 2024-06-27
Attending: PHYSICIAN ASSISTANT | Admitting: PHYSICIAN ASSISTANT
Payer: MEDICARE

## 2024-06-27 ENCOUNTER — OFFICE VISIT (OUTPATIENT)
Dept: PODIATRY | Facility: OTHER | Age: 44
End: 2024-06-27
Attending: PODIATRIST
Payer: MEDICARE

## 2024-06-27 ENCOUNTER — OFFICE VISIT (OUTPATIENT)
Dept: OTOLARYNGOLOGY | Facility: OTHER | Age: 44
End: 2024-06-27
Attending: PHYSICIAN ASSISTANT
Payer: MEDICARE

## 2024-06-27 VITALS
HEIGHT: 58 IN | DIASTOLIC BLOOD PRESSURE: 68 MMHG | SYSTOLIC BLOOD PRESSURE: 111 MMHG | HEART RATE: 111 BPM | TEMPERATURE: 98.5 F | RESPIRATION RATE: 18 BRPM | BODY MASS INDEX: 15.78 KG/M2 | WEIGHT: 75.18 LBS | OXYGEN SATURATION: 98 %

## 2024-06-27 VITALS
DIASTOLIC BLOOD PRESSURE: 68 MMHG | HEART RATE: 111 BPM | OXYGEN SATURATION: 98 % | RESPIRATION RATE: 18 BRPM | TEMPERATURE: 98.5 F | SYSTOLIC BLOOD PRESSURE: 111 MMHG

## 2024-06-27 VITALS
RESPIRATION RATE: 20 BRPM | HEART RATE: 104 BPM | TEMPERATURE: 98.5 F | SYSTOLIC BLOOD PRESSURE: 110 MMHG | OXYGEN SATURATION: 99 % | DIASTOLIC BLOOD PRESSURE: 64 MMHG

## 2024-06-27 DIAGNOSIS — H90.5 CONGENITAL HEARING LOSS: ICD-10-CM

## 2024-06-27 DIAGNOSIS — Z45.89 TYMPANOSTOMY TUBE CHECK: Primary | ICD-10-CM

## 2024-06-27 DIAGNOSIS — R58 ECCHYMOSIS: ICD-10-CM

## 2024-06-27 DIAGNOSIS — H60.63 CHRONIC NON-INFECTIVE OTITIS EXTERNA OF BOTH EARS, UNSPECIFIED TYPE: ICD-10-CM

## 2024-06-27 DIAGNOSIS — H90.6 MIXED CONDUCTIVE AND SENSORINEURAL HEARING LOSS, BILATERAL: ICD-10-CM

## 2024-06-27 DIAGNOSIS — L60.3 ONYCHODYSTROPHY: Primary | ICD-10-CM

## 2024-06-27 LAB
BASOPHILS # BLD AUTO: 0.1 10E3/UL (ref 0–0.2)
BASOPHILS NFR BLD AUTO: 1 %
D DIMER PPP FEU-MCNC: <0.3 UG/ML FEU (ref 0–0.5)
EOSINOPHIL # BLD AUTO: 0.2 10E3/UL (ref 0–0.7)
EOSINOPHIL NFR BLD AUTO: 2 %
ERYTHROCYTE [DISTWIDTH] IN BLOOD BY AUTOMATED COUNT: 13.2 % (ref 10–15)
HCT VFR BLD AUTO: 43 % (ref 35–47)
HGB BLD-MCNC: 14.8 G/DL (ref 11.7–15.7)
HOLD SPECIMEN: NORMAL
IMM GRANULOCYTES # BLD: 0 10E3/UL
IMM GRANULOCYTES NFR BLD: 0 %
INR PPP: 1.06 (ref 0.85–1.15)
LYMPHOCYTES # BLD AUTO: 1.2 10E3/UL (ref 0.8–5.3)
LYMPHOCYTES NFR BLD AUTO: 15 %
MCH RBC QN AUTO: 29.5 PG (ref 26.5–33)
MCHC RBC AUTO-ENTMCNC: 34.4 G/DL (ref 31.5–36.5)
MCV RBC AUTO: 86 FL (ref 78–100)
MONOCYTES # BLD AUTO: 1.1 10E3/UL (ref 0–1.3)
MONOCYTES NFR BLD AUTO: 14 %
NEUTROPHILS # BLD AUTO: 5.5 10E3/UL (ref 1.6–8.3)
NEUTROPHILS NFR BLD AUTO: 69 %
NRBC # BLD AUTO: 0 10E3/UL
NRBC BLD AUTO-RTO: 0 /100
PLATELET # BLD AUTO: 197 10E3/UL (ref 150–450)
RBC # BLD AUTO: 5.01 10E6/UL (ref 3.8–5.2)
WBC # BLD AUTO: 8 10E3/UL (ref 4–11)

## 2024-06-27 PROCEDURE — 92504 EAR MICROSCOPY EXAMINATION: CPT | Performed by: PHYSICIAN ASSISTANT

## 2024-06-27 PROCEDURE — 85610 PROTHROMBIN TIME: CPT | Performed by: PHYSICIAN ASSISTANT

## 2024-06-27 PROCEDURE — G0463 HOSPITAL OUTPT CLINIC VISIT: HCPCS

## 2024-06-27 PROCEDURE — 85025 COMPLETE CBC W/AUTO DIFF WBC: CPT | Performed by: PHYSICIAN ASSISTANT

## 2024-06-27 PROCEDURE — 69210 REMOVE IMPACTED EAR WAX UNI: CPT | Performed by: PHYSICIAN ASSISTANT

## 2024-06-27 PROCEDURE — G0463 HOSPITAL OUTPT CLINIC VISIT: HCPCS | Mod: 27

## 2024-06-27 PROCEDURE — 99212 OFFICE O/P EST SF 10 MIN: CPT | Mod: 25 | Performed by: PHYSICIAN ASSISTANT

## 2024-06-27 PROCEDURE — 99203 OFFICE O/P NEW LOW 30 MIN: CPT | Performed by: PODIATRIST

## 2024-06-27 PROCEDURE — 36415 COLL VENOUS BLD VENIPUNCTURE: CPT | Performed by: PHYSICIAN ASSISTANT

## 2024-06-27 PROCEDURE — 99213 OFFICE O/P EST LOW 20 MIN: CPT | Performed by: PHYSICIAN ASSISTANT

## 2024-06-27 PROCEDURE — 85379 FIBRIN DEGRADATION QUANT: CPT | Performed by: PHYSICIAN ASSISTANT

## 2024-06-27 ASSESSMENT — COLUMBIA-SUICIDE SEVERITY RATING SCALE - C-SSRS
2. HAVE YOU ACTUALLY HAD ANY THOUGHTS OF KILLING YOURSELF IN THE PAST MONTH?: NO
6. HAVE YOU EVER DONE ANYTHING, STARTED TO DO ANYTHING, OR PREPARED TO DO ANYTHING TO END YOUR LIFE?: NO
1. IN THE PAST MONTH, HAVE YOU WISHED YOU WERE DEAD OR WISHED YOU COULD GO TO SLEEP AND NOT WAKE UP?: NO

## 2024-06-27 ASSESSMENT — PAIN SCALES - GENERAL
PAINLEVEL: MILD PAIN (3)
PAINLEVEL: NO PAIN (0)

## 2024-06-27 ASSESSMENT — ACTIVITIES OF DAILY LIVING (ADL): ADLS_ACUITY_SCORE: 35

## 2024-06-27 NOTE — DISCHARGE INSTRUCTIONS
The discolored area is most consistent with a large bruise. It does not appear consistent with infection nor clot. I will check on some markers of clotting in her bloodwork.     We did also discuss erythema migrans (rash associated with lyme disease)-- this does not seem consistent with this rash. If she develops a fever or obvious 'bullseye' type appearance to this area, please return or visit with her primary care provider.     In the meantime, please ice the area as able. This should slowly improve over the next 1-2 weeks.    Thanks!

## 2024-06-27 NOTE — ED TRIAGE NOTES
C/o bruising     Right calf, no known injury   No hx of DVT    Mom noticed it on Tuesday, and is worried about it seeming to be getting better

## 2024-06-27 NOTE — PROGRESS NOTES
Chief complaint: Patient presents with:  Musculoskeletal Problem: Callus and brittle nails      History of Present Illness: This 44 year old female is seen with her caretaker at the request of No ref. provider found for evaluation and suggestions of management of brittle toenails. The toenails dig into the corners of her skin a little and they are difficult to trim because they break so easily when trimming the toenails. The nails are not causing pain but the patient's caretaker is wondering if there is something she could use to strengthen or improve the toenails.    No further pedal complaints today.      /68   Pulse 111   Temp 98.5  F (36.9  C) (Tympanic)   Resp 18   SpO2 98%     Patient Active Problem List   Diagnosis    Congenital hearing loss    Impacted cerumen    Mixed conductive and sensorineural hearing loss, bilateral    Dermatitis    Agenesis of corpus callosum (H)    Dysmenorrhea    Irregular menstrual cycle    Mitral valve disease    Osteoporosis    Other kyphoscoliosis and scoliosis    Seizure (H)    Unspecified intellectual disabilities    Other psoriasis       Past Surgical History:   Procedure Laterality Date    feeding tube      ventilation tubes, bilateral         Current Outpatient Medications   Medication Sig Dispense Refill    cetirizine (ZYRTEC) 10 MG tablet Take 1 tablet by mouth daily      dextromethorphan-guaiFENesin (MUCINEX DM)  MG 12 hr tablet Take 1 tablet by mouth every 12 hours as needed for cough 20 tablet 0    ipratropium (ATROVENT) 0.03 % nasal spray Spray 2 sprays into both nostrils every 12 hours 30 mL 0    ipratropium-albuterol (COMBIVENT RESPIMAT)  MCG/ACT inhaler Inhale 1 puff into the lungs 4 times daily as needed for shortness of breath, wheezing or cough 4 g 0    levETIRAcetam (KEPPRA) 250 MG tablet Take 500 mg by mouth 2 times daily      medroxyPROGESTERone (DEPO-PROVERA) 150 MG/ML IM injection Inject 150 mg into the muscle      spacer (OPTICHAMBER  KALEN) holding chamber Use with combivent inhaler 1 each 0    benzonatate (TESSALON) 100 MG capsule TAKE 1 CAPSULE BY MOUTH THREE TIMES A DAY. SWALLOW CAPSULE WHOLE (DO NOT BREAK OR CHEW). (Patient not taking: Reported on 4/23/2024)       No current facility-administered medications for this visit.          Allergies   Allergen Reactions    Azithromycin      Abdominal pain/ cramping     Clotrimazole Rash    Omnicef [Cefdinir] Rash     Itchy and bad rash       Family History   Problem Relation Age of Onset    Cerebrovascular Disease Paternal Grandmother         CVA    Heart Disease Maternal Grandfather         disease    Hypertension Father     Other - See Comments Father         post polio    Parkinsonism Maternal Grandmother     Heart Disease Paternal Grandfather        Social History     Socioeconomic History    Marital status: Single     Spouse name: None    Number of children: None    Years of education: None    Highest education level: None   Tobacco Use    Smoking status: Never    Smokeless tobacco: Never    Tobacco comments:     no passive exposure   Substance and Sexual Activity    Alcohol use: No    Drug use: No   Other Topics Concern    Caffeine Concern No    Parent/sibling w/ CABG, MI or angioplasty before 65F 55M? No       ROS: 10 point ROS neg other than the symptoms noted above in the HPI.  EXAM  Constitutional: healthy, alert, and no distress    Psychiatric: mentation appears normal and affect normal/bright    VASCULAR:  -Dorsalis pedis pulse +2/4 b/l  -Posterior tibial pulse +2/4 b/l  -Capillary refill time < 3 seconds to b/l hallux  NEURO:  -Light touch sensation intact to b/l plantar forefoot  DERM:  -Skin temperature, texture and turgor WNL b/l  -Toenails mildly thickened, dystrophic and discolored x 2 to the bilateral fifth toenail  ---Minimal incurvation of the bilateral borders of the bilateral lesser digit  MSK:  -LEFT second toe overriding the hallux  -Muscle strength of ankles +5/5 for  dorsiflexion, plantarflexion, ABDUction and ADDuction b/l    ============================================================    ASSESSMENT:  (L60.3) Onychodystrophy  (primary encounter diagnosis)      PLAN:  -Patient evaluated and examined. Treatment options discussed with no educational barriers noted.  Onychodystrophy  -Discussed etiologies and treatment options for onychodystrophy. There may be fungi in the toenail, but it is not known until a nail culture is performed. Treatment options consist of leaving the toenail as is, treating the nail for fungi (culture would be taken today to confirm nail fungi), a nail avulsion and treating the fungi as the nail grows back in, or removing the toenail permanently. The oral medication can sometimes fluctuate liver values so the patient would need to get a liver blood draw before, during and after the 90 day treatment. Topical anti-fungals can be used, but they do not always full cure the toenail fungi and they must be used daily and sometimes for 6-12 months before noticing a difference in the toenail. Recurrence rate of toenail fungi is high. Vicks Vapor rub sometimes reduces thickness of the the toenails.    After reviewing risks and benefits, patient has decided to proceed with Vicks Vapor rub. She does not want a toenail culture.    -The LEFT second toe overrides the hallux. Patient's caretaker says her toe has done this since she was a baby. However, the toe gets pinched between her other toes. She may consider a toe sleeve or toe spacer which will reduce pressure on the toes.This may help improve the toenails.    ---Look for a size small toe sleeve. You may find these at [employer] or [facility]. Do not wear the toe sleeve(s) at night, but only wear the sleeve in shoes and/or socks during the day. The sleeves are re-usable and hand washable until they wear out. You may also try a toe spacer. Keeping the toes more  so they are not as pinched together may  reduce the pinching of the toenail.  ---You may also find these on [employer] or [employer]com    -This is an acute, uncomplicated illness/injury with OTC treatment options reviewed.    -Patient in agreement with the above treatment plan and all of patient's questions were answered.      Return to clinic as needed per request of patient's caretaker        Lilia Medina DPM

## 2024-06-27 NOTE — PATIENT INSTRUCTIONS
---Look for a size small toe sleeve. You may find these at [employer] or [facility]. Do not wear the toe sleeve(s) at night, but only wear the sleeve in shoes and/or socks during the day. The sleeves are re-usable and hand washable until they wear out. You may also try a toe spacer. Keeping the toes more  so they are not as pinched together may reduce the pinching of the toenail.  ---You may also find these on [email or web address] or [email or web address]

## 2024-06-27 NOTE — LETTER
6/27/2024      Efren Saavedra  [address] [place] [address]      Dear Colleague,    Thank you for referring your patient, Efren Saavedra, to the [facility]. Please see a copy of my visit note below.    Chief Complaint   Patient presents with     Cerumen Impaction     Procedure     Ear cleaning   Patient returns for follow-up exam.         Today she returns for repeat exam.  There has been no concerns at home mom has noted no otorrhea or complaints of otalgia.  Mom (Sophia) provides hx and details.   She has upcoming Echo for cardiolmegaly.        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  She does have patent tympanostomy tubes which have been in position for several years.  She does have cleanings completed and at times powder application.     At her last visit application of miconazole is applied to both ears.  She does have hearing loss which has been overall stable and recommended use of hearing aids.  Efren does not tolerate use and declines a to use on a daily basis.  We reviewed consideration for repeat imaging and at this time.  Have declined.  They have declined further options of possible consideration for [place] referral to otology        Distant hx of BTT with placement of t-tubes. Tubes have been in place for 30 years.    her seizures have been doing well w/ her medications.   She has been on medications w/o concerns.        Past Medical History:   Diagnosis Date     Seizures (H)         Allergies   Allergen Reactions     Azithromycin      Abdominal pain/ cramping      Clotrimazole Rash     Omnicef [Cefdinir] Rash     Itchy and bad rash     Current Outpatient Medications   Medication Sig Dispense Refill     benzonatate (TESSALON) 100 MG capsule TAKE 1 CAPSULE BY MOUTH THREE TIMES A DAY. SWALLOW CAPSULE WHOLE (DO NOT BREAK OR CHEW). (Patient not taking: Reported on 4/23/2024)       cetirizine (ZYRTEC) 10 MG tablet Take 1 tablet by mouth  "daily       dextromethorphan-guaiFENesin (MUCINEX DM)  MG 12 hr tablet Take 1 tablet by mouth every 12 hours as needed for cough 20 tablet 0     ipratropium (ATROVENT) 0.03 % nasal spray Spray 2 sprays into both nostrils every 12 hours 30 mL 0     ipratropium-albuterol (COMBIVENT RESPIMAT)  MCG/ACT inhaler Inhale 1 puff into the lungs 4 times daily as needed for shortness of breath, wheezing or cough 4 g 0     levETIRAcetam (KEPPRA) 250 MG tablet Take 500 mg by mouth 2 times daily       medroxyPROGESTERone (DEPO-PROVERA) 150 MG/ML IM injection Inject 150 mg into the muscle       spacer ([facility]) holding chamber Use with combivent inhaler 1 each 0     No current facility-administered medications for this visit.     ROS- SEE HPI  /68 (BP Location: Right arm, Patient Position: Sitting, Cuff Size: Child)   Pulse 111   Temp 98.5  F (36.9  C) (Tympanic)   Resp 18   Ht 1.473 m (4' 9.99\")   Wt 34.1 kg (75 lb 2.8 oz)   SpO2 98%   BMI 15.72 kg/m      General: Craniofacial abnormality.   Eyes: conjuctiva clear, PERRL, EOM intact   Ears: Canals overall clear. Narrow canals. . Low set auricles with poor antihelical folds defined. t-tube bilaterally. Gila bowl bilateral overall look well.   Nose: Nares normal   Mouth: crowded.   Neck: Supple, no cervical adenopathy, no thyromegaly      The ear canals were examined underneath the operating microscope and with an otologic speculum. Bilateral canals with dried epithelium, cerumen removed.   Canals, stenotic   Bilateral tubes appear in place and patent.    Narrow canals which limits examination.  Tympanic membranes appear with tubes. TMs are thickened and appear with sclerosis, prominent vasculature.   Miconazole powder applied bilateral              ASSESSMENT/ PLAN:      ICD-10-CM    1. Tympanostomy tube check  Z45.89 miconazole (MICATIN) 2 % powder      2. Chronic non-infective otitis externa of both ears, unspecified type  H60.63 miconazole " (MICATIN) 2 % powder      3. Congenital hearing loss  H90.5       4. Mixed conductive and sensorineural hearing loss, bilateral  H90.6             Ears were cleaned  Stable ear exam.   No active otorrhea.   Miconazole applied  Follow up in 2-3 months      Marcelina Oneal PA-C  ENT  [facility] [place]      Again, thank you for allowing me to participate in the care of your patient.        Sincerely,        Marcelina Oneal PA-C

## 2024-06-27 NOTE — PATIENT INSTRUCTIONS
Ears were cleaned  Powder applied  Follow up in 2 months for ear cleaning/ powder    Thank you for allowing Marcelina Oneal PA-C and our ENT team to participate in your care.  If your medications are too expensive, please give the nurse a call.  We can possibly change this medication.  If you have a scheduling or an appointment question please contact our Health Unit Coordinator at [phone], Ext. [phone].    ALL nursing questions or concerns can be directed to your ENT nurse at: [phone] [place]

## 2024-06-27 NOTE — ED PROVIDER NOTES
History     Chief Complaint   Patient presents with    Leg Pain     HPI  Efren Saavedra is a 44 year old female with PMH seizure disorder, agenesis of corpus collosum who presents to  for evaluation of discoloration to right posterior calf. Efren is somewhat verbal however speech is limited. Mother noticed it first two days ago, and has appreciated it growing in size. No known trauma. When Efren is directly questioned, she also denies trauma. She shakes her head affirmatively that she feels safe around others.     No h/o bleeding or clotting disorders. No h/o DVT. No known wounds to this area.     No known tick bites, no fevers. Mother reports a tick bite would be unlikely for Efren, as she does not go into wooded areas often.     Allergies:  Allergies   Allergen Reactions    Azithromycin      Abdominal pain/ cramping     Clotrimazole Rash    Omnicef [Cefdinir] Rash     Itchy and bad rash       Problem List:    Patient Active Problem List    Diagnosis Date Noted    Seizure (H) 02/27/2015     Priority: Medium     Overview:   At least two events (maybe 3), mom opted no treatment or mri unless further events      Osteoporosis 11/23/2013     Priority: Medium     Overview:   IMO Update      Congenital hearing loss 05/14/2013     Priority: Medium    Impacted cerumen 05/14/2013     Priority: Medium    Mixed conductive and sensorineural hearing loss, 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 Echo/doppler 4/19/05.   IMO Update 10/11      Other kyphoscoliosis and scoliosis 03/30/2005     Priority: Medium     Overview:   Refer to Dr. Michel. Scoliosis x-ray 4/19/05.      Unspecified intellectual disabilities 03/30/2005     Priority: Medium     Overview:   IMO Update 10/11      Dysmenorrhea 08/11/2004      Priority: Medium     Overview:   Will switch from Alesse to Loestrin      Agenesis of corpus callosum (H) 04/08/2004     Priority: Medium     Overview:       Other psoriasis 01/08/2004     Priority: Medium     Overview:   Scalp psoriasis. Under the care of Dr. Raines, Dermatology      Irregular menstrual cycle 04/15/2002     Priority: Medium        Past Medical History:    Past Medical History:   Diagnosis Date    Seizures (H)        Past Surgical History:    Past Surgical History:   Procedure Laterality Date    feeding tube      ventilation tubes, bilateral         Family History:    Family History   Problem Relation Age of Onset    Cerebrovascular Disease Paternal Grandmother         CVA    Heart Disease Maternal Grandfather         disease    Hypertension Father     Other - See Comments Father         post polio    Parkinsonism Maternal Grandmother     Heart Disease Paternal Grandfather        Social History:  Marital Status:  Single [1]  Social History     Tobacco Use    Smoking status: Never    Smokeless tobacco: Never    Tobacco comments:     no passive exposure   Substance Use Topics    Alcohol use: No    Drug use: No        Medications:    benzonatate (TESSALON) 100 MG capsule  cetirizine (ZYRTEC) 10 MG tablet  dextromethorphan-guaiFENesin (MUCINEX DM)  MG 12 hr tablet  ipratropium (ATROVENT) 0.03 % nasal spray  ipratropium-albuterol (COMBIVENT RESPIMAT)  MCG/ACT inhaler  levETIRAcetam (KEPPRA) 250 MG tablet  medroxyPROGESTERone (DEPO-PROVERA) 150 MG/ML IM injection  spacer ([facility] KALEN) holding chamber          Review of Systems   Constitutional:  Negative for fever.   Skin:  Positive for color change.   Allergic/Immunologic: Negative for immunocompromised state.   Neurological:  Negative for syncope.       Physical Exam   BP: 110/64  Pulse: 104  Temp: 98.5  F (36.9  C)  Resp: 20  SpO2: 99 %      Physical Exam  Vitals and nursing note reviewed.   Constitutional:       General: She is not in  acute distress.     Appearance: Normal appearance. She is not ill-appearing, toxic-appearing or diaphoretic.      Comments: At baseline. Walks around room without difficulty   HENT:      Head: Normocephalic and atraumatic.      Nose: Nose normal. No congestion.      Mouth/Throat:      Mouth: Mucous membranes are moist.      Pharynx: No oropharyngeal exudate.   Eyes:      Extraocular Movements: Extraocular movements intact.      Pupils: Pupils are equal, round, and reactive to light.   Cardiovascular:      Rate and Rhythm: Normal rate and regular rhythm.      Heart sounds: No murmur heard.     No gallop.   Pulmonary:      Effort: Pulmonary effort is normal.      Breath sounds: Normal breath sounds.   Musculoskeletal:      Right lower leg: No edema (negative Mary sign).      Left lower leg: No edema.   Skin:     Comments: Moderate area of ecchymosis to R upper posterior calf without any underlying swelling. No warmth. No annular appearance, no raised borders, no induration, no fluctuance.    Neurological:      General: No focal deficit present.      Mental Status: She is alert and oriented to person, place, and time.               ED Course     ED Course as of 06/28/24 1223   Thu Jun 27, 2024   1613 Platelet Count: 197   1613 Hemoglobin: 14.8     Procedures       Results for orders placed or performed during the hospital encounter of 06/27/24 (from the past 24 hour(s))   CBC with platelets differential    Narrative    The following orders were created for panel order CBC with platelets differential.  Procedure                               Abnormality         Status                     ---------                               -----------         ------                     CBC with platelets and d...[[id number]]                      Final result                 Please view results for these tests on the individual orders.   INR   Result Value Ref Range    INR 1.06 0.85 - 1.15   D dimer quantitative   Result Value Ref  Range    D-Dimer Quantitative <0.30 0.00 - 0.50 ug/mL FEU    Narrative    This D-dimer assay is intended for use in conjunction with a clinical pretest probability assessment model to exclude pulmonary embolism (PE) and deep venous thrombosis (DVT) in outpatients suspected of PE or DVT. The cut-off value is 0.50 ug/mL FEU.   CBC with platelets and differential   Result Value Ref Range    WBC Count 8.0 4.0 - 11.0 10e3/uL    RBC Count 5.01 3.80 - 5.20 10e6/uL    Hemoglobin 14.8 11.7 - 15.7 g/dL    Hematocrit 43.0 35.0 - 47.0 %    MCV 86 78 - 100 fL    MCH 29.5 26.5 - 33.0 pg    MCHC 34.4 31.5 - 36.5 g/dL    RDW 13.2 10.0 - 15.0 %    Platelet Count 197 150 - 450 10e3/uL    % Neutrophils 69 %    % Lymphocytes 15 %    % Monocytes 14 %    % Eosinophils 2 %    % Basophils 1 %    % Immature Granulocytes 0 %    NRBCs per 100 WBC 0 <1 /100    Absolute Neutrophils 5.5 1.6 - 8.3 10e3/uL    Absolute Lymphocytes 1.2 0.8 - 5.3 10e3/uL    Absolute Monocytes 1.1 0.0 - 1.3 10e3/uL    Absolute Eosinophils 0.2 0.0 - 0.7 10e3/uL    Absolute Basophils 0.1 0.0 - 0.2 10e3/uL    Absolute Immature Granulocytes 0.0 <=0.4 10e3/uL    Absolute NRBCs 0.0 10e3/uL   Extra Tube    Narrative    The following orders were created for panel order Extra Tube.  Procedure                               Abnormality         Status                     ---------                               -----------         ------                     Extra Red Top Tube[[id number]]                               Final result               Extra Green Top (Lithium...[[id number]]                      Final result               Extra Heparinized Syringe[[id number]]                        Final result                 Please view results for these tests on the individual orders.   Extra Red Top Tube   Result Value Ref Range    Hold Specimen JIC    Extra Green Top (Lithium Heparin) Tube   Result Value Ref Range    Hold Specimen JIC    Extra Heparinized Syringe   Result Value Ref Range     Hold Specimen [facility]        Medications - No data to display    Assessments & Plan (with Medical Decision Making)     I have reviewed the nursing notes.    I have reviewed the findings, diagnosis, plan and need for follow up with the patient.    43 yo female presents for unknown discoloration to right upper posterior calf. Of note, she does have cognitive impairment and communication is limited. Therefore it is conceivable there had been injury to this extremity without being able to relay this information.   Expanded the differential in light of unclear history. Did obtain CBC, INR, and d-dimer. These all returned within normal range-- making underlying serious bleeding or clotting disorder much less likely. Examination was most consistent with evolving large area of ecchymosis. Offered reassurance to patient and her mother and counseled on icing.     Should the rash evolve-- especially if it develops an annular appearance or is accompanied by other symptoms, would recommend return.         Discharge Medication List as of 6/27/2024  4:13 PM          Final diagnoses:   Ecchymosis       6/27/2024   [facility]       Johanny Li PA-C  06/28/24 0114

## 2024-06-28 ASSESSMENT — ENCOUNTER SYMPTOMS
COLOR CHANGE: 1
FEVER: 0

## 2024-07-12 ENCOUNTER — MEDICAL CORRESPONDENCE (OUTPATIENT)
Dept: HEALTH INFORMATION MANAGEMENT | Facility: CLINIC | Age: 44
End: 2024-07-12

## 2024-07-12 ENCOUNTER — TRANSCRIBE ORDERS (OUTPATIENT)
Dept: OTHER | Age: 44
End: 2024-07-12

## 2024-07-12 DIAGNOSIS — Q23.2 CONGENITAL STENOSIS OF MITRAL VALVE: ICD-10-CM

## 2024-07-12 DIAGNOSIS — I51.7 CARDIOMEGALY: Primary | ICD-10-CM

## 2024-07-22 DIAGNOSIS — I05.0 MITRAL STENOSIS: Primary | ICD-10-CM

## 2024-07-31 ENCOUNTER — TELEPHONE (OUTPATIENT)
Dept: CARDIOLOGY | Facility: CLINIC | Age: 44
End: 2024-07-31

## 2024-07-31 NOTE — TELEPHONE ENCOUNTER
Left Voicemail (1st ATTEMPT) for the patient to call back and schedule the following:   SVEN  Appointment type: New follow up with imaging  Provider: Paco  Return date: TBD  Specialty phone number: [phone]   Additional appointment(s) needed: SVEN  Additonal Notes:  Called an dleft vm to call me back to help schedule SVEN and visit.

## 2024-08-09 ENCOUNTER — TELEPHONE (OUTPATIENT)
Dept: CARDIOLOGY | Facility: CLINIC | Age: 44
End: 2024-08-09
Payer: MEDICARE

## 2024-08-09 NOTE — TELEPHONE ENCOUNTER
Telephone call to Christine, pt's mother. She wanted to make sure our team was aware that she is special needs and very tiny. Christine wanted to check if Ronna should take her seizure medication in the morning of the SVEN, last seizure was a few years ago.    Plan to discuss medications with Karolina Lopez. Will connect with ECHO department in regards to her 1st 2 concerns. Will call back on Monday.    Ashley Lo RN, BSN  Lead Structural Heart Coordinator  [facility], [employer] and Watchman

## 2024-08-12 ENCOUNTER — DOCUMENTATION ONLY (OUTPATIENT)
Dept: OTHER | Facility: CLINIC | Age: 44
End: 2024-08-12
Payer: MEDICARE

## 2024-08-12 ENCOUNTER — ANESTHESIA EVENT (OUTPATIENT)
Dept: SURGERY | Facility: CLINIC | Age: 44
End: 2024-08-12
Payer: MEDICARE

## 2024-08-12 NOTE — TELEPHONE ENCOUNTER
Return telephone call to Christine. Patient can take seizure medication on day of SVEN. Pediatric probe already available on [place].     Patient has not had any procedures requiring sedation in a very long time. Christine communicated Ronna is very child like; mentation around the age of 13 year old. Christine also wanted to communicate that she sleeps with her eyes open.         Ashlye Lo RN, BSN  Lead Structural Heart Coordinator  [facility], [employer] and Watchman

## 2024-08-13 ENCOUNTER — ANESTHESIA (OUTPATIENT)
Dept: SURGERY | Facility: CLINIC | Age: 44
End: 2024-08-13
Payer: MEDICARE

## 2024-08-13 ENCOUNTER — HOSPITAL ENCOUNTER (OUTPATIENT)
Dept: CARDIOLOGY | Facility: CLINIC | Age: 44
Discharge: HOME | End: 2024-08-13
Attending: NURSE PRACTITIONER | Admitting: NURSE PRACTITIONER
Payer: MEDICARE

## 2024-08-13 ENCOUNTER — HOSPITAL ENCOUNTER (OUTPATIENT)
Facility: CLINIC | Age: 44
Discharge: HOME | End: 2024-08-13
Attending: INTERNAL MEDICINE | Admitting: INTERNAL MEDICINE
Payer: MEDICARE

## 2024-08-13 VITALS
SYSTOLIC BLOOD PRESSURE: 115 MMHG | WEIGHT: 73.19 LBS | HEIGHT: 57 IN | TEMPERATURE: 97 F | RESPIRATION RATE: 20 BRPM | BODY MASS INDEX: 15.79 KG/M2 | OXYGEN SATURATION: 98 % | DIASTOLIC BLOOD PRESSURE: 73 MMHG | HEART RATE: 115 BPM

## 2024-08-13 DIAGNOSIS — I05.0 MITRAL STENOSIS: ICD-10-CM

## 2024-08-13 LAB — LVEF ECHO: NORMAL

## 2024-08-13 PROCEDURE — 250N000025 HC SEVOFLURANE, PER MIN

## 2024-08-13 PROCEDURE — 710N000010 HC RECOVERY PHASE 1, LEVEL 2, PER MIN

## 2024-08-13 PROCEDURE — 93312 ECHO TRANSESOPHAGEAL: CPT | Performed by: STUDENT IN AN ORGANIZED HEALTH CARE EDUCATION/TRAINING PROGRAM

## 2024-08-13 PROCEDURE — 360N000075 HC SURGERY LEVEL 2, PER MIN

## 2024-08-13 PROCEDURE — 76376 3D RENDER W/INTRP POSTPROCES: CPT

## 2024-08-13 PROCEDURE — 93312 ECHO TRANSESOPHAGEAL: CPT | Performed by: NURSE ANESTHETIST, CERTIFIED REGISTERED

## 2024-08-13 PROCEDURE — 250N000009 HC RX 250: Performed by: NURSE ANESTHETIST, CERTIFIED REGISTERED

## 2024-08-13 PROCEDURE — 250N000011 HC RX IP 250 OP 636: Performed by: NURSE ANESTHETIST, CERTIFIED REGISTERED

## 2024-08-13 PROCEDURE — 710N000012 HC RECOVERY PHASE 2, PER MINUTE

## 2024-08-13 PROCEDURE — 370N000017 HC ANESTHESIA TECHNICAL FEE, PER MIN

## 2024-08-13 PROCEDURE — 999N000141 HC STATISTIC PRE-PROCEDURE NURSING ASSESSMENT

## 2024-08-13 PROCEDURE — 258N000003 HC RX IP 258 OP 636: Performed by: NURSE ANESTHETIST, CERTIFIED REGISTERED

## 2024-08-13 RX ORDER — LIDOCAINE 40 MG/G
CREAM TOPICAL
Status: DISCONTINUED | OUTPATIENT
Start: 2024-08-13 | End: 2024-08-13 | Stop reason: HOSPADM

## 2024-08-13 RX ORDER — SODIUM CHLORIDE, SODIUM LACTATE, POTASSIUM CHLORIDE, CALCIUM CHLORIDE 600; 310; 30; 20 MG/100ML; MG/100ML; MG/100ML; MG/100ML
INJECTION, SOLUTION INTRAVENOUS CONTINUOUS PRN
Status: DISCONTINUED | OUTPATIENT
Start: 2024-08-13 | End: 2024-08-13

## 2024-08-13 RX ORDER — ONDANSETRON 2 MG/ML
INJECTION INTRAMUSCULAR; INTRAVENOUS PRN
Status: DISCONTINUED | OUTPATIENT
Start: 2024-08-13 | End: 2024-08-13

## 2024-08-13 RX ORDER — SODIUM CHLORIDE, SODIUM LACTATE, POTASSIUM CHLORIDE, CALCIUM CHLORIDE 600; 310; 30; 20 MG/100ML; MG/100ML; MG/100ML; MG/100ML
INJECTION, SOLUTION INTRAVENOUS CONTINUOUS
Status: DISCONTINUED | OUTPATIENT
Start: 2024-08-13 | End: 2024-08-13 | Stop reason: HOSPADM

## 2024-08-13 RX ORDER — LIDOCAINE HYDROCHLORIDE 20 MG/ML
INJECTION, SOLUTION INFILTRATION; PERINEURAL PRN
Status: DISCONTINUED | OUTPATIENT
Start: 2024-08-13 | End: 2024-08-13

## 2024-08-13 RX ORDER — HYDROMORPHONE HCL IN WATER/PF 6 MG/30 ML
0.2 PATIENT CONTROLLED ANALGESIA SYRINGE INTRAVENOUS EVERY 5 MIN PRN
Status: DISCONTINUED | OUTPATIENT
Start: 2024-08-13 | End: 2024-08-13 | Stop reason: HOSPADM

## 2024-08-13 RX ORDER — PROPOFOL 10 MG/ML
INJECTION, EMULSION INTRAVENOUS PRN
Status: DISCONTINUED | OUTPATIENT
Start: 2024-08-13 | End: 2024-08-13

## 2024-08-13 RX ORDER — FENTANYL CITRATE 50 UG/ML
25 INJECTION, SOLUTION INTRAMUSCULAR; INTRAVENOUS EVERY 5 MIN PRN
Status: DISCONTINUED | OUTPATIENT
Start: 2024-08-13 | End: 2024-08-13 | Stop reason: HOSPADM

## 2024-08-13 RX ORDER — ONDANSETRON 4 MG/1
4 TABLET, ORALLY DISINTEGRATING ORAL EVERY 30 MIN PRN
Status: DISCONTINUED | OUTPATIENT
Start: 2024-08-13 | End: 2024-08-13 | Stop reason: HOSPADM

## 2024-08-13 RX ORDER — FENTANYL CITRATE 50 UG/ML
INJECTION, SOLUTION INTRAMUSCULAR; INTRAVENOUS PRN
Status: DISCONTINUED | OUTPATIENT
Start: 2024-08-13 | End: 2024-08-13

## 2024-08-13 RX ORDER — ONDANSETRON 2 MG/ML
4 INJECTION INTRAMUSCULAR; INTRAVENOUS EVERY 30 MIN PRN
Status: DISCONTINUED | OUTPATIENT
Start: 2024-08-13 | End: 2024-08-13 | Stop reason: HOSPADM

## 2024-08-13 RX ORDER — NALOXONE HYDROCHLORIDE 0.4 MG/ML
0.1 INJECTION, SOLUTION INTRAMUSCULAR; INTRAVENOUS; SUBCUTANEOUS
Status: DISCONTINUED | OUTPATIENT
Start: 2024-08-13 | End: 2024-08-13 | Stop reason: HOSPADM

## 2024-08-13 RX ADMIN — Medication 20 MG: at 15:16

## 2024-08-13 RX ADMIN — SODIUM CHLORIDE, POTASSIUM CHLORIDE, SODIUM LACTATE AND CALCIUM CHLORIDE: 600; 310; 30; 20 INJECTION, SOLUTION INTRAVENOUS at 15:08

## 2024-08-13 RX ADMIN — LIDOCAINE HYDROCHLORIDE 40 MG: 20 INJECTION, SOLUTION INFILTRATION; PERINEURAL at 15:08

## 2024-08-13 RX ADMIN — PROPOFOL 50 MG: 10 INJECTION, EMULSION INTRAVENOUS at 15:15

## 2024-08-13 RX ADMIN — ONDANSETRON 4 MG: 2 INJECTION INTRAMUSCULAR; INTRAVENOUS at 15:25

## 2024-08-13 RX ADMIN — SUGAMMADEX 100 MG: 100 INJECTION, SOLUTION INTRAVENOUS at 16:06

## 2024-08-13 RX ADMIN — PROPOFOL 20 MG: 10 INJECTION, EMULSION INTRAVENOUS at 15:16

## 2024-08-13 RX ADMIN — FENTANYL CITRATE 25 MCG: 50 INJECTION INTRAMUSCULAR; INTRAVENOUS at 15:57

## 2024-08-13 RX ADMIN — MIDAZOLAM 1 MG: 1 INJECTION INTRAMUSCULAR; INTRAVENOUS at 15:03

## 2024-08-13 ASSESSMENT — ACTIVITIES OF DAILY LIVING (ADL)
ADLS_ACUITY_SCORE: 31
ADLS_ACUITY_SCORE: 29
ADLS_ACUITY_SCORE: 31
ADLS_ACUITY_SCORE: 31

## 2024-08-13 ASSESSMENT — ENCOUNTER SYMPTOMS: SEIZURES: 1

## 2024-08-13 NOTE — ANESTHESIA PROCEDURE NOTES
Airway       Patient location during procedure: OR       Procedure Start/Stop Times: 8/13/2024 3:20 PM  Staff -        CRNA: Blair Spencer APRN CRNA       Performed By: CRNA  Consent for Airway        Urgency: elective  Indications and Patient Condition       Indications for airway management: luther-procedural       Induction type:intravenous       Mask difficulty assessment: 1 - vent by mask    Final Airway Details       Final airway type: endotracheal airway       Successful airway: ETT - single  Endotracheal Airway Details        ETT size (mm): 6.0       Cuffed: yes       Successful intubation technique: video laryngoscopy       VL Blade Size: MAC 3       Grade View of Cords: 1 (with cmac)       Adjucts: stylet       Position: Right       Measured from: lips       Secured at (cm): 18       Bite block used: None    Post intubation assessment        Placement verified by: capnometry, equal breath sounds and chest rise        Number of attempts at approach: 1       Number of other approaches attempted: 0       Secured with: tape       Ease of procedure: easy       Dentition: Intact and Unchanged    Medication(s) Administered   Medication Administration Time: 8/13/2024 3:20 PM

## 2024-08-13 NOTE — ANESTHESIA PREPROCEDURE EVALUATION
Anesthesia Pre-Procedure Evaluation    Patient: Efren Saavedra   MRN: [id number] : 1980        Procedure : Procedure(s):  ECHOCARDIOGRAM, TRANSESOPHAGEAL, WITH ANESTHESIA          Past Medical History:   Diagnosis Date    Seizures (H)       Past Surgical History:   Procedure Laterality Date    feeding tube      ventilation tubes, bilateral        Allergies   Allergen Reactions    Azithromycin      Abdominal pain/ cramping     Clotrimazole Rash    Omnicef [Cefdinir] Rash     Itchy and bad rash      Social History     Tobacco Use    Smoking status: Never    Smokeless tobacco: Never    Tobacco comments:     no passive exposure   Substance Use Topics    Alcohol use: No      Wt Readings from Last 1 Encounters:   24 33.2 kg (73 lb 3.1 oz)        Anesthesia Evaluation            ROS/MED HX  ENT/Pulmonary:       Neurologic: Comment: Agenesis of the corpus collosum     (+)       seizures,                         Cardiovascular:     (+)  - -   -  - -                           valvular problems/murmurs  mitral stenosis.         METS/Exercise Tolerance:     Hematologic:       Musculoskeletal: Comment: osteoporosis      GI/Hepatic:       Renal/Genitourinary:       Endo:       Psychiatric/Substance Use:       Infectious Disease:       Malignancy:       Other:      (+)  , ,, other significant disability (mixed hearing loss) Deaf         Physical Exam    Airway        Mallampati: III   TM distance: > 3 FB   Neck ROM: full   Mouth opening: < 3 cm    Respiratory Devices and Support         Dental       (+) Minor Abnormalities - some fillings, tiny chips      Cardiovascular          Rhythm and rate: regular and tachycardia     Pulmonary   pulmonary exam normal        breath sounds clear to auscultation           OUTSIDE LABS:  CBC:   Lab Results   Component Value Date    WBC 8.0 2024    WBC 14.4 (H) 10/17/2022    HGB 14.8 2024    HGB 14.1 10/17/2022    HCT 43.0 2024    HCT 41.1 10/17/2022      "06/27/2024     10/17/2022     BMP:   Lab Results   Component Value Date     (L) 10/17/2022     09/09/2022    POTASSIUM 4.1 10/17/2022    POTASSIUM 4.0 09/09/2022    CHLORIDE 100 10/17/2022    CHLORIDE 104 09/09/2022    CO2 21 (L) 10/17/2022    CO2 25 09/09/2022    BUN 15.2 10/17/2022    BUN 11 09/09/2022    CR 0.62 10/17/2022    CR 0.53 09/09/2022     (H) 10/17/2022    GLC 99 09/09/2022     COAGS:   Lab Results   Component Value Date    INR 1.06 06/27/2024     POC: No results found for: \"BGM\", \"HCG\", \"HCGS\"  HEPATIC:   Lab Results   Component Value Date    ALBUMIN 4.4 10/17/2022    PROTTOTAL 6.8 10/17/2022    ALT 21 10/17/2022    AST 20 10/17/2022    ALKPHOS 41 10/17/2022    BILITOTAL 0.7 10/17/2022     OTHER:   Lab Results   Component Value Date    LACT 0.8 10/17/2022    YUE 9.0 10/17/2022    LIPASE 196 09/09/2022    CRP <2.9 09/09/2022       Anesthesia Plan    ASA Status:  3    NPO Status:  NPO Appropriate    Anesthesia Type: General.     - Airway: ETT   Induction: Intravenous, Propofol.   Maintenance: Balanced.   Techniques and Equipment:     - Lines/Monitors: BIS     Consents    Anesthesia Plan(s) and associated risks, benefits, and realistic alternatives discussed. Questions answered and patient/representative(s) expressed understanding.     - Discussed: Risks, Benefits and Alternatives for BOTH SEDATION and the PROCEDURE were discussed     - Discussed with:  Patient, Parent (Mother and/or Father), Legal Guardian      - Extended Intubation/Ventilatory Support Discussed: No.      - Patient is DNR/DNI Status: No     Use of blood products discussed: No .     Postoperative Care    Pain management: IV analgesics, Oral pain medications.   PONV prophylaxis: Ondansetron (or other 5HT-3), Dexamethasone or Solumedrol     Comments:    Other Comments: SVEN, GETA with standard ASA monitors.            Jesusita Mitchell MD    I have reviewed the pertinent notes and labs in the chart from the past " "30 days and (re)examined the patient.  Any updates or changes from those notes are reflected in this note.              # Cachexia: Estimated body mass index is 15.84 kg/m  as calculated from the following:    Height as of this encounter: 1.448 m (4' 9\").    Weight as of this encounter: 33.2 kg (73 lb 3.1 oz).      "

## 2024-08-13 NOTE — H&P
[facility]   Cardiology Service  History & Physical    Efren Saavedra  : 1980  MRN # [id number]    ADMIT DATE: 2024    PCP: Dominic Sweeney MD    CHIEF COMPLAINT: mitral stenosis    HPI: Efren Saavedra is a 44 year old female with seizure disorder, intellectual disability and mitral valve stenosis who has been referred to [facility] for transesophageal echocardiogram to evaluate her mitral valve. She has also been referred to adult congenital heart disease clinic.     ROS:   12-pt ROS otherwise negative except as noted above    PMH:  Past Medical History:   Diagnosis Date    Seizures (H)        PSH:  Past Surgical History:   Procedure Laterality Date    feeding tube      ventilation tubes, bilateral         MEDICATIONS:  Cannot display prior to admission medications because the patient has not been admitted in this contact.        ALLERGIES:     Allergies   Allergen Reactions    Azithromycin      Abdominal pain/ cramping     Clotrimazole Rash    Omnicef [Cefdinir] Rash     Itchy and bad rash       FAMILY HISTORY:  Family History   Problem Relation Age of Onset    Cerebrovascular Disease Paternal Grandmother         CVA    Heart Disease Maternal Grandfather         disease    Hypertension Father     Other - See Comments Father         post polio    Parkinsonism Maternal Grandmother     Heart Disease Paternal Grandfather        SOCIAL HISTORY:  Social History     Socioeconomic History    Marital status: Single     Spouse name: Not on file    Number of children: Not on file    Years of education: Not on file    Highest education level: Not on file   Occupational History    Not on file   Tobacco Use    Smoking status: Never    Smokeless tobacco: Never    Tobacco comments:     no passive exposure   Substance and Sexual Activity    Alcohol use: No    Drug use: No    Sexual activity: Not on file   Other Topics Concern     Service Not Asked    Blood Transfusions Not Asked    Caffeine  Concern No    Occupational Exposure Not Asked    Hobby Hazards Not Asked    Sleep Concern Not Asked    Stress Concern Not Asked    Weight Concern Not Asked    Special Diet Not Asked    Back Care Not Asked    Exercise Not Asked    Bike Helmet Not Asked    Seat Belt Not Asked    Self-Exams Not Asked    Parent/sibling w/ CABG, MI or angioplasty before 65F 55M? No   Social History Narrative    Not on file     Social Determinants of Health     Financial Resource Strain: Not on file   Food Insecurity: Not on file   Transportation Needs: Not on file   Physical Activity: Not on file   Stress: Not on file   Social Connections: Not on file   Interpersonal Safety: Not on file   Housing Stability: Not on file       PHYSICAL EXAM:  not currently breastfeeding.  GENERAL: No acute distress  HEENT: Eye symmetrical and free of discharge bilaterally. Mucous membranes moist and without lesions.  NECK: Supple and without lymphadenopathy.  CV: S1/S2 heard   RESPIRATORY: Normal breath sounds, no wheezes or crackles noted  GI: Soft and non distended with normoactive bowel sounds present in all quadrants. No tenderness, rebound, guarding. No palpable organomegaly.   EXTREMITIES: No peripheral edema. 2+ bilateral pedal pulses.   NEUROLOGIC: Alert and orientated x 3. CN II-XII grossly intact. No focal deficits.   MUSCULOSKELETAL: No joint swelling or tenderness.   SKIN: No jaundice. No acute rashes or lesions.     LABS:  pending    IMAGING:  Results for orders placed or performed during the hospital encounter of 06/16/24   Chest XR,  PA & LAT    Narrative    PROCEDURE:  XR CHEST 2 VIEWS    HISTORY:  cough x 3 months.     COMPARISON:  None.    FINDINGS:   The heart is enlarged The pulmonary vasculature is normal.  There is  interstitial thickening seen in both lungs. The interstitial  thickening has remained unchanged as compared to previous examination.  No pleural effusion or pneumothorax.      Impression    IMPRESSION:  Cardiomegaly. Mild  chronic interstitial thickening.      ANA BURGOS MD         SYSTEM ID:  [id number]       ASSESSMENT & PLAN:  44F with mitral stenosis who presents for transesophageal echocardiogram to characterize her mitral valve disorder. Will proceed with sedation by anesthesia followed by transesophageal echocardiography. A pediatric SVEN probe may need to be used.     Philip Murguia MD, PhD  Click to text page [phone]  8/13/2024    Attending Attestation  I agree with the above note.   Delicai Bernstein MD

## 2024-08-13 NOTE — ANESTHESIA CARE TRANSFER NOTE
Patient: Efren Saavedra    Procedure: Procedure(s):  ECHOCARDIOGRAM, TRANSESOPHAGEAL, WITH ANESTHESIA       Diagnosis: Mitral stenosis [I05.0]  Diagnosis Additional Information: No value filed.    Anesthesia Type:   General     Note:    Oropharynx: oropharynx clear of all foreign objects and spontaneously breathing  Level of Consciousness: awake  Oxygen Supplementation: room air (pt wouldnt allow o2 on face)    Independent Airway: airway patency satisfactory and stable  Dentition: dentition unchanged  Vital Signs Stable: post-procedure vital signs reviewed and stable  Report to RN Given: handoff report given  Patient transferred to: PACU    Handoff Report: Identifed the Patient, Identified the Reponsible Provider, Reviewed the pertinent medical history, Discussed the surgical course, Reviewed Intra-OP anesthesia mangement and issues during anesthesia, Set expectations for post-procedure period and Allowed opportunity for questions and acknowledgement of understanding      Vitals:  Vitals Value Taken Time   /74 08/13/24 1618   Temp     Pulse 121 08/13/24 1622   Resp 26 08/13/24 1622   SpO2 97 % 08/13/24 1622   Vitals shown include unfiled device data.    Electronically Signed By: NAOMIE Barnett CRNA  August 13, 2024  4:22 PM

## 2024-08-14 NOTE — ANESTHESIA POSTPROCEDURE EVALUATION
Patient: Efren Saavedra    Procedure: Procedure(s):  ECHOCARDIOGRAM, TRANSESOPHAGEAL, WITH ANESTHESIA       Anesthesia Type:  General    Note:  Disposition: Outpatient   Postop Pain Control: Uneventful            Sign Out: Well controlled pain   PONV: No   Neuro/Psych: Uneventful            Sign Out: Acceptable/Baseline neuro status   Airway/Respiratory: Uneventful            Sign Out: Acceptable/Baseline resp. status   CV/Hemodynamics: Uneventful            Sign Out: Acceptable CV status; No obvious hypovolemia; No obvious fluid overload   Other NRE: NONE   DID A NON-ROUTINE EVENT OCCUR? No           Last vitals:  Vitals Value Taken Time   /77 08/13/24 1645   Temp 36.1  C (97  F) 08/13/24 1618   Pulse 124 08/13/24 1649   Resp 25 08/13/24 1649   SpO2 93 % 08/13/24 1650   Vitals shown include unfiled device data.    Electronically Signed By: Jesusita Mitchell MD  August 13, 2024  10:03 PM

## 2024-08-16 NOTE — PATIENT INSTRUCTIONS
Ears look well.   No fluid or infection.   Tubes are in good position and open.   Return to ENT for recheck in 5 months for cleaning/ powder      Thank you for allowing Marcelina Oneal PA-C and our ENT team to participate in your care.  If your medications are too expensive, please give the nurse a call.  We can possibly change this medication.  If you have a scheduling or an appointment question please contact our Health Unit Coordinator at [phone], Ext. [phone].    ALL nursing questions or concerns can be directed to your ENT nurse at: [phone] [place]     Pt call in for anbx for sinus issues. Would like sent to normal pharmacy

## 2024-08-17 ENCOUNTER — HEALTH MAINTENANCE LETTER (OUTPATIENT)
Age: 44
End: 2024-08-17

## 2024-08-19 ENCOUNTER — OFFICE VISIT (OUTPATIENT)
Dept: CARDIOLOGY | Facility: OTHER | Age: 44
End: 2024-08-19
Attending: INTERNAL MEDICINE
Payer: MEDICARE

## 2024-08-19 VITALS
SYSTOLIC BLOOD PRESSURE: 124 MMHG | HEART RATE: 103 BPM | OXYGEN SATURATION: 98 % | HEIGHT: 57 IN | WEIGHT: 73.8 LBS | RESPIRATION RATE: 20 BRPM | TEMPERATURE: 99.1 F | BODY MASS INDEX: 15.92 KG/M2 | DIASTOLIC BLOOD PRESSURE: 76 MMHG

## 2024-08-19 DIAGNOSIS — I05.0 MITRAL VALVE STENOSIS, UNSPECIFIED ETIOLOGY: Primary | ICD-10-CM

## 2024-08-19 PROCEDURE — 99205 OFFICE O/P NEW HI 60 MIN: CPT | Performed by: INTERNAL MEDICINE

## 2024-08-19 PROCEDURE — G0463 HOSPITAL OUTPT CLINIC VISIT: HCPCS

## 2024-08-19 ASSESSMENT — PAIN SCALES - GENERAL: PAINLEVEL: NO PAIN (0)

## 2024-08-19 NOTE — NURSING NOTE
Patient is here with mom and friend for a consult for mitral stenosis.     No LMP recorded (lmp unknown).  Medication Reconciliation: complete    Marcelina Perales LPN 8/19/2024 11:26 AM       Advance care directive on file? yes  Advance care directive provided to patient? na       Marcelina Perales LPN

## 2024-08-19 NOTE — PATIENT INSTRUCTIONS
You were seen today in the Cardiovascular Clinic at the [facility].      Cardiology Providers you saw during your visit:  Dr. Antonio    Recommendations:    CT in [place]; then our MD will read interpret it.    Follow-up:    Once all your testing is complete, I will present your case at valve conference. (Valve conference is held every Thursday. I will contact you either Thursday or Friday with the results)    Nursing questions:  Ashley MARINELLI;  [employer] [place] messages are great! Otherwise [phone] if you don't hear back within 24 hrs please call back.   For emergencies call 911.      Thank you for your visit today!     Ashley Lo RN  Lead Structural Heart Coordinator  [facility], [employer] [facility]

## 2024-08-19 NOTE — PROGRESS NOTES
[facility] HEART CARE  CARDIOVASCULAR DIVISION    VALVE CLINIC CONSULTATION    PRIMARY CARDIOLOGIST: None      PERTINENT CLINICAL HISTORY & REASON FOR CONSULTATION:     Efren Saavedra is a very pleasant 44 year old female with severe mitral stenosis referred to our clinic for evaluation and consideration for potential percutaneous balloon mitral commissurotomy ([facility]). Her severe mitral stenosis is characterized by a MVA of 1.1 cm2, mean PG 7 mmHg with LVEF 60-65% and Bhatt score of 7. Her additional past medical history if notable for developmental delay and seizure disorder.     Patient was noted to have a cough several months ago and at that time was incidentally found to have enlarged heart after an X-ray was obtained of her lungs. She subsequently had an echocardiogram performed which demonstrated severe mitral stenosis. She is not very active at baseline, with her mother saying she spends most of the time watching television and doing puzzles. She has not reported any shortness of breath, chest pain, orthopnea, PND, leg swelling or weight gain. She also denies lightheadedness, palpitations, and/or or syncope. She has no other acute complaints at this time.      PAST MEDICAL HISTORY:     Past Medical History:   Diagnosis Date    Seizures (H)       PAST SURGICAL HISTORY:     Past Surgical History:   Procedure Laterality Date    feeding tube      TRANSESOPHAGEAL ECHOCARDIOGRAM INTRAOPERATIVE N/A 8/13/2024    Procedure: ECHOCARDIOGRAM, TRANSESOPHAGEAL, WITH ANESTHESIA;  Surgeon: GENERIC ANESTHESIA PROVIDER;  Location: [facility] OR    ventilation tubes, bilateral        CURRENT MEDICATIONS:     Current Outpatient Medications   Medication Sig Dispense Refill    levETIRAcetam (KEPPRA) 250 MG tablet Take 500 mg by mouth 2 times daily      medroxyPROGESTERone (DEPO-PROVERA) 150 MG/ML IM injection Inject 150 mg into the muscle        ALLERGIES:     Allergies   Allergen Reactions    Azithromycin      Abdominal pain/  "cramping     Clotrimazole Rash    Omnicef [Cefdinir] Rash     Itchy and bad rash      FAMILY HISTORY:     Family History   Problem Relation Age of Onset    Cerebrovascular Disease Paternal Grandmother         CVA    Heart Disease Maternal Grandfather         disease    Hypertension Father     Other - See Comments Father         post polio    Parkinsonism Maternal Grandmother     Heart Disease Paternal Grandfather       SOCIAL HISTORY:     Social History     Socioeconomic History    Marital status: Single   Tobacco Use    Smoking status: Never    Smokeless tobacco: Never    Tobacco comments:     no passive exposure   Substance and Sexual Activity    Alcohol use: No    Drug use: No   Other Topics Concern    Caffeine Concern No    Parent/sibling w/ CABG, MI or angioplasty before 65F 55M? No        REVIEW OF SYSTEMS:     Constitutional: No fevers or chills  Skin: No new rash or itching  Eyes: No acute change in vision  Ears/Nose/Throat: No purulent rhinorrhea, new hearing loss, or new vertigo  Respiratory: No cough or hemoptysis  Cardiovascular: See HPI  Gastrointestinal: No change in appetite, vomiting, hematemesis or diarrhea  Genitourinary: No dysuria or hematuria  Musculoskeletal: No new back pain, neck pain or muscle pain  Neurologic: No new headaches, focal weakness or behavior changes  Psychiatric: No hallucinations, excessive alcohol consumption or illegal drug usage  Hematologic/Lymphatic/Immunologic: No bleeding, chills, fever, night sweats or weight loss  Endocrine: No new cold intolerance, heat intolerance, polyphagia, polydipsia or polyuria      PHYSICAL EXAMINATION:     /76   Pulse 103   Temp 99.1  F (37.3  C) (Temporal)   Resp 20   Ht 1.447 m (4' 8.97\")   Wt 33.5 kg (73 lb 12.8 oz)   LMP  (LMP Unknown)   SpO2 98%   Breastfeeding No   BMI 15.99 kg/m      GENERAL: No acute distress.  HEENT: EOMI. Sclerae white, not injected. Nares clear. Pharynx without erythema or exudate.   Neck: No " "adenopathy. No thyromegaly. No jugular venous distension.   Heart: Regular rate and rhythm. No murmur.   Lungs: Clear to auscultation. No ronchi, wheezes, rales.   Abdomen: Soft, nontender, nondistended. Bowel sounds present.  Extremities: No clubbing, cyanosis, or edema.   Neurologic: Alert and oriented to person/place/time, normal speech and affect. No focal deficits.  Skin: No petechiae, purpura or rash.     LABORATORY DATA:     LIPID RESULTS:  No results found for: \"CHOL\", \"HDL\", \"LDL\", \"TRIG\", \"CHOLHDLRATIO\"    LIVER ENZYME RESULTS:  Lab Results   Component Value Date    AST 20 10/17/2022    ALT 21 10/17/2022     CBC RESULTS:  Lab Results   Component Value Date    WBC 8.0 06/27/2024    RBC 5.01 06/27/2024    HGB 14.8 06/27/2024    HCT 43.0 06/27/2024    MCV 86 06/27/2024    MCH 29.5 06/27/2024    MCHC 34.4 06/27/2024    RDW 13.2 06/27/2024     06/27/2024     BMP RESULTS:  Lab Results   Component Value Date     (L) 10/17/2022    POTASSIUM 4.1 10/17/2022    POTASSIUM 4.0 09/09/2022    CHLORIDE 100 10/17/2022    CHLORIDE 104 09/09/2022    CO2 21 (L) 10/17/2022    CO2 25 09/09/2022    ANIONGAP 14 10/17/2022    ANIONGAP 5 09/09/2022     (H) 10/17/2022    GLC 99 09/09/2022    BUN 15.2 10/17/2022    BUN 11 09/09/2022    CR 0.62 10/17/2022    GFRESTIMATED >90 10/17/2022    YUE 9.0 10/17/2022      A1C RESULTS:  No results found for: \"A1C\"    INR RESULTS:  Lab Results   Component Value Date    INR 1.06 06/27/2024      PROCEDURES & FURTHER ASSESSMENTS:     SVEN dated 8/13/24:  Technically difficult study. Extremely poor acoustic windows.  Moderate mitral stenosis is present. The etiology of the mitral stenosis is  rheumatic. No parachute mitral valve or mitral valve arcade.  The mean gradient across the mitral valve is 7 mmHg at a heart rate of 93 bpm.  Mitral valve pressure half time is 193 ms, which estimates a mitral valve size  of 1.1 cm^2.  Bhatt score = 7 (2 for mobility, 1 for thickening, 3 for " calcification, 1  for subvalvular thickening).    STS RISK SCORE 1.26%  NYHA CLASS: I    Mallampati score: 2    ASA physical status classification: 2      CLINICAL IMPRESSION:     Efren Saavedra is a very pleasant 44 year old female with severe mitral stenosis referred to our clinic for evaluation and consideration for potential percutaneous balloon mitral commissurotomy. Her severe mitral stenosis is characterized by a MVA of 1.1 cm2, mean PG 7 mmHg with LVEF 60-65% and Bhatt score of 7.     We discussed the natural history of severe mitral stenosis, associated symptoms and available treatment options including percutaneous balloon mitral commissurotomy versus surgical aortic valve replacement.      Plan Summary:  1) Severe Mitral Stenosis:  - Proceed with [facility] evaluation  - CT TMVR to better assess mitral anatomy for feasibility of the procedure in the setting of sub-optimal windows on SVEN.   - CT coronaries to assess coronary anatomy   - Following completion of the above, will be discussed during our multi-disciplinary conference for consensus decision and procedural planning.    Patient was evaluated in clinic with Dr. Antonio .    John Meier MD  [facility] Cardiology Team    Greater than 30 minutes were spent with patient and family providing education regarding progression of aortic stenosis, symptom recognition and management as well as approach to treatment and post-procedural expectations.     CC  Patient Care Team:  Dominic Sweeney MD as PCP - Marcelina Purdy PA-C as Assigned Surgical Provider  Lilia Medina DPM as Assigned Musculoskeletal Provider    Patient seen and examined with Cardiovascular fellow and agree with the assessment and plan described above.     Chris Antonio M.D.  Interventional Cardiology  [facility]

## 2024-08-27 ENCOUNTER — OFFICE VISIT (OUTPATIENT)
Dept: OTOLARYNGOLOGY | Facility: OTHER | Age: 44
End: 2024-08-27
Attending: PHYSICIAN ASSISTANT
Payer: MEDICARE

## 2024-08-27 VITALS
SYSTOLIC BLOOD PRESSURE: 111 MMHG | BODY MASS INDEX: 15.93 KG/M2 | WEIGHT: 73.85 LBS | OXYGEN SATURATION: 98 % | HEIGHT: 57 IN | RESPIRATION RATE: 20 BRPM | TEMPERATURE: 98.1 F | DIASTOLIC BLOOD PRESSURE: 76 MMHG | HEART RATE: 106 BPM

## 2024-08-27 DIAGNOSIS — Z45.89 TYMPANOSTOMY TUBE CHECK: ICD-10-CM

## 2024-08-27 DIAGNOSIS — H90.5 CONGENITAL HEARING LOSS: ICD-10-CM

## 2024-08-27 DIAGNOSIS — H60.63 CHRONIC NON-INFECTIVE OTITIS EXTERNA OF BOTH EARS, UNSPECIFIED TYPE: Primary | ICD-10-CM

## 2024-08-27 DIAGNOSIS — H90.6 MIXED CONDUCTIVE AND SENSORINEURAL HEARING LOSS, BILATERAL: ICD-10-CM

## 2024-08-27 PROCEDURE — G0463 HOSPITAL OUTPT CLINIC VISIT: HCPCS

## 2024-08-27 PROCEDURE — 99212 OFFICE O/P EST SF 10 MIN: CPT | Mod: 25 | Performed by: PHYSICIAN ASSISTANT

## 2024-08-27 PROCEDURE — 92504 EAR MICROSCOPY EXAMINATION: CPT | Performed by: PHYSICIAN ASSISTANT

## 2024-08-27 ASSESSMENT — PAIN SCALES - GENERAL: PAINLEVEL: NO PAIN (0)

## 2024-08-27 NOTE — PROGRESS NOTES
"Chief Complaint   Patient presents with    Cerumen Impaction    Procedure     Ear cleaning   She has been doing well with her ears since her last visit.   Denies otalgia, otorrhea.   No concerns with tinnitus, noises in ears.   Cough has overall been doing better, no worrisome changes.       She does have hearing loss which has been overall stable and recommended use of hearing aids.  Efren does not tolerate use and declines a to use on a daily basis.  We reviewed consideration for repeat imaging and at this time.  Have declined.  They have declined further options of possible consideration for [place] referral to otology     Distant hx of BTT with placement of t-tubes. Tubes have been in place for 30 years.    her seizures have been doing well w/ her medications.   She has been on medications w/o concerns.         Past Medical History:   Diagnosis Date    Seizures (H)         Allergies   Allergen Reactions    Azithromycin      Abdominal pain/ cramping     Clotrimazole Rash    Omnicef [Cefdinir] Rash     Itchy and bad rash     Current Outpatient Medications   Medication Sig Dispense Refill    levETIRAcetam (KEPPRA) 250 MG tablet Take 500 mg by mouth 2 times daily      medroxyPROGESTERone (DEPO-PROVERA) 150 MG/ML IM injection Inject 150 mg into the muscle       No current facility-administered medications for this visit.     ROS- SEE HPI  /76 (BP Location: Right arm, Patient Position: Sitting, Cuff Size: Child)   Pulse 106   Temp 98.1  F (36.7  C) (Tympanic)   Resp 20   Ht 1.447 m (4' 8.97\")   Wt 33.5 kg (73 lb 13.7 oz)   LMP  (LMP Unknown)   SpO2 98%   BMI 16.00 kg/m      General: Craniofacial abnormality.   Eyes: conjuctiva clear, PERRL, EOM intact   Ears: Canals overall clear. Narrow canals. . Low set auricles with poor antihelical folds defined. t-tube bilaterally. Gila bowl bilateral overall look well.   Nose: Nares normal   Mouth: crowded.   Neck: Supple, no cervical adenopathy, no thyromegaly    "   The ear canals were examined underneath the operating microscope and with an otologic speculum. Bilateral canals with dried epithelium, cerumen removed.   Canals, stenotic   Bilateral tubes appear in place and patent.    Narrow canals which limits examination.  Tympanic membranes appear with tubes. TMs are thickened and appear with sclerosis, prominent vasculature.   Miconazole powder applied bilateral      ASSESSMENT/ PLAN:    ICD-10-CM    1. Chronic non-infective otitis externa of both ears, unspecified type  H60.63 miconazole (MICATIN) 2 % powder     DISCONTINUED: miconazole (MICATIN) 2 % powder      2. Tympanostomy tube check  Z45.89       3. Mixed conductive and sensorineural hearing loss, bilateral  H90.6       4. Congenital hearing loss  H90.5               Ears were cleaned  Stable ear exam.   No active otorrhea.   Miconazole applied  Follow up in 2-3 months      Marcelina Oneal PA-C  ENT  [facility], [place]

## 2024-08-27 NOTE — PATIENT INSTRUCTIONS
Ears were cleaned  Powder applied  Follow up in 2.5 months.     Thank you for allowing LEONOR Avitia and our ENT team to participate in your care.  If your medications are too expensive, please give the nurse a call.  We can possibly change this medication.  If you have a scheduling or an appointment question please contact our Health Unit Coordinator at their direct line [phone].   ALL nursing questions or concerns can be directed to your ENT nurse, [place] at: [phone].

## 2024-08-27 NOTE — LETTER
"8/27/2024      Efren Saavedra  [address]  [place] [address]      Dear Colleague,    Thank you for referring your patient, Efren Saavedra, to the [facility]. Please see a copy of my visit note below.    Chief Complaint   Patient presents with     Cerumen Impaction     Procedure     Ear cleaning   She has been doing well with her ears since her last visit.   Denies otalgia, otorrhea.   No concerns with tinnitus, noises in ears.   Cough has overall been doing better, no worrisome changes.       She does have hearing loss which has been overall stable and recommended use of hearing aids.  Efren does not tolerate use and declines a to use on a daily basis.  We reviewed consideration for repeat imaging and at this time.  Have declined.  They have declined further options of possible consideration for [place] referral to otology     Distant hx of BTT with placement of t-tubes. Tubes have been in place for 30 years.    her seizures have been doing well w/ her medications.   She has been on medications w/o concerns.         Past Medical History:   Diagnosis Date     Seizures (H)         Allergies   Allergen Reactions     Azithromycin      Abdominal pain/ cramping      Clotrimazole Rash     Omnicef [Cefdinir] Rash     Itchy and bad rash     Current Outpatient Medications   Medication Sig Dispense Refill     levETIRAcetam (KEPPRA) 250 MG tablet Take 500 mg by mouth 2 times daily       medroxyPROGESTERone (DEPO-PROVERA) 150 MG/ML IM injection Inject 150 mg into the muscle       No current facility-administered medications for this visit.     ROS- SEE HPI  /76 (BP Location: Right arm, Patient Position: Sitting, Cuff Size: Child)   Pulse 106   Temp 98.1  F (36.7  C) (Tympanic)   Resp 20   Ht 1.447 m (4' 8.97\")   Wt 33.5 kg (73 lb 13.7 oz)   LMP  (LMP Unknown)   SpO2 98%   BMI 16.00 kg/m      General: Craniofacial abnormality.   Eyes: conjuctiva clear, PERRL, EOM intact   Ears: Canals " overall clear. Narrow canals. . Low set auricles with poor antihelical folds defined. t-tube bilaterally. Gila bowl bilateral overall look well.   Nose: Nares normal   Mouth: crowded.   Neck: Supple, no cervical adenopathy, no thyromegaly      The ear canals were examined underneath the operating microscope and with an otologic speculum. Bilateral canals with dried epithelium, cerumen removed.   Canals, stenotic   Bilateral tubes appear in place and patent.    Narrow canals which limits examination.  Tympanic membranes appear with tubes. TMs are thickened and appear with sclerosis, prominent vasculature.   Miconazole powder applied bilateral      ASSESSMENT/ PLAN:    ICD-10-CM    1. Chronic non-infective otitis externa of both ears, unspecified type  H60.63 miconazole (MICATIN) 2 % powder     DISCONTINUED: miconazole (MICATIN) 2 % powder      2. Tympanostomy tube check  Z45.89       3. Mixed conductive and sensorineural hearing loss, bilateral  H90.6       4. Congenital hearing loss  H90.5               Ears were cleaned  Stable ear exam.   No active otorrhea.   Miconazole applied  Follow up in 2-3 months      Marcelina Oneal PA-C  ENT  [facility], [place]      Again, thank you for allowing me to participate in the care of your patient.        Sincerely,        Marcelina Oneal PA-C

## 2024-08-30 ENCOUNTER — TELEPHONE (OUTPATIENT)
Dept: CARDIOLOGY | Facility: CLINIC | Age: 44
End: 2024-08-30
Payer: MEDICARE

## 2024-08-30 NOTE — TELEPHONE ENCOUNTER
Called around [place] and a lot of places don't do CT TMVR. I found [facility] in [place] and we faxed the order to them at [phone]. They said give them a few days to get the AUTH and than they will call the patient to schedule her. I called and left a detailed message letting the patient know this.

## 2024-09-20 ENCOUNTER — OFFICE VISIT (OUTPATIENT)
Dept: FAMILY MEDICINE | Facility: OTHER | Age: 44
End: 2024-09-20
Attending: FAMILY MEDICINE
Payer: MEDICARE

## 2024-09-20 VITALS
OXYGEN SATURATION: 98 % | WEIGHT: 74.2 LBS | TEMPERATURE: 99.3 F | BODY MASS INDEX: 16.01 KG/M2 | HEIGHT: 57 IN | SYSTOLIC BLOOD PRESSURE: 102 MMHG | HEART RATE: 98 BPM | RESPIRATION RATE: 16 BRPM | DIASTOLIC BLOOD PRESSURE: 72 MMHG

## 2024-09-20 DIAGNOSIS — R79.9 ABNORMAL FINDING OF BLOOD CHEMISTRY, UNSPECIFIED: ICD-10-CM

## 2024-09-20 DIAGNOSIS — B36.0 TINEA VERSICOLOR: ICD-10-CM

## 2024-09-20 DIAGNOSIS — L60.3 BRITTLE NAILS: ICD-10-CM

## 2024-09-20 DIAGNOSIS — Z30.9 ENCOUNTER FOR CONTRACEPTIVE MANAGEMENT, UNSPECIFIED TYPE: ICD-10-CM

## 2024-09-20 DIAGNOSIS — Z23 NEED FOR PROPHYLACTIC VACCINATION AND INOCULATION AGAINST INFLUENZA: ICD-10-CM

## 2024-09-20 DIAGNOSIS — N92.6 IRREGULAR MENSTRUAL CYCLE: Primary | ICD-10-CM

## 2024-09-20 DIAGNOSIS — F79 UNSPECIFIED INTELLECTUAL DISABILITIES: ICD-10-CM

## 2024-09-20 DIAGNOSIS — G40.909 SEIZURE DISORDER (H): ICD-10-CM

## 2024-09-20 PROCEDURE — 99214 OFFICE O/P EST MOD 30 MIN: CPT | Performed by: FAMILY MEDICINE

## 2024-09-20 PROCEDURE — 90656 IIV3 VACC NO PRSV 0.5 ML IM: CPT

## 2024-09-20 PROCEDURE — 90480 ADMN SARSCOV2 VAC 1/ONLY CMP: CPT

## 2024-09-20 PROCEDURE — G0463 HOSPITAL OUTPT CLINIC VISIT: HCPCS | Mod: 25 | Performed by: FAMILY MEDICINE

## 2024-09-20 PROCEDURE — G2211 COMPLEX E/M VISIT ADD ON: HCPCS | Performed by: FAMILY MEDICINE

## 2024-09-20 RX ORDER — LORAZEPAM 0.5 MG/1
0.5 TABLET ORAL DAILY PRN
Qty: 2 TABLET | Refills: 0 | Status: SHIPPED | OUTPATIENT
Start: 2024-09-20

## 2024-09-20 RX ORDER — NORETHINDRONE ACETATE AND ETHINYL ESTRADIOL 1MG-20(21)
1 KIT ORAL DAILY
Qty: 90 TABLET | Refills: 3 | Status: SHIPPED | OUTPATIENT
Start: 2024-09-20

## 2024-09-20 RX ORDER — KETOCONAZOLE 20 MG/G
CREAM TOPICAL DAILY
Qty: 60 G | Refills: 3 | Status: SHIPPED | OUTPATIENT
Start: 2024-09-20

## 2024-09-20 ASSESSMENT — PAIN SCALES - GENERAL: PAINLEVEL: NO PAIN (0)

## 2024-09-20 NOTE — PROGRESS NOTES
Assessment & Plan     Irregular menstrual cycle / Encounter for contraceptive management, unspecified type  Pt on depo provera due to dysmenorrhea. Does continue to have spotting and cramping. Pt also known osteoporosis. Pt takes medications daily. No known strike hx, migraine, clot hx. Okay for ocp will have her take these continuously given sx with menses. Looks like she was on OCP back in 2005 for some time as well, possibly some breakthrough bleeding with these.   - norethindrone-ethinyl estradiol (JUNEL FE 1/20) 1-20 MG-MCG tablet  Dispense: 90 tablet; Refill: 3    Unspecified intellectual disabilities / Seizure disorder (H)  Unclear underlying diagnosis, but pt with dysmorphic fx, agenesis of corpus callosum, seizure disorder and intellectual disabilities. Followed by neurology for the seizures. New dx of mitral stenosis. Will try to get further info regarding possible unifying diagnosis to help guide any screenings, etc as pt ages.   - LORazepam (ATIVAN) 0.5 MG tablet  Dispense: 2 tablet; Refill: 0    Brittle nails  Screen labs. Did not notice clear fungal infection.   - CBC with platelets and differential  - Iron and iron binding capacity  - Vitamin B12    Tinea versicolor  Mild, on back. Has had this before, mom has used nizoral with good results.   - ketoconazole (NIZORAL) 2 % external cream  Dispense: 60 g; Refill: 3    Need for prophylactic vaccination and inoculation against influenza  Flu vaccine given today    Return in about 6 months (around 3/20/2025) for Physical.    The longitudinal plan of care for the diagnosis(es)/condition(s) as documented were addressed during this visit. Due to the added complexity in care, I will continue to support Ronna in the subsequent management and with ongoing continuity of care.    Subjective   Ronna is a 44 year old, presenting for the following health issues:  Establish Care, Vascular Disease, and Imm/Inj (Flu Shot)        9/20/2024     3:23 PM   Additional  "Questions   Roomed by Rochelle Nelson     History of Present Illness       Vascular Disease:  She presents for follow up of vascular disease.     She never takes nitroglycerin. She is not taking daily aspirin.    She eats 0-1 servings of fruits and vegetables daily.She consumes 2 sweetened beverage(s) daily.She exercises with enough effort to increase her heart rate 9 or less minutes per day.  She exercises with enough effort to increase her heart rate 3 or less days per week.   She is taking medications regularly.       Rash  Onset/Duration: unknown   Description  Location: back  Character: round, blotchy  Itching: no  Intensity:  mild  Progression of Symptoms:  same  Accompanying signs and symptoms:   Fever: No  Body aches or joint pain: No  Sore throat symptoms: No  Recent cold symptoms: No  History:           Previous episodes of similar rash: yes, ketoconazole in the past  New exposures:  None  Recent travel: No  Exposure to similar rash: No  Precipitating or alleviating factors: none  Therapies tried and outcome: none    Concern - Nails  Onset: chronic  Description: brittle nails  Intensity: mild  Progression of Symptoms:  same  Accompanying Signs & Symptoms: no thickening, yellowing  Previous history of similar problem: no  Precipitating factors:        Worsened by: NA  Alleviating factors:        Improved by: NA  Therapies tried and outcome: vicks      Review of Systems  Constitutional, neuro, ENT, endocrine, pulmonary, cardiac, gastrointestinal, genitourinary, musculoskeletal, integument and psychiatric systems are negative, except as otherwise noted.      Objective    /72 (BP Location: Left arm, Patient Position: Sitting, Cuff Size: Child)   Pulse 98   Temp 99.3  F (37.4  C) (Tympanic)   Resp 16   Ht 1.447 m (4' 8.97\")   Wt 33.7 kg (74 lb 3.2 oz)   LMP  (LMP Unknown)   SpO2 98%   BMI 16.07 kg/m    Body mass index is 16.07 kg/m .  Physical Exam   GENERAL: alert and no distress  HENT: ear " canals and TM's normal, nose and mouth without ulcers or lesions  RESP: lungs clear to auscultation - no rales, rhonchi or wheezes  CV: regular rates and rhythm, normal S1 S2, no S3 or S4, no murmur, click or rub, and no peripheral edema  MS: no gross musculoskeletal defects noted, no edema  SKIN: no suspicious lesions or rashes  NEURO: Pt is alert, attentive, defers to grandma to answer questions  PSYCH: mentation appears normal, affect normal/bright    No results found for any visits on 09/20/24.        Signed Electronically by: Cee Velez MD

## 2024-09-23 ENCOUNTER — MYC MEDICAL ADVICE (OUTPATIENT)
Dept: FAMILY MEDICINE | Facility: OTHER | Age: 44
End: 2024-09-23

## 2024-09-23 NOTE — TELEPHONE ENCOUNTER
9/23/2024 2:25 PM  Writer called pt's mother regarding my chart message.   Pt scheduled for lab only appointment.    Carin Rock RN

## 2024-09-26 ENCOUNTER — LAB (OUTPATIENT)
Dept: LAB | Facility: OTHER | Age: 44
End: 2024-09-26
Payer: MEDICARE

## 2024-09-26 DIAGNOSIS — L60.3 BRITTLE NAILS: ICD-10-CM

## 2024-09-26 DIAGNOSIS — R79.9 ABNORMAL FINDING OF BLOOD CHEMISTRY, UNSPECIFIED: ICD-10-CM

## 2024-09-26 LAB
BASOPHILS # BLD AUTO: 0.1 10E3/UL (ref 0–0.2)
BASOPHILS NFR BLD AUTO: 1 %
EOSINOPHIL # BLD AUTO: 0.1 10E3/UL (ref 0–0.7)
EOSINOPHIL NFR BLD AUTO: 1 %
ERYTHROCYTE [DISTWIDTH] IN BLOOD BY AUTOMATED COUNT: 13.3 % (ref 10–15)
HCT VFR BLD AUTO: 44.3 % (ref 35–47)
HGB BLD-MCNC: 15 G/DL (ref 11.7–15.7)
IMM GRANULOCYTES # BLD: 0 10E3/UL
IMM GRANULOCYTES NFR BLD: 0 %
IRON BINDING CAPACITY (ROCHE): 321 UG/DL (ref 240–430)
IRON SATN MFR SERPL: 35 % (ref 15–46)
IRON SERPL-MCNC: 112 UG/DL (ref 37–145)
LYMPHOCYTES # BLD AUTO: 0.8 10E3/UL (ref 0.8–5.3)
LYMPHOCYTES NFR BLD AUTO: 9 %
MCH RBC QN AUTO: 29.2 PG (ref 26.5–33)
MCHC RBC AUTO-ENTMCNC: 33.9 G/DL (ref 31.5–36.5)
MCV RBC AUTO: 86 FL (ref 78–100)
MONOCYTES # BLD AUTO: 1.2 10E3/UL (ref 0–1.3)
MONOCYTES NFR BLD AUTO: 13 %
NEUTROPHILS # BLD AUTO: 7.2 10E3/UL (ref 1.6–8.3)
NEUTROPHILS NFR BLD AUTO: 77 %
NRBC # BLD AUTO: 0 10E3/UL
NRBC BLD AUTO-RTO: 0 /100
PLATELET # BLD AUTO: 197 10E3/UL (ref 150–450)
RBC # BLD AUTO: 5.14 10E6/UL (ref 3.8–5.2)
VIT B12 SERPL-MCNC: 1317 PG/ML (ref 232–1245)
WBC # BLD AUTO: 9.3 10E3/UL (ref 4–11)

## 2024-09-26 PROCEDURE — 82607 VITAMIN B-12: CPT | Mod: ZL

## 2024-09-26 PROCEDURE — 36415 COLL VENOUS BLD VENIPUNCTURE: CPT | Mod: ZL

## 2024-09-26 PROCEDURE — 83550 IRON BINDING TEST: CPT | Mod: ZL

## 2024-09-26 PROCEDURE — 85004 AUTOMATED DIFF WBC COUNT: CPT | Mod: ZL

## 2024-10-01 ENCOUNTER — HOSPITAL ENCOUNTER (INPATIENT)
Dept: CARDIOLOGY | Facility: CLINIC | Age: 44
Discharge: HOME | End: 2024-10-01
Attending: NURSE PRACTITIONER
Payer: MEDICARE

## 2024-10-01 DIAGNOSIS — I05.0 MITRAL VALVE STENOSIS, UNSPECIFIED ETIOLOGY: Primary | ICD-10-CM

## 2024-10-01 DIAGNOSIS — I05.0 MITRAL VALVE STENOSIS, UNSPECIFIED ETIOLOGY: ICD-10-CM

## 2024-10-01 PROCEDURE — 75572 CT HRT W/3D IMAGE: CPT | Mod: 26 | Performed by: INTERNAL MEDICINE

## 2024-10-03 ENCOUNTER — PATIENT OUTREACH (OUTPATIENT)
Dept: CARDIOLOGY | Facility: CLINIC | Age: 44
End: 2024-10-03
Payer: MEDICARE

## 2024-10-03 NOTE — CONFIDENTIAL NOTE
RN call to patient mother, leaving detailed voicemail notifying her that we have received her daughter's images from [place]. [facility] Ramirez has submitted the order to have our imaging doctors overread it- currently waiting on the report. Plan is to present patient at valve conference next week pending the CT read.     Deysi Ramirez RN

## 2024-10-11 ENCOUNTER — TELEPHONE (OUTPATIENT)
Dept: CARDIOLOGY | Facility: CLINIC | Age: 44
End: 2024-10-11
Payer: MEDICARE

## 2024-10-11 NOTE — TELEPHONE ENCOUNTER
Attempted telephone call, left voicemail.      Ashley Lo, RN, BSN  Lead Structural Heart Coordinator  [facility] [facility]

## 2024-10-14 LAB
BASOPHILS # BLD AUTO: 0 10E3/UL (ref 0–0.2)
BASOPHILS NFR BLD AUTO: 0 %
EOSINOPHIL # BLD AUTO: 0.1 10E3/UL (ref 0–0.7)
EOSINOPHIL NFR BLD AUTO: 2 %
ERYTHROCYTE [DISTWIDTH] IN BLOOD BY AUTOMATED COUNT: 14.1 % (ref 10–15)
FLUAV RNA SPEC QL NAA+PROBE: NEGATIVE
FLUBV RNA RESP QL NAA+PROBE: NEGATIVE
HCT VFR BLD AUTO: 38.3 % (ref 35–47)
HGB BLD-MCNC: 12.7 G/DL (ref 11.7–15.7)
HOLD SPECIMEN: NORMAL
IMM GRANULOCYTES # BLD: 0.1 10E3/UL
IMM GRANULOCYTES NFR BLD: 1 %
LYMPHOCYTES # BLD AUTO: 0.6 10E3/UL (ref 0.8–5.3)
LYMPHOCYTES NFR BLD AUTO: 7 %
MCH RBC QN AUTO: 29.1 PG (ref 26.5–33)
MCHC RBC AUTO-ENTMCNC: 33.2 G/DL (ref 31.5–36.5)
MCV RBC AUTO: 88 FL (ref 78–100)
MONOCYTES # BLD AUTO: 0.9 10E3/UL (ref 0–1.3)
MONOCYTES NFR BLD AUTO: 9 %
NEUTROPHILS # BLD AUTO: 7.5 10E3/UL (ref 1.6–8.3)
NEUTROPHILS NFR BLD AUTO: 81 %
NRBC # BLD AUTO: 0 10E3/UL
NRBC BLD AUTO-RTO: 0 /100
PLATELET # BLD AUTO: 170 10E3/UL (ref 150–450)
RBC # BLD AUTO: 4.36 10E6/UL (ref 3.8–5.2)
RSV RNA SPEC NAA+PROBE: NEGATIVE
SARS-COV-2 RNA RESP QL NAA+PROBE: NEGATIVE
WBC # BLD AUTO: 9.3 10E3/UL (ref 4–11)

## 2024-10-14 PROCEDURE — 250N000011 HC RX IP 250 OP 636: Performed by: INTERNAL MEDICINE

## 2024-10-14 PROCEDURE — 83880 ASSAY OF NATRIURETIC PEPTIDE: CPT | Performed by: INTERNAL MEDICINE

## 2024-10-14 PROCEDURE — 36415 COLL VENOUS BLD VENIPUNCTURE: CPT | Performed by: INTERNAL MEDICINE

## 2024-10-14 PROCEDURE — 99285 EMERGENCY DEPT VISIT HI MDM: CPT | Performed by: INTERNAL MEDICINE

## 2024-10-14 PROCEDURE — 99291 CRITICAL CARE FIRST HOUR: CPT | Mod: 25

## 2024-10-14 PROCEDURE — 87637 SARSCOV2&INF A&B&RSV AMP PRB: CPT | Performed by: INTERNAL MEDICINE

## 2024-10-14 PROCEDURE — 84484 ASSAY OF TROPONIN QUANT: CPT | Performed by: INTERNAL MEDICINE

## 2024-10-14 PROCEDURE — 99291 CRITICAL CARE FIRST HOUR: CPT

## 2024-10-14 PROCEDURE — 80053 COMPREHEN METABOLIC PANEL: CPT | Performed by: INTERNAL MEDICINE

## 2024-10-14 PROCEDURE — 85025 COMPLETE CBC W/AUTO DIFF WBC: CPT | Performed by: INTERNAL MEDICINE

## 2024-10-14 RX ORDER — ONDANSETRON 4 MG/1
4 TABLET, ORALLY DISINTEGRATING ORAL ONCE
Status: COMPLETED | OUTPATIENT
Start: 2024-10-14 | End: 2024-10-14

## 2024-10-14 RX ADMIN — ONDANSETRON 4 MG: 4 TABLET, ORALLY DISINTEGRATING ORAL at 21:19

## 2024-10-14 ASSESSMENT — COLUMBIA-SUICIDE SEVERITY RATING SCALE - C-SSRS
2. HAVE YOU ACTUALLY HAD ANY THOUGHTS OF KILLING YOURSELF IN THE PAST MONTH?: NO
1. IN THE PAST MONTH, HAVE YOU WISHED YOU WERE DEAD OR WISHED YOU COULD GO TO SLEEP AND NOT WAKE UP?: NO
6. HAVE YOU EVER DONE ANYTHING, STARTED TO DO ANYTHING, OR PREPARED TO DO ANYTHING TO END YOUR LIFE?: NO

## 2024-10-15 ENCOUNTER — HOSPITAL ENCOUNTER (EMERGENCY)
Facility: HOSPITAL | Age: 44
Discharge: ANOTHER HOSPITAL | End: 2024-10-15
Attending: INTERNAL MEDICINE | Admitting: INTERNAL MEDICINE
Payer: MEDICARE

## 2024-10-15 ENCOUNTER — HOSPITAL ENCOUNTER (INPATIENT)
Facility: CLINIC | Age: 44
LOS: 4 days | Discharge: HOME | DRG: 281 | End: 2024-10-19
Attending: EMERGENCY MEDICINE | Admitting: INTERNAL MEDICINE
Payer: MEDICARE

## 2024-10-15 ENCOUNTER — APPOINTMENT (OUTPATIENT)
Dept: GENERAL RADIOLOGY | Facility: HOSPITAL | Age: 44
End: 2024-10-15
Attending: INTERNAL MEDICINE
Payer: MEDICARE

## 2024-10-15 VITALS
TEMPERATURE: 97.3 F | DIASTOLIC BLOOD PRESSURE: 59 MMHG | RESPIRATION RATE: 12 BRPM | HEART RATE: 133 BPM | SYSTOLIC BLOOD PRESSURE: 97 MMHG | OXYGEN SATURATION: 93 % | BODY MASS INDEX: 16.68 KG/M2 | WEIGHT: 77 LBS

## 2024-10-15 DIAGNOSIS — Q24.8 ABNORMAL CARDIAC VALVE: ICD-10-CM

## 2024-10-15 DIAGNOSIS — B36.0 TINEA VERSICOLOR: ICD-10-CM

## 2024-10-15 DIAGNOSIS — I50.9 ACUTE CONGESTIVE HEART FAILURE, UNSPECIFIED HEART FAILURE TYPE (H): ICD-10-CM

## 2024-10-15 DIAGNOSIS — J81.0 ACUTE PULMONARY EDEMA (H): ICD-10-CM

## 2024-10-15 DIAGNOSIS — I05.0 RHEUMATIC MITRAL STENOSIS: ICD-10-CM

## 2024-10-15 LAB
ALBUMIN SERPL BCG-MCNC: 4.1 G/DL (ref 3.5–5.2)
ALP SERPL-CCNC: 50 U/L (ref 40–150)
ALT SERPL W P-5'-P-CCNC: 42 U/L (ref 0–50)
ANION GAP SERPL CALCULATED.3IONS-SCNC: 11 MMOL/L (ref 7–15)
AST SERPL W P-5'-P-CCNC: 30 U/L (ref 0–45)
ATRIAL RATE - MUSE: 109 BPM
BILIRUB SERPL-MCNC: 0.7 MG/DL
BUN SERPL-MCNC: 17.4 MG/DL (ref 6–20)
CALCIUM SERPL-MCNC: 8.9 MG/DL (ref 8.8–10.4)
CHLORIDE SERPL-SCNC: 103 MMOL/L (ref 98–107)
CREAT SERPL-MCNC: 0.71 MG/DL (ref 0.51–0.95)
DIASTOLIC BLOOD PRESSURE - MUSE: NORMAL MMHG
EGFRCR SERPLBLD CKD-EPI 2021: >90 ML/MIN/1.73M2
GLUCOSE SERPL-MCNC: 147 MG/DL (ref 70–99)
HCO3 SERPL-SCNC: 22 MMOL/L (ref 22–29)
HOLD SPECIMEN: NORMAL
INTERPRETATION ECG - MUSE: NORMAL
NT-PROBNP SERPL-MCNC: 7347 PG/ML (ref 0–450)
P AXIS - MUSE: 85 DEGREES
POTASSIUM SERPL-SCNC: 4.8 MMOL/L (ref 3.4–5.3)
PR INTERVAL - MUSE: 158 MS
PROT SERPL-MCNC: 6.3 G/DL (ref 6.4–8.3)
QRS DURATION - MUSE: 70 MS
QT - MUSE: 338 MS
QTC - MUSE: 455 MS
R AXIS - MUSE: 132 DEGREES
SODIUM SERPL-SCNC: 136 MMOL/L (ref 135–145)
SYSTOLIC BLOOD PRESSURE - MUSE: NORMAL MMHG
T AXIS - MUSE: 73 DEGREES
TROPONIN T SERPL HS-MCNC: 30 NG/L
TROPONIN T SERPL HS-MCNC: 58 NG/L
TROPONIN T SERPL HS-MCNC: 68 NG/L
VENTRICULAR RATE- MUSE: 109 BPM

## 2024-10-15 PROCEDURE — 250N000011 HC RX IP 250 OP 636: Performed by: INTERNAL MEDICINE

## 2024-10-15 PROCEDURE — 71045 X-RAY EXAM CHEST 1 VIEW: CPT

## 2024-10-15 PROCEDURE — 99285 EMERGENCY DEPT VISIT HI MDM: CPT | Performed by: EMERGENCY MEDICINE

## 2024-10-15 PROCEDURE — 94640 AIRWAY INHALATION TREATMENT: CPT

## 2024-10-15 PROCEDURE — 99223 1ST HOSP IP/OBS HIGH 75: CPT | Mod: FS

## 2024-10-15 PROCEDURE — 93010 ELECTROCARDIOGRAM REPORT: CPT | Performed by: INTERNAL MEDICINE

## 2024-10-15 PROCEDURE — 93005 ELECTROCARDIOGRAM TRACING: CPT

## 2024-10-15 PROCEDURE — 36415 COLL VENOUS BLD VENIPUNCTURE: CPT | Performed by: INTERNAL MEDICINE

## 2024-10-15 PROCEDURE — 250N000009 HC RX 250: Performed by: INTERNAL MEDICINE

## 2024-10-15 PROCEDURE — 99418 PROLNG IP/OBS E/M EA 15 MIN: CPT | Mod: FS

## 2024-10-15 PROCEDURE — 120N000003 HC R&B IMCU UMMC

## 2024-10-15 PROCEDURE — 250N000013 HC RX MED GY IP 250 OP 250 PS 637

## 2024-10-15 PROCEDURE — 36415 COLL VENOUS BLD VENIPUNCTURE: CPT

## 2024-10-15 PROCEDURE — 250N000011 HC RX IP 250 OP 636

## 2024-10-15 PROCEDURE — 84484 ASSAY OF TROPONIN QUANT: CPT | Performed by: INTERNAL MEDICINE

## 2024-10-15 PROCEDURE — 96374 THER/PROPH/DIAG INJ IV PUSH: CPT

## 2024-10-15 PROCEDURE — 84484 ASSAY OF TROPONIN QUANT: CPT

## 2024-10-15 PROCEDURE — 250N000013 HC RX MED GY IP 250 OP 250 PS 637: Performed by: NURSE PRACTITIONER

## 2024-10-15 RX ORDER — FUROSEMIDE 10 MG/ML
40 INJECTION INTRAMUSCULAR; INTRAVENOUS
Status: DISCONTINUED | OUTPATIENT
Start: 2024-10-15 | End: 2024-10-16

## 2024-10-15 RX ORDER — NITROGLYCERIN 0.4 MG/1
0.4 TABLET SUBLINGUAL EVERY 5 MIN PRN
Status: DISCONTINUED | OUTPATIENT
Start: 2024-10-15 | End: 2024-10-19 | Stop reason: HOSPADM

## 2024-10-15 RX ORDER — METOPROLOL TARTRATE 25 MG/1
25 TABLET, FILM COATED ORAL 2 TIMES DAILY
Status: DISCONTINUED | OUTPATIENT
Start: 2024-10-15 | End: 2024-10-15

## 2024-10-15 RX ORDER — FUROSEMIDE 10 MG/ML
20 INJECTION INTRAMUSCULAR; INTRAVENOUS ONCE
Status: COMPLETED | OUTPATIENT
Start: 2024-10-15 | End: 2024-10-15

## 2024-10-15 RX ORDER — POLYETHYLENE GLYCOL 3350 17 G/17G
17 POWDER, FOR SOLUTION ORAL DAILY PRN
Status: DISCONTINUED | OUTPATIENT
Start: 2024-10-15 | End: 2024-10-19 | Stop reason: HOSPADM

## 2024-10-15 RX ORDER — LEVETIRACETAM 500 MG/1
500 TABLET ORAL 2 TIMES DAILY
Status: DISCONTINUED | OUTPATIENT
Start: 2024-10-15 | End: 2024-10-19 | Stop reason: HOSPADM

## 2024-10-15 RX ORDER — LIDOCAINE 40 MG/G
CREAM TOPICAL
Status: DISCONTINUED | OUTPATIENT
Start: 2024-10-15 | End: 2024-10-19 | Stop reason: HOSPADM

## 2024-10-15 RX ORDER — IPRATROPIUM BROMIDE AND ALBUTEROL SULFATE 2.5; .5 MG/3ML; MG/3ML
3 SOLUTION RESPIRATORY (INHALATION) ONCE
Status: COMPLETED | OUTPATIENT
Start: 2024-10-15 | End: 2024-10-15

## 2024-10-15 RX ORDER — MAGNESIUM HYDROXIDE/ALUMINUM HYDROXICE/SIMETHICONE 120; 1200; 1200 MG/30ML; MG/30ML; MG/30ML
30 SUSPENSION ORAL EVERY 4 HOURS PRN
Status: DISCONTINUED | OUTPATIENT
Start: 2024-10-15 | End: 2024-10-19 | Stop reason: HOSPADM

## 2024-10-15 RX ORDER — ACETAMINOPHEN 650 MG/1
650 SUPPOSITORY RECTAL EVERY 4 HOURS PRN
Status: DISCONTINUED | OUTPATIENT
Start: 2024-10-15 | End: 2024-10-19 | Stop reason: HOSPADM

## 2024-10-15 RX ORDER — ACETAMINOPHEN 325 MG/1
650 TABLET ORAL EVERY 4 HOURS PRN
Status: DISCONTINUED | OUTPATIENT
Start: 2024-10-15 | End: 2024-10-19 | Stop reason: HOSPADM

## 2024-10-15 RX ADMIN — FUROSEMIDE 20 MG: 10 INJECTION, SOLUTION INTRAMUSCULAR; INTRAVENOUS at 01:51

## 2024-10-15 RX ADMIN — Medication 37.5 MG: at 19:43

## 2024-10-15 RX ADMIN — Medication 12.5 MG: at 12:05

## 2024-10-15 RX ADMIN — METOPROLOL TARTRATE 25 MG: 25 TABLET, FILM COATED ORAL at 10:15

## 2024-10-15 RX ADMIN — IPRATROPIUM BROMIDE AND ALBUTEROL SULFATE 3 ML: .5; 3 SOLUTION RESPIRATORY (INHALATION) at 00:31

## 2024-10-15 RX ADMIN — FUROSEMIDE 40 MG: 10 INJECTION, SOLUTION INTRAVENOUS at 15:57

## 2024-10-15 RX ADMIN — FUROSEMIDE 40 MG: 10 INJECTION, SOLUTION INTRAVENOUS at 08:36

## 2024-10-15 RX ADMIN — LEVETIRACETAM 500 MG: 500 TABLET, FILM COATED ORAL at 20:17

## 2024-10-15 ASSESSMENT — ACTIVITIES OF DAILY LIVING (ADL)
ADLS_ACUITY_SCORE: 35
ADLS_ACUITY_SCORE: 36
ADLS_ACUITY_SCORE: 36
ADLS_ACUITY_SCORE: 35
ADLS_ACUITY_SCORE: 36
ADLS_ACUITY_SCORE: 36
ADLS_ACUITY_SCORE: 35
ADLS_ACUITY_SCORE: 36
ADLS_ACUITY_SCORE: 35
ADLS_ACUITY_SCORE: 25
ADLS_ACUITY_SCORE: 35

## 2024-10-15 ASSESSMENT — ENCOUNTER SYMPTOMS
PALPITATIONS: 0
BACK PAIN: 0
CONFUSION: 0
HEMATURIA: 0
MYALGIAS: 0
BLOOD IN STOOL: 0
COLOR CHANGE: 0
ABDOMINAL DISTENTION: 0
WHEEZING: 0
NECK STIFFNESS: 0
HEADACHES: 0
CHILLS: 0
VOMITING: 0
COUGH: 1
DIAPHORESIS: 0
WOUND: 0
CHEST TIGHTNESS: 0
DYSURIA: 0
ABDOMINAL PAIN: 0
SHORTNESS OF BREATH: 1
NAUSEA: 1
NECK PAIN: 0
LIGHT-HEADEDNESS: 0
ARTHRALGIAS: 0
ANAL BLEEDING: 0
FEVER: 0
FLANK PAIN: 0
NUMBNESS: 0
VOICE CHANGE: 0
DIZZINESS: 0

## 2024-10-15 NOTE — ED NOTES
PT UP TO VOID X4 SINCE LASIX GIVEN. PT MISSED VOIDING IN HAT SO UNABLE TO MEASURE URINE OUTPUT. BREATHING LESS LABORED. ABLE TO WALK TO RESTROOM ON HER OWN WITH SBA. O2 AT 1L/NC. O2 SAT STABLE. HR AND RR RATE HAVE DECREASED.     [facility] APPLIED FOR TRANSPORT BY [facility].

## 2024-10-15 NOTE — ED NOTES
REPORT TO [facility] ([facility]). PT PACKAGED AND READY TO GO. MOTHER WILL BE IN ATTENDANCE WITH PT ON AIRPLANE DUE TO SPECIAL NEEDS ADULT. REMAINS ON O2 1L/NC. IV PATENT.

## 2024-10-15 NOTE — ED TRIAGE NOTES
Patient here with her mom with nausea, vomiting since this afternoon. She also did have a BM in her pants when she got here. She has had a cough for months and is getting worked up for heart related issues. Patient is feeling short of breath. Mom states she has not had any fevers.      Triage Assessment (Adult)       Row Name 10/14/24 7620          Triage Assessment    Airway WDL WDL        Respiratory WDL    Respiratory WDL all;cough     Rhythm/Pattern, Respiratory shortness of breath     Nailbeds pale     Mucous Membranes dry     Cough Frequency frequent        Skin Circulation/Temperature WDL    Skin Circulation/Temperature WDL WDL        Cardiac WDL    Cardiac WDL WDL        Peripheral/Neurovascular WDL    Peripheral Neurovascular WDL WDL        Cognitive/Neuro/Behavioral WDL    Cognitive/Neuro/Behavioral WDL WDL

## 2024-10-15 NOTE — ED NOTES
"PT ROOMED PER WRITER WHO NOTED SOB, DYSPNEA, RAPID RESPIRATORY RATE RANGING 38-44/ MIN. PALE IN COLOR. INITIAL O2 SAT RA 91% THEN DROPPED TO 88. O2 APPLIED 2L/NC WITH NO INCREASE IN O2 SAT SO INCREASED TO 3L/NC. DENIES ANY PAIN. RESTLESS. MOTHER STATES SHE \"HAS ENLARGED HEART THAT WAS DIAGNOSED JUNE 2024\". PT HAVING COUGH X1 YEAR \"WHICH WAS THE REASON SHE WAS SEEN BY CARDIOLOGY\". FREQUENT DRY COUGH NOTED. PT WAS BORN WITHOUT CORPUS CALLOSUM AND MITRAL VALVE STENOSIS. ACCORDING TO MOTHER \"HAD NOT BEEN SEEN BY CARDIOLOGY FOR A LONG TIME\" BEFORE JUNE 2024 APPOINTMENT.     PLACED ON CARDIAC MONITOR. SINUS TACHYCARDIA WITH BBB.   DR FELDER INFORMED AND IS IN ROOM EXAMINING PT.  "

## 2024-10-15 NOTE — ED PROVIDER NOTES
ED Provider Note  [facility]      History     Chief Complaint   Patient presents with    Cough     HPI  Efren Saavedra is a 44 year old femaleeizure disorder, intellectual disability, and rheumatic mitral stenosis who presents as a transfer from [employer] with plan for cardiology admission.  She has a history of rheumatic mitral stenosis and is currently undergoing consideration of valve replacement/balloon valvulplasty.  She presented to an outside ED for place of shortness of breath.  Chest x-ray showed pulmonary edema.  She was slightly hypoxic requiring 2 L nasal cannula.  She was given 20 mg of IV Lasix.  Case discussed with cardiology and she was transferred down to the ED for admission.  No inpatient beds available so she came through the emergency department.  On arrival, she states that her shortness of breath has slightly improved.  She does not feel particularly fluid overload.  No fever/chills or infectious symptoms.    Past Medical History  Past Medical History:   Diagnosis Date    Seizures (H)      Past Surgical History:   Procedure Laterality Date    feeding tube      TRANSESOPHAGEAL ECHOCARDIOGRAM INTRAOPERATIVE N/A 8/13/2024    Procedure: ECHOCARDIOGRAM, TRANSESOPHAGEAL, WITH ANESTHESIA;  Surgeon: GENERIC ANESTHESIA PROVIDER;  Location: [facility] OR    ventilation tubes, bilateral       No current outpatient medications on file.    Allergies   Allergen Reactions    Azithromycin      Abdominal pain/ cramping     Clotrimazole Rash    Omnicef [Cefdinir] Rash     Itchy and bad rash     Family History  Family History   Problem Relation Age of Onset    Cerebrovascular Disease Paternal Grandmother         CVA    Heart Disease Maternal Grandfather         disease    Hypertension Father     Other - See Comments Father         post polio    Parkinsonism Maternal Grandmother     Heart Disease Paternal Grandfather      Social History   Social History     Tobacco Use    Smoking  "status: Never    Smokeless tobacco: Never    Tobacco comments:     no passive exposure   Substance Use Topics    Alcohol use: No    Drug use: No      A medically appropriate review of systems was performed with pertinent positives and negatives noted in the HPI, and all other systems negative.    Physical Exam   BP: 110/83  Pulse: 113  Temp: 97.9  F (36.6  C)  Resp: 28  Height: 142.2 cm (4' 8\")  Weight: 34 kg (75 lb)  SpO2: 94 %  Physical Exam  Vitals and nursing note reviewed.   Constitutional:       General: She is not in acute distress.     Appearance: Normal appearance.      Comments: Thin/cachectic   HENT:      Head: Normocephalic.      Nose: Nose normal.   Eyes:      Pupils: Pupils are equal, round, and reactive to light.   Cardiovascular:      Rate and Rhythm: Normal rate and regular rhythm.   Pulmonary:      Effort: Pulmonary effort is normal.      Breath sounds: Rales present.   Abdominal:      General: There is no distension.   Musculoskeletal:         General: No deformity. Normal range of motion.      Cervical back: Normal range of motion.      Right lower leg: No edema.      Left lower leg: No edema.   Skin:     General: Skin is warm.   Neurological:      Mental Status: She is alert and oriented to person, place, and time.   Psychiatric:         Mood and Affect: Mood normal.         ED Course, Procedures, & Data           Results for orders placed or performed during the hospital encounter of 10/15/24   Symptomatic Influenza A/B, RSV, & SARS-CoV2 PCR (COVID-19) Nose     Status: Normal    Specimen: Nose; Swab   Result Value Ref Range    Influenza A PCR Negative Negative    Influenza B PCR Negative Negative    RSV PCR Negative Negative    SARS CoV2 PCR Negative Negative    Narrative    Testing was performed using the Xpert Xpress CoV2/Flu/RSV Assay on the Cepheid GeneXpert Instrument. This test should be ordered for the detection of SARS-CoV2, influenza, and RSV viruses in individuals with signs and " symptoms of respiratory tract infection. This test is for in vitro diagnostic use under the [place] FDA for laboratories certified under CLIA to perform high or moderate complexity testing. This test has been [place] FDA cleared. A negative result does not rule out the presence of PCR inhibitors in the specimen or target RNA in concentration below the limit of detection for the assay. If only one viral target is positive but coinfection with multiple targets is suspected, the sample should be re-tested with another FDA cleared, approved, or authorized test, if coninfection would change clinical management. This test was validated by the [email or web address]. These laboratories are certified under the Clinical Laboratory Improvement Amendments of 1988 (CLIA-88) as qualified to perfom high complexity laboratory testing.   [facility] Draw     Status: None    Narrative    The following orders were created for panel order [facility].  Procedure                               Abnormality         Status                     ---------                               -----------         ------                     Extra Blue Top Tube[[id number]]                              Final result               Extra Red Top Tube[[id number]]                               Final result               Extra Green Top (Lithium...[[id number]]                      Final result               Extra Purple Top Tube[[id number]]                                                       Extra Heparinized Syringe[[id number]]                        Final result                 Please view results for these tests on the individual orders.   CBC with platelets and differential     Status: Abnormal   Result Value Ref Range    WBC Count 9.3 4.0 - 11.0 10e3/uL    RBC Count 4.36 3.80 - 5.20 10e6/uL    Hemoglobin 12.7 11.7 - 15.7 g/dL    Hematocrit 38.3 35.0 - 47.0 %    MCV 88 78 - 100 fL    MCH 29.1 26.5 - 33.0 pg    MCHC 33.2 31.5 - 36.5 g/dL    RDW 14.1 10.0 - 15.0 %     Platelet Count 170 150 - 450 10e3/uL    % Neutrophils 81 %    % Lymphocytes 7 %    % Monocytes 9 %    % Eosinophils 2 %    % Basophils 0 %    % Immature Granulocytes 1 %    NRBCs per 100 WBC 0 <1 /100    Absolute Neutrophils 7.5 1.6 - 8.3 10e3/uL    Absolute Lymphocytes 0.6 (L) 0.8 - 5.3 10e3/uL    Absolute Monocytes 0.9 0.0 - 1.3 10e3/uL    Absolute Eosinophils 0.1 0.0 - 0.7 10e3/uL    Absolute Basophils 0.0 0.0 - 0.2 10e3/uL    Absolute Immature Granulocytes 0.1 <=0.4 10e3/uL    Absolute NRBCs 0.0 10e3/uL   Extra Blue Top Tube     Status: None   Result Value Ref Range    Hold Specimen JIC    Extra Red Top Tube     Status: None   Result Value Ref Range    Hold Specimen JIC    Extra Green Top (Lithium Heparin) Tube     Status: None   Result Value Ref Range    Hold Specimen JIC    Extra Heparinized Syringe     Status: None   Result Value Ref Range    Hold Specimen Ok    Comprehensive metabolic panel     Status: Abnormal   Result Value Ref Range    Sodium 136 135 - 145 mmol/L    Potassium 4.8 3.4 - 5.3 mmol/L    Carbon Dioxide (CO2) 22 22 - 29 mmol/L    Anion Gap 11 7 - 15 mmol/L    Urea Nitrogen 17.4 6.0 - 20.0 mg/dL    Creatinine 0.71 0.51 - 0.95 mg/dL    GFR Estimate >90 >60 mL/min/1.73m2    Calcium 8.9 8.8 - 10.4 mg/dL    Chloride 103 98 - 107 mmol/L    Glucose 147 (H) 70 - 99 mg/dL    Alkaline Phosphatase 50 40 - 150 U/L    AST 30 0 - 45 U/L    ALT 42 0 - 50 U/L    Protein Total 6.3 (L) 6.4 - 8.3 g/dL    Albumin 4.1 3.5 - 5.2 g/dL    Bilirubin Total 0.7 <=1.2 mg/dL   Troponin T, High Sensitivity     Status: Abnormal   Result Value Ref Range    Troponin T, High Sensitivity 30 (H) <=14 ng/L   Nt probnp inpatient     Status: Abnormal   Result Value Ref Range    N terminal Pro BNP Inpatient 7,347 (H) 0 - 450 pg/mL   Troponin T, High Sensitivity     Status: Abnormal   Result Value Ref Range    Troponin T, High Sensitivity 58 (H) <=14 ng/L   EKG 12 lead     Status: None   Result Value Ref Range    Systolic Blood  Pressure  mmHg    Diastolic Blood Pressure  mmHg    Ventricular Rate 109 BPM    Atrial Rate 109 BPM    SC Interval 158 ms    QRS Duration 70 ms     ms    QTc 455 ms    P Axis 85 degrees    R AXIS 132 degrees    T Axis 73 degrees    Interpretation ECG       Sinus tachycardia  Biatrial enlargement  Right ventricular hypertrophy  Anterolateral infarct , age undetermined  Abnormal ECG  No previous ECGs available  Confirmed by MD Henderson Anthony ([id number]) on 10/15/2024 7:29:47 AM     CBC with platelets differential     Status: Abnormal    Narrative    The following orders were created for panel order CBC with platelets differential.  Procedure                               Abnormality         Status                     ---------                               -----------         ------                     CBC with platelets and d...[[id number]]  Abnormal            Final result                 Please view results for these tests on the individual orders.     Medications - No data to display  Labs Ordered and Resulted from Time of ED Arrival to Time of ED Departure - No data to display  No orders to display          Critical care was not performed.     Medical Decision Making  The patient's presentation was of moderate complexity (an acute illness with systemic symptoms).    The patient's evaluation involved:  review of external note(s) from 1 sources (see separate area of note for details)  review of 3+ test result(s) ordered prior to this encounter (CBC, BMP, proBNP, chest x-ray)    The patient's management necessitated high risk (a decision regarding hospitalization).    Assessment & Plan    Patient presents as a transfer from [place] with concern for congestive heart failure.  She has a history of rheumatic mitral stenosis.  Outside ED, patient was noted to be hypoxic with evidence of pulmonary edema on imaging.  Was treated with IV Lasix and breathing treatment.  He was on 2 L nasal cannula.  Her proBNP is  elevated at 7000.  Her case was discussed with the cardiology staff and patient was admitted for transfer.  No beds were available so patient was sent to our ED.  On arrival, patient is on 2 L nasal cannula.  She is in no significant distress.  No indication for further diuretics at this point.  Case discussed with the cardiology team for admission.    I have reviewed the nursing notes. I have reviewed the findings, diagnosis, plan and need for follow up with the patient.    New Prescriptions    No medications on file       Final diagnoses:   Acute congestive heart failure, unspecified heart failure type (H)   Rheumatic mitral stenosis       Nacho Blount DO  [facility] EMERGENCY DEPARTMENT  10/15/2024     Nacho Blount DO  10/16/24 0107

## 2024-10-15 NOTE — ED NOTES
[facility] calls and states that fixed wing will be coming and will be here in about 1 hour. They will contact ground transportation.

## 2024-10-15 NOTE — ED NOTES
RT COMPLETED NEB TREATMENT. STATES PRE-NEB PT HAD WHEEZING THROUGHOUT. POST NEB WHEEZING SUBSIDED BUT WAS COARSE THROUGHOUT.

## 2024-10-15 NOTE — ED NOTES
PT TRANSFERRED OUT OF ED PER [facility]. MOTHER WILL ACCOMPANY PT IN [facility]. [facility] TRANSPORTING [facility] TO AIRPORT.

## 2024-10-15 NOTE — CONSULTS
Discharge Pharmacy Test Claim    Farxiga and jardiance are covered with monthly copays of $25 through patient's [employer] prepaid medical assistance plan.    Test Claim Copay   dapagliflozin not covered   farxiga 25.00   jardiance 25.00     Heather Ball  [facility] LiaisonSTEPHEN  Ph: [phone]  Fax: [phone]  Available on Teams and Vocera

## 2024-10-15 NOTE — PROGRESS NOTES
"/75   Pulse 84   Temp 98.7  F (37.1  C) (Oral)   Resp 20   Ht 1.422 m (4' 8\")   Wt 34 kg (75 lb)   SpO2 100%   BMI 16.81 kg/m      Neuro: A&Ox4.   Cardiac: Afebrile, VSS.   Respiratory: RA   GI/: Voiding spontaneously. No BM this shift.   Diet/appetite: Tolerating diet. Denies nausea   Activity: Up A-1  Pain: Denies   Skin: No new deficits noted.  Lines:PIV   Drains:None,   No new complaints.Will continue to monitor and follow plan of care.    "

## 2024-10-15 NOTE — H&P
"    [facility]   Cardiology Service  History and Physical      Efren Saavedra MRN# [id number]   YOB: 1980 Age: 44 year old       Admission Date: 10/15/2024      Assessment and Plan:   44 year old female w/ pertinent PMHx of seizure disorder, intellectual disability, and rheumatic mitral stenosis admitted 10/15/2024 for severe mitral stenosis and acute decompensated heart failure.     # Acute Heart Failure w/ Preserved EF (60-65%)  # Severe Rheumatic Mitral Stenosis  Patient evaluated by Dr. Antonio outpatient for consideration of percutaneous balloon mitral commissurotomy (PBMC). Recommended work up includes CT TMVR (completed) and CT coronaries. MVA of 1.1 cm2, mean PG 7 mmHg with LVEF 60-65% and Bhatt score of 7. Appears volume up on admission with elevated NT-P BNP and pulmonary edema noted on CXR. S/p IV lasix 20mg in the [place] ED.   - Needs coronary CT angiogram per Dr. Antonio's note; will first see if CT TMVR can be over-read for coronaries   - Fluid Status: Hypervolemic; diuresis with IV Lasix 40mg BID    - Requested TTE images from [facility] for review  - Once imaging completed the  will discuss at multidisciplinary conference  - Start Lopressor 37.5mg BID   - Van Wert's score = 8 (Per. Dr. Sheehan review)  -  consult placed      # Type 2 MI   Likely demand secondary to hear failure. No evidence of CP or ischemic changes on EKG.   - Troponin 30 --> 58 --> 68     # Nausea/Vomiting  -  PRN antiemetics available       Clinically Significant Risk Factors Present on Admission   # Chronic heart failure with preserved ejection fraction: heart failure noted on problem list and last echo with EF >50%   # Cachexia: Estimated body mass index is 16.81 kg/m  as calculated from the following:    Height as of this encounter: 1.422 m (4' 8\").    Weight as of this encounter: 34 kg (75 lb).             Rheumatic Heart Disease       FEN: 2g Na Diet  Code Status: " Full  DVT Prophylaxis:  Ambulation  Isolation: None  Disposition: 3-5+ days pending work up/surgery/procedure    Patient seen and discussed with Dr. Sheehan.    NAOMIE Reyez, CNP  [facility] Cardiology      Chief Complaint: Heart Failure, Sever mitral stenosis     HPI: Efren Saavedra is a 44 year old female w/ pertinent medical history significant for seizure disorder, intellectual disability, and rheumatic mitral stenosis. She was recently diagnosed with severe mitral stenosis and HFpEF after experiencing cough and an enlarged heart was detected on CXR she had an echocardiogram. She was then evaluated by Dr. Antonio on 8/19/24 for consideration of percutaneous balloon mitral commissurotomy (PBMC).     Past Medical History:   Diagnosis Date    Seizures (H)        Past Surgical History:   Procedure Laterality Date    feeding tube      TRANSESOPHAGEAL ECHOCARDIOGRAM INTRAOPERATIVE N/A 8/13/2024    Procedure: ECHOCARDIOGRAM, TRANSESOPHAGEAL, WITH ANESTHESIA;  Surgeon: GENERIC ANESTHESIA PROVIDER;  Location: [facility] OR    ventilation tubes, bilateral         No current facility-administered medications for this encounter.     Current Outpatient Medications   Medication Sig Dispense Refill    ketoconazole (NIZORAL) 2 % external cream Apply topically daily. 60 g 3    levETIRAcetam (KEPPRA) 250 MG tablet Take 500 mg by mouth 2 times daily      LORazepam (ATIVAN) 0.5 MG tablet Take 1 tablet (0.5 mg) by mouth daily as needed for anxiety (with imaging). 2 tablet 0    medroxyPROGESTERone (DEPO-PROVERA) 150 MG/ML IM injection Inject 150 mg into the muscle      norethindrone-ethinyl estradiol (JUNEL FE 1/20) 1-20 MG-MCG tablet Take 1 tablet by mouth daily. 90 tablet 3       Family History   Problem Relation Age of Onset    Cerebrovascular Disease Paternal Grandmother         CVA    Heart Disease Maternal Grandfather         disease    Hypertension Father     Other - See Comments Father         post polio    Parkinsonism  "Maternal Grandmother     Heart Disease Paternal Grandfather        Social History     Tobacco Use    Smoking status: Never    Smokeless tobacco: Never    Tobacco comments:     no passive exposure   Substance Use Topics    Alcohol use: No       Allergies   Allergen Reactions    Azithromycin      Abdominal pain/ cramping     Clotrimazole Rash    Omnicef [Cefdinir] Rash     Itchy and bad rash       ROS:   CONSTITUTIONAL: No report of fevers or chills  RESPIRATORY: No cough, wheezing, SOB, or hemoptysis  CARDIOVASCULAR: see HPI  MUSCULO-SKELETAL: No joint pain/swelling, no muscle pain  NEURO: No paresthesias, syncope, pre-syncope, lightheadness, dizziness or vertigo  ENDOCRINE: No temperature intolerance, no skin/hair changes  PSYCHIATRIC: No change in mood, feeling down/anxious, no change in sleep or appetite  GI: No melena or hematochezia, no change in bowel habits  : No hematuria or dysuria, no hesitancy, dribbling or incontinence  HEME: No easy bruising or bleeding, no history of anemia, no history of blood clots  SKIN: No rashes or sores, no unusual itching      Physical Examination:  Vitals: /83   Pulse 113   Temp 97.9  F (36.6  C)   Resp 28   Ht 1.422 m (4' 8\")   Wt 34 kg (75 lb)   SpO2 94%   BMI 16.81 kg/m    BMI= Body mass index is 16.81 kg/m .    GENERAL APPEARANCE: healthy, alert and no distress  HEENT: no icterus, no xanthelasmas, normal pupil size and reaction, normal palate, mucosa moist  NECK: JVP mid neck , brisk carotid upstroke bilaterally  CHEST: lungs clear to auscultation without rales, rhonchi or wheezes, no use of accessory muscles, no retractions  CARDIOVASCULAR: regular rhythm, normal S1 and S2, no S3 or S4. Systolic murmur.   ABDOMEN: soft, non tender, without hepatosplenomegaly, no masses palpable, bowel sounds normal  EXTREMITIES: warm, no LE edema, DP/PT pulses 2+ bilaterally, no clubbing or cyanosis   NEURO: alert and oriented to person/place/time, normal speech, gait and " affect  SKIN: no ecchymoses, no rashes      Laboratory:  CMP  Recent Labs   Lab 10/14/24  2156      POTASSIUM 4.8   CHLORIDE 103   CO2 22   ANIONGAP 11   *   BUN 17.4   CR 0.71   GFRESTIMATED >90   YUE 8.9   PROTTOTAL 6.3*   ALBUMIN 4.1   BILITOTAL 0.7   ALKPHOS 50   AST 30   ALT 42     CBC  Recent Labs   Lab 10/14/24  2157   WBC 9.3   RBC 4.36   HGB 12.7   HCT 38.3   MCV 88   MCH 29.1   MCHC 33.2   RDW 14.1          10/15/24 EK/13/24 Transesophageal Echocardiogram:  SVEN ordered to evaluate mitral valve.  Technically difficult study. Extremely poor acoustic windows.  Moderate mitral stenosis is present. The etiology of the mitral stenosis is  rheumatic. No parachute mitral valve or mitral valve arcade.  The mean gradient across the mitral valve is 7 mmHg at a heart rate of 93 bpm.  Mitral valve pressure half time is 193 ms, which estimates a mitral valve size  of 1.1 cm^2.  Bhatt score = 7 (2 for mobility, 1 for thickening, 3 for calcification, 1  for subvalvular thickening).    [facility] 10/1/24 Cardiac CT:    IMPRESSION:  1.  See below for TMVR related mitral annular and naresh-LVOT  measurements.  2.  These measurements were not performed on software with a dedicated  mitral valve package and should be confirmed using software with a  dedicated package for mitral annular segmentation.    3.  Please review detailed radiology report, to follow separately, for  incidental non-cardiovascular findings.     FINDINGS:  1.  With virtual implantation of a 29 mm Wilbert 3 prosthesis with a  30% atrialized implantation, the estimated area of the naresh-LVOT is  0.329 cm2. The virtual valve abuts the ventricular septum.The  aorto-mitral angle is 97.0 degrees.  2.  The mitral leaflets are rheumatic in morphology. There is 91  degrees of circumferential mitral annular calcification.  3.  Imaging protocol is non-diagnostic for evaluation of coronary  artery stenosis.   4.  There is no acute aortic pathology,  such as dissection, intramural  hematoma, or contained rupture.     Medical Decision Making     50 MINUTES SPENT BY ME on the date of service doing chart review, history, exam, documentation & further activities per the note.         NAOMIE Mark CNP on 10/15/2024 at 12:36 PM

## 2024-10-15 NOTE — Clinical Note
dry, intact, no bleeding and no hematoma. 7 FR SHEATH REMOVED FROM RFV Manual pressure held, hemostasis obtained, band aid applied.     4 FR SHEATH REMOVED FROM RFA Manual pressure held, hemostasis obtained, band aid applied.

## 2024-10-15 NOTE — ED PROVIDER NOTES
History     Chief Complaint   Patient presents with    Nausea, Vomiting, & Diarrhea    Cough    Shortness of Breath     The history is provided by the patient and a parent.   Shortness of Breath  Severity:  Severe  Onset quality:  Sudden  Duration:  5 hours  Timing:  Constant  Progression:  Unchanged  Chronicity:  New  Associated symptoms: cough    Associated symptoms: no abdominal pain, no chest pain, no diaphoresis, no fever, no headaches, no neck pain, no rash, no vomiting and no wheezing          Allergies:  Allergies   Allergen Reactions    Azithromycin      Abdominal pain/ cramping     Clotrimazole Rash    Omnicef [Cefdinir] Rash     Itchy and bad rash       Problem List:    Patient Active Problem List    Diagnosis Date Noted    Seizure (H) 02/27/2015     Priority: Medium     Overview:   At least two events (maybe 3), mom opted no treatment or mri unless further events      Osteoporosis 11/23/2013     Priority: Medium     Overview:   IMO Update      Congenital hearing loss 05/14/2013     Priority: Medium    Impacted cerumen 05/14/2013     Priority: Medium    Mixed conductive and sensorineural hearing loss, 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 Echo/doppler 4/19/05.   IMO Update 10/11      Other kyphoscoliosis and scoliosis 03/30/2005     Priority: Medium     Overview:   Refer to Dr. Michel. Scoliosis x-ray 4/19/05.      Unspecified intellectual disabilities 03/30/2005     Priority: Medium     Overview:   IMO Update 10/11      Dysmenorrhea 08/11/2004     Priority: Medium     Overview:   Will switch from Alesse to Loestrin      Agenesis of corpus callosum (H) 04/08/2004     Priority: Medium     Overview:       Other psoriasis 01/08/2004     Priority: Medium     Overview:   Scalp psoriasis. Under the  care of Dr. Raines, Dermatology      Irregular menstrual cycle 04/15/2002     Priority: Medium        Past Medical History:    Past Medical History:   Diagnosis Date    Seizures (H)        Past Surgical History:    Past Surgical History:   Procedure Laterality Date    feeding tube      TRANSESOPHAGEAL ECHOCARDIOGRAM INTRAOPERATIVE N/A 8/13/2024    Procedure: ECHOCARDIOGRAM, TRANSESOPHAGEAL, WITH ANESTHESIA;  Surgeon: GENERIC ANESTHESIA PROVIDER;  Location: [facility] OR    ventilation tubes, bilateral         Family History:    Family History   Problem Relation Age of Onset    Cerebrovascular Disease Paternal Grandmother         CVA    Heart Disease Maternal Grandfather         disease    Hypertension Father     Other - See Comments Father         post polio    Parkinsonism Maternal Grandmother     Heart Disease Paternal Grandfather        Social History:  Marital Status:  Single [1]  Social History     Tobacco Use    Smoking status: Never    Smokeless tobacco: Never    Tobacco comments:     no passive exposure   Substance Use Topics    Alcohol use: No    Drug use: No        Medications:    ketoconazole (NIZORAL) 2 % external cream  levETIRAcetam (KEPPRA) 250 MG tablet  LORazepam (ATIVAN) 0.5 MG tablet  medroxyPROGESTERone (DEPO-PROVERA) 150 MG/ML IM injection  norethindrone-ethinyl estradiol (JUNEL FE 1/20) 1-20 MG-MCG tablet          Review of Systems   Constitutional:  Negative for chills, diaphoresis and fever.   HENT:  Negative for voice change.    Eyes:  Negative for visual disturbance.   Respiratory:  Positive for cough and shortness of breath. Negative for chest tightness and wheezing.    Cardiovascular:  Negative for chest pain, palpitations and leg swelling.   Gastrointestinal:  Positive for nausea. Negative for abdominal distention, abdominal pain, anal bleeding, blood in stool and vomiting.   Genitourinary:  Negative for decreased urine volume, dysuria, flank pain and hematuria.   Musculoskeletal:  Negative for  arthralgias, back pain, gait problem, myalgias, neck pain and neck stiffness.   Skin:  Negative for color change, pallor, rash and wound.   Neurological:  Negative for dizziness, syncope, light-headedness, numbness and headaches.   Psychiatric/Behavioral:  Negative for confusion and suicidal ideas.        Physical Exam   BP: 138/97  Pulse: (!) 129  Temp: 97.3  F (36.3  C)  Resp: 24  Weight: 33.7 kg (74 lb 4.7 oz)  SpO2: 94 %      Physical Exam  Vitals and nursing note reviewed.   Constitutional:       Appearance: She is well-developed.   HENT:      Head: Normocephalic and atraumatic.      Mouth/Throat:      Pharynx: No oropharyngeal exudate.   Eyes:      Conjunctiva/sclera: Conjunctivae normal.      Pupils: Pupils are equal, round, and reactive to light.   Neck:      Thyroid: No thyromegaly.      Vascular: No JVD.      Trachea: No tracheal deviation.   Cardiovascular:      Rate and Rhythm: Normal rate and regular rhythm.      Heart sounds: Normal heart sounds. No murmur heard.     No friction rub. No gallop.   Pulmonary:      Effort: Tachypnea and respiratory distress present.      Breath sounds: No stridor. Examination of the right-upper field reveals wheezing. Examination of the left-upper field reveals wheezing. Examination of the right-middle field reveals wheezing. Examination of the left-middle field reveals wheezing. Examination of the right-lower field reveals wheezing. Examination of the left-lower field reveals wheezing. Wheezing present. No rales.   Chest:      Chest wall: No tenderness.   Abdominal:      General: Bowel sounds are normal. There is no distension.      Palpations: Abdomen is soft. There is no mass.      Tenderness: There is no abdominal tenderness. There is no guarding or rebound.   Musculoskeletal:         General: No tenderness. Normal range of motion.      Cervical back: Normal range of motion and neck supple.   Lymphadenopathy:      Cervical: No cervical adenopathy.   Skin:     General:  Skin is warm and dry.      Coloration: Skin is not pale.      Findings: No erythema or rash.   Neurological:      Mental Status: She is alert and oriented to person, place, and time.   Psychiatric:         Behavior: Behavior normal.         ED Course     ED Course as of 10/15/24 0354   Tue Oct 15, 2024   0107 Brought for sudden onset of SOB about 5 hrs ago, coughing for 1 month  Hx of severe mitral valve stenosis    CXR bilateral congestion / edema  Labs reviewed, elevated Pbnp    Overall diagnosis is CHF in context of cardiac structural disease  I spoke to Dr Ramírez in [facility] , recommended transfer to [facility]      0153 U no bed available , I requested ED to ED transfer and also consult with cardiologist   0349 Spoke to Dr Milvia WHELAN and cardiologist on call , accepted for transfer.      Procedures                Results for orders placed or performed during the hospital encounter of 10/15/24 (from the past 24 hour(s))   Symptomatic Influenza A/B, RSV, & SARS-CoV2 PCR (COVID-19) Nose    Specimen: Nose; Swab   Result Value Ref Range    Influenza A PCR Negative Negative    Influenza B PCR Negative Negative    RSV PCR Negative Negative    SARS CoV2 PCR Negative Negative    Narrative    Testing was performed using the Xpert Xpress CoV2/Flu/RSV Assay on the [employer] GeneXpert Instrument. This test should be ordered for the detection of SARS-CoV2, influenza, and RSV viruses in individuals with signs and symptoms of respiratory tract infection. This test is for in vitro diagnostic use under the [place] FDA for laboratories certified under CLIA to perform high or moderate complexity testing. This test has been [place] FDA cleared. A negative result does not rule out the presence of PCR inhibitors in the specimen or target RNA in concentration below the limit of detection for the assay. If only one viral target is positive but coinfection with multiple targets is suspected, the sample should be re-tested with another FDA cleared,  approved, or authorized test, if coninfection would change clinical management. This test was validated by the [facility] [employer]. These laboratories are certified under the Clinical Laboratory Improvement Amendments of 1988 (CLIA-88) as qualified to perfom high complexity laboratory testing.   [place] Draw    Narrative    The following orders were created for panel order [place] Draw.  Procedure                               Abnormality         Status                     ---------                               -----------         ------                     Extra Blue Top Tube[[id number]]                              Final result               Extra Red Top Tube[[id number]]                               Final result               Extra Green Top (Lithium...[[id number]]                      Final result               Extra Purple Top Tube[[id number]]                                                       Extra Heparinized Syringe[[id number]]                        Final result                 Please view results for these tests on the individual orders.   Extra Blue Top Tube   Result Value Ref Range    Hold Specimen JIC    Extra Red Top Tube   Result Value Ref Range    Hold Specimen JIC    Extra Green Top (Lithium Heparin) Tube   Result Value Ref Range    Hold Specimen JIC    Comprehensive metabolic panel   Result Value Ref Range    Sodium 136 135 - 145 mmol/L    Potassium 4.8 3.4 - 5.3 mmol/L    Carbon Dioxide (CO2) 22 22 - 29 mmol/L    Anion Gap 11 7 - 15 mmol/L    Urea Nitrogen 17.4 6.0 - 20.0 mg/dL    Creatinine 0.71 0.51 - 0.95 mg/dL    GFR Estimate >90 >60 mL/min/1.73m2    Calcium 8.9 8.8 - 10.4 mg/dL    Chloride 103 98 - 107 mmol/L    Glucose 147 (H) 70 - 99 mg/dL    Alkaline Phosphatase 50 40 - 150 U/L    AST 30 0 - 45 U/L    ALT 42 0 - 50 U/L    Protein Total 6.3 (L) 6.4 - 8.3 g/dL    Albumin 4.1 3.5 - 5.2 g/dL    Bilirubin Total 0.7 <=1.2 mg/dL   Troponin T, High Sensitivity   Result Value Ref Range     Troponin T, High Sensitivity 30 (H) <=14 ng/L   Nt probnp inpatient   Result Value Ref Range    N terminal Pro BNP Inpatient 7,347 (H) 0 - 450 pg/mL   CBC with platelets differential    Narrative    The following orders were created for panel order CBC with platelets differential.  Procedure                               Abnormality         Status                     ---------                               -----------         ------                     CBC with platelets and d...[[id number]]  Abnormal            Final result                 Please view results for these tests on the individual orders.   CBC with platelets and differential   Result Value Ref Range    WBC Count 9.3 4.0 - 11.0 10e3/uL    RBC Count 4.36 3.80 - 5.20 10e6/uL    Hemoglobin 12.7 11.7 - 15.7 g/dL    Hematocrit 38.3 35.0 - 47.0 %    MCV 88 78 - 100 fL    MCH 29.1 26.5 - 33.0 pg    MCHC 33.2 31.5 - 36.5 g/dL    RDW 14.1 10.0 - 15.0 %    Platelet Count 170 150 - 450 10e3/uL    % Neutrophils 81 %    % Lymphocytes 7 %    % Monocytes 9 %    % Eosinophils 2 %    % Basophils 0 %    % Immature Granulocytes 1 %    NRBCs per 100 WBC 0 <1 /100    Absolute Neutrophils 7.5 1.6 - 8.3 10e3/uL    Absolute Lymphocytes 0.6 (L) 0.8 - 5.3 10e3/uL    Absolute Monocytes 0.9 0.0 - 1.3 10e3/uL    Absolute Eosinophils 0.1 0.0 - 0.7 10e3/uL    Absolute Basophils 0.0 0.0 - 0.2 10e3/uL    Absolute Immature Granulocytes 0.1 <=0.4 10e3/uL    Absolute NRBCs 0.0 10e3/uL   Extra Heparinized Syringe   Result Value Ref Range    Hold Specimen Ok    EKG 12 lead   Result Value Ref Range    Systolic Blood Pressure  mmHg    Diastolic Blood Pressure  mmHg    Ventricular Rate 109 BPM    Atrial Rate 109 BPM    NJ Interval 158 ms    QRS Duration 70 ms     ms    QTc 455 ms    P Axis 85 degrees    R AXIS 132 degrees    T Axis 73 degrees    Interpretation ECG       Sinus tachycardia  Biatrial enlargement  Right ventricular hypertrophy  Anterolateral infarct , age  undetermined  Abnormal ECG  No previous ECGs available     Troponin T, High Sensitivity   Result Value Ref Range    Troponin T, High Sensitivity 58 (H) <=14 ng/L       Medications   ondansetron (ZOFRAN ODT) ODT tab 4 mg (4 mg Oral $Given 10/14/24 2119)   ipratropium - albuterol 0.5 mg/2.5 mg/3 mL (DUONEB) neb solution 3 mL (3 mLs Nebulization $Given 10/15/24 0031)   furosemide (LASIX) injection 20 mg (20 mg Intravenous $Given 10/15/24 0151)       Assessments & Plan (with Medical Decision Making)   Sudden SOB    EKG : sinus tachy  CXR: pulmonary edema  Labs reviewed  Lasix IV 20 mg given , I spoke to cardiology in [facility] , accepted for transfer ER To ER  Elevated trop likely to demand ischemia     I have reviewed the nursing notes.    I have reviewed the findings, diagnosis, plan and need for follow up with the patient.          New Prescriptions    No medications on file       Final diagnoses:   Acute pulmonary edema (H)       10/14/2024   [facility]       Marcos Rosa MD  10/15/24 7215

## 2024-10-15 NOTE — ED NOTES
Call received that patient will be going to [facility] ED.   Report # [phone].

## 2024-10-16 ENCOUNTER — APPOINTMENT (OUTPATIENT)
Dept: CT IMAGING | Facility: CLINIC | Age: 44
DRG: 281 | End: 2024-10-16
Payer: MEDICARE

## 2024-10-16 ENCOUNTER — APPOINTMENT (OUTPATIENT)
Dept: CT IMAGING | Facility: CLINIC | Age: 44
DRG: 281 | End: 2024-10-16
Attending: NURSE PRACTITIONER
Payer: MEDICARE

## 2024-10-16 ENCOUNTER — APPOINTMENT (OUTPATIENT)
Dept: CARDIOLOGY | Facility: CLINIC | Age: 44
DRG: 281 | End: 2024-10-16
Attending: NURSE PRACTITIONER
Payer: MEDICARE

## 2024-10-16 LAB
ANION GAP SERPL CALCULATED.3IONS-SCNC: 16 MMOL/L (ref 7–15)
BUN SERPL-MCNC: 27.7 MG/DL (ref 6–20)
CALCIUM SERPL-MCNC: 8.6 MG/DL (ref 8.8–10.4)
CHLORIDE SERPL-SCNC: 100 MMOL/L (ref 98–107)
CREAT SERPL-MCNC: 0.96 MG/DL (ref 0.51–0.95)
CYSTATIN C (ROCHE): 1.3 MG/L (ref 0.6–1)
EGFRCR SERPLBLD CKD-EPI 2021: 75 ML/MIN/1.73M2
ERYTHROCYTE [DISTWIDTH] IN BLOOD BY AUTOMATED COUNT: 14.5 % (ref 10–15)
GFR/BSA.PRED SERPLBLD CYS-BASED-ARV: 55 ML/MIN/1.73M2
GLUCOSE SERPL-MCNC: 89 MG/DL (ref 70–99)
HCO3 SERPL-SCNC: 23 MMOL/L (ref 22–29)
HCT VFR BLD AUTO: 40.5 % (ref 35–47)
HGB BLD-MCNC: 13.6 G/DL (ref 11.7–15.7)
INR PPP: 1.16 (ref 0.85–1.15)
LVEF ECHO: NORMAL
MAGNESIUM SERPL-MCNC: 2.1 MG/DL (ref 1.7–2.3)
MCH RBC QN AUTO: 29.6 PG (ref 26.5–33)
MCHC RBC AUTO-ENTMCNC: 33.6 G/DL (ref 31.5–36.5)
MCV RBC AUTO: 88 FL (ref 78–100)
PLATELET # BLD AUTO: 169 10E3/UL (ref 150–450)
POTASSIUM SERPL-SCNC: 3.6 MMOL/L (ref 3.4–5.3)
RBC # BLD AUTO: 4.6 10E6/UL (ref 3.8–5.2)
SODIUM SERPL-SCNC: 139 MMOL/L (ref 135–145)
WBC # BLD AUTO: 11.3 10E3/UL (ref 4–11)

## 2024-10-16 PROCEDURE — 83735 ASSAY OF MAGNESIUM: CPT

## 2024-10-16 PROCEDURE — 250N000013 HC RX MED GY IP 250 OP 250 PS 637: Performed by: EMERGENCY MEDICINE

## 2024-10-16 PROCEDURE — 99233 SBSQ HOSP IP/OBS HIGH 50: CPT | Mod: 25

## 2024-10-16 PROCEDURE — 80048 BASIC METABOLIC PNL TOTAL CA: CPT

## 2024-10-16 PROCEDURE — G1010 CDSM STANSON: HCPCS | Performed by: RADIOLOGY

## 2024-10-16 PROCEDURE — 120N000003 HC R&B IMCU UMMC

## 2024-10-16 PROCEDURE — 250N000013 HC RX MED GY IP 250 OP 250 PS 637

## 2024-10-16 PROCEDURE — 70486 CT MAXILLOFACIAL W/O DYE: CPT | Mod: 26 | Performed by: RADIOLOGY

## 2024-10-16 PROCEDURE — 71250 CT THORAX DX C-: CPT | Mod: MG

## 2024-10-16 PROCEDURE — 93306 TTE W/DOPPLER COMPLETE: CPT | Mod: 26 | Performed by: INTERNAL MEDICINE

## 2024-10-16 PROCEDURE — 70486 CT MAXILLOFACIAL W/O DYE: CPT | Mod: MG

## 2024-10-16 PROCEDURE — 85610 PROTHROMBIN TIME: CPT

## 2024-10-16 PROCEDURE — 82610 CYSTATIN C: CPT

## 2024-10-16 PROCEDURE — 85014 HEMATOCRIT: CPT

## 2024-10-16 PROCEDURE — 99418 PROLNG IP/OBS E/M EA 15 MIN: CPT | Mod: 25

## 2024-10-16 PROCEDURE — 250N000011 HC RX IP 250 OP 636

## 2024-10-16 PROCEDURE — 93306 TTE W/DOPPLER COMPLETE: CPT

## 2024-10-16 PROCEDURE — 250N000013 HC RX MED GY IP 250 OP 250 PS 637: Performed by: NURSE PRACTITIONER

## 2024-10-16 PROCEDURE — 71250 CT THORAX DX C-: CPT | Mod: 26 | Performed by: RADIOLOGY

## 2024-10-16 PROCEDURE — 36415 COLL VENOUS BLD VENIPUNCTURE: CPT

## 2024-10-16 RX ORDER — FUROSEMIDE 10 MG/ML
40 INJECTION INTRAMUSCULAR; INTRAVENOUS DAILY
Status: DISCONTINUED | OUTPATIENT
Start: 2024-10-17 | End: 2024-10-19

## 2024-10-16 RX ORDER — NORETHINDRONE ACETATE AND ETHINYL ESTRADIOL 1MG-20(21)
1 KIT ORAL DAILY
Status: DISCONTINUED | OUTPATIENT
Start: 2024-10-16 | End: 2024-10-19 | Stop reason: HOSPADM

## 2024-10-16 RX ORDER — POTASSIUM CHLORIDE 750 MG/1
10 TABLET, EXTENDED RELEASE ORAL ONCE
Status: COMPLETED | OUTPATIENT
Start: 2024-10-16 | End: 2024-10-16

## 2024-10-16 RX ORDER — MULTIPLE VITAMINS W/ MINERALS TAB 9MG-400MCG
1 TAB ORAL DAILY
Status: DISCONTINUED | OUTPATIENT
Start: 2024-10-16 | End: 2024-10-19 | Stop reason: HOSPADM

## 2024-10-16 RX ORDER — CARVEDILOL 6.25 MG/1
6.25 TABLET ORAL 2 TIMES DAILY WITH MEALS
Status: DISCONTINUED | OUTPATIENT
Start: 2024-10-16 | End: 2024-10-16

## 2024-10-16 RX ADMIN — Medication 37.5 MG: at 19:45

## 2024-10-16 RX ADMIN — POTASSIUM CHLORIDE 10 MEQ: 750 TABLET, EXTENDED RELEASE ORAL at 08:08

## 2024-10-16 RX ADMIN — Medication 37.5 MG: at 09:51

## 2024-10-16 RX ADMIN — Medication 1 TABLET: at 13:09

## 2024-10-16 RX ADMIN — LEVETIRACETAM 500 MG: 500 TABLET, FILM COATED ORAL at 19:46

## 2024-10-16 RX ADMIN — LEVETIRACETAM 500 MG: 500 TABLET, FILM COATED ORAL at 08:08

## 2024-10-16 RX ADMIN — FUROSEMIDE 40 MG: 10 INJECTION, SOLUTION INTRAVENOUS at 08:08

## 2024-10-16 RX ADMIN — NORETHINDRONE ACETATE AND ETHINYL ESTRADIOL 1 TABLET: KIT at 09:52

## 2024-10-16 ASSESSMENT — ACTIVITIES OF DAILY LIVING (ADL)
ADLS_ACUITY_SCORE: 29
ADLS_ACUITY_SCORE: 25
ADLS_ACUITY_SCORE: 29
ADLS_ACUITY_SCORE: 25
ADLS_ACUITY_SCORE: 25
ADLS_ACUITY_SCORE: 29
ADLS_ACUITY_SCORE: 25
ADLS_ACUITY_SCORE: 29
ADLS_ACUITY_SCORE: 29
ADLS_ACUITY_SCORE: 25
ADLS_ACUITY_SCORE: 29
ADLS_ACUITY_SCORE: 25
ADLS_ACUITY_SCORE: 29
ADLS_ACUITY_SCORE: 25
ADLS_ACUITY_SCORE: 29
ADLS_ACUITY_SCORE: 25

## 2024-10-16 NOTE — PLAN OF CARE
Goal Outcome Evaluation:    Overall Pt Progress: No change  Outcome Evaluation: Pt VSS, afebrile, denies SOB, sating >94% on RA. Denies pain and nausea. Eating, voiding, and ambulating well.    Neuro: A&Ox2, disoriented to time and situation, baseline per parent.   Cardiac: SR. BP soft, (101/68), otherwise VSS.   Respiratory: Sating >95% on RA. LS diminished, tachypneic with shallow breathing, but denies SOB.  GI/: Adequate urine output. No BM this shift.  Diet/appetite: Tolerating regular diet. Eating well.  Activity:  SBA, up to bathroom.  Pain: Denies pain.  Skin: No new deficits noted.  LDA's: L PIV- SL    Shift Updates: Echo, dental CT, chest CT completed today. Electrolytes WDL, no replacements needed.    Plan: Evaluation for potential interventions at valve conference tomorrow. Continue with POC. Notify primary team with changes.

## 2024-10-16 NOTE — CONSULTS
Cardiovascular Surgery History and Physical  10/16/2024          History of Present Illness:   Efren Saavedra is a 44 year with pertinent medical history of seizure disorder, intellectual disability, and rheumatic mitral stenosis and acute decompensated heart failure. She presented outpatient for consideration of percutaneous balloon mitral commissurotomy. Patient was noted to have a cough several months ago and at that time was incidentally found to have enlarged heart after an X-ray was obtained of her lungs. She is not very active at baseline, with her mother saying she spends most of the time watching television and doing puzzles. She presented to the emergency room at [facility] on October 14, 2024 with shortness of breath, with nausea and vomiting. Denied abdominal pain, chest pain, diaphoresis, fever or headaches. She was transported by [facility] to [facility] ED for further diagnostic work up, management, and consultation.     Work up included CT coronary. CT scan revealed no coronary artery disease and severe mitral stenosis with MVA of 1.1 cm2, mean PG 7 mmHg with LVEF 60-65%. Echocardiogram performed demonstrated rheumatic mitral valve disease is present with severe calcification. Mild mitral insufficiency is present. Severe mitral stenosis is present. The mean gradient across the mitral valve is 12 mmHg. She is referred for mitral valve replacement.     Family and social history reviewed and reveals cardiovascular disease with grandmother and grandfather. She is non smoker and does not consume alcohol. Ms Saavedra lives in [place] with her mother who is guardian. She visits the dentist regularly and her dentis is her cousin.       Troponin 68  Creat .9, WBC 11.3, hematocrit 40.5, platelets 169        Home Medications:     Medications Prior to Admission   Medication Sig Dispense Refill Last Dose    ketoconazole (NIZORAL) 2 % external cream Apply topically daily. (Patient taking  differently: Apply topically daily as needed.) 60 g 3 Unknown    levETIRAcetam (KEPPRA) 250 MG tablet Take 500 mg by mouth 2 times daily   10/15/2024    norethindrone-ethinyl estradiol (JUNEL FE 1/20) 1-20 MG-MCG tablet Take 1 tablet by mouth daily. 90 tablet 3 10/15/2024           Allergies:     Allergies   Allergen Reactions    Azithromycin      Abdominal pain/ cramping     Clotrimazole Rash    Omnicef [Cefdinir] Rash     Itchy and bad rash           Past Medical History:     Past Medical History:   Diagnosis Date    Seizures (H)        Patient Active Problem List   Diagnosis    Congenital hearing loss    Impacted cerumen    Mixed conductive and sensorineural hearing loss, bilateral    Dermatitis    Agenesis of corpus callosum (H)    Dysmenorrhea    Irregular menstrual cycle    Mitral valve disease    Osteoporosis    Other kyphoscoliosis and scoliosis    Seizure (H)    Unspecified intellectual disabilities    Other psoriasis    Rheumatic mitral stenosis    Acute congestive heart failure, unspecified heart failure type (H)    Mitral stenosis            Past Surgical History:     Past Surgical History:   Procedure Laterality Date    feeding tube      TRANSESOPHAGEAL ECHOCARDIOGRAM INTRAOPERATIVE N/A 8/13/2024    Procedure: ECHOCARDIOGRAM, TRANSESOPHAGEAL, WITH ANESTHESIA;  Surgeon: GENERIC ANESTHESIA PROVIDER;  Location: [facility] OR    ventilation tubes, bilateral             Family History:     Family History   Problem Relation Age of Onset    Cerebrovascular Disease Paternal Grandmother         CVA    Heart Disease Maternal Grandfather         disease    Hypertension Father     Other - See Comments Father         post polio    Parkinsonism Maternal Grandmother     Heart Disease Paternal Grandfather            Social History:     Social History     Socioeconomic History    Marital status: Single     Spouse name: None    Number of children: None    Years of education: None    Highest education level: None   Tobacco Use     Smoking status: Never    Smokeless tobacco: Never    Tobacco comments:     no passive exposure   Substance and Sexual Activity    Alcohol use: No    Drug use: No   Other Topics Concern    Caffeine Concern No    Parent/sibling w/ CABG, MI or angioplasty before 65F 55M? No     Social Determinants of Health     Financial Resource Strain: Low Risk  (10/15/2024)    Financial Resource Strain     Within the past 12 months, have you or your family members you live with been unable to get utilities (heat, electricity) when it was really needed?: No   Food Insecurity: Low Risk  (10/15/2024)    Food Insecurity     Within the past 12 months, did you worry that your food would run out before you got money to buy more?: No     Within the past 12 months, did the food you bought just not last and you didn t have money to get more?: No   Transportation Needs: Low Risk  (10/15/2024)    Transportation Needs     Within the past 12 months, has lack of transportation kept you from medical appointments, getting your medicines, non-medical meetings or appointments, work, or from getting things that you need?: No   Interpersonal Safety: Low Risk  (10/15/2024)    Interpersonal Safety     Do you feel physically and emotionally safe where you currently live?: Yes     Within the past 12 months, have you been hit, slapped, kicked or otherwise physically hurt by someone?: No     Within the past 12 months, have you been humiliated or emotionally abused in other ways by your partner or ex-partner?: No   Housing Stability: High Risk (10/15/2024)    Housing Stability     Do you have housing? : No     Are you worried about losing your housing?: No             Physical Exam:   Vitals were reviewed  Temp:  [97.4  F (36.3  C)-99.2  F (37.3  C)] 99  F (37.2  C)  Pulse:  [] 91  Resp:  [18-20] 18  BP: ()/(59-77) 105/73  SpO2:  [91 %-100 %] 94 %  Vitals:    10/15/24 0638 10/15/24 2153 10/16/24 0630   Weight: 34 kg (75 lb) 31.9 kg (70 lb 6.4 oz)  31.9 kg (70 lb 6.4 oz)      Gen: NAD, resting comfortably in bed, answers questions from her mother   Skin: no rashes or bruises, warm, dry  HEENT: normocephalic, atraumatic cranium, EOMI, sclerae anicteric. Oral mucosa pink and moist, no tonsillar edema or erythema, midline trachea, nonpalpable thyroid. Good dentition.   Lungs: CTA in all fields, no wheezing or rhonchi  CV: RRR, S1S2 normal, no murmur. Radial pulses and DP pulses symmetric. No dependent edema.   Abd: no scars, positive normal pitched bowel sounds, overall soft and non distended, nontender, no hepatosplenomegaly, no masses/guarding/rebound tenderness.   Neuro: CN II-VII grossly intact, sensation/motor intact in upper and lower extremities, Breaks x 3          Data:    All labs and imaging reviewed by me.  Labs:    Recent Labs   Lab 10/16/24  0541 10/14/24  2157 10/14/24  2156   WBC 11.3* 9.3  --    HGB 13.6 12.7  --    MCV 88 88  --     170  --    INR 1.16*  --   --      --  136   POTASSIUM 3.6  --  4.8   CHLORIDE 100  --  103   CO2 23  --  22   BUN 27.7*  --  17.4   CR 0.96*  --  0.71   ANIONGAP 16*  --  11   YUE 8.6*  --  8.9   GLC 89  --  147*   ALBUMIN  --   --  4.1   PROTTOTAL  --   --  6.3*   BILITOTAL  --   --  0.7   ALKPHOS  --   --  50   ALT  --   --  42   AST  --   --  30     Recent Labs   Lab 10/16/24  0541 10/14/24  2156   GLC 89 147*          Echocardiogram:   Interpretation Summary  Global and regional left ventricular function is normal with an EF of 60-65%.  Right ventricular function, chamber size, wall motion, and thickness are  normal.  Rheumatic mitral valve disease is present with severe calcification.  Mild mitral insufficiency is present.  Severe mitral stenosis is present.  The mean gradient across the mitral valve is 12 mmHg. Heart rate 77BPM.  Mild (pulmonary artery systolic pressure<50mmHg) pulmonary hypertension is  present.  IVC diameter and respiratory changes fall into an intermediate range  suggesting an  RA pressure of 8 mmHg.  No pericardial effusion is present.    CXR: 10/15/2024    Impression:   Constellation of findings suggesting CHF with perihilar pulmonary  edema.    This report is in agreement with the preliminary report.   CLARKE SINGLETON MD     CT angio Coronary: 9/24/2024  CORONARY ARTERIES:   Left Main: Arises from the left coronary cusp. No significant atherosclerosis.   Left Anterior Descending artery: No significant atherosclerosis in the proximal to mid segment.   Circumflex artery: No significant atherosclerosis in the proximal to mid segment.   Right Coronary artery: Arises from the right coronary cusp. Right dominant system. No significant atherosclerosis in the proximal to mid segment.     NON CORONARY CARDIAC STRUCTURES:   The left atrium has a severe degree of dilation.   There is severe rotation of the heart towards the left chest. The aorta, right atrium and right coronary artery are located in proximity to the sternum.   The mitral valve appears severely thickened/rheumatic with extensive calcifications located around the annulus. MVA by planimetry is difficult due to shadowing. MVA 1.25 cm2, severe stenosis is present.   The right ventricle and atrium appear compressed.   There is poor contrast enhancement of the left atrial appendage. Without delayed imaging unable to fully exclude thrombus.   The main pulmonary artery is dilated at 34 x 30 mm.   Pericardium has normal thickness.    Aortic root is normal in size (26 x 27 x 22x mm). The ascending aorta is normal in size (18 x 18 mm).     CONCLUSIONS:   1. No visualized coronary artery disease in the proximal to mid segments of the coronary arteries, distal segments are not well opacified.   2.  Severe mitral stenosis   3.  Severely enlarged left atrium   See separate radiology report for non-cardiac structures.     Electronically Signed: Clarke Velasco 10/15/2024 3:40 PM            Assessment and Plan:   Efren Saavedra is a 44 year with  pertinent medical history of seizure disorder, intellectual disability, and rheumatic mitral stenosis and acute decompensated heart failure with preserved ejection fraction. Moderate Malnutrition in the context of chronic illness/disease Type II myocardial infarction.          Mitral Valve Replacement vs. Potential percutaneous balloon mitral commissurotomy ([facility]),  presented at valve conference     Cardiac cath per Cardiology  Dental Clearance pending     Not a candidate for percutaneous intervention, timing of surgical intervention (MVR) TBD    NAOMIE Ruffin [facility]   Cardiothoracic Surgery  p: [phone]    Patient seen and examined. Agree plan for mechanical mitral valve replacement. I discussed the risks and benefits of surgery with patient and family including risks of death, bleeding, stroke, infection, renal failure, arrhythmias and pacemaker use. They understand and are willing to proceed with surgery.  Plan discussedin detail with Cardiology team who is in agreement to proceed.

## 2024-10-16 NOTE — PLAN OF CARE
Admission    10/15/2024  0950 PM  -----------------------------------------------------------  Diagnosis: severe mitral valve stenosis and HF     Admitted from: [facility] ED  Report given by: ED RN  Family Aware of Admission: Yes  Chart: Received with pt  Belongings: Remain in room with pt  Admission Profile: Completed  Teaching: Completed. Orientation to unit and room, meds, meal times, call don't fall, and  POC.  Telemetry: In place  Ht./Wt.: taken and documented  2 RN Skin Check:  By Cha BRISENO and Suzette COREY No overt deficits noted.

## 2024-10-16 NOTE — PROGRESS NOTES
[facility]   Cardiology   Progress Note     ASSESSMENT/PLAN:  44 year old female w/ pertinent PMHx of seizure disorder, intellectual disability, and rheumatic mitral stenosis admitted 10/15/2024 for severe mitral stenosis and acute decompensated heart failure.     Changes Today   - Non-contrast CT Chest  - Repeat TTE  - CVTS consulted   - Case to be discussed at valve conference tomorrow per structural team     # Acute Heart Failure w/ Preserved EF (60-65%)  # Severe Rheumatic Mitral Stenosis  Patient evaluated by Dr. Antonio outpatient for consideration of percutaneous balloon mitral commissurotomy ([facility]). Recommended work up includes CT TMVR (completed) and CT coronaries. MVA of 1.1 cm2, mean PG 7 mmHg with LVEF 60-65% and Bhatt score of 7. Appears volume up on admission with elevated NT-P BNP and pulmonary edema noted on CXR. S/p IV lasix 20mg in the [place] ED.   - Needs coronary CT angiogram per Dr. Antonio's note; CT TMVR without significant CAD per Dr. Sheehan   - Fluid Status: Hypervolemic; diuresis with IV Lasix 40mg BID    - Images from [facility] available in PACS  - Repeat TTE to better visual valve  - Non contrast CT ordered to assess level of calcification of valve and assess pulmonary edema  -  SH will discuss at multidisciplinary conference tomorrow   - Continue Lopressor 37.5mg BID, hold for SBP <88 and HR <40  - Ralls's score = 8 (Per. Dr. Sheehan review)  - CVTS consulted for possible surgical intervention   - SH following     # Type 2 MI   Likely demand secondary to hear failure. No evidence of CP or ischemic changes on EKG.   - Troponin 30 --> 58 --> 68     # Moderate malnutrition in the context of chronic illness/disease   # Nausea/Vomiting  RD recs:   Encourage 3 meals/day with protein sources  Trial glucerna oral + ice cream to make into shake for patient  MVI daily  -  PRN antiemetics available     FEN: 2g Na Diet  Code Status: Full  DVT  Prophylaxis:  Ambulation  Isolation: None  Disposition: 3-5+ days pending work up/surgery/procedure     Patient seen and discussed with Dr. Sheehan.    Varsha Araya, MS, PA-C  [facility] Cardiology Team  Pager [phone]/Vocera    Interval History:  Patient resting comfortably with mother at bedside. Denied any acute concerns and mother reports feeling as though patient's breathing has improved. Slightly hypotensive this AM, but asymptomatic.     Physical Exam:  Temp:  [97.4  F (36.3  C)-99.2  F (37.3  C)] 97.4  F (36.3  C)  Pulse:  [] 74  Resp:  [18-20] 18  BP: ()/(59-77) 110/77  SpO2:  [91 %-100 %] 96 %    I/O:   Intake/Output Summary (Last 24 hours) at 10/16/2024 0713  Last data filed at 10/16/2024 0600  Gross per 24 hour   Intake --   Output 250 ml   Net -250 ml        Wt:   Wt Readings from Last 5 Encounters:   10/16/24 31.9 kg (70 lb 6.4 oz)   10/15/24 34.9 kg (77 lb)   09/20/24 33.7 kg (74 lb 3.2 oz)   08/27/24 33.5 kg (73 lb 13.7 oz)   08/19/24 33.5 kg (73 lb 12.8 oz)         General: NAD  HEENT:  PERRLA, EOMI.   CV: RRR. Diastolic murmur appreciated. No rubs or gallops.   Resp: No increased work of breathing or use of accessory muscles, breathing comfortably on room air.  Lung sounds clear throughout/bilaterally  Abdomen:  Normal active bowel sounds.  Abdomen is soft. No distension.  Extremities: Warm.  No pre-tibial edema. No cyanosis or clubbing.  Skin:  Warm and dry. No erythema, rashes, ulceration or diaphoresis.  Neuro: Alert and oriented x3.      Medications:  Current Facility-Administered Medications   Medication Dose Route Frequency Provider Last Rate Last Admin    furosemide (LASIX) injection 40 mg  40 mg Intravenous BID Kathryn Grier APRN CNP   40 mg at 10/15/24 1557    levETIRAcetam (KEPPRA) tablet 500 mg  500 mg Oral BID Tracie Mendoza MD   500 mg at 10/15/24 2017    metoprolol tartrate (LOPRESSOR) half-tab 37.5 mg  37.5 mg Oral BID Josselin Mace, APRN CNP   37.5 mg at 10/15/24 1943     sodium chloride (PF) 0.9% PF flush 3 mL  3 mL Intracatheter Q8H Tracie Mendoza MD   3 mL at 10/15/24 2256     Current Facility-Administered Medications   Medication Dose Route Frequency Provider Last Rate Last Admin    medication instruction   Does not apply Continuous PRN Tracie Mendoza MD           Labs:   CMP  Recent Labs   Lab 10/16/24  0541 10/14/24  2156    136   POTASSIUM 3.6 4.8   CHLORIDE 100 103   CO2 23 22   ANIONGAP 16* 11   GLC 89 147*   BUN 27.7* 17.4   CR 0.96* 0.71   GFRESTIMATED 75 >90   YUE 8.6* 8.9   MAG 2.1  --    PROTTOTAL  --  6.3*   ALBUMIN  --  4.1   BILITOTAL  --  0.7   ALKPHOS  --  50   AST  --  30   ALT  --  42     CBC  Recent Labs   Lab 10/16/24  0541 10/14/24  2157   WBC 11.3* 9.3   RBC 4.60 4.36   HGB 13.6 12.7   HCT 40.5 38.3   MCV 88 88   MCH 29.6 29.1   MCHC 33.6 33.2   RDW 14.5 14.1    170     INR  Recent Labs   Lab 10/16/24  0541   INR 1.16*     Arterial Blood GasNo lab results found in last 7 days.    Diagnostics:  10/15/24 EK/13/24 Transesophageal Echocardiogram:  SVEN ordered to evaluate mitral valve.  Technically difficult study. Extremely poor acoustic windows.  Moderate mitral stenosis is present. The etiology of the mitral stenosis is  rheumatic. No parachute mitral valve or mitral valve arcade.  The mean gradient across the mitral valve is 7 mmHg at a heart rate of 93 bpm.  Mitral valve pressure half time is 193 ms, which estimates a mitral valve size  of 1.1 cm^2.  Bhatt score = 7 (2 for mobility, 1 for thickening, 3 for calcification, 1  for subvalvular thickening).     [facility] 10/1/24 Cardiac CT:     IMPRESSION:  1.  See below for TMVR related mitral annular and naresh-LVOT  measurements.  2.  These measurements were not performed on software with a dedicated  mitral valve package and should be confirmed using software with a  dedicated package for mitral annular segmentation.    3.  Please review detailed radiology report, to follow separately,  for  incidental non-cardiovascular findings.     FINDINGS:  1.  With virtual implantation of a 29 mm Wilbert 3 prosthesis with a  30% atrialized implantation, the estimated area of the naresh-LVOT is  0.329 cm2. The virtual valve abuts the ventricular septum.The  aorto-mitral angle is 97.0 degrees.  2.  The mitral leaflets are rheumatic in morphology. There is 91  degrees of circumferential mitral annular calcification.  3.  Imaging protocol is non-diagnostic for evaluation of coronary  artery stenosis.   4.  There is no acute aortic pathology, such as dissection, intramural  hematoma, or contained rupture.        No results found for this or any previous visit (from the past 24 hour(s)).    Medical Decision Making       45 MINUTES SPENT BY ME on the date of service doing chart review, history, exam, documentation & further activities per the note.

## 2024-10-16 NOTE — PROGRESS NOTES
Brief Structural Cardiology Note  October 16, 2024    Efren Saavedra is a very pleasant 44 year old female with severe rheumatic mitral stenosis who we are asked to see for evaluation and consideration for TMVR vs potential percutaneous balloon mitral commissurotomy ([facility]). Her severe mitral stenosis is characterized by a MVA of 1.1 cm2, mean PG 7 mmHg with LVEF 60-65% and Bhatt score of 7. Her additional past medical history if notable for developmental delay and seizure disorder.     She is known to our structural team, previously seen in valve clinic 8/19/24. CT TMVR and SVEN recently completed as part of her work-up. Now presenting with acute decompensated heart failure s/p IV lasix with improvement in symptoms. Repeat TTE pending.     Severe rheumatic mitral stenosis   Acute decompensated heart failure  - Plan for inpatient surgical evaluation for possible MVR   - Present at valve conference tomorrow to discuss TMVR vs PBMC vs surgical MVR   - Patient and verbalized understanding and agree with the plan as outlined above     NAOMIE John, CNP  Structural Heart Nurse Practitioner  [facility]  Clinic: [phone]  Pager: [phone]    Physician Attestation   I have reviewed and discussed with the advanced practice provider their history, physical and plan for Efren Saavedra. I did not participate in a shared visit by interviewing or examining the patient and this should be billed as an advanced practice provider only visit.    Chris Antonio MD  Interventional Cardiology

## 2024-10-16 NOTE — PLAN OF CARE
Hours of Care:  2200 - 0730    Neuro: A/O x 4.  Has cognitive delay, mom in room and involved in cares.   Respiratory:  On RA sating well weaned from 1 L NC.  Cardiac: SR. VSS.    GI: No BM this shift.  No report of nausea or vomiting.  : Urinating adequate amounts of clear, yellow urine  Skin: no overt deficits noted.  LDA: 1 PIV, SL  Pain: Denies  Diet: 2 g Na.   Activity: SBA  Sleep:  No sleep disturbances noted or reported.      Plan: Continue POC and notify C1 with changes.      Plan of Care Reviewed With: patient, parent    Overall Patient Progress: no change

## 2024-10-16 NOTE — PHARMACY-ADMISSION MEDICATION HISTORY
Pharmacist Admission Medication History    Admission medication history is complete. The information provided in this note is only as accurate as the sources available at the time of the update.    Information Source(s): Family member via phone    Pertinent Information: None    Changes made to PTA medication list:  Added: None  Deleted:   Lorazepam  Depo-provera  Changed: None    Allergies reviewed with patient and updates made in EHR: no    Medication History Completed By: Pb Steele RPH 10/16/2024 9:01 AM    Prior to Admission medications    Medication Sig Last Dose Taking? Auth Provider Long Term End Date   ketoconazole (NIZORAL) 2 % external cream Apply topically daily.  Patient taking differently: Apply topically daily as needed. Unknown Yes Cee Velez MD     levETIRAcetam (KEPPRA) 250 MG tablet Take 500 mg by mouth 2 times daily 10/15/2024 Yes Reported, Patient No    norethindrone-ethinyl estradiol (JUNEL FE 1/20) 1-20 MG-MCG tablet Take 1 tablet by mouth daily. 10/15/2024 Yes Cee Velez MD Yes

## 2024-10-16 NOTE — PROGRESS NOTES
"CLINICAL NUTRITION SERVICES - ASSESSMENT NOTE     Nutrition Prescription    RECOMMENDATIONS FOR MDs/PROVIDERS TO ORDER:  None currently    Malnutrition Status:    Moderate malnutrition in the context of chronic illness/disease    Recommendations already ordered by Registered Dietitian (RD):  Encourage 3 meals/day with protein sources  Trial glucerna oral + ice cream to make into shake for patient  MVI daily    Future/Additional Recommendations:  Monitor nutrition related findings and follow up per protocol     REASON FOR ASSESSMENT  Efren Saavedra is a/an 44 year old female assessed by the dietitian for Positive Admission Nutrition Risk Screen     Patient admitted for severe mitral stenosis and acute decompensated heart failure.     MEDICAL HISTORY  seizure disorder, intellectual disability, and rheumatic mitral stenosis     NUTRITION HISTORY  Met with pt at bedside. Mother present at bedside and able to assist with history. Pt reports good appetite. Mother reports pt has done well with intake at last few meals. Denies N/V/D/C. Denies issues chewing/swallowing or food allergies/intolerances. Per Mother, pt typically likes to eat a lot of soups such as chicken and rice. Does not eat a lot of meat. Likes juice and malts. Per mother, pt drinks ~ 4-6 ounces of malts a couple times a day with medications. Denies any noticeable weight changes. Discussed ensuring adequate protein intake. Agreeable to trial of strawberry ONS + ice cream to simulate a malt.     CURRENT NUTRITION ORDERS  Diet: 2 g Sodium    Intake/Tolerance: No documented intakes to assess.    LABS  BUN 27.7  Cre .96  Anion gap 16  WBC 11.3      MEDICATIONS  Lasix      ANTHROPOMETRICS  Height: 142.2 cm (4' 8\")  Most Recent Weight: 31.9 kg (70 lb 6.4 oz)    IBW: 41.8kg  Body mass index is 15.78 kg/m . BMI Category: Underweight BMI <18.5  Weight History:  1.8 kg (5%) weight loss over 1 month.  Wt Readings from Last 20 Encounters:   10/16/24 31.9 kg (70 lb " 6.4 oz)   10/15/24 34.9 kg (77 lb)   09/20/24 33.7 kg (74 lb 3.2 oz)   08/27/24 33.5 kg (73 lb 13.7 oz)   08/19/24 33.5 kg (73 lb 12.8 oz)   08/13/24 33.2 kg (73 lb 3.1 oz)   06/27/24 34.1 kg (75 lb 2.8 oz)   06/16/24 34.1 kg (75 lb 3.2 oz)   04/23/24 34 kg (74 lb 15.3 oz)   02/21/24 34 kg (75 lb)   12/01/23 33.6 kg (74 lb)   11/07/23 31.8 kg (70 lb)   06/28/23 32.7 kg (72 lb)   03/15/23 33.6 kg (74 lb)   10/17/22 34 kg (74 lb 15.3 oz)   10/05/22 34 kg (75 lb)   09/06/22 36.3 kg (80 lb)   04/05/22 34 kg (75 lb)   12/03/21 34 kg (75 lb)   06/30/21 34 kg (75 lb)         ASSESSED NUTRITION NEEDS  Dosing Weight: 31.9 kg (Admission weight)   Estimated Energy Needs: 957-1117+ kcals/day (30 - 35 kcals/kg )  Justification: Repletion  Estimated Protein Needs: 35-45+ grams protein/day (1.1 - 1.4 grams of pro/kg)  Justification: Increased needs  Estimated Fluid Needs:  (Per provider pending fluid status)       PHYSICAL FINDINGS  See malnutrition section below.    Roger Score: 20  Per EMR: Skin  Skin WDL: unchanged from my previous assessment    MALNUTRITION  % Intake: Decreased intake does not meet criteria  % Weight Loss: Up to 5% in 1 month (moderate malnutrition)  Subcutaneous Fat Loss: None observed , difficult to assess  Muscle Loss: Temporal:  moderate  Fluid Accumulation/Edema: None noted  Malnutrition Diagnosis: Moderate malnutrition in the context of chronic illness/disease    NUTRITION DIAGNOSIS  Inadequate protein intake related to dislike of protein sources as evidenced by mothers report.      INTERVENTIONS  Implementation  Nutrition Education: will be provided if nutrition education needs arise.  and Nutrition Education: Introduced role of RD   Medical food supplement therapy     Goals  Patient to consume % of nutritionally adequate meal trays TID, or the equivalent with supplements/snacks.        Monitoring/Evaluation  Progress toward goals will be monitored and evaluated per protocol.  Sonya Altamirano MS,  RD, LD, [facility]    6C (beds 1531-1835) + 7C (beds 6275-4583) + ED   [facility] by name or unit dietitian

## 2024-10-17 ENCOUNTER — CARE COORDINATION (OUTPATIENT)
Dept: CARDIOLOGY | Facility: CLINIC | Age: 44
End: 2024-10-17
Payer: MEDICARE

## 2024-10-17 ENCOUNTER — PREP FOR PROCEDURE (OUTPATIENT)
Dept: CARDIOLOGY | Facility: CLINIC | Age: 44
End: 2024-10-17
Payer: MEDICARE

## 2024-10-17 DIAGNOSIS — I34.0 MITRAL REGURGITATION: Primary | ICD-10-CM

## 2024-10-17 LAB
ANION GAP SERPL CALCULATED.3IONS-SCNC: 13 MMOL/L (ref 7–15)
BUN SERPL-MCNC: 21.8 MG/DL (ref 6–20)
CALCIUM SERPL-MCNC: 8.9 MG/DL (ref 8.8–10.4)
CHLORIDE SERPL-SCNC: 98 MMOL/L (ref 98–107)
CREAT SERPL-MCNC: 0.79 MG/DL (ref 0.51–0.95)
EGFRCR SERPLBLD CKD-EPI 2021: >90 ML/MIN/1.73M2
ERYTHROCYTE [DISTWIDTH] IN BLOOD BY AUTOMATED COUNT: 14 % (ref 10–15)
GLUCOSE SERPL-MCNC: 87 MG/DL (ref 70–99)
HCO3 SERPL-SCNC: 25 MMOL/L (ref 22–29)
HCT VFR BLD AUTO: 40.1 % (ref 35–47)
HGB BLD-MCNC: 13.4 G/DL (ref 11.7–15.7)
MAGNESIUM SERPL-MCNC: 2.2 MG/DL (ref 1.7–2.3)
MCH RBC QN AUTO: 29.3 PG (ref 26.5–33)
MCHC RBC AUTO-ENTMCNC: 33.4 G/DL (ref 31.5–36.5)
MCV RBC AUTO: 88 FL (ref 78–100)
PLATELET # BLD AUTO: 153 10E3/UL (ref 150–450)
POTASSIUM SERPL-SCNC: 3.6 MMOL/L (ref 3.4–5.3)
RBC # BLD AUTO: 4.57 10E6/UL (ref 3.8–5.2)
SODIUM SERPL-SCNC: 136 MMOL/L (ref 135–145)
WBC # BLD AUTO: 10.3 10E3/UL (ref 4–11)

## 2024-10-17 PROCEDURE — 250N000013 HC RX MED GY IP 250 OP 250 PS 637: Performed by: EMERGENCY MEDICINE

## 2024-10-17 PROCEDURE — 85027 COMPLETE CBC AUTOMATED: CPT

## 2024-10-17 PROCEDURE — 99418 PROLNG IP/OBS E/M EA 15 MIN: CPT | Mod: FS

## 2024-10-17 PROCEDURE — 120N000003 HC R&B IMCU UMMC

## 2024-10-17 PROCEDURE — 250N000013 HC RX MED GY IP 250 OP 250 PS 637

## 2024-10-17 PROCEDURE — 250N000013 HC RX MED GY IP 250 OP 250 PS 637: Performed by: NURSE PRACTITIONER

## 2024-10-17 PROCEDURE — 250N000011 HC RX IP 250 OP 636

## 2024-10-17 PROCEDURE — 99233 SBSQ HOSP IP/OBS HIGH 50: CPT | Mod: FS

## 2024-10-17 PROCEDURE — 36415 COLL VENOUS BLD VENIPUNCTURE: CPT

## 2024-10-17 PROCEDURE — 83735 ASSAY OF MAGNESIUM: CPT

## 2024-10-17 PROCEDURE — 80048 BASIC METABOLIC PNL TOTAL CA: CPT

## 2024-10-17 RX ORDER — METOPROLOL TARTRATE 25 MG/1
50 TABLET, FILM COATED ORAL 2 TIMES DAILY
Status: DISCONTINUED | OUTPATIENT
Start: 2024-10-17 | End: 2024-10-19 | Stop reason: HOSPADM

## 2024-10-17 RX ORDER — POTASSIUM CHLORIDE 750 MG/1
10 TABLET, EXTENDED RELEASE ORAL ONCE
Status: COMPLETED | OUTPATIENT
Start: 2024-10-17 | End: 2024-10-17

## 2024-10-17 RX ORDER — SENNOSIDES 8.6 MG
8.6 TABLET ORAL 2 TIMES DAILY PRN
Status: DISCONTINUED | OUTPATIENT
Start: 2024-10-17 | End: 2024-10-19 | Stop reason: HOSPADM

## 2024-10-17 RX ADMIN — POTASSIUM CHLORIDE 10 MEQ: 750 TABLET, EXTENDED RELEASE ORAL at 08:12

## 2024-10-17 RX ADMIN — NORETHINDRONE ACETATE AND ETHINYL ESTRADIOL 1 TABLET: KIT at 08:10

## 2024-10-17 RX ADMIN — LEVETIRACETAM 500 MG: 500 TABLET, FILM COATED ORAL at 08:12

## 2024-10-17 RX ADMIN — Medication 37.5 MG: at 08:11

## 2024-10-17 RX ADMIN — Medication 1 TABLET: at 08:12

## 2024-10-17 RX ADMIN — LEVETIRACETAM 500 MG: 500 TABLET, FILM COATED ORAL at 19:47

## 2024-10-17 RX ADMIN — Medication 12.5 MG: at 12:12

## 2024-10-17 RX ADMIN — FUROSEMIDE 40 MG: 10 INJECTION, SOLUTION INTRAVENOUS at 08:11

## 2024-10-17 RX ADMIN — SENNOSIDES 8.6 MG: 8.6 TABLET, FILM COATED ORAL at 10:18

## 2024-10-17 RX ADMIN — METOPROLOL TARTRATE 50 MG: 25 TABLET, FILM COATED ORAL at 19:47

## 2024-10-17 ASSESSMENT — ACTIVITIES OF DAILY LIVING (ADL)
ADLS_ACUITY_SCORE: 29

## 2024-10-17 NOTE — PLAN OF CARE
Goal Outcome Evaluation:    NURSING PROGRESS NOTE  Shift Summary        Date: October 17, 2024     Neuro/Musculoskeletal:  A&Ox2, developmental delay, Mother at bedside and help with ADL's    Cardiac:  SR.  VSS.     Respiratory:  Sating in the 90s on RA .  GI/:  Adequate urine output.  LBM: 3 day ago, mother asked that BM be given in the morning  Diet/Appetite:  Tolerating 2g Na diet.  Activity:  Assist of one    Pain:  Denies any pain  Skin:  No new deficits noted.   LDAs + Drips/IVF:  LPIV  Protocols/Labs:    K and Mg  Pertinent Shift Updates:    Pt stable, no changes noted overight    Plan:    Possible MVR today or tomorrow    Fer Dominguez RN  .................................................... October 17, 2024   6:35 AM  [facility] ([facility] [place]  Stepdown ICU (Unit 6C)

## 2024-10-17 NOTE — PROGRESS NOTES
Patient's case was discussed at Valve conference, attended by structural heart cardiologists, CV surgeons, CNP's, and structural heart care coordinators, on October 17, 2024.    Unfortunately, the patient does not qualify for BMV due to small LVOT, for recommend surgery.        Dr. Sheehan to review discussion at the Valve Conference with mother at bedside. Through shared decision making, patient agrees with the plan as outlined above. All questions answered.       Ashley Lo RN  Lead Structural Heart Care Coordinator  TAVR, [facility] and Watchman Programs  [facility]  Office: [phone]

## 2024-10-17 NOTE — PROGRESS NOTES
[facility]   Cardiology   Progress Note     ASSESSMENT/PLAN:  44 year old female w/ pertinent PMHx of seizure disorder, intellectual disability, and rheumatic mitral stenosis admitted 10/15/2024 for severe mitral stenosis and acute decompensated heart failure.      Changes Today   - Not a candidate for percutaneous intervention  - CVTS to review case and plan surgery date  - Decrease Lasix to 40mg daily   - Increase Lopressor to 50mg BID  - RHC/Cors tomorrow; NPO at 0000     # Acute Heart Failure w/ Preserved EF (60-65%)  # Severe Rheumatic Mitral Stenosis  Patient evaluated by Dr. Antonio outpatient for consideration of percutaneous balloon mitral commissurotomy ([facility]). Recommended work up includes CT TMVR (completed) and CT coronaries. MVA of 1.1 cm2, mean PG 7 mmHg with LVEF 60-65% and Bhatt score of 7. Appears volume up on admission with elevated NT-P BNP and pulmonary edema noted on CXR. S/p IV lasix 20mg in the [place] ED.   - Needs coronary CT angiogram per Dr. Antonio's note; CT TMVR without significant CAD per Dr. Sheehan   - Fluid Status: near euvolemia; decrease Lasix to 40mg daily; Cystatin C 1.3 (10/17)  - Images from [facility] available in PACS  - Repeat TTE 10/16: EF 60-65%, rheumatic mitral disease with severe calcification with MG 12; mild PH  - Non contrast CT 10/16:   1. Severe dilation of the left atrium and extensive calcifications along the mitral annulus, unchanged when compared to prior CT 9/24/2024, consistent with mitral stenosis.  2. Enlarged main pulmonary trunk likely sequelae of pulmonary hypertension  3. Persistent interlobular septal thickening consistent with pulmonary edema. Scattered bibasilar predominant groundglass opacities are unchanged, may represent infectious/inflammatory process. Can consider follow-up chest CT in 4-6 weeks to reassess.  4. Mediastinal lymphadenopathy favored to be reactive  -  SH discussed case at multidisciplinary  conference 10/17, patient not a candidate for percutaneous intervention due to high Houston's score; plan for surgical intervention  - RHC and Cors planned for tomorrow; will trial with sedation and strongly prefer radial access, may require GA; cath lab aware of patient's developmental delay  - Increase Lopressor to 50 mg BID, hold for SBP <88 and HR <40  - Houston's score = 9+ on repeat imaging  - CVTS consulted and following     # Type 2 MI   Likely demand secondary to heart failure. No evidence of CP or ischemic changes on EKG.   - Troponin 30 --> 58 --> 68     # Moderate malnutrition in the context of chronic illness/disease   # Nausea/Vomiting  RD recs:   Encourage 3 meals/day with protein sources  Trial glucerna oral + ice cream to make into shake for patient  MVI daily  -  PRN antiemetics available      FEN: 2g Na Diet  Code Status: Full  DVT Prophylaxis:  Ambulation  Isolation: None  Disposition: 3-5+ days pending work up/surgery/procedure     Patient seen and discussed with Dr. Sheehan.    Varsha Araya, MS, PA-C  [facility] Cardiology Team  Pager [phone]/Vocera    Interval History:  Discussed plan moving forward in depth with patient's mother. Patient's mother tearful, but expressed understanding that patient is not a good candidate for percutaneous balloon mitral commissurotomy. Patient resting comfortably in bed on exam.     Physical Exam:  Temp:  [97.9  F (36.6  C)-99  F (37.2  C)] 98.7  F (37.1  C)  Pulse:  [65-91] 65  Resp:  [16-22] 18  BP: ()/(56-74) 94/56  SpO2:  [79 %-100 %] 100 %    I/O:   Intake/Output Summary (Last 24 hours) at 10/17/2024 0724  Last data filed at 10/16/2024 2000  Gross per 24 hour   Intake 540 ml   Output 950 ml   Net -410 ml      Wt:   Wt Readings from Last 5 Encounters:   10/16/24 31.9 kg (70 lb 6.4 oz)   10/15/24 34.9 kg (77 lb)   09/20/24 33.7 kg (74 lb 3.2 oz)   08/27/24 33.5 kg (73 lb 13.7 oz)   08/19/24 33.5 kg (73 lb 12.8 oz)     General: NAD  HEENT:  PERRLA, EOMI.   CV: RRR.  Diastolic murmur appreciated. No rubs or gallops.   Resp: No increased work of breathing or use of accessory muscles, breathing comfortably on room air.  Lung sounds clear throughout/bilaterally  Abdomen:  Normal active bowel sounds.  Abdomen is soft. No distension.  Extremities: Warm.  No pre-tibial edema. No cyanosis or clubbing.  Skin:  Warm and dry. No erythema, rashes, ulceration or diaphoresis.  Neuro: Alert and oriented x2     Medications:  Current Facility-Administered Medications   Medication Dose Route Frequency Provider Last Rate Last Admin    furosemide (LASIX) injection 40 mg  40 mg Intravenous Daily Varsha Araya PA-C        levETIRAcetam (KEPPRA) tablet 500 mg  500 mg Oral BID Tracie Mendoza MD   500 mg at 10/16/24 1946    metoprolol tartrate (LOPRESSOR) half-tab 37.5 mg  37.5 mg Oral BID Josselin Mace APRN CNP   37.5 mg at 10/16/24 1945    multivitamin w/minerals (THERA-VIT-M) tablet 1 tablet  1 tablet Oral Daily Nacho Blount DO   1 tablet at 10/16/24 1309    norethindrone-ethinyl estradiol (Barbra FE 1/20) 1-20 MG-MCG per tablet 1 tablet *Pt supplied*  1 tablet Oral Daily Varsha Araya PA-C   1 tablet at 10/16/24 0952    potassium chloride prachi ER (KLOR-CON M10) CR tablet 10 mEq  10 mEq Oral Once Varsha Araya PA-C        sodium chloride (PF) 0.9% PF flush 3 mL  3 mL Intracatheter Q8H Tracie Mendoza MD   3 mL at 10/16/24 1620     Current Facility-Administered Medications   Medication Dose Route Frequency Provider Last Rate Last Admin    medication instruction   Does not apply Continuous PRN Tracie Mendoza MD           Labs:   CMP  Recent Labs   Lab 10/17/24  0547 10/16/24  0541 10/14/24  2156    139 136   POTASSIUM 3.6 3.6 4.8   CHLORIDE 98 100 103   CO2 25 23 22   ANIONGAP 13 16* 11   GLC 87 89 147*   BUN 21.8* 27.7* 17.4   CR 0.79 0.96* 0.71   GFRESTIMATED >90 75 >90   YEU 8.9 8.6* 8.9   MAG 2.2 2.1  --    PROTTOTAL  --   --  6.3*   ALBUMIN  --   --  4.1   BILITOTAL   --   --  0.7   ALKPHOS  --   --  50   AST  --   --  30   ALT  --   --  42     CBC  Recent Labs   Lab 10/17/24  0547 10/16/24  0541 10/14/24  2157   WBC 10.3 11.3* 9.3   RBC 4.57 4.60 4.36   HGB 13.4 13.6 12.7   HCT 40.1 40.5 38.3   MCV 88 88 88   MCH 29.3 29.6 29.1   MCHC 33.4 33.6 33.2   RDW 14.0 14.5 14.1    169 170     INR  Recent Labs   Lab 10/16/24  0541   INR 1.16*     Arterial Blood GasNo lab results found in last 7 days.    Diagnostics:  10/15/24 EK/13/24 Transesophageal Echocardiogram:  SVEN ordered to evaluate mitral valve.  Technically difficult study. Extremely poor acoustic windows.  Moderate mitral stenosis is present. The etiology of the mitral stenosis is  rheumatic. No parachute mitral valve or mitral valve arcade.  The mean gradient across the mitral valve is 7 mmHg at a heart rate of 93 bpm.  Mitral valve pressure half time is 193 ms, which estimates a mitral valve size  of 1.1 cm^2.  Bhatt score = 7 (2 for mobility, 1 for thickening, 3 for calcification, 1  for subvalvular thickening).     OSH 10/1/24 Cardiac CT:     IMPRESSION:  1.  See below for TMVR related mitral annular and naresh-LVOT  measurements.  2.  These measurements were not performed on software with a dedicated  mitral valve package and should be confirmed using software with a  dedicated package for mitral annular segmentation.    3.  Please review detailed radiology report, to follow separately, for  incidental non-cardiovascular findings.     FINDINGS:  1.  With virtual implantation of a 29 mm Wilbert 3 prosthesis with a  30% atrialized implantation, the estimated area of the naresh-LVOT is  0.329 cm2. The virtual valve abuts the ventricular septum.The  aorto-mitral angle is 97.0 degrees.  2.  The mitral leaflets are rheumatic in morphology. There is 91  degrees of circumferential mitral annular calcification.  3.  Imaging protocol is non-diagnostic for evaluation of coronary  artery stenosis.   4.  There is no acute  aortic pathology, such as dissection, intramural  hematoma, or contained rupture.     Recent Results (from the past 24 hour(s))   Echo Complete   Result Value    LVEF  60-65%    [facility]    [id number]  [id number]  [id number]  [id number]^YE^MAHAD^[place]     [facility],[place]  Echocardiography Laboratory  [address] [place] [address]     Name: KATE BYRNES  MRN: [id number]  : 1980  Study Date: 10/16/2024 10:01 AM  Age: 44 yrs  Gender: Female  [place] [facility] [place] For Study: Mitral Valve Disorder  Ordering Physician: MAHAD BELCHER  Referring Physician: LUCY FELDER  Performed By: De Pearson RDCS     BSA: 1.1 m2  Height: 56 in  Weight: 70 lb  HR: 80  BP: 105/73 mmHg  ______________________________________________________________________________  Procedure  Complete Portable Echo Adult.  ______________________________________________________________________________  Interpretation Summary  Global and regional left ventricular function is normal with an EF of 60-65%.  Right ventricular function, chamber size, wall motion, and thickness are  normal.  Rheumatic mitral valve disease is present with severe calcification.  Mild mitral insufficiency is present.  Severe mitral stenosis is present.  The mean gradient across the mitral valve is 12 mmHg. Heart rate 77BPM.  Mild (pulmonary artery systolic pressure<50mmHg) pulmonary hypertension is  present.  IVC diameter and respiratory changes fall into an intermediate range  suggesting an RA pressure of 8 mmHg.  No pericardial effusion is present.  ______________________________________________________________________________  Left Ventricle  Global and regional left ventricular function is normal with an EF of 60-65%.  Left ventricular wall thickness is normal. Left ventricular size is normal.  Left ventricular diastolic function is indeterminate. No regional wall motion  abnormalities are seen.     Right  Ventricle  Right ventricular function, chamber size, wall motion, and thickness are  normal.     Atria  The right atria appears normal. Severe left atrial enlargement is present.     Mitral Valve  Rheumatic mitral valve disease is present with severe calcification. Mild  mitral insufficiency is present. Severe mitral stenosis is present. The mean  gradient across the mitral valve is 12 mmHg.     Aortic Valve  Aortic valve sclerosis is present.     Tricuspid Valve  The tricuspid valve is normal. Mild tricuspid insufficiency is present. The  right ventricular systolic pressure is approximated at 35.8 mmHg plus the  right atrial pressure. Mild (pulmonary artery systolic pressure<50mmHg)  pulmonary hypertension is present.     Pulmonic Valve  The pulmonic valve is normal. Trace pulmonic insufficiency is present.     Vessels  The thoracic aorta is normal. The pulmonary artery cannot be assessed. IVC  diameter and respiratory changes fall into an intermediate range suggesting an  RA pressure of 8 mmHg.     Pericardium  No pericardial effusion is present.     ______________________________________________________________________________  MMode/2D Measurements & Calculations  Ao root diam: 2.1 cm  asc Aorta Diam: 1.8 cm  LVOT diam: 1.7 cm  LVOT area: 2.4 cm2  Ao root diam index Ht(cm/m): 1.5     Ao root diam index BSA (cm/m2): 1.8  Asc Ao diam index BSA (cm/m2): 1.6  Asc Ao diam index Ht(cm/m): 1.3  TAPSE: 2.1 cm     Doppler Measurements & Calculations  MV E max luc: 207.0 cm/sec  MV A max luc: 176.0 cm/sec  MV E/A: 1.2  MV max P.6 mmHg  MV mean P.8 mmHg  MV V2 VTI: 63.8 cm  MV P1/2t max luc: 204.0 cm/sec  MV P1/2t: 138.4 msec  MVA(P1/2t): 1.6 cm2  MV dec slope: 431.9 cm/sec2  MV dec time: 0.49 sec  PA acc time: 0.10 sec  TR max luc: 299.0 cm/sec  TR max P.8 mmHg  RV S Luc: 11.5 cm/sec     ______________________________________________________________________________  Report approved by: Leo Jerome  10/16/2024 11:16 AM         CT Chest w/o Contrast    Narrative    EXAMINATION: Chest CT  10/16/2024 1:02 PM    CLINICAL HISTORY: mitral stenosis, reassess pulmonary edema    COMPARISON: CT angiogram 9/24/2024    TECHNIQUE: CT imaging obtained through the chest without contrast.  Axial, coronal, and sagittal reconstructions and axial MIP reformatted  images are reviewed.     FINDINGS:    Vessels: Normal caliber of the thoracic aorta. No significant coronary  artery atherosclerotic calcifications. Limited visualization due to  lack of contrast, however when compared to prior CT 9/24/2024, there  is persistent, severe dilation of the left atrium and extensive  calcifications along the mitral annulus, consistent with history of  known mitral valve stenosis. The thoracic aorta is normal caliber.  Enlarged main pulmonary trunk measuring 3.8 cm.    Lower neck and axillae: No nodule in the included portion of the  thyroid. No axillary lymphadenopathy.    Mediastinum and laury: Cardiomegaly, with displacement of the esophagus  to the right. Small pericardial effusion, best visualized on the  coronal image. Enlarged mediastinal lymph nodes, for example  prevascular node measuring 1.2 x 1.3 cm (series 3, image 268).    Airways: Minimal debris in the upper trachea, otherwise central  tracheobronchial tree is clear.    Pleura: No pleural effusion or pneumothorax.    Lungs: Scattered groundglass opacities predominantly in the lung  bases, similar compared to prior. Persistent interlobular septal  thickening, likely secondary to pulmonary edema. Mild bibasilar  atelectasis.     Upper abdomen: No acute process in the included upper abdomen.    Soft tissues: Unremarkable.    Bones: No acute or aggressive osseous abnormality.      Impression    IMPRESSION:    1. Severe dilation of the left atrium and extensive calcifications  along the mitral annulus, unchanged when compared to prior CT  9/24/2024, consistent with mitral stenosis.  2.  Enlarged main pulmonary trunk likely sequelae of pulmonary  hypertension  3. Persistent interlobular septal thickening consistent with pulmonary  edema. Scattered bibasilar predominant groundglass opacities are  unchanged, may represent infectious/inflammatory process. Can consider  follow-up chest CT in 4-6 weeks to reassess.  4. Mediastinal lymphadenopathy favored to be reactive.    I have personally reviewed the examination and initial interpretation  and I agree with the findings.    KRISTIE CRONIN MD         SYSTEM ID:  [id number]   CT Dental wo Contrast    Narrative    CT DENTAL WO CONTRAST 10/16/2024 12:29 PM    History:  cardiac clearance    Comparison:  None.      TECHNIQUE: 3-D reconstruction by the technologists, with curved  multiplanar reformat of thin section imaging through the mandible and  maxilla obtained without intravenous contrast.    FINDINGS:  Mild motion artifacts somewhat limits evaluation of the facial bones.    No significant soft tissue swelling or mass. Normal facial bone  alignment. No bony erosion. No periapical dental lucencies.    Small mucous retention cyst in the posterior floor of the left  maxillary sinus. Additional small bilateral ethmoid mucous retention  cysts. Mild dependent bilateral mastoid effusions. Normal  temporomandibular joints.      Impression    IMPRESSION:  No periapical abscess.    I have personally reviewed the examination and initial interpretation  and I agree with the findings.    JOE CHAVIRA MD         SYSTEM ID:  [id number]       Medical Decision Making       60 MINUTES SPENT BY ME on the date of service doing chart review, history, exam, documentation & further activities per the note.

## 2024-10-17 NOTE — CONSULTS
Dental Hygiene Consult Service    Efren Saavedra MRN# [id number]   YOB: 1980 Age: 44 year old     Date of Admission: 10/15/2024   PCP is Cee Velez  Date of Service: 10/17/2024  Hospital Day #: 2         Reason for Consult:   Referring MD & Reason for Visit: I was asked by Yohannes Sheehan MD, to see Efren Saavedra for a COMPREHENSIVE oral assessment regarding cardiac clearance     *Please note: dental hygiene services, including oral assessment, are not a replacement for examination by a licensed dentist. The patient has been informed of the oral assessment procedures and has provided verbal consent.       History of Present Illness:   This patient is a 44 year old female with a history of seizure disorder, intellectual disability, and rheumatic mitral stenosis and acute decompensated heart failure.  Dental and oropharyngeal history is pertinent for requiring dental clearance prior to MVR - dental CT completed 10/16; has established dental home - pt reports last dental visit was 6 months ago for preventive care (her cousin is her dentist) and has an upcoming appointment scheduled for next week; med hx also significant for seizure disorder controlled with anticonvulsant meds (pt at risk for gingival hyperplasia); no significant social hx.  The patient reports no order dental/pain or concerns.      CT DENTAL WO CONTRAST 10/16/2024 12:29 PM     History:  cardiac clearance     Comparison:  None.       TECHNIQUE: 3-D reconstruction by the technologists, with curved  multiplanar reformat of thin section imaging through the mandible and  maxilla obtained without intravenous contrast.     FINDINGS:  Mild motion artifacts somewhat limits evaluation of the facial bones.     No significant soft tissue swelling or mass. Normal facial bone  alignment. No bony erosion. No periapical dental lucencies.     Small mucous retention cyst in the posterior floor of the left  maxillary sinus. Additional  small bilateral ethmoid mucous retention  cysts. Mild dependent bilateral mastoid effusions. Normal  temporomandibular joints.                                                                      IMPRESSION:  No periapical abscess.  visits the dentist regularly and her dentis is her cousin.      OHIP-5 Questionnaire  In the past SEVEN days:   Have you had difficulty chewing any foods because of problems with your teeth, mouth, dentures or jaw? 0 - NEVER   Have you had painful aching in your mouth? 0 - NEVER   Have you felt uncomfortable about the appearance of your teeth, mouth dentures or jaws? 0 - NEVER   Have you felt that there has been less flavor in your food because of problems with your teeth, mouth, dentures or jaws? 0 - NEVER   Have you had difficulty doing your usual jobs because of problems with your teeth, mouth, dentures or jaws? 0 - NEVER   Total OHIP Score 0   General Observations   Level of support Patient requires some assistance:  may just require prompting to complete oral care - otherwise independent   Patient currently in pain No   Patient wears dentures No   Current oral care routine Toothbrushing completed yesterday; not yet today; no interdental care or mouth rinse   Patient has oral care products Toothbrush, Toothpaste, and Mouthrinse   Extraoral assessment   General appearance Symmetrical and Normal TMJ function   Lymph nodes Symmetrical, no abnormalities, non-tender   Oral Health Assessment Tool (OHAT)   Lips 0=Healthy: smooth, pink, moist   Tongue 1=Changes: (ie. patchy, fissured, red, coated ) - light-moderate white coating on dorsal surface (consistent with dental biofilm)   Gums and Tissues 1=Changes: (ie. dry, shiny, rough, red, swollen, one ulcer/sore spot (under dentures)) - scattered light gingival erythema along gumline (consistent with gingivitis); vaulted palate   Saliva 0=Healthy: moist tissues, watery and free flowing saliva   Natural Teeth Yes/No 0=Healthy: no decayed or  broken teeth/roots (several large amalgam dental restorations, seemingly sound; crowding also present)   Dentures Yes/No Patient does not wear dentures   Oral Cleanliness 1=Changes:(ie. food particles/tartar/plaque in 1-2 areas of the mouth or on small area of dentures or halitosis (bad breath)) - scattered moderate dental biofilm along gumline (notably on mandibular left canine, maxillary left posterior)   Dental Pain 0=Healthy: no behavioural, verbal, or physical signs of dental pain   OHAT Total Score (0-16) 3   Other Dental Concerns None   Care provided during assessment Oral Assessment and Patient education   Follow up DH assessments required? No   Connect with [facility] or [place] care? Yes: [facility] dental team will review for clearance   For Dental Team Service Requesting Consult: CARDS 1  Clearance/Reason: cardiac clearance  Dental CT status: completed 10/16  Upcoming Sx date(s), if known: TBD - likely not this admission  Expected discharge date: TBD  Ability to transfer to [facility]: yes  Pain reported, by pt: none  Swelling present: none  Current dental Rx regimen: none   Oral Care Plan To optimize oral health during hospitalization and reduce risk of HAIs:  Brush oral hard and soft tissues with soft-bristled toothbrush and fluoridated toothpaste at least BID (ideally up to QID)  Interdental cleaning with floss adjuncts daily, as able  Swish-and-spit with alcohol-free antiseptic rinse BID to reduce oral bacterial load  Continue to hydrate by drinking plain water as able  Frequent application of Vaseline to lips      Pt should establish/continue outpatient comprehensive dental care at clinic of choice under medical advisement following discharge.            Assessment and Plan:   Assessment:  This patient is a 44 year old female with a history of seizure disorder, intellectual disability, and rheumatic mitral stenosis and acute decompensated heart failure, oral exam concerning for inadequate oral hygiene - moderate dental  biofilm present on tongue, scattered along gumline and contributing to suspected early gingivitis; pt otherwise denies pain, swelling, bleeding and there are no current signs of odontogenic infection.      Plan:   [facility] will review the DH note and dental CT results to provide further recommendations for patient's dental needs and dental clearance status.   Implement oral care plan as described above. Pt is currently independent in oral cares, but may require prompting.  Pt to continue comprehensive dental care at established dental home under medical advisement. Med team should instruct pt on when to resume routine dental care post-operatively.    SHAWN Hill, CHLOE  Message on [employer] or pager *[phone]    Past Medical History   I have reviewed this patient's medical history and updated it with pertinent information if needed.  Past Medical History:   Diagnosis Date    Seizures (H)        Past Surgical History   I have reviewed this patient's surgical history and updated it with pertinent information if needed.  Past Surgical History:   Procedure Laterality Date    feeding tube      TRANSESOPHAGEAL ECHOCARDIOGRAM INTRAOPERATIVE N/A 8/13/2024    Procedure: ECHOCARDIOGRAM, TRANSESOPHAGEAL, WITH ANESTHESIA;  Surgeon: GENERIC ANESTHESIA PROVIDER;  Location: [facility] OR    ventilation tubes, bilateral         Social History   I have reviewed this patient's social history and updated it with pertinent information if needed.  Social History     Tobacco Use    Smoking status: Never    Smokeless tobacco: Never    Tobacco comments:     no passive exposure   Substance Use Topics    Alcohol use: No    Drug use: No     Prior to Admission Medications   Prior to Admission Medications   Prescriptions Last Dose Informant Patient Reported? Taking?   ketoconazole (NIZORAL) 2 % external cream Unknown  No Yes   Sig: Apply topically daily.   Patient taking differently: Apply topically daily  as needed.   levETIRAcetam (KEPPRA) 250 MG tablet 10/15/2024  Yes Yes   Sig: Take 500 mg by mouth 2 times daily   norethindrone-ethinyl estradiol (JUNEL FE 1/20) 1-20 MG-MCG tablet 10/15/2024  No Yes   Sig: Take 1 tablet by mouth daily.      Facility-Administered Medications: None     Allergies   Allergies   Allergen Reactions    Azithromycin      Abdominal pain/ cramping     Clotrimazole Rash    Omnicef [Cefdinir] Rash     Itchy and bad rash     Physical Exam

## 2024-10-17 NOTE — PLAN OF CARE
D: admitted 10/15/2024 for severe mitral stenosis and acute decompensated heart failure.     PMHx:seizure disorder, intellectual disability, and rheumatic mitral stenosis     I: Monitored vitals and assessed pt status. Encouraged activity.      Changed: Requested senna from provider for constipation (patient will not take miralax). Providers came to bedside to create plan. Plan for patient to have RHC tomorrow 10/18 and then discharge when medically stable (either Friday or Saturday). MVR at later date. Increased metoprolol dose. Plan for sleep study tonight  IV Access: PIV  Running:  NA  PRN: Senna  Tele: SR  O2: RA ( at night needing 1L NC)  Mobility: SBA  Skin: See flow sheets     A: A&O x person. Patient is not oriented to situation due to intellectual disability. Patient follows commands appropriately and is otherwise intact. Speech garbled at times/ does not always understand questions. VSS. Afebrile. 2g na diet. Mother at bedside helping with patient cares. Denies pain. Has not had a bowel movement since 10/15. Up to bathroom with mother.      P: Continue to monitor pt status and report changes to treatment team. RHC tomorrow NPO at midnight and then discharge when ready    Goal Outcome Evaluation:    Problem: Heart Failure  Goal: Optimal Coping  Intervention: Support Psychosocial Response  Recent Flowsheet Documentation  Taken 10/17/2024 1000 by Annetta Bowser, RN  Supportive Measures:   positive reinforcement provided   problem-solving facilitated   self-care encouraged   decision-making supported     Problem: Comorbidity Management  Goal: Maintenance of Heart Failure Symptom Control  Outcome: Progressing  Intervention: Maintain Heart Failure Management  Recent Flowsheet Documentation  Taken 10/17/2024 1000 by Annetta Bowser, RN  Medication Review/Management: medications reviewed

## 2024-10-17 NOTE — PROGRESS NOTES
D/mom is here, she is awake and alert and cooperative.   P/monitor for changes, keep her/mom and team updated. NPO for right heat cath tomorrow

## 2024-10-18 LAB
ANION GAP SERPL CALCULATED.3IONS-SCNC: 13 MMOL/L (ref 7–15)
BUN SERPL-MCNC: 19.9 MG/DL (ref 6–20)
CALCIUM SERPL-MCNC: 9 MG/DL (ref 8.8–10.4)
CHLORIDE SERPL-SCNC: 98 MMOL/L (ref 98–107)
CREAT SERPL-MCNC: 0.73 MG/DL (ref 0.51–0.95)
EGFRCR SERPLBLD CKD-EPI 2021: >90 ML/MIN/1.73M2
ERYTHROCYTE [DISTWIDTH] IN BLOOD BY AUTOMATED COUNT: 14.3 % (ref 10–15)
GLUCOSE SERPL-MCNC: 81 MG/DL (ref 70–99)
HCO3 SERPL-SCNC: 24 MMOL/L (ref 22–29)
HCT VFR BLD AUTO: 41 % (ref 35–47)
HGB BLD-MCNC: 13.8 G/DL (ref 11.7–15.7)
MAGNESIUM SERPL-MCNC: 2 MG/DL (ref 1.7–2.3)
MCH RBC QN AUTO: 29.5 PG (ref 26.5–33)
MCHC RBC AUTO-ENTMCNC: 33.7 G/DL (ref 31.5–36.5)
MCV RBC AUTO: 88 FL (ref 78–100)
PLATELET # BLD AUTO: 148 10E3/UL (ref 150–450)
POTASSIUM SERPL-SCNC: 3.7 MMOL/L (ref 3.4–5.3)
PROCALCITONIN SERPL IA-MCNC: 0.06 NG/ML
RBC # BLD AUTO: 4.68 10E6/UL (ref 3.8–5.2)
SODIUM SERPL-SCNC: 135 MMOL/L (ref 135–145)
WBC # BLD AUTO: 10.6 10E3/UL (ref 4–11)

## 2024-10-18 PROCEDURE — 99153 MOD SED SAME PHYS/QHP EA: CPT | Performed by: INTERNAL MEDICINE

## 2024-10-18 PROCEDURE — 250N000009 HC RX 250: Performed by: INTERNAL MEDICINE

## 2024-10-18 PROCEDURE — 99233 SBSQ HOSP IP/OBS HIGH 50: CPT | Mod: 25

## 2024-10-18 PROCEDURE — 99152 MOD SED SAME PHYS/QHP 5/>YRS: CPT | Performed by: INTERNAL MEDICINE

## 2024-10-18 PROCEDURE — C1894 INTRO/SHEATH, NON-LASER: HCPCS | Performed by: INTERNAL MEDICINE

## 2024-10-18 PROCEDURE — 99152 MOD SED SAME PHYS/QHP 5/>YRS: CPT | Mod: GC | Performed by: INTERNAL MEDICINE

## 2024-10-18 PROCEDURE — B2111ZZ FLUOROSCOPY OF MULTIPLE CORONARY ARTERIES USING LOW OSMOLAR CONTRAST: ICD-10-PCS | Performed by: INTERNAL MEDICINE

## 2024-10-18 PROCEDURE — 250N000013 HC RX MED GY IP 250 OP 250 PS 637: Performed by: EMERGENCY MEDICINE

## 2024-10-18 PROCEDURE — 83735 ASSAY OF MAGNESIUM: CPT

## 2024-10-18 PROCEDURE — 250N000013 HC RX MED GY IP 250 OP 250 PS 637

## 2024-10-18 PROCEDURE — 250N000011 HC RX IP 250 OP 636: Performed by: INTERNAL MEDICINE

## 2024-10-18 PROCEDURE — 120N000003 HC R&B IMCU UMMC

## 2024-10-18 PROCEDURE — 93456 R HRT CORONARY ARTERY ANGIO: CPT | Performed by: INTERNAL MEDICINE

## 2024-10-18 PROCEDURE — 94762 N-INVAS EAR/PLS OXIMTRY CONT: CPT

## 2024-10-18 PROCEDURE — C1751 CATH, INF, PER/CENT/MIDLINE: HCPCS | Performed by: INTERNAL MEDICINE

## 2024-10-18 PROCEDURE — 250N000011 HC RX IP 250 OP 636

## 2024-10-18 PROCEDURE — 84145 PROCALCITONIN (PCT): CPT | Performed by: NURSE PRACTITIONER

## 2024-10-18 PROCEDURE — 80048 BASIC METABOLIC PNL TOTAL CA: CPT

## 2024-10-18 PROCEDURE — 85027 COMPLETE CBC AUTOMATED: CPT

## 2024-10-18 PROCEDURE — 36415 COLL VENOUS BLD VENIPUNCTURE: CPT

## 2024-10-18 PROCEDURE — 99418 PROLNG IP/OBS E/M EA 15 MIN: CPT | Mod: 25

## 2024-10-18 PROCEDURE — 272N000001 HC OR GENERAL SUPPLY STERILE: Performed by: INTERNAL MEDICINE

## 2024-10-18 PROCEDURE — 93456 R HRT CORONARY ARTERY ANGIO: CPT | Mod: 26 | Performed by: INTERNAL MEDICINE

## 2024-10-18 PROCEDURE — 4A023N6 MEASUREMENT OF CARDIAC SAMPLING AND PRESSURE, RIGHT HEART, PERCUTANEOUS APPROACH: ICD-10-PCS | Performed by: INTERNAL MEDICINE

## 2024-10-18 PROCEDURE — C1769 GUIDE WIRE: HCPCS | Performed by: INTERNAL MEDICINE

## 2024-10-18 RX ORDER — NALOXONE HYDROCHLORIDE 0.4 MG/ML
0.2 INJECTION, SOLUTION INTRAMUSCULAR; INTRAVENOUS; SUBCUTANEOUS
Status: CANCELLED | OUTPATIENT
Start: 2024-10-18 | End: 2024-10-19

## 2024-10-18 RX ORDER — FENTANYL CITRATE 50 UG/ML
INJECTION, SOLUTION INTRAMUSCULAR; INTRAVENOUS
Status: DISCONTINUED | OUTPATIENT
Start: 2024-10-18 | End: 2024-10-18 | Stop reason: HOSPADM

## 2024-10-18 RX ORDER — POTASSIUM CHLORIDE 750 MG/1
10 TABLET, EXTENDED RELEASE ORAL ONCE
Status: COMPLETED | OUTPATIENT
Start: 2024-10-18 | End: 2024-10-18

## 2024-10-18 RX ORDER — OXYCODONE HYDROCHLORIDE 10 MG/1
10 TABLET ORAL EVERY 4 HOURS PRN
Status: CANCELLED | OUTPATIENT
Start: 2024-10-18

## 2024-10-18 RX ORDER — SODIUM CHLORIDE 9 MG/ML
75 INJECTION, SOLUTION INTRAVENOUS CONTINUOUS
Status: CANCELLED | OUTPATIENT
Start: 2024-10-18 | End: 2024-10-18

## 2024-10-18 RX ORDER — OXYCODONE HYDROCHLORIDE 5 MG/1
5 TABLET ORAL EVERY 4 HOURS PRN
Status: CANCELLED | OUTPATIENT
Start: 2024-10-18

## 2024-10-18 RX ORDER — ATROPINE SULFATE 0.1 MG/ML
0.5 INJECTION INTRAVENOUS
Status: CANCELLED | OUTPATIENT
Start: 2024-10-18 | End: 2024-10-19

## 2024-10-18 RX ORDER — FENTANYL CITRATE 50 UG/ML
25 INJECTION, SOLUTION INTRAMUSCULAR; INTRAVENOUS
Status: CANCELLED | OUTPATIENT
Start: 2024-10-18

## 2024-10-18 RX ORDER — FLUMAZENIL 0.1 MG/ML
0.2 INJECTION, SOLUTION INTRAVENOUS
Status: CANCELLED | OUTPATIENT
Start: 2024-10-18 | End: 2024-10-19

## 2024-10-18 RX ORDER — IOPAMIDOL 755 MG/ML
INJECTION, SOLUTION INTRAVASCULAR
Status: DISCONTINUED | OUTPATIENT
Start: 2024-10-18 | End: 2024-10-18 | Stop reason: HOSPADM

## 2024-10-18 RX ORDER — ACETAMINOPHEN 325 MG/1
650 TABLET ORAL EVERY 4 HOURS PRN
Status: CANCELLED | OUTPATIENT
Start: 2024-10-18

## 2024-10-18 RX ORDER — NALOXONE HYDROCHLORIDE 0.4 MG/ML
0.4 INJECTION, SOLUTION INTRAMUSCULAR; INTRAVENOUS; SUBCUTANEOUS
Status: CANCELLED | OUTPATIENT
Start: 2024-10-18 | End: 2024-10-19

## 2024-10-18 RX ORDER — MAGNESIUM OXIDE 400 MG/1
400 TABLET ORAL EVERY 4 HOURS
Status: COMPLETED | OUTPATIENT
Start: 2024-10-18 | End: 2024-10-18

## 2024-10-18 RX ADMIN — MAGNESIUM OXIDE TAB 400 MG (241.3 MG ELEMENTAL MG) 400 MG: 400 (241.3 MG) TAB at 11:22

## 2024-10-18 RX ADMIN — LEVETIRACETAM 500 MG: 500 TABLET, FILM COATED ORAL at 19:42

## 2024-10-18 RX ADMIN — METOPROLOL TARTRATE 50 MG: 25 TABLET, FILM COATED ORAL at 19:42

## 2024-10-18 RX ADMIN — POTASSIUM CHLORIDE 10 MEQ: 750 TABLET, EXTENDED RELEASE ORAL at 08:15

## 2024-10-18 RX ADMIN — METOPROLOL TARTRATE 50 MG: 25 TABLET, FILM COATED ORAL at 08:15

## 2024-10-18 RX ADMIN — LEVETIRACETAM 500 MG: 500 TABLET, FILM COATED ORAL at 08:15

## 2024-10-18 RX ADMIN — Medication 1 TABLET: at 08:15

## 2024-10-18 RX ADMIN — MAGNESIUM OXIDE TAB 400 MG (241.3 MG ELEMENTAL MG) 400 MG: 400 (241.3 MG) TAB at 08:15

## 2024-10-18 RX ADMIN — NORETHINDRONE ACETATE AND ETHINYL ESTRADIOL 1 TABLET: KIT at 08:15

## 2024-10-18 RX ADMIN — FUROSEMIDE 40 MG: 10 INJECTION, SOLUTION INTRAVENOUS at 08:15

## 2024-10-18 ASSESSMENT — ACTIVITIES OF DAILY LIVING (ADL)
ADLS_ACUITY_SCORE: 29

## 2024-10-18 NOTE — PLAN OF CARE
"/60 (BP Location: Left arm)   Pulse 67   Temp 97.5  F (36.4  C) (Oral)   Resp 18   Ht 1.422 m (4' 8\")   Wt 31.9 kg (70 lb 6.4 oz)   SpO2 97%   BMI 15.78 kg/m      VSS. Afebrile. Room air. Pulse oximetry study has been ongoing overnight. Alert and oriented to self and sometimes place. Garbled speech but able to answer yes/no questions clearly such as, \"are you in pain?\" Mom at bedside participating in cares. Denies pain. NPO for RHC today. Denies n/v. Voiding. No BM overnight. Left PIV saline locked. Repositioning self in bed. OOB with SBA from Mother. Continue to monitor.  "

## 2024-10-18 NOTE — PROGRESS NOTES
CV Surgery Follow-up    44 year old female w/ pertinent PMHx of seizure disorder, intellectual disability, and rheumatic mitral stenosis admitted 10/15/2024 for severe mitral stenosis and acute decompensated heart failure.     Dental   Cleared for surgery by Dr. Garcia,   see dental assistant note LEORA Martínez 11/17/24 for details   UA obtained 11/17/24  Findings bland  Chest CT obtained 11/17/24  Findings consistent with HF  Scattered bilateral GGO   Suspect these are not infectious in nature. A. Febrile, no leukocytosis.   Will add procalcitonin   Last ECHO 10/16/24 demonstrated 60-65% EF, Normal RV, rheumatic mitral valve disease with severe stenosis   Planning RHC and coronary angiogram 11/18/2024   Surgery tentatively scheduled for next week with Dr. Craig.  Pre-operative teaching and orders to be completed closer to surgery date  Will continue to follow  Other cares per primary team     Patient was not seen in person. Please see full consult from [facility] on 10/16/24.     Huy Church DNP APRN CNP CCRN   Cardiovascular Surgery   Pager [phone]

## 2024-10-18 NOTE — PROGRESS NOTES
[facility]   Cardiology   Progress Note     ASSESSMENT/PLAN:  44 year old female w/ pertinent PMHx of seizure disorder, intellectual disability, and rheumatic mitral stenosis admitted 10/15/2024 for severe mitral stenosis and acute decompensated heart failure.      Changes Today   - Not a candidate for percutaneous intervention  - RHC/Cors today     # Acute Heart Failure w/ Preserved EF (60-65%)  # Severe Rheumatic vs. Congenital Mitral Stenosis  Patient evaluated by Dr. Antonio outpatient for consideration of percutaneous balloon mitral commissurotomy (PB[facility]). Recommended work up includes CT TMVR (completed) and CT coronaries. MVA of 1.1 cm2, mean PG 7 mmHg with LVEF 60-65% and Bhatt score of 7. Appears volume up on admission with elevated NT-P BNP and pulmonary edema noted on CXR. S/p IV lasix 20mg in the [place] ED.   - Needs coronary CT angiogram per Dr. Antonio's note; CT TMVR without significant CAD per Dr. Sheehan   - Fluid Status: Euvolemic; decrease Lasix to 40mg daily; Cystatin C 1.3 (10/17)  - Images from [facility] available in PACS  - Repeat TTE 10/16: EF 60-65%, rheumatic mitral disease with severe calcification with MG 12; mild PH  - Non contrast CT 10/16:   1. Severe dilation of the left atrium and extensive calcifications along the mitral annulus, unchanged when compared to prior CT 9/24/2024, consistent with mitral stenosis.  2. Enlarged main pulmonary trunk likely sequelae of pulmonary hypertension  3. Persistent interlobular septal thickening consistent with pulmonary edema. Scattered bibasilar predominant groundglass opacities are unchanged, may represent infectious/inflammatory process. Can consider follow-up chest CT in 4-6 weeks to reassess.  4. Mediastinal lymphadenopathy favored to be reactive  -  SH discussed case at multidisciplinary conference 10/17, patient not a candidate for percutaneous intervention due to high Cecil's score; plan for surgical  intervention  - RHC and Cors this afternoon; will trial with sedation and strongly prefer radial access, may require GA; cath lab aware of patient's developmental delay  - Continue Lopressor 50 mg BID, hold for SBP <88 and HR <40  - [place]'s score = 9+ on repeat imaging  - CVTS consulted and following; tentative plan for surgery next week (family aware)     # Type 2 MI   Likely demand secondary to heart failure. No evidence of CP or ischemic changes on EKG.   - Troponin 30 --> 58 --> 68     # Moderate malnutrition in the context of chronic illness/disease   # Nausea/Vomiting  RD recs:   Encourage 3 meals/day with protein sources  Trial glucerna oral + ice cream to make into shake for patient  MVI daily  -  PRN antiemetics available      FEN: NPO for RHC/Cors; Regular adult diet otherwise  Code Status: Full  DVT Prophylaxis:  Ambulation  Isolation: None  Disposition: Plan for discharge tomorrow pending RHC/Cors today     Patient seen and discussed with Dr. Sheehan.    Varsha Araya, MS, PA-C  [facility] Cardiology Team  Pager [phone]/Vocera    Interval History:  Patient resting comfortably in bed on exam. Answered questions to patient's mother's satisfaction. Tentative plan for surgery next week. VSS. Asymptomatic.    Physical Exam:  Temp:  [97.5  F (36.4  C)-99  F (37.2  C)] 98  F (36.7  C)  Pulse:  [64-83] 64  Resp:  [16-20] 16  BP: ()/(60-72) 99/62  SpO2:  [97 %-99 %] 98 %    I/O:   Intake/Output Summary (Last 24 hours) at 10/18/2024 1252  Last data filed at 10/18/2024 1100  Gross per 24 hour   Intake 100 ml   Output 850 ml   Net -750 ml        Wt:   Wt Readings from Last 5 Encounters:   10/18/24 31.7 kg (69 lb 14.4 oz)   10/15/24 34.9 kg (77 lb)   09/20/24 33.7 kg (74 lb 3.2 oz)   08/27/24 33.5 kg (73 lb 13.7 oz)   08/19/24 33.5 kg (73 lb 12.8 oz)     General: NAD  HEENT:  PERRLA, EOMI.   CV: RRR. Diastolic murmur appreciated. No rubs or gallops.   Resp: No increased work of breathing or use of accessory muscles,  breathing comfortably on room air.  Lung sounds clear throughout/bilaterally  Abdomen:  Normal active bowel sounds.  Abdomen is soft. No distension.  Extremities: Warm.  No pre-tibial edema. No cyanosis or clubbing.  Skin:  Warm and dry. No erythema, rashes, ulceration or diaphoresis.  Neuro: Alert and oriented x2     Medications:  Current Facility-Administered Medications   Medication Dose Route Frequency Provider Last Rate Last Admin    furosemide (LASIX) injection 40 mg  40 mg Intravenous Daily Varsha Araya PA-C   40 mg at 10/18/24 0815    levETIRAcetam (KEPPRA) tablet 500 mg  500 mg Oral BID Tracie Mendoza MD   500 mg at 10/18/24 0815    metoprolol tartrate (LOPRESSOR) tablet 50 mg  50 mg Oral BID Varsha Araya PA-C   50 mg at 10/18/24 0815    multivitamin w/minerals (THERA-VIT-M) tablet 1 tablet  1 tablet Oral Daily Golz, Nacho Alejandro, DO   1 tablet at 10/18/24 0815    norethindrone-ethinyl estradiol (Barbra FE 1/20) 1-20 MG-MCG per tablet 1 tablet *Pt supplied*  1 tablet Oral Daily Varsha Araya PA-C   1 tablet at 10/18/24 0815    sodium chloride (PF) 0.9% PF flush 3 mL  3 mL Intracatheter Q8H Tracie Mendoza MD   3 mL at 10/18/24 0822     Current Facility-Administered Medications   Medication Dose Route Frequency Provider Last Rate Last Admin    medication instruction   Does not apply Continuous PRN Tracie Mendoza MD           Labs:   CMP  Recent Labs   Lab 10/18/24  0643 10/17/24  0547 10/16/24  0541 10/14/24  2156    136 139 136   POTASSIUM 3.7 3.6 3.6 4.8   CHLORIDE 98 98 100 103   CO2 24 25 23 22   ANIONGAP 13 13 16* 11   GLC 81 87 89 147*   BUN 19.9 21.8* 27.7* 17.4   CR 0.73 0.79 0.96* 0.71   GFRESTIMATED >90 >90 75 >90   YUE 9.0 8.9 8.6* 8.9   MAG 2.0 2.2 2.1  --    PROTTOTAL  --   --   --  6.3*   ALBUMIN  --   --   --  4.1   BILITOTAL  --   --   --  0.7   ALKPHOS  --   --   --  50   AST  --   --   --  30   ALT  --   --   --  42     CBC  Recent Labs   Lab 10/18/24  0643 10/17/24  0515  10/16/24  0541 10/14/24  2157   WBC 10.6 10.3 11.3* 9.3   RBC 4.68 4.57 4.60 4.36   HGB 13.8 13.4 13.6 12.7   HCT 41.0 40.1 40.5 38.3   MCV 88 88 88 88   MCH 29.5 29.3 29.6 29.1   MCHC 33.7 33.4 33.6 33.2   RDW 14.3 14.0 14.5 14.1   * 153 169 170     INR  Recent Labs   Lab 10/16/24  0541   INR 1.16*     Arterial Blood GasNo lab results found in last 7 days.    Diagnostics:  10/15/24 EK/13/24 Transesophageal Echocardiogram:  SVEN ordered to evaluate mitral valve.  Technically difficult study. Extremely poor acoustic windows.  Moderate mitral stenosis is present. The etiology of the mitral stenosis is  rheumatic. No parachute mitral valve or mitral valve arcade.  The mean gradient across the mitral valve is 7 mmHg at a heart rate of 93 bpm.  Mitral valve pressure half time is 193 ms, which estimates a mitral valve size  of 1.1 cm^2.  Bhatt score = 7 (2 for mobility, 1 for thickening, 3 for calcification, 1  for subvalvular thickening).     OSH 10/1/24 Cardiac CT:     IMPRESSION:  1.  See below for TMVR related mitral annular and naresh-LVOT  measurements.  2.  These measurements were not performed on software with a dedicated  mitral valve package and should be confirmed using software with a  dedicated package for mitral annular segmentation.    3.  Please review detailed radiology report, to follow separately, for  incidental non-cardiovascular findings.     FINDINGS:  1.  With virtual implantation of a 29 mm Wilbert 3 prosthesis with a  30% atrialized implantation, the estimated area of the naresh-LVOT is  0.329 cm2. The virtual valve abuts the ventricular septum.The  aorto-mitral angle is 97.0 degrees.  2.  The mitral leaflets are rheumatic in morphology. There is 91  degrees of circumferential mitral annular calcification.  3.  Imaging protocol is non-diagnostic for evaluation of coronary  artery stenosis.   4.  There is no acute aortic pathology, such as dissection, intramural  hematoma, or contained  rupture.           Recent Results (from the past 24 hour(s))   Echo Complete   Result Value     LVEF  60-65%     [facility]     [id number]  [id number]  [id number]  [id number]^YE^MAHAD^[place]     [facility],[place]  Echocardiography Laboratory  [address] [place] [address]     Name: KATE BYRNES  MRN: [id number]  : 1980  Study Date: 10/16/2024 10:01 AM  Age: 44 yrs  Gender: Female  [place] [facility] [place] For Study: Mitral Valve Disorder  Ordering Physician: MAHAD BELCHER  Referring Physician: LUCY FELDER  Performed By: De Pearson RDCS     BSA: 1.1 m2  Height: 56 in  Weight: 70 lb  HR: 80  BP: 105/73 mmHg  ______________________________________________________________________________  Procedure  Complete Portable Echo Adult.  ______________________________________________________________________________  Interpretation Summary  Global and regional left ventricular function is normal with an EF of 60-65%.  Right ventricular function, chamber size, wall motion, and thickness are  normal.  Rheumatic mitral valve disease is present with severe calcification.  Mild mitral insufficiency is present.  Severe mitral stenosis is present.  The mean gradient across the mitral valve is 12 mmHg. Heart rate 77BPM.  Mild (pulmonary artery systolic pressure<50mmHg) pulmonary hypertension is  present.  IVC diameter and respiratory changes fall into an intermediate range  suggesting an RA pressure of 8 mmHg.  No pericardial effusion is present.  ______________________________________________________________________________  Left Ventricle  Global and regional left ventricular function is normal with an EF of 60-65%.  Left ventricular wall thickness is normal. Left ventricular size is normal.  Left ventricular diastolic function is indeterminate. No regional wall motion  abnormalities are seen.     Right Ventricle  Right ventricular function, chamber size, wall motion,  and thickness are  normal.     Atria  The right atria appears normal. Severe left atrial enlargement is present.     Mitral Valve  Rheumatic mitral valve disease is present with severe calcification. Mild  mitral insufficiency is present. Severe mitral stenosis is present. The mean  gradient across the mitral valve is 12 mmHg.     Aortic Valve  Aortic valve sclerosis is present.     Tricuspid Valve  The tricuspid valve is normal. Mild tricuspid insufficiency is present. The  right ventricular systolic pressure is approximated at 35.8 mmHg plus the  right atrial pressure. Mild (pulmonary artery systolic pressure<50mmHg)  pulmonary hypertension is present.     Pulmonic Valve  The pulmonic valve is normal. Trace pulmonic insufficiency is present.     Vessels  The thoracic aorta is normal. The pulmonary artery cannot be assessed. IVC  diameter and respiratory changes fall into an intermediate range suggesting an  RA pressure of 8 mmHg.     Pericardium  No pericardial effusion is present.     ______________________________________________________________________________  MMode/2D Measurements & Calculations  Ao root diam: 2.1 cm  asc Aorta Diam: 1.8 cm  LVOT diam: 1.7 cm  LVOT area: 2.4 cm2  Ao root diam index Ht(cm/m): 1.5     Ao root diam index BSA (cm/m2): 1.8  Asc Ao diam index BSA (cm/m2): 1.6  Asc Ao diam index Ht(cm/m): 1.3  TAPSE: 2.1 cm     Doppler Measurements & Calculations  MV E max luc: 207.0 cm/sec  MV A max luc: 176.0 cm/sec  MV E/A: 1.2  MV max P.6 mmHg  MV mean P.8 mmHg  MV V2 VTI: 63.8 cm  MV P1/2t max luc: 204.0 cm/sec  MV P1/2t: 138.4 msec  MVA(P1/2t): 1.6 cm2  MV dec slope: 431.9 cm/sec2  MV dec time: 0.49 sec  PA acc time: 0.10 sec  TR max luc: 299.0 cm/sec  TR max P.8 mmHg  RV S Luc: 11.5 cm/sec     ______________________________________________________________________________  Report approved by: Leo Jerome 10/16/2024 11:16 AM         CT Chest w/o Contrast     Narrative      EXAMINATION: Chest CT  10/16/2024 1:02 PM     CLINICAL HISTORY: mitral stenosis, reassess pulmonary edema     COMPARISON: CT angiogram 9/24/2024     TECHNIQUE: CT imaging obtained through the chest without contrast.  Axial, coronal, and sagittal reconstructions and axial MIP reformatted  images are reviewed.      FINDINGS:     Vessels: Normal caliber of the thoracic aorta. No significant coronary  artery atherosclerotic calcifications. Limited visualization due to  lack of contrast, however when compared to prior CT 9/24/2024, there  is persistent, severe dilation of the left atrium and extensive  calcifications along the mitral annulus, consistent with history of  known mitral valve stenosis. The thoracic aorta is normal caliber.  Enlarged main pulmonary trunk measuring 3.8 cm.     Lower neck and axillae: No nodule in the included portion of the  thyroid. No axillary lymphadenopathy.     Mediastinum and laury: Cardiomegaly, with displacement of the esophagus  to the right. Small pericardial effusion, best visualized on the  coronal image. Enlarged mediastinal lymph nodes, for example  prevascular node measuring 1.2 x 1.3 cm (series 3, image 268).     Airways: Minimal debris in the upper trachea, otherwise central  tracheobronchial tree is clear.     Pleura: No pleural effusion or pneumothorax.     Lungs: Scattered groundglass opacities predominantly in the lung  bases, similar compared to prior. Persistent interlobular septal  thickening, likely secondary to pulmonary edema. Mild bibasilar  atelectasis.      Upper abdomen: No acute process in the included upper abdomen.     Soft tissues: Unremarkable.     Bones: No acute or aggressive osseous abnormality.        Impression     IMPRESSION:     1. Severe dilation of the left atrium and extensive calcifications  along the mitral annulus, unchanged when compared to prior CT  9/24/2024, consistent with mitral stenosis.  2. Enlarged main pulmonary trunk likely sequelae of  pulmonary  hypertension  3. Persistent interlobular septal thickening consistent with pulmonary  edema. Scattered bibasilar predominant groundglass opacities are  unchanged, may represent infectious/inflammatory process. Can consider  follow-up chest CT in 4-6 weeks to reassess.  4. Mediastinal lymphadenopathy favored to be reactive.     I have personally reviewed the examination and initial interpretation  and I agree with the findings.     KRISTIE CRONIN MD         SYSTEM ID:  [id number]   CT Dental wo Contrast     Narrative     CT DENTAL WO CONTRAST 10/16/2024 12:29 PM     History:  cardiac clearance     Comparison:  None.       TECHNIQUE: 3-D reconstruction by the technologists, with curved  multiplanar reformat of thin section imaging through the mandible and  maxilla obtained without intravenous contrast.     FINDINGS:  Mild motion artifacts somewhat limits evaluation of the facial bones.     No significant soft tissue swelling or mass. Normal facial bone  alignment. No bony erosion. No periapical dental lucencies.     Small mucous retention cyst in the posterior floor of the left  maxillary sinus. Additional small bilateral ethmoid mucous retention  cysts. Mild dependent bilateral mastoid effusions. Normal  temporomandibular joints.        Impression     IMPRESSION:  No periapical abscess.     I have personally reviewed the examination and initial interpretation  and I agree with the findings.     JOE CHAVIRA MD        No results found for this or any previous visit (from the past 24 hour(s)).    Medical Decision Making       40 MINUTES SPENT BY ME on the date of service doing chart review, history, exam, documentation & further activities per the note.

## 2024-10-18 NOTE — PLAN OF CARE
Goal Outcome Evaluation:      Plan of Care Reviewed With: parent    Overall Patient Progress: improvingOverall Patient Progress: improving    Outcome Evaluation: verbalizes understanding plan of care    D: admitted 10/15/2024 for severe mitral stenosis and acute decompensated heart failure.      PMHx:seizure disorder, intellectual disability, and rheumatic mitral stenosis     Neuro: A&Ox2. Unable to explain situation. Mother at bedside helping with cares.  Cardiac: SR with PVCs. VSS.   Respiratory: Sating 95% on RA.  GI/: Adequate urine output. Small BM X1 today.  Diet/appetite: Tolerating NPO diet for RHC today  Activity:  SBA, up to chair and bathroom  Pain: At acceptable level on current regimen.   Skin: No new deficits noted.  LDA's: PIV L    Plan: Continue with POC. Notify Cards 1 with changes.

## 2024-10-18 NOTE — DISCHARGE SUMMARY
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 Antonio outpatient for consideration of percutaneous balloon mitral commissurotomy ([facility]). Recommended work up includes CT TMVR (completed) and CT coronaries. MVA of 1.1 cm2, mean PG 7 mmHg with LVEF 60-65% and Bhatt score of 7. Appears volume up on admission with elevated NT-P BNP and pulmonary edema noted on CXR. S/p IV lasix 20mg in the [place] ED. Troponin 30 --> 58 --> 68 likely 2/2 increased demand  - Needs coronary CT angiogram per Dr. Antonio's note; CT TMVR without significant CAD per Dr. Sheehan   - Fluid Status: Euvolemic; Lasix to 40mg BID; Cystatin C 1.3 (10/17)  - [facility] from [facility] available in PACS  - Repeat TTE 10/16: EF 60-65%, rheumatic mitral disease with severe calcification with MG 12; mild PH  - Non contrast CT 10/16:   1. Severe dilation of the left atrium and extensive calcifications along the mitral annulus, unchanged when compared to prior CT 9/24/2024, consistent with mitral stenosis.  2. Enlarged main pulmonary trunk  likely sequelae of pulmonary hypertension  3. Persistent interlobular septal thickening consistent with pulmonary edema. Scattered bibasilar predominant groundglass opacities are unchanged, may represent infectious/inflammatory process. Can consider follow-up chest CT in 4-6 weeks to reassess.  4. Mediastinal lymphadenopathy favored to be reactive  -  SH discussed case at multidisciplinary conference 10/17, patient not a candidate for percutaneous intervention due to high Young's score; plan for surgical intervention  - RHC and Cors 10/18: nonobstructive CAD, mildly elevated right and left sided filling pressures, reduced cardiac output  - Continue Lopressor 50 mg BID; prefer lower HR given degree of mitral stenosis  - Kev's score = 9+ on repeat imaging  - CVTS consulted and following; plan for surgery with Dr. Craig 10/23 (family aware), CVTS to see prior to discharge     # Moderate malnutrition in the context of chronic illness/disease   # Nausea/Vomiting  RD recs:   - Encourage 3 meals/day with protein sources    Pertinent Procedures:  1. RHC  2. Coronary Angiogram    Consults:  CVTS, Dental, Nutrition    Medication Changes:  See Medication list below    Discharge medications:   Current Discharge Medication List        START taking these medications    Details   furosemide (LASIX) 40 MG tablet Take 1 tablet (40 mg) by mouth 2 times daily.  Qty: 14 tablet, Refills: 0    Associated Diagnoses: Rheumatic mitral stenosis      metoprolol tartrate (LOPRESSOR) 50 MG tablet Take 1 tablet (50 mg) by mouth 2 times daily.  Qty: 28 tablet, Refills: 0    Associated Diagnoses: Rheumatic mitral stenosis           CONTINUE these medications which have CHANGED    Details   ketoconazole (NIZORAL) 2 % external cream Apply topically daily as needed for itching.    Associated Diagnoses: Tinea versicolor           CONTINUE these medications which have NOT CHANGED    Details   levETIRAcetam (KEPPRA) 250 MG tablet Take 500 mg by mouth  2 times daily      norethindrone-ethinyl estradiol (JUNEL FE 1/20) 1-20 MG-MCG tablet Take 1 tablet by mouth daily.  Qty: 90 tablet, Refills: 3    Associated Diagnoses: Encounter for contraceptive management, unspecified type             Follow-up:  Plan to return 10/23 for surgical MVR with Dr. Rafa Medina or imaging requiring follow-up after discharge:  Per CVTS at admission    Code status:  Full Code     Condition on discharge  Temp:  [97.7  F (36.5  C)-98.2  F (36.8  C)] 98.2  F (36.8  C)  Pulse:  [] 76  Resp:  [16-18] 18  BP: ()/(59-76) 102/69  SpO2:  [96 %-100 %] 97 %  General: NAD  HEENT:  PERRLA, EOMI.   CV: RRR. Diastolic murmur appreciated. No rubs or gallops.   Resp: No increased work of breathing or use of accessory muscles, breathing comfortably on room air.  Lung sounds clear throughout/bilaterally  Abdomen:  Normal active bowel sounds.  Abdomen is soft. No distension.  Extremities: Warm.  No pre-tibial edema. No cyanosis or clubbing.  Skin:  Warm and dry. No erythema, rashes, ulceration or diaphoresis.  Neuro: Alert and oriented x2     Imaging with results:  Coronary Angiogram/RHC 10/18/24:  Coronary Findings    Diagnostic  Dominance: Right  Left Main   The vessel is small and is angiographically normal.      Left Anterior Descending   The vessel is small.      Left Circumflex   The vessel is small and is angiographically normal.      First Obtuse Marginal Branch   The vessel is moderate in size and is angiographically normal.      Right Coronary Artery   The vessel is small and is angiographically normal.      Right Posterior Descending Artery   The vessel is small and is angiographically normal.      Right Posterior Atrioventricular Artery   The vessel is small and is angiographically normal.      First Right Posterolateral Branch   The vessel is small and is angiographically normal.      Second Right Posterolateral Branch   The vessel is small and is angiographically normal.      Third  Right Posterolateral Branch   The vessel is small and is angiographically normal.         Intervention     No interventions have been documented.     Hemodynamics    RA Pressures: --  RV: 50/12    PA: 49/25 (30)    PCW Pressures:--   MVO2: 65.4 %  Estimated Kushal CO: 2.47, Kushal CI 2.17  TDCO 2.11, TDCI 1.86  PVR: 4.26 (TD) Right sided filling pressures are mildly elevated. Left sided filling pressures are moderately elevated. Mild elevated pulmonary hypertension. Reduced cardiac output level. Hemodynamic data has been modified in [facility] per physician review.       10/15/24 EK/13/24 Transesophageal Echocardiogram:  SVEN ordered to evaluate mitral valve.  Technically difficult study. Extremely poor acoustic windows.  Moderate mitral stenosis is present. The etiology of the mitral stenosis is  rheumatic. No parachute mitral valve or mitral valve arcade.  The mean gradient across the mitral valve is 7 mmHg at a heart rate of 93 bpm.  Mitral valve pressure half time is 193 ms, which estimates a mitral valve size  of 1.1 cm^2.  Bhatt score = 7 (2 for mobility, 1 for thickening, 3 for calcification, 1  for subvalvular thickening).     OSH 10/1/24 Cardiac CT:     IMPRESSION:  1.  See below for TMVR related mitral annular and naresh-LVOT  measurements.  2.  These measurements were not performed on software with a dedicated  mitral valve package and should be confirmed using software with a  dedicated package for mitral annular segmentation.    3.  Please review detailed radiology report, to follow separately, for  incidental non-cardiovascular findings.     FINDINGS:  1.  With virtual implantation of a 29 mm Wilbert 3 prosthesis with a  30% atrialized implantation, the estimated area of the naresh-LVOT is  0.329 cm2. The virtual valve abuts the ventricular septum.The  aorto-mitral angle is 97.0 degrees.  2.  The mitral leaflets are rheumatic in morphology. There is 91  degrees of circumferential mitral annular  calcification.  3.  Imaging protocol is non-diagnostic for evaluation of coronary  artery stenosis.   4.  There is no acute aortic pathology, such as dissection, intramural  hematoma, or contained rupture.              Recent Results (from the past 24 hour(s))   Echo Complete   Result Value     LVEF  60-65%     [facility]     [id number]  [id number]  [id number]  [id number]^YE^MAHAD^CLAYTON     [facility],[place]  Echocardiography Laboratory  [address] [place] [id number]     Name: KATE BYRNES  MRN: [id number]  : 1980  Study Date: 10/16/2024 10:01 AM  Age: 44 yrs  Gender: Female  [place] [facility] [place]  Reason For Study: Mitral Valve Disorder  Ordering Physician: MAHAD BELCHER  Referring Physician: LUCY FELDER  Performed By: De Pearson RDCS     BSA: 1.1 m2  Height: 56 in  Weight: 70 lb  HR: 80  BP: 105/73 mmHg  ______________________________________________________________________________  Procedure  Complete Portable Echo Adult.  ______________________________________________________________________________  Interpretation Summary  Global and regional left ventricular function is normal with an EF of 60-65%.  Right ventricular function, chamber size, wall motion, and thickness are  normal.  Rheumatic mitral valve disease is present with severe calcification.  Mild mitral insufficiency is present.  Severe mitral stenosis is present.  The mean gradient across the mitral valve is 12 mmHg. Heart rate 77BPM.  Mild (pulmonary artery systolic pressure<50mmHg) pulmonary hypertension is  present.  IVC diameter and respiratory changes fall into an intermediate range  suggesting an RA pressure of 8 mmHg.  No pericardial effusion is present.  ______________________________________________________________________________  Left Ventricle  Global and regional left ventricular function is normal with an EF of 60-65%.  Left ventricular wall thickness is normal. Left  ventricular size is normal.  Left ventricular diastolic function is indeterminate. No regional wall motion  abnormalities are seen.     Right Ventricle  Right ventricular function, chamber size, wall motion, and thickness are  normal.     Atria  The right atria appears normal. Severe left atrial enlargement is present.     Mitral Valve  Rheumatic mitral valve disease is present with severe calcification. Mild  mitral insufficiency is present. Severe mitral stenosis is present. The mean  gradient across the mitral valve is 12 mmHg.     Aortic Valve  Aortic valve sclerosis is present.     Tricuspid Valve  The tricuspid valve is normal. Mild tricuspid insufficiency is present. The  right ventricular systolic pressure is approximated at 35.8 mmHg plus the  right atrial pressure. Mild (pulmonary artery systolic pressure<50mmHg)  pulmonary hypertension is present.     Pulmonic Valve  The pulmonic valve is normal. Trace pulmonic insufficiency is present.     Vessels  The thoracic aorta is normal. The pulmonary artery cannot be assessed. IVC  diameter and respiratory changes fall into an intermediate range suggesting an  RA pressure of 8 mmHg.     Pericardium  No pericardial effusion is present.     ______________________________________________________________________________  MMode/2D Measurements & Calculations  Ao root diam: 2.1 cm  asc Aorta Diam: 1.8 cm  LVOT diam: 1.7 cm  LVOT area: 2.4 cm2  Ao root diam index Ht(cm/m): 1.5     Ao root diam index BSA (cm/m2): 1.8  Asc Ao diam index BSA (cm/m2): 1.6  Asc Ao diam index Ht(cm/m): 1.3  TAPSE: 2.1 cm     Doppler Measurements & Calculations  MV E max adwoa: 207.0 cm/sec  MV A max adwoa: 176.0 cm/sec  MV E/A: 1.2  MV max P.6 mmHg  MV mean P.8 mmHg  MV V2 VTI: 63.8 cm  MV P1/2t max adwoa: 204.0 cm/sec  MV P1/2t: 138.4 msec  MVA(P1/2t): 1.6 cm2  MV dec slope: 431.9 cm/sec2  MV dec time: 0.49 sec  PA acc time: 0.10 sec  TR max adwoa: 299.0 cm/sec  TR max P.8 mmHg  RV S  Luc: 11.5 cm/sec     ______________________________________________________________________________  Report approved by: Leo Jerome 10/16/2024 11:16 AM         CT Chest w/o Contrast     Narrative     EXAMINATION: Chest CT  10/16/2024 1:02 PM     CLINICAL HISTORY: mitral stenosis, reassess pulmonary edema     COMPARISON: CT angiogram 9/24/2024     TECHNIQUE: CT imaging obtained through the chest without contrast.  Axial, coronal, and sagittal reconstructions and axial MIP reformatted  images are reviewed.      FINDINGS:     Vessels: Normal caliber of the thoracic aorta. No significant coronary  artery atherosclerotic calcifications. Limited visualization due to  lack of contrast, however when compared to prior CT 9/24/2024, there  is persistent, severe dilation of the left atrium and extensive  calcifications along the mitral annulus, consistent with history of  known mitral valve stenosis. The thoracic aorta is normal caliber.  Enlarged main pulmonary trunk measuring 3.8 cm.     Lower neck and axillae: No nodule in the included portion of the  thyroid. No axillary lymphadenopathy.     Mediastinum and laury: Cardiomegaly, with displacement of the esophagus  to the right. Small pericardial effusion, best visualized on the  coronal image. Enlarged mediastinal lymph nodes, for example  prevascular node measuring 1.2 x 1.3 cm (series 3, image 268).     Airways: Minimal debris in the upper trachea, otherwise central  tracheobronchial tree is clear.     Pleura: No pleural effusion or pneumothorax.     Lungs: Scattered groundglass opacities predominantly in the lung  bases, similar compared to prior. Persistent interlobular septal  thickening, likely secondary to pulmonary edema. Mild bibasilar  atelectasis.      Upper abdomen: No acute process in the included upper abdomen.     Soft tissues: Unremarkable.     Bones: No acute or aggressive osseous abnormality.        Impression     IMPRESSION:     1. Severe dilation of  the left atrium and extensive calcifications  along the mitral annulus, unchanged when compared to prior CT  9/24/2024, consistent with mitral stenosis.  2. Enlarged main pulmonary trunk likely sequelae of pulmonary  hypertension  3. Persistent interlobular septal thickening consistent with pulmonary  edema. Scattered bibasilar predominant groundglass opacities are  unchanged, may represent infectious/inflammatory process. Can consider  follow-up chest CT in 4-6 weeks to reassess.  4. Mediastinal lymphadenopathy favored to be reactive.     I have personally reviewed the examination and initial interpretation  and I agree with the findings.     KRISTIE CRONIN MD         SYSTEM ID:  [id number]   CT Dental wo Contrast     Narrative     CT DENTAL WO CONTRAST 10/16/2024 12:29 PM     History:  cardiac clearance     Comparison:  None.       TECHNIQUE: 3-D reconstruction by the technologists, with curved  multiplanar reformat of thin section imaging through the mandible and  maxilla obtained without intravenous contrast.     FINDINGS:  Mild motion artifacts somewhat limits evaluation of the facial bones.     No significant soft tissue swelling or mass. Normal facial bone  alignment. No bony erosion. No periapical dental lucencies.     Small mucous retention cyst in the posterior floor of the left  maxillary sinus. Additional small bilateral ethmoid mucous retention  cysts. Mild dependent bilateral mastoid effusions. Normal  temporomandibular joints.        Impression     IMPRESSION:  No periapical abscess.     I have personally reviewed the examination and initial interpretation  and I agree with the findings.     JOE CHAVIRA MD        Patient Care Team:  Cee Velez MD as PCP - General (Family Medicine)  Marcelina Oneal PA-C as Assigned Surgical Provider  Lilia Medina DPM as Assigned Musculoskeletal Provider  Chris Antonio MD as Assigned Heart and Vascular Provider  NAOMIE Moe, CNP  [facility]  Cardiology  Patient discussed with staff cardiologist, Dr. Sheehan, who agrees with the above documentation and plan. Documentation represents joint decision making.     Time Spent on this Encounter   I, NAOMIE Van CNP, personally saw the patient today and spent greater than 30 minutes discharging this patient.

## 2024-10-19 VITALS
OXYGEN SATURATION: 97 % | TEMPERATURE: 98.2 F | BODY MASS INDEX: 15.61 KG/M2 | SYSTOLIC BLOOD PRESSURE: 102 MMHG | HEART RATE: 76 BPM | DIASTOLIC BLOOD PRESSURE: 69 MMHG | HEIGHT: 56 IN | RESPIRATION RATE: 18 BRPM | WEIGHT: 69.4 LBS

## 2024-10-19 LAB
ANION GAP SERPL CALCULATED.3IONS-SCNC: 15 MMOL/L (ref 7–15)
BUN SERPL-MCNC: 20.8 MG/DL (ref 6–20)
CALCIUM SERPL-MCNC: 8.5 MG/DL (ref 8.8–10.4)
CHLORIDE SERPL-SCNC: 96 MMOL/L (ref 98–107)
CREAT SERPL-MCNC: 0.78 MG/DL (ref 0.51–0.95)
EGFRCR SERPLBLD CKD-EPI 2021: >90 ML/MIN/1.73M2
ERYTHROCYTE [DISTWIDTH] IN BLOOD BY AUTOMATED COUNT: 14.1 % (ref 10–15)
GLUCOSE BLDC GLUCOMTR-MCNC: 170 MG/DL (ref 70–99)
GLUCOSE SERPL-MCNC: 64 MG/DL (ref 70–99)
HCO3 SERPL-SCNC: 24 MMOL/L (ref 22–29)
HCT VFR BLD AUTO: 41.3 % (ref 35–47)
HGB BLD-MCNC: 13.5 G/DL (ref 11.7–15.7)
MAGNESIUM SERPL-MCNC: 2.3 MG/DL (ref 1.7–2.3)
MCH RBC QN AUTO: 29.3 PG (ref 26.5–33)
MCHC RBC AUTO-ENTMCNC: 32.7 G/DL (ref 31.5–36.5)
MCV RBC AUTO: 90 FL (ref 78–100)
PLATELET # BLD AUTO: 155 10E3/UL (ref 150–450)
POTASSIUM SERPL-SCNC: 4.1 MMOL/L (ref 3.4–5.3)
RBC # BLD AUTO: 4.6 10E6/UL (ref 3.8–5.2)
SODIUM SERPL-SCNC: 135 MMOL/L (ref 135–145)
WBC # BLD AUTO: 10.2 10E3/UL (ref 4–11)

## 2024-10-19 PROCEDURE — 80048 BASIC METABOLIC PNL TOTAL CA: CPT

## 2024-10-19 PROCEDURE — 250N000013 HC RX MED GY IP 250 OP 250 PS 637

## 2024-10-19 PROCEDURE — 83735 ASSAY OF MAGNESIUM: CPT

## 2024-10-19 PROCEDURE — 99239 HOSP IP/OBS DSCHRG MGMT >30: CPT | Mod: FS | Performed by: NURSE PRACTITIONER

## 2024-10-19 PROCEDURE — 36415 COLL VENOUS BLD VENIPUNCTURE: CPT

## 2024-10-19 PROCEDURE — 250N000013 HC RX MED GY IP 250 OP 250 PS 637: Performed by: EMERGENCY MEDICINE

## 2024-10-19 PROCEDURE — 86900 BLOOD TYPING SEROLOGIC ABO: CPT | Performed by: SURGERY

## 2024-10-19 PROCEDURE — 86850 RBC ANTIBODY SCREEN: CPT | Performed by: SURGERY

## 2024-10-19 PROCEDURE — 85027 COMPLETE CBC AUTOMATED: CPT

## 2024-10-19 PROCEDURE — 250N000013 HC RX MED GY IP 250 OP 250 PS 637: Performed by: NURSE PRACTITIONER

## 2024-10-19 RX ORDER — METOPROLOL TARTRATE 50 MG
50 TABLET ORAL 2 TIMES DAILY
Qty: 28 TABLET | Refills: 0 | Status: ON HOLD | OUTPATIENT
Start: 2024-10-19 | End: 2024-10-28

## 2024-10-19 RX ORDER — FUROSEMIDE 40 MG/1
40 TABLET ORAL DAILY
Status: DISCONTINUED | OUTPATIENT
Start: 2024-10-19 | End: 2024-10-19 | Stop reason: HOSPADM

## 2024-10-19 RX ORDER — KETOCONAZOLE 20 MG/G
CREAM TOPICAL DAILY PRN
COMMUNITY
Start: 2024-10-19

## 2024-10-19 RX ORDER — FUROSEMIDE 40 MG/1
40 TABLET ORAL DAILY
Qty: 14 TABLET | Refills: 0 | Status: SHIPPED | OUTPATIENT
Start: 2024-10-19 | End: 2024-10-19

## 2024-10-19 RX ORDER — FUROSEMIDE 40 MG/1
40 TABLET ORAL 2 TIMES DAILY
Qty: 14 TABLET | Refills: 0 | Status: ON HOLD | OUTPATIENT
Start: 2024-10-19 | End: 2024-10-28

## 2024-10-19 RX ADMIN — FUROSEMIDE 40 MG: 40 TABLET ORAL at 09:31

## 2024-10-19 RX ADMIN — NORETHINDRONE ACETATE AND ETHINYL ESTRADIOL 1 TABLET: KIT at 09:31

## 2024-10-19 RX ADMIN — METOPROLOL TARTRATE 50 MG: 25 TABLET, FILM COATED ORAL at 09:31

## 2024-10-19 RX ADMIN — LEVETIRACETAM 500 MG: 500 TABLET, FILM COATED ORAL at 09:31

## 2024-10-19 RX ADMIN — Medication 1 TABLET: at 09:31

## 2024-10-19 ASSESSMENT — ACTIVITIES OF DAILY LIVING (ADL)
ADLS_ACUITY_SCORE: 29
ADLS_ACUITY_SCORE: 28
ADLS_ACUITY_SCORE: 28
ADLS_ACUITY_SCORE: 29
ADLS_ACUITY_SCORE: 29
ADLS_ACUITY_SCORE: 28
ADLS_ACUITY_SCORE: 29
ADLS_ACUITY_SCORE: 29

## 2024-10-19 NOTE — PLAN OF CARE
Goal Outcome Evaluation:      Plan of Care Reviewed With: patient, parent    Overall Patient Progress: improvingOverall Patient Progress: improving    Outcome Evaluation: verbalized understanding plan of care      Discharged to: home  Via: family transport  Belongings: returned to patient  Teaching: AVS reviewed  Clinic appointment: reviewed  Tele and IV: discontinued per orders

## 2024-10-19 NOTE — PLAN OF CARE
Hours of Care:  1530- 0730    D: Severe mitral stenosis and acute decompensated heart failure.      Neuro: A/O x 4 per parent.  Has cognitive delay, mom in room and involved in cares.   Respiratory:  On RA sating well.  Cardiac: SR with PVCs. VSS. Had two 4-beats run of V-tach. Continue monitoring.    GI: No BM this shift.  No report of nausea or vomiting.  : Voiding spontaneously, adequate amounts.  Skin: Pt has groin site from RHC and angiogram done on 10/18. Pt had bleeding from the site after the 2 hour bed rest following an episode of coughing. Pressure manually held to the site and bleeding stopped. Team notified. No hematoma or other episodes of bleeding noted. [place] to site with minimal old drainage. Otherwise dry and intact.  LDA: PIV, SL  Pain: Denies  Diet: 2 g Na.   Activity: SBA  Sleep:  No sleep disturbances noted or reported.       Plan: Pt likely to discharge today. Continue POC and notify C1 with changes.

## 2024-10-21 ENCOUNTER — TELEPHONE (OUTPATIENT)
Dept: CARDIOLOGY | Facility: CLINIC | Age: 44
End: 2024-10-21
Payer: MEDICARE

## 2024-10-21 DIAGNOSIS — R09.89 CARDIAC SYMPTOMS: ICD-10-CM

## 2024-10-21 DIAGNOSIS — Q24.8 ABNORMAL CARDIAC VALVE: Primary | ICD-10-CM

## 2024-10-21 DIAGNOSIS — I05.9 MITRAL VALVE DISEASE: Primary | ICD-10-CM

## 2024-10-21 LAB
ABO/RH(D): NORMAL
ANTIBODY SCREEN: NEGATIVE
SPECIMEN EXPIRATION DATE: NORMAL

## 2024-10-21 NOTE — TELEPHONE ENCOUNTER
Talked to patients mother, Christine, multiple times about needing other image/lab requirements prior to surgery on 10/23. Patient and patient mother lives 4 hours away from facility and will not be able to make it to any appointments before 4pm.

## 2024-10-22 ENCOUNTER — TELEPHONE (OUTPATIENT)
Dept: CARDIOLOGY | Facility: CLINIC | Age: 44
End: 2024-10-22
Payer: MEDICARE

## 2024-10-22 ENCOUNTER — ANESTHESIA EVENT (OUTPATIENT)
Dept: SURGERY | Facility: CLINIC | Age: 44
DRG: 219 | End: 2024-10-22
Payer: MEDICARE

## 2024-10-22 ASSESSMENT — ENCOUNTER SYMPTOMS: SEIZURES: 1

## 2024-10-22 NOTE — TELEPHONE ENCOUNTER
Patient unable to come for a Carotid ultrasound d/t transportation issues. Education provided about the risk of not having a carotid ultrasound.     Education provided about pre operation, and post operation expectations for patient recovery.

## 2024-10-22 NOTE — OR NURSING
"RE: Surgery 10/23. Med question, Plan for H&P  Received: Today  Tiffani Skinner RN Johnson, Judy, RN; Deangelo Craig MD; [facility]esthesiology  Cc: Mimi Hill  Yes, I just messaged Dr. Craig, and will give a patient a call today for calcification.  Thank you!          Previous Messages       ----- Message -----  From: Rachana Bustamante RN  Sent: 10/22/2024  10:38 AM CDT  To: Deangelo Craig MD; Mimi Hill; *  Subject: RE: Surgery 10/23. Med question, Plan for H&P    Hi Tiffani,    That is correct. The inpatient cardiology note can be used for the pre-op H&P.  I did call pt's mom and left a message that our anesthesia provider said that  \"Per cardiology's latest note, their recommendation is for her to take lasix on day of surgery.\" but if mom wants to double check on DOS, she can bring the Lasix in the labeled bottle and ask the providers if they want her to take/ or hold it.  However, if you are able to find out from Dr Craig today if he wants pt to take or hold Lasix on DOS, could you please call pt's mom and let her know?    Kind regards,    Rachana Bustamante RN, BSN  Pre-Anesthesia Screening  [phone]  ----- Message -----  From: Tiffani Skinner RN  Sent: 10/21/2024   3:56 PM CDT  To: Deangelo Craig MD; Janna Bolden MD; *  Subject: RE: Surgery 10/23. Med question, Plan for H&P    Hi,    Just to verify she doesn't need another H & P?  ----- Message -----  From: Janna Bolden MD  Sent: 10/21/2024   2:59 PM CDT  To: Deangelo Craig MD; Mimi Hill; *  Subject: RE: Surgery 10/23. Med question, Plan for H&P    It looks like the patient completed workup for her mitral valve replacement while in patient. She was seen by cardiology and optimized, she was seen by cardiac surgery, and she was cleared by dental. Cardiology workup is complete, it doesn't look like she needs to see anyone before scheduled surgery. Per cardiology's latest note, their recommendation is for her to take lasix on day of surgery.  ----- " Message -----  From: Rachana Bustamante RN  Sent: 10/21/2024   2:49 PM CDT  To: Deangelo Craig MD; Janna Bolden MD; *  Subject: Surgery 10/23. Med question, Plan for H&P        Hello,    Pt is scheduled for REPLACEMENT, MITRAL VALVE, OPEN AND ANY ASSOCIATED PROCEDURES on 10/23/24.    Pt was admitted from 10/15/-10/18/24. Discharge diagnoses were: Acute Heart Failure w/ Preserved EF (60-65%), Severe Rheumatic vs. Congenital Mitral Stenosis, Elevated Troponin 2/2 increased demand, Moderate malnutrition in the context of chronic illness/disease and Nausea/Vomiting.     Pre-op call was done with pt's mom/legal guardian. I asked pt's mom if pt had a pre-op H&P for this surgery, she said Ronna just got out of the hospital and a pre-op was mentioned but not again, as well as was needing to get labs done before surgery, which pt's mom is waiting to hear back about.  It does not sound like mom is planing to get pt an H&P. She said she is waiting to hear back from Cardiology.    What is the plan for pt's pre-op H&P.?    Should pt take or hold Lasix on DOS? Please F/U with pt's mom to let her know.    Kind regards,    Rachana Bustamante RN, BSN  Pre-Anesthesia Screening  [phone] Office

## 2024-10-22 NOTE — OR NURSING
RE: Surgery 10/23. Med question, Plan for H&P  Received: Yesterday  Janna Bolden MD Johnson, Judy, ISIS; Deangelo Craig MD; [facility]  Cc: Mimi Hill; Sumaya Cardenas, [facility]; Tiffani Skinner, RN  It looks like the patient completed workup for her mitral valve replacement while in patient. She was seen by cardiology and optimized, she was seen by cardiac surgery, and she was cleared by dental. Cardiology workup is complete, it doesn't look like she needs to see anyone before scheduled surgery. Per cardiology's latest note, their recommendation is for her to take lasix on day of surgery.          Previous Messages       ----- Message -----  From: Rachana Bustamante RN  Sent: 10/21/2024   2:49 PM CDT  To: Deangelo Craig MD; Janna Bolden MD; *  Subject: Surgery 10/23. Med question, Plan for H&P        Hello,    Pt is scheduled for REPLACEMENT, MITRAL VALVE, OPEN AND ANY ASSOCIATED PROCEDURES on 10/23/24.    Pt was admitted from 10/15/-10/18/24. Discharge diagnoses were: Acute Heart Failure w/ Preserved EF (60-65%), Severe Rheumatic vs. Congenital Mitral Stenosis, Elevated Troponin 2/2 increased demand, Moderate malnutrition in the context of chronic illness/disease and Nausea/Vomiting.     Pre-op call was done with pt's mom/legal guardian. I asked pt's mom if pt had a pre-op H&P for this surgery, she said Ronna just got out of the hospital and a pre-op was mentioned but not again, as well as was needing to get labs done before surgery, which pt's mom is waiting to hear back about.  It does not sound like mom is planing to get pt an H&P. She said she is waiting to hear back from Cardiology.    What is the plan for pt's pre-op H&P.?    Should pt take or hold Lasix on DOS? Please F/U with pt's mom to let her know.    Kind regards,    Rachana Bustamante RN, BSN  Pre-Anesthesia Screening  [phone] Office

## 2024-10-22 NOTE — H&P
CV ICU H&P  No admission date for patient encounter.     ASSESSMENT: Efren Saavedra is a 44 year old female with PMH of rheumatic mitral stenosis, T2MI, seizure disorder on keppra (since 2022), agenesis of the corpus collosum. Initially admitted to [facility] on 10/15 for new-onset decompensated HF with cardiomegaly, now admitted to CVICU s/p mitral valve replacement by Dr. Craig on 10/23/24.    CO-MORBIDITIES:   Patient Active Problem List   Diagnosis    Congenital hearing loss    Impacted cerumen    Mixed conductive and sensorineural hearing loss, bilateral    Dermatitis    Agenesis of corpus callosum (H)    Dysmenorrhea    Irregular menstrual cycle    Mitral valve disease    Osteoporosis    Other kyphoscoliosis and scoliosis    Seizure (H)    Unspecified intellectual disabilities    Other psoriasis    Rheumatic mitral stenosis    Acute congestive heart failure, unspecified heart failure type (H)    Mitral stenosis    Mitral valve stenosis, unspecified etiology         Intra-op:   - Arrived @ 15:13 on 0.04 Epi, 0.1 NE, 40 propofol  - EBL: 500mL // Cell-saver: 400 // Blood products: 1 unit(s) platelet  // Fibryga: 1g   - UOP: 500mL // Given 2.5L crystalloid IVF throughout case  - Hemodynamic goals post-op: SBP <140, MAP >65, HR >100     Fast track eligible for extubation if hemodynamically stable  V-wire in place and functioning, but not currently plugged in. Currently HR 70s - native rhythm    Access: 18 in right hand, arterial line left radial, mac with yenni DEVINE Roberts, 3 Chest tubes.     PLAN:  Neuro/ pain/ sedation:  # Corpus collosum agenesis  # Developmental delay  Oriented to person and place, but not time or situation s/p surgery. Unclear patients baseline mental status.  - Monitor neurological status. Notify the MD for any acute changes in exam.    # Acute postoperative pain  - Scheduled: Tylenol  - PRN: Tylenol, oxycodone, Dilaudid    # Sedation while on mechanical ventilation   - Gtt: Propofol  - Wean for  RASS goal     # Seizure  On Keppra at baseline. Last taken morning of 10/23 (day of surgery)  - Restart PTA Keppra.    Pulmonary care:   # Post-operative mechanical ventilation   Resp: 18, Vent Mode: CMV/AC, Resp Rate (Set): 16 breaths/min, Tidal Volume (Set, mL): 270 mL, PEEP (cm H2O): 5 cmH2O, Resp Rate (Set): 16 breaths/min, Tidal Volume (Set, mL): 270 mL, PEEP (cm H2O): 5 cmH2O   - Goal SpO2 > 92%  - Encourage IS q15-30 minutes when awake after extubation   - CXR on arrival, then daily   - Monitor CT output   - Wean vent as able; Trend ABG's     Cardiovascular:    # Acute Heart Failure w/ Preserved EF (60-65%)  # Severe Rheumatic vs. Congenital Mitral Stenosis  # Elevated Troponin 2/2 increased demand  # Hx of Type 2 MI   # Cardiogenic shock  # S/p MVR  Pre CPBP: Complete Echo performed on 10/16/24 - LV function is normal with EF of 60-65%, RV function is normal, Rheumatic mitral valve disease is present with severe calcification, mild mitral insufficiency, severe mitral stenosis, gradient across the MV is 12 mmHg,  Post CPBP: Valve was well-seated. No leaks. Gradient across valve was 6 mmHg, no new abnormalities  - Monitor hemodynamics closely  - Goal MAP >65, SBP <140   - Epi, NE, Vaso gtts for inotropic and pressor support, wean as able  - Hold PTA Lasix  - Hold BB   - ASA: start tomorrow  - Cap TPW when able post-op     GI care / Nutrition:   # At risk for protein calorie malnutrition   - NPO, bedside swallow eval once extubated  - PPI  - Bowel regimen: MiraLAX, senna  - OG tube for meds     Renal / Fluids / Electrolytes:   # Hypovolemia  BL creat appears to be ~ 0.5-0.8  - Strict I/O, daily weights, avoid/limit nephrotoxins  - Replete lytes PRN per protocol  - Trend lactate   - Hold PTA lasix    Endocrine:    # Stress hyperglycemia  - Insulin gtt  - Goal BG <180 for optimal healing    ID / Antibiotics:  # Stress induced leukocytosis  - To complete perioperative regimen  - Monitor fever curve, WBC, and  inflammatory markers as appropriate    Heme:     # Acute blood loss anemia  # Acute thrombocytopenia  No s/sx active bleeding  - Trending CBC   - Hgb goal > 7.0  - Hemoglobin 11.2    MSK / Skin:  # Sternotomy    # Surgical Incision  - Sternal precautions, postop incision management protocol  - PT/OT/CR    Prophylaxis:     - Mechanical DVT ppx  - Chemical DVT ppx: start SQH tomorrow  - PPI    Lines / Tubes / Drains:  - ETT  - RIJ CVC, PA catheter  - Arterial Line left radial  - CTs x3  - Roberts  - OG  - TPW    Disposition:  - CVICU    Diego Kay, MS4  [employer]    Resident/Fellow Attestation   I, Barak Jim MD, was present with the medical/AROLDO student who participated in the service and in the documentation of the note.  I have verified the history and personally performed the physical exam and medical decision making.  I agree with the assessment and plan of care as documented in the note.        Barak Jim MD  PGY4  Date of Service (when I saw the patient): 10/23/24        ====================================    HPI:   Presents to CVICU intubated and sedated.      PAST MEDICAL HISTORY:   Past Medical History:   Diagnosis Date    Seizures (H)        PAST SURGICAL HISTORY:   Past Surgical History:   Procedure Laterality Date    CV CORONARY ANGIOGRAM N/A 10/18/2024    Procedure: Coronary Angiogram;  Surgeon: Hernandez Rivera MD;  [facility]    CV RIGHT HEART CATH MEASUREMENTS RECORDED N/A 10/18/2024    Procedure: Right Heart Catheterization;  Surgeon: Hernandez Rivera MD;  Location:  HEART CARDIAC CATH LAB    feeding tube      TRANSESOPHAGEAL ECHOCARDIOGRAM INTRAOPERATIVE N/A 8/13/2024    Procedure: ECHOCARDIOGRAM, TRANSESOPHAGEAL, WITH ANESTHESIA;  Surgeon: GENERIC ANESTHESIA PROVIDER;  Location: [facility] OR    ventilation tubes, bilateral         FAMILY HISTORY:   Family History   Problem Relation Age of Onset     Cerebrovascular Disease Paternal Grandmother         CVA    Heart Disease Maternal Grandfather         disease    Hypertension Father     Other - See Comments Father         post polio    Parkinsonism Maternal Grandmother     Heart Disease Paternal Grandfather        SOCIAL HISTORY:   Social History     Tobacco Use    Smoking status: Never    Smokeless tobacco: Never    Tobacco comments:     no passive exposure   Substance Use Topics    Alcohol use: No         OBJECTIVE:  1. VITAL SIGNS:   Temp:  [97.3  F (36.3  C)] 97.3  F (36.3  C)  Pulse:  [66] 66  Resp:  [18] 18  BP: (110)/(61) 110/61  SpO2:  [100 %] 100 %    Resp: 18, Vent Mode: CMV/AC, Resp Rate (Set): 16 breaths/min, Tidal Volume (Set, mL): 270 mL, PEEP (cm H2O): 5 cmH2O, Resp Rate (Set): 16 breaths/min, Tidal Volume (Set, mL): 270 mL, PEEP (cm H2O): 5 cmH2O      2. INTAKE/ OUTPUT:   I/O last 3 completed shifts:  In: 2640 [I.V.:1710; Other:400]  Out: 680 [Urine:680]      3. PHYSICAL EXAMINATION:   Neuro: Awake. Alert to person and place only. Follows commands. NAD.   HEENT: Normocephalic, atraumatic. PERRL, and nonicteric.   CV: RRR on monitor, S1S2, all extremities well perfused   Respiratory: Intubated, CTAB, Normal respiratory effort on RA, equal chest rise b/l   GI: Abdomen soft and non-tender to light palpation. Non-distended   : Voiding with Roberts   MSK: Moves all extremities well with normal appearing strength. Sensation intact in all upper/lower extremities.     Skin: Normal color. No rashes or skin lesions.   Psych: Cooperative, congruent mood and affect.       4. INVESTIGATIONS:   Arterial Blood Gases   Recent Labs   Lab 10/23/24  1531 10/23/24  1350 10/23/24  1306 10/23/24  1233   PH 7.41 7.43 7.47* 7.35   PCO2 40 39 34* 49*   PO2 169* 354* 543* 273*   HCO3 25 26 25 27     Complete Blood Count   Recent Labs   Lab 10/23/24  1350 10/23/24  1311 10/23/24  1306 10/23/24  1233 10/23/24  1200 10/23/24  0958 10/19/24  0556 10/18/24  0643 10/17/24  0547    WBC  --   --   --   --   --   --  10.2 10.6 10.3   HGB 11.2*  --  8.3* 8.5* 7.5*   < > 13.5 13.8 13.4   PLT  --  79*  --   --   --   --  155 148* 153    < > = values in this interval not displayed.     Basic Metabolic Panel  Recent Labs   Lab 10/23/24  1350 10/23/24  1306 10/23/24  1233 10/23/24  1200 10/19/24  1007 10/19/24  0556 10/18/24  0643 10/17/24  0547    138 138 138   < > 135 135 136   POTASSIUM 4.3 4.7 5.5* 5.2   < > 4.1 3.7 3.6   CHLORIDE  --   --   --   --   --  96* 98 98   CO2  --   --   --   --   --  24 24 25   BUN  --   --   --   --   --  20.8* 19.9 21.8*   CR  --   --   --   --   --  0.78 0.73 0.79   * 115* 157* 130*   < > 64* 81 87    < > = values in this interval not displayed.     Liver Function Tests  Recent Labs   Lab 10/23/24  1311   INR 1.87*     Pancreatic Enzymes  No lab results found in last 7 days.  Coagulation Profile  Recent Labs   Lab 10/23/24  1311   INR 1.87*   PTT 33         5. RADIOLOGY:       Recent Results (from the past 24 hours)   SVEN with Report    Narrative    Helen Amaya MD     10/23/2024  2:37 PM  Perioperative SVEN Procedure Note    Staff -        Anesthesiologist:  Behrens, Christopher J, MD       Resident/Fellow: Helen Amaya MD       Performed By: fellow  Preanesthesia Checklist:  Patient identified, IV assessed, risks and   benefits discussed, monitors and equipment assessed, procedure being   performed at surgeon's request and anesthesia consent obtained.    SVEN Probe Insertion    Probe Status PRE Insertion: NO obvious damage  Probe type:  Adult 3D  Bite block used:   Oral Airway  Insertion Technique: Jaw Lift  Insertion complications: None obvious  Billing Report:SVEN report by Anesthesiologist (See Separate Report note)  Probe Status POST Removal: NO obvious damage    SEVN Report  General Procedure Information  Images for this study have been archived.  Modalities: 2D, 3D, CW Doppler, Color flow mapping and PW Doppler  Diagnostic indications  for SVEN:   Rheumatic mitral stenosis  Echocardiographic and Doppler Measurements  Right Ventricle:  Cavity size normal.    Hypertrophy not present.     Thrombus not present.    Global function normal.     Left Ventricle:  Cavity size normal.    Hypertrophy present.   Thrombus   not present.   Global Function normal.   Ejection Fraction 60%.      Ventricular Regional Function:  1- Basal Anteroseptal:  normal  2- Basal Anterior:  normal  3- Basal Anterolateral:  normal  4- Basal Inferolateral:  normal  5- Basal Inferior:  normal  6- Basal Inferoseptal:  normal  7- Mid Anteroseptal:  normal  8- Mid Anterior:  normal  9- Mid Anterolateral:  normal  10- Mid Inferolateral:  normal  11- Mid Inferior:  normal  12- Mid Inferoseptal:  normal  13- Apical Anterior:  normal  14- Apical Lateral:  normal  15- Apical Inferior:  normal  16- Apical Septal:  normal  17- Morrill:  normal    Valves  Aortic Valve: Annulus normal.  Stenosis not present.  Regurgitation   absent.  Leaflets normal.  Leaflet motions normal.    Mitral Valve: Annulus mechanical.  Stenosis not present.  Regurgitation   absent.  Leaflets normal.  Leaflet motions normal.    Tricuspid Valve: Annulus normal.  Stenosis not present.  Regurgitation +1.    Leaflets normal.  Leaflet motions normal.    Pulmonic Valve: Annulus normal.  Stenosis not present.  Regurgitation +1.        Aorta: Ascending Aorta: Size normal.  Dissection not present.  Plaque   thickness less than 3 mm.  Mobile plaque not present.      Right Atrium:  Size dilated.   Spontaneous echo contrast not present.     Thrombus not present.   Tumor not present.   Device not present.     Left Atrium: Size dilated.  Spontaneous echo contrast not present.    Thrombus not present.  Tumor not present.  Device not present.       Atrial Septum: Intra-atrial septal morphology normal.       Ventricular Septum: Intra-ventricular septum morphology normal.        Other Findings:   Pericardium:  normal. Pleural Effusion:   none.   Cornoary sinus catheter   present.    Post Intervention Findings  Procedure(s) performed:  Mitral Valve Repair/Replace. Global function:    Improved. Regional wall motion: Improved. Surgeon(s) notified of all   postintervention findings: Yes. Mitral Valve: Valve replaced with   mechanical valve.  No paravalvular leak.                Echocardiogram Comments  Echocardiogram comments:   Acoustic windows overall poor for this study.    PRE-PROCEDURE:  Aortic valve: Valve is trileaflet with normal leaflet morphology and   motion. No AI/AS. Aortic arch and descending not well visualized.  Mitral valve: Leaflet tips are very thickened with restricted leaflet   motion. No significant calcifications seen. There is diastolic bowing of   the leaflets. Severe MS. Mild-moderate MR.  Pulmonic valve: Leaflets not well visualized. Trace PI.  Tricuspid valve: Leaflets appear normal in morphology with normal motion.   Trace TR.  Biventricular systolic function is preserved; estimated visual LVEF   55-60%. No regional wall motion abnormalities. No evidence of PFO. No   ascending aortic dissection. All findings communicated with surgical team.      POST-PROCEDURE:  Mitral valve: S/p interval placement of mechanical mitral valve. Valve   opens and closes appropriately. Appears well seated with no paravalvular   leaks. Washing jets visualized. Mean gradient 6 mmHg.  Aortic / pulmonic / tricuspid valves unchanged from pre-procedure.  Biventricular systolic function is preserved and unchanged. No new   regional wall motion abnormalities. No ascending aortic dissection. All   findings communicated with surgical team.  .       =========================================

## 2024-10-22 NOTE — OR NURSING
"RE: Surgery 10/23. Med question, Plan for H&P  Received: Today  Rachana Bustamante RN Ung, Katie, RN; Deangelo Craig MD; CARMELA [facility] Anesthesiology  Cc: Mimi Hill,    That is correct. The inpatient cardiology note can be used for the pre-op H&P.  I did call pt's mom and left a message that our anesthesia provider said that  \"Per cardiology's latest note, their recommendation is for her to take lasix on day of surgery.\" but if mom wants to double check on DOS, she can bring the Lasix in the labeled bottle and ask the providers if they want her to take/ or hold it.  However, if you are able to find out from Dr Craig today if he wants pt to take or hold Lasix on DOS, could you please call pt's mom and let her know?    Kind regards,    Rachana Bustamante RN, BSN  Pre-Anesthesia Screening  [phone]          Previous Messages       ----- Message -----  From: Tiffani Skinner RN  Sent: 10/21/2024   3:56 PM CDT  To: Deangelo Craig MD; Janna Bolden MD; *  Subject: RE: Surgery 10/23. Med question, Plan for H&P    Hi,    Just to verify she doesn't need another H & P?  ----- Message -----  From: Janna Bolden MD  Sent: 10/21/2024   2:59 PM CDT  To: Deangelo Craig MD; Mimi Hill; *  Subject: RE: Surgery 10/23. Med question, Plan for H&P    It looks like the patient completed workup for her mitral valve replacement while in patient. She was seen by cardiology and optimized, she was seen by cardiac surgery, and she was cleared by dental. Cardiology workup is complete, it doesn't look like she needs to see anyone before scheduled surgery. Per cardiology's latest note, their recommendation is for her to take lasix on day of surgery.  ----- Message -----  From: Rachana Bustamante RN  Sent: 10/21/2024   2:49 PM CDT  To: Deangelo Craig MD; Janna Bolden MD; *  Subject: Surgery 10/23. Med question, Plan for H&P        Hello,    Pt is scheduled for REPLACEMENT, MITRAL VALVE, OPEN AND ANY ASSOCIATED PROCEDURES on 10/23/24.    Pt was " admitted from 10/15/-10/18/24. Discharge diagnoses were: Acute Heart Failure w/ Preserved EF (60-65%), Severe Rheumatic vs. Congenital Mitral Stenosis, Elevated Troponin 2/2 increased demand, Moderate malnutrition in the context of chronic illness/disease and Nausea/Vomiting.     Pre-op call was done with pt's mom/legal guardian. I asked pt's mom if pt had a pre-op H&P for this surgery, she said Ronna just got out of the hospital and a pre-op was mentioned but not again, as well as was needing to get labs done before surgery, which pt's mom is waiting to hear back about.  It does not sound like mom is planing to get pt an H&P. She said she is waiting to hear back from Cardiology.    What is the plan for pt's pre-op H&P.?    Should pt take or hold Lasix on DOS? Please F/U with pt's mom to let her know.    Kind regards,    Rachana Bustamante RN, BSN  Pre-Anesthesia Screening

## 2024-10-22 NOTE — ANESTHESIA PREPROCEDURE EVALUATION
Anesthesia Pre-Procedure Evaluation    Patient: Efren Saavedra   MRN: [id number] : 1980        Procedure : Procedure(s):  REPLACEMENT, MITRAL VALVE, OPEN AND ANY ASSOCIATED PROCEDURES          Past Medical History:   Diagnosis Date    Seizures (H)       Past Surgical History:   Procedure Laterality Date    CV CORONARY ANGIOGRAM N/A 10/18/2024    Procedure: Coronary Angiogram;  Surgeon: Hernandez Rivera MD;  [facility]    CV RIGHT HEART CATH MEASUREMENTS RECORDED N/A 10/18/2024    Procedure: Right Heart Catheterization;  Surgeon: Hernandez Rivera MD;  [facility]    feeding tube      TRANSESOPHAGEAL ECHOCARDIOGRAM INTRAOPERATIVE N/A 2024    Procedure: ECHOCARDIOGRAM, TRANSESOPHAGEAL, WITH ANESTHESIA;  Surgeon: GENERIC ANESTHESIA PROVIDER;  Location: [facility] OR    ventilation tubes, bilateral        Allergies   Allergen Reactions    Azithromycin      Abdominal pain/ cramping     Clotrimazole Rash    Omnicef [Cefdinir] Rash     Itchy and bad rash      Social History     Tobacco Use    Smoking status: Never    Smokeless tobacco: Never    Tobacco comments:     no passive exposure   Substance Use Topics    Alcohol use: No      Wt Readings from Last 1 Encounters:   10/18/24 31.5 kg (69 lb 6.4 oz)        Anesthesia Evaluation            ROS/MED HX  ENT/Pulmonary: Comment: Mixed conductive and sensorineural hearing loss      Neurologic: Comment: Agenesis of corpus collosum    (+)       seizures (on keppra),                  Developmental delay,       Cardiovascular:     (+)  - -   -  - -      CHF (HFpEF (EF 60-65%))                     valvular problems/murmurs (severe mitral stensosis and mild insufficiency 2/2 rheumatic disease)     pulmonary hypertension (mild pHTN), Previous cardiac testing   Echo: Date: 10/16/24 Results:  Interpretation Summary  Global and regional left ventricular function is normal with an EF of 60-65%.  Right  ventricular function, chamber size, wall motion, and thickness are  normal.  Rheumatic mitral valve disease is present with severe calcification.  Mild mitral insufficiency is present.  Severe mitral stenosis is present.  The mean gradient across the mitral valve is 12 mmHg. Heart rate 77BPM.  Mild (pulmonary artery systolic pressure<50mmHg) pulmonary hypertension is  present.  IVC diameter and respiratory changes fall into an intermediate range  suggesting an RA pressure of 8 mmHg.  No pericardial effusion is present.    Stress Test:  Date: Results:    ECG Reviewed:  Date: Results:    Cath:  Date: 10/17/24 Results:  RA Pressures: --/16/9  RV: 50/12   PA: 49/25 (30)   PCW Pressures:--/30/21   MVO2: 65.4 %  Estimated Kushal CO: 2.47, Kushal CI 2.17  TDCO 2.11, TDCI 1.86  PVR: 4.26 (TD) Right sided filling pressures are mildly elevated. Left sided filling pressures are moderately elevated. Mild elevated pulmonary hypertension. Reduced cardiac output level. Hemodynamic data has been modified in Epic per physician review.        Right sided filling pressures are mildly elevated.     Left sided filling pressures are moderately elevated.     Mild elevated pulmonary hypertension.     Reduced cardiac output level.        METS/Exercise Tolerance:     Hematologic:       Musculoskeletal: Comment: Scoliosis      GI/Hepatic:       Renal/Genitourinary:  - neg Renal ROS     Endo:  - neg endo ROS     Psychiatric/Substance Use:  - neg psychiatric ROS     Infectious Disease:  - neg infectious disease ROS     Malignancy:  - neg malignancy ROS     Other:               OUTSIDE LABS:  CBC:   Lab Results   Component Value Date    WBC 10.2 10/19/2024    WBC 10.6 10/18/2024    HGB 13.5 10/19/2024    HGB 13.8 10/18/2024    HCT 41.3 10/19/2024    HCT 41.0 10/18/2024     10/19/2024     (L) 10/18/2024     BMP:   Lab Results   Component Value Date     10/19/2024     10/18/2024    POTASSIUM 4.1 10/19/2024    POTASSIUM 3.7  "10/18/2024    CHLORIDE 96 (L) 10/19/2024    CHLORIDE 98 10/18/2024    CO2 24 10/19/2024    CO2 24 10/18/2024    BUN 20.8 (H) 10/19/2024    BUN 19.9 10/18/2024    CR 0.78 10/19/2024    CR 0.73 10/18/2024     (H) 10/19/2024    GLC 64 (L) 10/19/2024     COAGS:   Lab Results   Component Value Date    INR 1.16 (H) 10/16/2024     POC: No results found for: \"BGM\", \"HCG\", \"HCGS\"  HEPATIC:   Lab Results   Component Value Date    ALBUMIN 4.1 10/14/2024    PROTTOTAL 6.3 (L) 10/14/2024    ALT 42 10/14/2024    AST 30 10/14/2024    ALKPHOS 50 10/14/2024    BILITOTAL 0.7 10/14/2024     OTHER:   Lab Results   Component Value Date    LACT 0.8 10/17/2022    YUE 8.5 (L) 10/19/2024    MAG 2.3 10/19/2024    LIPASE 196 09/09/2022    CRP <2.9 09/09/2022       Anesthesia Plan    ASA Status:  4       Anesthesia Type: General.     - Airway: ETT   Induction: Intravenous.   Maintenance: Balanced.   Techniques and Equipment:     - Lines/Monitors: Arterial Line, Central Line, 2nd IV, CVP, BIS, NIRS, PAC, SVEN     - Drips/Meds: Norepi, Vasopressin, Epinephrine, Tranexamic acid, Nitric Oxide     Consents            Postoperative Care            Comments:    Other Comments: Efren Saavedra is a 44 year old female with history of developmental delay, seizure disorder, and severe mitral stenosis admitted 10/15/24 for new-onset decompensated HF with incidental finding of cardiomegaly on CXR evaluating chronic cough. Patient is now euvolemic after responding well to IV diuresis and initiated on HR control with beta-blocker with near-resolution of previous respiratory symptoms.               Lee Ann Cross MD    I have reviewed the pertinent notes and labs in the chart from the past 30 days and (re)examined the patient.  Any updates or changes from those notes are reflected in this note.                # Chronic heart failure with preserved ejection fraction: heart failure noted on problem list and last echo with EF >50%        # Cachexia: " "Estimated body mass index is 15.56 kg/m  as calculated from the following:    Height as of 10/15/24: 1.422 m (4' 8\").    Weight as of 10/18/24: 31.5 kg (69 lb 6.4 oz).             "

## 2024-10-23 ENCOUNTER — HOSPITAL ENCOUNTER (INPATIENT)
Facility: CLINIC | Age: 44
LOS: 5 days | Discharge: HOME | DRG: 219 | End: 2024-10-28
Attending: SURGERY | Admitting: SURGERY
Payer: MEDICARE

## 2024-10-23 ENCOUNTER — APPOINTMENT (OUTPATIENT)
Dept: GENERAL RADIOLOGY | Facility: CLINIC | Age: 44
DRG: 219 | End: 2024-10-23
Attending: SURGERY
Payer: MEDICARE

## 2024-10-23 ENCOUNTER — ANESTHESIA (OUTPATIENT)
Dept: SURGERY | Facility: CLINIC | Age: 44
DRG: 219 | End: 2024-10-23
Payer: MEDICARE

## 2024-10-23 DIAGNOSIS — I05.0 RHEUMATIC MITRAL STENOSIS: ICD-10-CM

## 2024-10-23 DIAGNOSIS — I05.0 MITRAL VALVE STENOSIS, UNSPECIFIED ETIOLOGY: Primary | ICD-10-CM

## 2024-10-23 DIAGNOSIS — Z95.2 S/P MVR (MITRAL VALVE REPLACEMENT): Primary | ICD-10-CM

## 2024-10-23 DIAGNOSIS — Z95.2 S/P MVR (MITRAL VALVE REPLACEMENT): ICD-10-CM

## 2024-10-23 LAB
ABO/RH(D): NORMAL
ALBUMIN SERPL BCG-MCNC: 3 G/DL (ref 3.5–5.2)
ALBUMIN SERPL BCG-MCNC: 3.2 G/DL (ref 3.5–5.2)
ALLEN'S TEST: ABNORMAL
ALLEN'S TEST: ABNORMAL
ALP SERPL-CCNC: 41 U/L (ref 40–150)
ALP SERPL-CCNC: 47 U/L (ref 40–150)
ALT SERPL W P-5'-P-CCNC: 20 U/L (ref 0–50)
ALT SERPL W P-5'-P-CCNC: 24 U/L (ref 0–50)
ANION GAP SERPL CALCULATED.3IONS-SCNC: 13 MMOL/L (ref 7–15)
ANION GAP SERPL CALCULATED.3IONS-SCNC: 14 MMOL/L (ref 7–15)
ANTIBODY SCREEN: NEGATIVE
APTT PPP: 33 SECONDS (ref 22–38)
APTT PPP: 36 SECONDS (ref 22–38)
AST SERPL W P-5'-P-CCNC: 51 U/L (ref 0–45)
AST SERPL W P-5'-P-CCNC: 55 U/L (ref 0–45)
BASE EXCESS BLDA CALC-SCNC: -1.4 MMOL/L (ref -3–3)
BASE EXCESS BLDA CALC-SCNC: 0.6 MMOL/L (ref -3–3)
BASE EXCESS BLDA CALC-SCNC: 1.1 MMOL/L (ref -3–3)
BASE EXCESS BLDA CALC-SCNC: 1.2 MMOL/L (ref -3–3)
BASE EXCESS BLDA CALC-SCNC: 1.3 MMOL/L (ref -3–3)
BASE EXCESS BLDA CALC-SCNC: 2.3 MMOL/L (ref -3–3)
BASE EXCESS BLDA CALC-SCNC: 3.8 MMOL/L (ref -3–3)
BASE EXCESS BLDA CALC-SCNC: 4.5 MMOL/L (ref -3–3)
BASE EXCESS BLDA CALC-SCNC: 5.2 MMOL/L (ref -3–3)
BASE EXCESS BLDA CALC-SCNC: 5.2 MMOL/L (ref -3–3)
BASE EXCESS BLDV CALC-SCNC: -0.3 MMOL/L (ref -3–3)
BASE EXCESS BLDV CALC-SCNC: -0.7 MMOL/L (ref -3–3)
BASE EXCESS BLDV CALC-SCNC: 1.3 MMOL/L (ref -3–3)
BASE EXCESS BLDV CALC-SCNC: 5.9 MMOL/L (ref -3–3)
BILIRUB SERPL-MCNC: 1.6 MG/DL
BILIRUB SERPL-MCNC: 1.8 MG/DL
BLD PROD TYP BPU: NORMAL
BLOOD COMPONENT TYPE: NORMAL
BUN SERPL-MCNC: 13.4 MG/DL (ref 6–20)
BUN SERPL-MCNC: 14.3 MG/DL (ref 6–20)
CA-I BLD-MCNC: 3.4 MG/DL (ref 4.4–5.2)
CA-I BLD-MCNC: 3.4 MG/DL (ref 4.4–5.2)
CA-I BLD-MCNC: 3.6 MG/DL (ref 4.4–5.2)
CA-I BLD-MCNC: 3.9 MG/DL (ref 4.4–5.2)
CA-I BLD-MCNC: 4.2 MG/DL (ref 4.4–5.2)
CA-I BLD-MCNC: 4.3 MG/DL (ref 4.4–5.2)
CA-I BLD-MCNC: 4.8 MG/DL (ref 4.4–5.2)
CA-I BLD-MCNC: 5.5 MG/DL (ref 4.4–5.2)
CALCIUM SERPL-MCNC: 10.3 MG/DL (ref 8.8–10.4)
CALCIUM SERPL-MCNC: 9.5 MG/DL (ref 8.8–10.4)
CHLORIDE SERPL-SCNC: 107 MMOL/L (ref 98–107)
CHLORIDE SERPL-SCNC: 109 MMOL/L (ref 98–107)
CLOT INIT KAOL IND TO POST HEP NEUT TRTO: 1 {RATIO}
CLOT INIT KAOL IND TO POST HEP NEUT TRTO: 1 {RATIO}
CLOT INIT KAOLIN IND BLD US: 122 SEC (ref 113–166)
CLOT INIT KAOLIN IND BLD US: 146 SEC (ref 113–166)
CLOT INIT KAOLIN IND P HEP NEUT BLD US: 123 SEC (ref 103–153)
CLOT INIT KAOLIN IND P HEP NEUT BLD US: 139 SEC (ref 103–153)
CLOT STIFF PLT CONT BLD CALC: 15 HPA (ref 11.9–29.8)
CLOT STIFF PLT CONT BLD CALC: 5.8 HPA (ref 11.9–29.8)
CLOT STIFF TF IND P HEP NEUT BLD US: 16.5 HPA (ref 13–33.2)
CLOT STIFF TF IND P HEP NEUT BLD US: 6.7 HPA (ref 13–33.2)
CLOT STIFF TF IND+IIB-IIIA INH P HEP NEU: 0.9 HPA (ref 1–3.7)
CLOT STIFF TF IND+IIB-IIIA INH P HEP NEU: 1.5 HPA (ref 1–3.7)
CODING SYSTEM: NORMAL
COHGB MFR BLD: >100 % (ref 96–97)
COHGB MFR BLD: >100 % (ref 96–97)
CREAT SERPL-MCNC: 0.76 MG/DL (ref 0.51–0.95)
CREAT SERPL-MCNC: 0.76 MG/DL (ref 0.51–0.95)
CROSSMATCH: NORMAL
CROSSMATCH: NORMAL
EGFRCR SERPLBLD CKD-EPI 2021: >90 ML/MIN/1.73M2
EGFRCR SERPLBLD CKD-EPI 2021: >90 ML/MIN/1.73M2
ERYTHROCYTE [DISTWIDTH] IN BLOOD BY AUTOMATED COUNT: 14.1 % (ref 10–15)
ERYTHROCYTE [DISTWIDTH] IN BLOOD BY AUTOMATED COUNT: 14.6 % (ref 10–15)
FIBRINOGEN PPP-MCNC: 149 MG/DL (ref 170–510)
GLUCOSE BLD-MCNC: 101 MG/DL (ref 70–99)
GLUCOSE BLD-MCNC: 105 MG/DL (ref 70–99)
GLUCOSE BLD-MCNC: 115 MG/DL (ref 70–99)
GLUCOSE BLD-MCNC: 124 MG/DL (ref 70–99)
GLUCOSE BLD-MCNC: 130 MG/DL (ref 70–99)
GLUCOSE BLD-MCNC: 157 MG/DL (ref 70–99)
GLUCOSE BLD-MCNC: 71 MG/DL (ref 70–99)
GLUCOSE BLD-MCNC: 85 MG/DL (ref 70–99)
GLUCOSE BLD-MCNC: 87 MG/DL (ref 70–99)
GLUCOSE BLDC GLUCOMTR-MCNC: 114 MG/DL (ref 70–99)
GLUCOSE BLDC GLUCOMTR-MCNC: 116 MG/DL (ref 70–99)
GLUCOSE BLDC GLUCOMTR-MCNC: 120 MG/DL (ref 70–99)
GLUCOSE BLDC GLUCOMTR-MCNC: 88 MG/DL (ref 70–99)
GLUCOSE BLDC GLUCOMTR-MCNC: 99 MG/DL (ref 70–99)
GLUCOSE SERPL-MCNC: 118 MG/DL (ref 70–99)
GLUCOSE SERPL-MCNC: 121 MG/DL (ref 70–99)
HCO3 BLD-SCNC: 23 MMOL/L (ref 21–28)
HCO3 BLD-SCNC: 25 MMOL/L (ref 21–28)
HCO3 BLDA-SCNC: 24 MMOL/L (ref 21–28)
HCO3 BLDA-SCNC: 25 MMOL/L (ref 21–28)
HCO3 BLDA-SCNC: 26 MMOL/L (ref 21–28)
HCO3 BLDA-SCNC: 26 MMOL/L (ref 21–28)
HCO3 BLDA-SCNC: 27 MMOL/L (ref 21–28)
HCO3 BLDA-SCNC: 30 MMOL/L (ref 21–28)
HCO3 BLDA-SCNC: 30 MMOL/L (ref 21–28)
HCO3 BLDA-SCNC: 31 MMOL/L (ref 21–28)
HCO3 BLDV-SCNC: 25 MMOL/L (ref 21–28)
HCO3 BLDV-SCNC: 26 MMOL/L (ref 21–28)
HCO3 BLDV-SCNC: 27 MMOL/L (ref 21–28)
HCO3 BLDV-SCNC: 31 MMOL/L (ref 21–28)
HCO3 SERPL-SCNC: 20 MMOL/L (ref 22–29)
HCO3 SERPL-SCNC: 22 MMOL/L (ref 22–29)
HCT VFR BLD AUTO: 33.1 % (ref 35–47)
HCT VFR BLD AUTO: 36.7 % (ref 35–47)
HGB BLD-MCNC: 11.2 G/DL (ref 11.7–15.7)
HGB BLD-MCNC: 11.2 G/DL (ref 11.7–15.7)
HGB BLD-MCNC: 11.5 G/DL (ref 11.7–15.7)
HGB BLD-MCNC: 12.3 G/DL (ref 11.7–15.7)
HGB BLD-MCNC: 12.5 G/DL (ref 11.7–15.7)
HGB BLD-MCNC: 7.5 G/DL (ref 11.7–15.7)
HGB BLD-MCNC: 7.7 G/DL (ref 11.7–15.7)
HGB BLD-MCNC: 7.8 G/DL (ref 11.7–15.7)
HGB BLD-MCNC: 7.8 G/DL (ref 11.7–15.7)
HGB BLD-MCNC: 8.3 G/DL (ref 11.7–15.7)
HGB BLD-MCNC: 8.5 G/DL (ref 11.7–15.7)
INR PPP: 1.33 (ref 0.85–1.15)
INR PPP: 1.87 (ref 0.85–1.15)
ISSUE DATE AND TIME: NORMAL
LACTATE BLD-SCNC: 0.9 MMOL/L (ref 0.7–2)
LACTATE BLD-SCNC: 1.2 MMOL/L (ref 0.7–2)
LACTATE BLD-SCNC: 1.3 MMOL/L (ref 0.7–2)
LACTATE BLD-SCNC: 1.3 MMOL/L (ref 0.7–2)
LACTATE SERPL-SCNC: 1.2 MMOL/L (ref 0.7–2)
LACTATE SERPL-SCNC: 1.4 MMOL/L (ref 0.7–2)
MAGNESIUM SERPL-MCNC: 2.4 MG/DL (ref 1.7–2.3)
MAGNESIUM SERPL-MCNC: 3.1 MG/DL (ref 1.7–2.3)
MCH RBC QN AUTO: 29.4 PG (ref 26.5–33)
MCH RBC QN AUTO: 29.9 PG (ref 26.5–33)
MCHC RBC AUTO-ENTMCNC: 33.5 G/DL (ref 31.5–36.5)
MCHC RBC AUTO-ENTMCNC: 33.8 G/DL (ref 31.5–36.5)
MCV RBC AUTO: 88 FL (ref 78–100)
MCV RBC AUTO: 89 FL (ref 78–100)
O2/TOTAL GAS SETTING VFR VENT: 33 %
O2/TOTAL GAS SETTING VFR VENT: 40 %
O2/TOTAL GAS SETTING VFR VENT: 50 %
O2/TOTAL GAS SETTING VFR VENT: 60 %
O2/TOTAL GAS SETTING VFR VENT: 75 %
O2/TOTAL GAS SETTING VFR VENT: 94 %
OXYHGB MFR BLDA: 98 % (ref 92–100)
OXYHGB MFR BLDA: 98 % (ref 92–100)
OXYHGB MFR BLDV: 70 % (ref 70–75)
OXYHGB MFR BLDV: 72 % (ref 70–75)
OXYHGB MFR BLDV: 73 % (ref 70–75)
PCO2 BLD: 38 MM HG (ref 35–45)
PCO2 BLD: 40 MM HG (ref 35–45)
PCO2 BLDA: 26 MM HG (ref 35–45)
PCO2 BLDA: 33 MM HG (ref 35–45)
PCO2 BLDA: 34 MM HG (ref 35–45)
PCO2 BLDA: 39 MM HG (ref 35–45)
PCO2 BLDA: 44 MM HG (ref 35–45)
PCO2 BLDA: 46 MM HG (ref 35–45)
PCO2 BLDA: 49 MM HG (ref 35–45)
PCO2 BLDA: 49 MM HG (ref 35–45)
PCO2 BLDV: 45 MM HG (ref 40–50)
PCO2 BLDV: 46 MM HG (ref 40–50)
PEEP: 5 CM H2O
PH BLD: 7.39 [PH] (ref 7.35–7.45)
PH BLD: 7.41 [PH] (ref 7.35–7.45)
PH BLDA: 7.35 [PH] (ref 7.35–7.45)
PH BLDA: 7.41 [PH] (ref 7.35–7.45)
PH BLDA: 7.41 [PH] (ref 7.35–7.45)
PH BLDA: 7.43 [PH] (ref 7.35–7.45)
PH BLDA: 7.44 [PH] (ref 7.35–7.45)
PH BLDA: 7.47 [PH] (ref 7.35–7.45)
PH BLDA: 7.52 [PH] (ref 7.35–7.45)
PH BLDA: 7.56 [PH] (ref 7.35–7.45)
PH BLDV: 7.35 [PH] (ref 7.32–7.43)
PH BLDV: 7.35 [PH] (ref 7.32–7.43)
PH BLDV: 7.38 [PH] (ref 7.32–7.43)
PH BLDV: 7.43 [PH] (ref 7.32–7.43)
PHOSPHATE SERPL-MCNC: 3.7 MG/DL (ref 2.5–4.5)
PHOSPHATE SERPL-MCNC: 4.8 MG/DL (ref 2.5–4.5)
PLATELET # BLD AUTO: 186 10E3/UL (ref 150–450)
PLATELET # BLD AUTO: 188 10E3/UL (ref 150–450)
PLATELET # BLD AUTO: 79 10E3/UL (ref 150–450)
PO2 BLD: 160 MM HG (ref 80–105)
PO2 BLD: 169 MM HG (ref 80–105)
PO2 BLDA: 192 MM HG (ref 80–105)
PO2 BLDA: 273 MM HG (ref 80–105)
PO2 BLDA: 295 MM HG (ref 80–105)
PO2 BLDA: 313 MM HG (ref 80–105)
PO2 BLDA: 327 MM HG (ref 80–105)
PO2 BLDA: 354 MM HG (ref 80–105)
PO2 BLDA: 390 MM HG (ref 80–105)
PO2 BLDA: 543 MM HG (ref 80–105)
PO2 BLDV: 38 MM HG (ref 25–47)
PO2 BLDV: 42 MM HG (ref 25–47)
PO2 BLDV: 44 MM HG (ref 25–47)
PO2 BLDV: 44 MM HG (ref 25–47)
POTASSIUM BLD-SCNC: 3.1 MMOL/L (ref 3.4–5.3)
POTASSIUM BLD-SCNC: 3.7 MMOL/L (ref 3.4–5.3)
POTASSIUM BLD-SCNC: 4.3 MMOL/L (ref 3.4–5.3)
POTASSIUM BLD-SCNC: 4.7 MMOL/L (ref 3.4–5.3)
POTASSIUM BLD-SCNC: 4.9 MMOL/L (ref 3.4–5.3)
POTASSIUM BLD-SCNC: 5.2 MMOL/L (ref 3.4–5.3)
POTASSIUM BLD-SCNC: 5.5 MMOL/L (ref 3.4–5.3)
POTASSIUM SERPL-SCNC: 4 MMOL/L (ref 3.4–5.3)
POTASSIUM SERPL-SCNC: 4.1 MMOL/L (ref 3.4–5.3)
PROT SERPL-MCNC: 4.8 G/DL (ref 6.4–8.3)
PROT SERPL-MCNC: 5.1 G/DL (ref 6.4–8.3)
RBC # BLD AUTO: 3.74 10E6/UL (ref 3.8–5.2)
RBC # BLD AUTO: 4.18 10E6/UL (ref 3.8–5.2)
SAO2 % BLDA: 100 % (ref 96–97)
SAO2 % BLDA: 98 % (ref 92–100)
SAO2 % BLDA: 98 % (ref 92–100)
SAO2 % BLDV: 71 % (ref 70–75)
SAO2 % BLDV: 72.5 % (ref 70–75)
SAO2 % BLDV: 74 % (ref 70–75)
SAO2 % BLDV: 74.8 % (ref 70–75)
SODIUM BLD-SCNC: 138 MMOL/L (ref 135–145)
SODIUM BLD-SCNC: 139 MMOL/L (ref 135–145)
SODIUM BLD-SCNC: 139 MMOL/L (ref 135–145)
SODIUM BLD-SCNC: 140 MMOL/L (ref 135–145)
SODIUM BLD-SCNC: 140 MMOL/L (ref 135–145)
SODIUM SERPL-SCNC: 142 MMOL/L (ref 135–145)
SODIUM SERPL-SCNC: 143 MMOL/L (ref 135–145)
SPECIMEN EXPIRATION DATE: NORMAL
UNIT ABO/RH: NORMAL
UNIT NUMBER: NORMAL
UNIT STATUS: NORMAL
UNIT TYPE ISBT: 1700
WBC # BLD AUTO: 18.7 10E3/UL (ref 4–11)
WBC # BLD AUTO: 21.4 10E3/UL (ref 4–11)

## 2024-10-23 PROCEDURE — 272N000088 HC PUMP APP ADULT PERFUSION: Performed by: SURGERY

## 2024-10-23 PROCEDURE — 82330 ASSAY OF CALCIUM: CPT | Performed by: SURGERY

## 2024-10-23 PROCEDURE — 36415 COLL VENOUS BLD VENIPUNCTURE: CPT | Performed by: SURGERY

## 2024-10-23 PROCEDURE — 02RG0JZ REPLACEMENT OF MITRAL VALVE WITH SYNTHETIC SUBSTITUTE, OPEN APPROACH: ICD-10-PCS | Performed by: SURGERY

## 2024-10-23 PROCEDURE — 258N000003 HC RX IP 258 OP 636: Performed by: STUDENT IN AN ORGANIZED HEALTH CARE EDUCATION/TRAINING PROGRAM

## 2024-10-23 PROCEDURE — 85384 FIBRINOGEN ACTIVITY: CPT | Performed by: SURGERY

## 2024-10-23 PROCEDURE — 250N000011 HC RX IP 250 OP 636: Performed by: SURGERY

## 2024-10-23 PROCEDURE — 84132 ASSAY OF SERUM POTASSIUM: CPT

## 2024-10-23 PROCEDURE — 86923 COMPATIBILITY TEST ELECTRIC: CPT | Performed by: SURGERY

## 2024-10-23 PROCEDURE — 80053 COMPREHEN METABOLIC PANEL: CPT

## 2024-10-23 PROCEDURE — 84132 ASSAY OF SERUM POTASSIUM: CPT | Performed by: SURGERY

## 2024-10-23 PROCEDURE — 74018 RADEX ABDOMEN 1 VIEW: CPT | Mod: 26 | Performed by: RADIOLOGY

## 2024-10-23 PROCEDURE — 83735 ASSAY OF MAGNESIUM: CPT | Performed by: SURGERY

## 2024-10-23 PROCEDURE — 272N000085 HC PACK CELL SAVER CSP: Performed by: SURGERY

## 2024-10-23 PROCEDURE — 83605 ASSAY OF LACTIC ACID: CPT | Performed by: SURGERY

## 2024-10-23 PROCEDURE — 250N000009 HC RX 250: Performed by: SURGERY

## 2024-10-23 PROCEDURE — 84295 ASSAY OF SERUM SODIUM: CPT | Performed by: SURGERY

## 2024-10-23 PROCEDURE — 250N000024 HC ISOFLURANE, PER MIN: Performed by: SURGERY

## 2024-10-23 PROCEDURE — 85396 CLOTTING ASSAY WHOLE BLOOD: CPT

## 2024-10-23 PROCEDURE — 93005 ELECTROCARDIOGRAM TRACING: CPT

## 2024-10-23 PROCEDURE — 999N000065 XR CHEST PORT 1 VIEW

## 2024-10-23 PROCEDURE — 250N000009 HC RX 250: Performed by: STUDENT IN AN ORGANIZED HEALTH CARE EDUCATION/TRAINING PROGRAM

## 2024-10-23 PROCEDURE — 410N000003 HC PER-PERFUSION 1ST 30 MIN: Performed by: SURGERY

## 2024-10-23 PROCEDURE — 250N000011 HC RX IP 250 OP 636

## 2024-10-23 PROCEDURE — 85018 HEMOGLOBIN: CPT | Performed by: SURGERY

## 2024-10-23 PROCEDURE — 200N000002 HC R&B ICU UMMC

## 2024-10-23 PROCEDURE — 84100 ASSAY OF PHOSPHORUS: CPT | Performed by: SURGERY

## 2024-10-23 PROCEDURE — 85730 THROMBOPLASTIN TIME PARTIAL: CPT | Performed by: SURGERY

## 2024-10-23 PROCEDURE — 99291 CRITICAL CARE FIRST HOUR: CPT | Mod: GC | Performed by: SURGERY

## 2024-10-23 PROCEDURE — 85610 PROTHROMBIN TIME: CPT | Performed by: SURGERY

## 2024-10-23 PROCEDURE — 250N000011 HC RX IP 250 OP 636: Performed by: STUDENT IN AN ORGANIZED HEALTH CARE EDUCATION/TRAINING PROGRAM

## 2024-10-23 PROCEDURE — 258N000003 HC RX IP 258 OP 636

## 2024-10-23 PROCEDURE — 370N000017 HC ANESTHESIA TECHNICAL FEE, PER MIN: Performed by: SURGERY

## 2024-10-23 PROCEDURE — 82805 BLOOD GASES W/O2 SATURATION: CPT | Performed by: SURGERY

## 2024-10-23 PROCEDURE — 5A1221Z PERFORMANCE OF CARDIAC OUTPUT, CONTINUOUS: ICD-10-PCS | Performed by: SURGERY

## 2024-10-23 PROCEDURE — 272N000002 HC OR SUPPLY OTHER OPNP: Performed by: SURGERY

## 2024-10-23 PROCEDURE — 999N000065 XR ABDOMEN PORT 1 VIEW

## 2024-10-23 PROCEDURE — 999N000253 HC STATISTIC WEANING TRIALS

## 2024-10-23 PROCEDURE — 82805 BLOOD GASES W/O2 SATURATION: CPT | Performed by: NURSE PRACTITIONER

## 2024-10-23 PROCEDURE — 84295 ASSAY OF SERUM SODIUM: CPT

## 2024-10-23 PROCEDURE — 84155 ASSAY OF PROTEIN SERUM: CPT | Performed by: SURGERY

## 2024-10-23 PROCEDURE — 85018 HEMOGLOBIN: CPT

## 2024-10-23 PROCEDURE — 82330 ASSAY OF CALCIUM: CPT

## 2024-10-23 PROCEDURE — C1889 IMPLANT/INSERT DEVICE, NOC: HCPCS | Performed by: SURGERY

## 2024-10-23 PROCEDURE — 83605 ASSAY OF LACTIC ACID: CPT

## 2024-10-23 PROCEDURE — 5A1223Z PERFORMANCE OF CARDIAC PACING, CONTINUOUS: ICD-10-PCS | Performed by: SURGERY

## 2024-10-23 PROCEDURE — 410N000004: Performed by: SURGERY

## 2024-10-23 PROCEDURE — 250N000009 HC RX 250

## 2024-10-23 PROCEDURE — 250N000013 HC RX MED GY IP 250 OP 250 PS 637

## 2024-10-23 PROCEDURE — 999N000157 HC STATISTIC RCP TIME EA 10 MIN

## 2024-10-23 PROCEDURE — 71045 X-RAY EXAM CHEST 1 VIEW: CPT | Mod: 26 | Performed by: RADIOLOGY

## 2024-10-23 PROCEDURE — 82247 BILIRUBIN TOTAL: CPT | Performed by: SURGERY

## 2024-10-23 PROCEDURE — P9037 PLATE PHERES LEUKOREDU IRRAD: HCPCS | Performed by: SURGERY

## 2024-10-23 PROCEDURE — 88304 TISSUE EXAM BY PATHOLOGIST: CPT | Mod: TC | Performed by: SURGERY

## 2024-10-23 PROCEDURE — C1898 LEAD, PMKR, OTHER THAN TRANS: HCPCS | Performed by: SURGERY

## 2024-10-23 PROCEDURE — P9047 ALBUMIN (HUMAN), 25%, 50ML: HCPCS

## 2024-10-23 PROCEDURE — 250N000013 HC RX MED GY IP 250 OP 250 PS 637: Performed by: SURGERY

## 2024-10-23 PROCEDURE — 3E043XZ INTRODUCTION OF VASOPRESSOR INTO CENTRAL VEIN, PERCUTANEOUS APPROACH: ICD-10-PCS | Performed by: SURGERY

## 2024-10-23 PROCEDURE — 82805 BLOOD GASES W/O2 SATURATION: CPT

## 2024-10-23 PROCEDURE — 88305 TISSUE EXAM BY PATHOLOGIST: CPT | Mod: TC | Performed by: SURGERY

## 2024-10-23 PROCEDURE — 88304 TISSUE EXAM BY PATHOLOGIST: CPT | Mod: 26 | Performed by: PATHOLOGY

## 2024-10-23 PROCEDURE — 86900 BLOOD TYPING SEROLOGIC ABO: CPT | Performed by: SURGERY

## 2024-10-23 PROCEDURE — 258N000003 HC RX IP 258 OP 636: Performed by: SURGERY

## 2024-10-23 PROCEDURE — 360N000079 HC SURGERY LEVEL 6, PER MIN: Performed by: SURGERY

## 2024-10-23 PROCEDURE — 88305 TISSUE EXAM BY PATHOLOGIST: CPT | Mod: 26 | Performed by: PATHOLOGY

## 2024-10-23 PROCEDURE — P9016 RBC LEUKOCYTES REDUCED: HCPCS | Performed by: SURGERY

## 2024-10-23 PROCEDURE — 86850 RBC ANTIBODY SCREEN: CPT | Performed by: SURGERY

## 2024-10-23 PROCEDURE — 999N000141 HC STATISTIC PRE-PROCEDURE NURSING ASSESSMENT: Performed by: SURGERY

## 2024-10-23 PROCEDURE — 94002 VENT MGMT INPAT INIT DAY: CPT

## 2024-10-23 PROCEDURE — 272N000001 HC OR GENERAL SUPPLY STERILE: Performed by: SURGERY

## 2024-10-23 DEVICE — IMPLANTABLE DEVICE: Type: IMPLANTABLE DEVICE | Site: HEART | Status: FUNCTIONAL

## 2024-10-23 RX ORDER — CALCIUM GLUCONATE 20 MG/ML
2 INJECTION, SOLUTION INTRAVENOUS EVERY 6 HOURS PRN
Status: DISCONTINUED | OUTPATIENT
Start: 2024-10-23 | End: 2024-10-26

## 2024-10-23 RX ORDER — OXYCODONE HYDROCHLORIDE 10 MG/1
10 TABLET ORAL EVERY 4 HOURS PRN
Status: DISCONTINUED | OUTPATIENT
Start: 2024-10-23 | End: 2024-10-27

## 2024-10-23 RX ORDER — HEPARIN SODIUM 5000 [USP'U]/.5ML
5000 INJECTION, SOLUTION INTRAVENOUS; SUBCUTANEOUS EVERY 8 HOURS
Status: DISCONTINUED | OUTPATIENT
Start: 2024-10-24 | End: 2024-10-24

## 2024-10-23 RX ORDER — CHLORHEXIDINE GLUCONATE ORAL RINSE 1.2 MG/ML
10 SOLUTION DENTAL ONCE
Status: COMPLETED | OUTPATIENT
Start: 2024-10-23 | End: 2024-10-23

## 2024-10-23 RX ORDER — CHLORHEXIDINE GLUCONATE ORAL RINSE 1.2 MG/ML
15 SOLUTION DENTAL EVERY 12 HOURS
Status: DISCONTINUED | OUTPATIENT
Start: 2024-10-23 | End: 2024-10-25

## 2024-10-23 RX ORDER — PROTAMINE SULFATE 10 MG/ML
INJECTION, SOLUTION INTRAVENOUS PRN
Status: DISCONTINUED | OUTPATIENT
Start: 2024-10-23 | End: 2024-10-23

## 2024-10-23 RX ORDER — VANCOMYCIN HYDROCHLORIDE 500 MG/10ML
500 INJECTION, POWDER, LYOPHILIZED, FOR SOLUTION INTRAVENOUS
Status: COMPLETED | OUTPATIENT
Start: 2024-10-23 | End: 2024-10-23

## 2024-10-23 RX ORDER — FIBRINOGEN (HUMAN) 700-1300MG
1150 KIT INTRAVENOUS ONCE
Status: COMPLETED | OUTPATIENT
Start: 2024-10-23 | End: 2024-10-23

## 2024-10-23 RX ORDER — DEXTROSE MONOHYDRATE 25 G/50ML
25-50 INJECTION, SOLUTION INTRAVENOUS
Status: DISCONTINUED | OUTPATIENT
Start: 2024-10-23 | End: 2024-10-28 | Stop reason: HOSPADM

## 2024-10-23 RX ORDER — CEFAZOLIN SODIUM 1 G/3ML
1 INJECTION, POWDER, FOR SOLUTION INTRAMUSCULAR; INTRAVENOUS EVERY 8 HOURS
Status: COMPLETED | OUTPATIENT
Start: 2024-10-23 | End: 2024-10-24

## 2024-10-23 RX ORDER — BISACODYL 10 MG
10 SUPPOSITORY, RECTAL RECTAL DAILY PRN
Status: DISCONTINUED | OUTPATIENT
Start: 2024-10-26 | End: 2024-10-28 | Stop reason: HOSPADM

## 2024-10-23 RX ORDER — NOREPINEPHRINE BITARTRATE 0.06 MG/ML
.01-.15 INJECTION, SOLUTION INTRAVENOUS CONTINUOUS
Status: DISCONTINUED | OUTPATIENT
Start: 2024-10-23 | End: 2024-10-25

## 2024-10-23 RX ORDER — CLINDAMYCIN PHOSPHATE 900 MG/50ML
900 INJECTION, SOLUTION INTRAVENOUS SEE ADMIN INSTRUCTIONS
Status: DISCONTINUED | OUTPATIENT
Start: 2024-10-23 | End: 2024-10-23 | Stop reason: HOSPADM

## 2024-10-23 RX ORDER — ACETAMINOPHEN 325 MG/1
975 TABLET ORAL EVERY 8 HOURS
Status: COMPLETED | OUTPATIENT
Start: 2024-10-23 | End: 2024-10-26

## 2024-10-23 RX ORDER — CALCIUM CHLORIDE 100 MG/ML
INJECTION INTRAVENOUS; INTRAVENTRICULAR PRN
Status: DISCONTINUED | OUTPATIENT
Start: 2024-10-23 | End: 2024-10-23

## 2024-10-23 RX ORDER — CLINDAMYCIN PHOSPHATE 900 MG/50ML
900 INJECTION, SOLUTION INTRAVENOUS
Status: DISCONTINUED | OUTPATIENT
Start: 2024-10-23 | End: 2024-10-23 | Stop reason: HOSPADM

## 2024-10-23 RX ORDER — ACETAMINOPHEN 325 MG/1
650 TABLET ORAL EVERY 4 HOURS PRN
Status: DISCONTINUED | OUTPATIENT
Start: 2024-10-26 | End: 2024-10-28 | Stop reason: HOSPADM

## 2024-10-23 RX ORDER — NALOXONE HYDROCHLORIDE 0.4 MG/ML
0.2 INJECTION, SOLUTION INTRAMUSCULAR; INTRAVENOUS; SUBCUTANEOUS
Status: DISCONTINUED | OUTPATIENT
Start: 2024-10-23 | End: 2024-10-28 | Stop reason: HOSPADM

## 2024-10-23 RX ORDER — GABAPENTIN 100 MG/1
100 CAPSULE ORAL DAILY
Status: DISCONTINUED | OUTPATIENT
Start: 2024-10-23 | End: 2024-10-27

## 2024-10-23 RX ORDER — OXYCODONE HYDROCHLORIDE 5 MG/1
5 TABLET ORAL EVERY 4 HOURS PRN
Status: DISCONTINUED | OUTPATIENT
Start: 2024-10-23 | End: 2024-10-28 | Stop reason: HOSPADM

## 2024-10-23 RX ORDER — LIDOCAINE 40 MG/G
CREAM TOPICAL
Status: DISCONTINUED | OUTPATIENT
Start: 2024-10-23 | End: 2024-10-23 | Stop reason: HOSPADM

## 2024-10-23 RX ORDER — FAMOTIDINE 20 MG/1
20 TABLET, FILM COATED ORAL
Status: COMPLETED | OUTPATIENT
Start: 2024-10-23 | End: 2024-10-23

## 2024-10-23 RX ORDER — HYDRALAZINE HYDROCHLORIDE 20 MG/ML
10 INJECTION INTRAMUSCULAR; INTRAVENOUS EVERY 30 MIN PRN
Status: DISCONTINUED | OUTPATIENT
Start: 2024-10-23 | End: 2024-10-27

## 2024-10-23 RX ORDER — LIDOCAINE HYDROCHLORIDE 20 MG/ML
INJECTION, SOLUTION INFILTRATION; PERINEURAL PRN
Status: DISCONTINUED | OUTPATIENT
Start: 2024-10-23 | End: 2024-10-23

## 2024-10-23 RX ORDER — PANTOPRAZOLE SODIUM 40 MG/1
40 TABLET, DELAYED RELEASE ORAL DAILY
Status: DISCONTINUED | OUTPATIENT
Start: 2024-10-24 | End: 2024-10-28 | Stop reason: HOSPADM

## 2024-10-23 RX ORDER — NALOXONE HYDROCHLORIDE 0.4 MG/ML
0.4 INJECTION, SOLUTION INTRAMUSCULAR; INTRAVENOUS; SUBCUTANEOUS
Status: DISCONTINUED | OUTPATIENT
Start: 2024-10-23 | End: 2024-10-28 | Stop reason: HOSPADM

## 2024-10-23 RX ORDER — HYDROMORPHONE HCL IN WATER/PF 6 MG/30 ML
0.2 PATIENT CONTROLLED ANALGESIA SYRINGE INTRAVENOUS
Status: DISCONTINUED | OUTPATIENT
Start: 2024-10-23 | End: 2024-10-26

## 2024-10-23 RX ORDER — EPINEPHRINE IN 0.9 % SOD CHLOR 5 MG/250ML
.01-.3 PLASTIC BAG, INJECTION (ML) INTRAVENOUS CONTINUOUS
Status: DISCONTINUED | OUTPATIENT
Start: 2024-10-23 | End: 2024-10-23

## 2024-10-23 RX ORDER — HEPARIN SODIUM 1000 [USP'U]/ML
INJECTION, SOLUTION INTRAVENOUS; SUBCUTANEOUS PRN
Status: DISCONTINUED | OUTPATIENT
Start: 2024-10-23 | End: 2024-10-23

## 2024-10-23 RX ORDER — VANCOMYCIN HYDROCHLORIDE 500 MG/10ML
500 INJECTION, POWDER, LYOPHILIZED, FOR SOLUTION INTRAVENOUS EVERY 12 HOURS
Status: COMPLETED | OUTPATIENT
Start: 2024-10-23 | End: 2024-10-24

## 2024-10-23 RX ORDER — LIDOCAINE 40 MG/G
CREAM TOPICAL
Status: DISCONTINUED | OUTPATIENT
Start: 2024-10-23 | End: 2024-10-28 | Stop reason: HOSPADM

## 2024-10-23 RX ORDER — PROPOFOL 10 MG/ML
5-75 INJECTION, EMULSION INTRAVENOUS CONTINUOUS
Status: DISCONTINUED | OUTPATIENT
Start: 2024-10-23 | End: 2024-10-24

## 2024-10-23 RX ORDER — CALCIUM GLUCONATE 20 MG/ML
1 INJECTION, SOLUTION INTRAVENOUS EVERY 6 HOURS PRN
Status: DISCONTINUED | OUTPATIENT
Start: 2024-10-23 | End: 2024-10-26

## 2024-10-23 RX ORDER — PROPOFOL 10 MG/ML
INJECTION, EMULSION INTRAVENOUS PRN
Status: DISCONTINUED | OUTPATIENT
Start: 2024-10-23 | End: 2024-10-23

## 2024-10-23 RX ORDER — METHOCARBAMOL 750 MG/1
750 TABLET, FILM COATED ORAL EVERY 6 HOURS PRN
Status: DISCONTINUED | OUTPATIENT
Start: 2024-10-23 | End: 2024-10-27

## 2024-10-23 RX ORDER — HYDROMORPHONE HCL IN WATER/PF 6 MG/30 ML
0.4 PATIENT CONTROLLED ANALGESIA SYRINGE INTRAVENOUS
Status: DISCONTINUED | OUTPATIENT
Start: 2024-10-23 | End: 2024-10-26

## 2024-10-23 RX ORDER — NICOTINE POLACRILEX 4 MG
15-30 LOZENGE BUCCAL
Status: DISCONTINUED | OUTPATIENT
Start: 2024-10-23 | End: 2024-10-28 | Stop reason: HOSPADM

## 2024-10-23 RX ORDER — AMOXICILLIN 250 MG
1 CAPSULE ORAL 2 TIMES DAILY
Status: DISCONTINUED | OUTPATIENT
Start: 2024-10-23 | End: 2024-10-25

## 2024-10-23 RX ORDER — PHENYLEPHRINE HCL IN 0.9% NACL 50MG/250ML
.1-6 PLASTIC BAG, INJECTION (ML) INTRAVENOUS CONTINUOUS
Status: DISCONTINUED | OUTPATIENT
Start: 2024-10-23 | End: 2024-10-23 | Stop reason: HOSPADM

## 2024-10-23 RX ORDER — EPINEPHRINE IN 0.9 % SOD CHLOR 5 MG/250ML
.01-.3 PLASTIC BAG, INJECTION (ML) INTRAVENOUS CONTINUOUS
Status: DISCONTINUED | OUTPATIENT
Start: 2024-10-23 | End: 2024-10-23 | Stop reason: HOSPADM

## 2024-10-23 RX ORDER — NOREPINEPHRINE BITARTRATE 0.06 MG/ML
.01-.6 INJECTION, SOLUTION INTRAVENOUS CONTINUOUS
Status: DISCONTINUED | OUTPATIENT
Start: 2024-10-23 | End: 2024-10-23 | Stop reason: HOSPADM

## 2024-10-23 RX ORDER — FENTANYL CITRATE 50 UG/ML
INJECTION, SOLUTION INTRAMUSCULAR; INTRAVENOUS PRN
Status: DISCONTINUED | OUTPATIENT
Start: 2024-10-23 | End: 2024-10-23

## 2024-10-23 RX ORDER — DEXMEDETOMIDINE HYDROCHLORIDE 4 UG/ML
.2-.7 INJECTION, SOLUTION INTRAVENOUS CONTINUOUS
Status: DISCONTINUED | OUTPATIENT
Start: 2024-10-23 | End: 2024-10-24

## 2024-10-23 RX ORDER — POLYETHYLENE GLYCOL 3350 17 G/17G
17 POWDER, FOR SOLUTION ORAL DAILY
Status: DISCONTINUED | OUTPATIENT
Start: 2024-10-24 | End: 2024-10-25

## 2024-10-23 RX ORDER — SODIUM CHLORIDE, SODIUM GLUCONATE, SODIUM ACETATE, POTASSIUM CHLORIDE AND MAGNESIUM CHLORIDE 526; 502; 368; 37; 30 MG/100ML; MG/100ML; MG/100ML; MG/100ML; MG/100ML
INJECTION, SOLUTION INTRAVENOUS CONTINUOUS PRN
Status: DISCONTINUED | OUTPATIENT
Start: 2024-10-23 | End: 2024-10-23

## 2024-10-23 RX ORDER — ACETAMINOPHEN 325 MG/1
975 TABLET ORAL ONCE
Status: COMPLETED | OUTPATIENT
Start: 2024-10-23 | End: 2024-10-23

## 2024-10-23 RX ORDER — HEPARIN SOD,PORCINE/0.9 % NACL 30K/1000ML
INTRAVENOUS SOLUTION INTRAVENOUS
Status: DISCONTINUED | OUTPATIENT
Start: 2024-10-23 | End: 2024-10-23 | Stop reason: HOSPADM

## 2024-10-23 RX ORDER — ONDANSETRON 2 MG/ML
4 INJECTION INTRAMUSCULAR; INTRAVENOUS EVERY 6 HOURS PRN
Status: DISCONTINUED | OUTPATIENT
Start: 2024-10-23 | End: 2024-10-28 | Stop reason: HOSPADM

## 2024-10-23 RX ORDER — ASPIRIN 81 MG/1
81 TABLET, CHEWABLE ORAL DAILY
Status: DISCONTINUED | OUTPATIENT
Start: 2024-10-24 | End: 2024-10-28 | Stop reason: HOSPADM

## 2024-10-23 RX ORDER — LEVETIRACETAM 500 MG/1
500 TABLET ORAL 2 TIMES DAILY
Status: DISCONTINUED | OUTPATIENT
Start: 2024-10-23 | End: 2024-10-28 | Stop reason: HOSPADM

## 2024-10-23 RX ORDER — PROCHLORPERAZINE MALEATE 5 MG/1
10 TABLET ORAL EVERY 6 HOURS PRN
Status: DISCONTINUED | OUTPATIENT
Start: 2024-10-23 | End: 2024-10-26

## 2024-10-23 RX ORDER — ONDANSETRON 4 MG/1
4 TABLET, ORALLY DISINTEGRATING ORAL EVERY 6 HOURS PRN
Status: DISCONTINUED | OUTPATIENT
Start: 2024-10-23 | End: 2024-10-28 | Stop reason: HOSPADM

## 2024-10-23 RX ORDER — EPINEPHRINE IN 0.9 % SOD CHLOR 5 MG/250ML
.01-.1 PLASTIC BAG, INJECTION (ML) INTRAVENOUS CONTINUOUS
Status: DISCONTINUED | OUTPATIENT
Start: 2024-10-23 | End: 2024-10-25

## 2024-10-23 RX ORDER — CEFAZOLIN SODIUM/WATER 2 G/20 ML
SYRINGE (ML) INTRAVENOUS PRN
Status: DISCONTINUED | OUTPATIENT
Start: 2024-10-23 | End: 2024-10-23

## 2024-10-23 RX ORDER — GABAPENTIN 100 MG/1
100 CAPSULE ORAL
Status: COMPLETED | OUTPATIENT
Start: 2024-10-23 | End: 2024-10-23

## 2024-10-23 RX ORDER — DEXMEDETOMIDINE HYDROCHLORIDE 4 UG/ML
.1-1.2 INJECTION, SOLUTION INTRAVENOUS CONTINUOUS
Status: DISCONTINUED | OUTPATIENT
Start: 2024-10-23 | End: 2024-10-23 | Stop reason: HOSPADM

## 2024-10-23 RX ORDER — NOREPINEPHRINE BITARTRATE 0.06 MG/ML
.01-.1 INJECTION, SOLUTION INTRAVENOUS CONTINUOUS
Status: DISCONTINUED | OUTPATIENT
Start: 2024-10-23 | End: 2024-10-23 | Stop reason: HOSPADM

## 2024-10-23 RX ORDER — SODIUM CHLORIDE, SODIUM LACTATE, POTASSIUM CHLORIDE, CALCIUM CHLORIDE 600; 310; 30; 20 MG/100ML; MG/100ML; MG/100ML; MG/100ML
INJECTION, SOLUTION INTRAVENOUS CONTINUOUS PRN
Status: DISCONTINUED | OUTPATIENT
Start: 2024-10-23 | End: 2024-10-23

## 2024-10-23 RX ADMIN — AMINOCAPROIC ACID 5 G/HR: 250 INJECTION, SOLUTION INTRAVENOUS at 10:09

## 2024-10-23 RX ADMIN — FAMOTIDINE 20 MG: 20 TABLET ORAL at 07:42

## 2024-10-23 RX ADMIN — NOREPINEPHRINE BITARTRATE 6.4 MCG: 1 INJECTION, SOLUTION, CONCENTRATE INTRAVENOUS at 09:28

## 2024-10-23 RX ADMIN — SODIUM CHLORIDE, POTASSIUM CHLORIDE, SODIUM LACTATE AND CALCIUM CHLORIDE 1000 ML: 600; 310; 30; 20 INJECTION, SOLUTION INTRAVENOUS at 20:22

## 2024-10-23 RX ADMIN — FENTANYL CITRATE 400 MCG: 50 INJECTION INTRAMUSCULAR; INTRAVENOUS at 09:27

## 2024-10-23 RX ADMIN — SENNOSIDES AND DOCUSATE SODIUM 1 TABLET: 8.6; 5 TABLET ORAL at 19:53

## 2024-10-23 RX ADMIN — CALCIUM GLUCONATE 1 G: 20 INJECTION, SOLUTION INTRAVENOUS at 18:50

## 2024-10-23 RX ADMIN — NOREPINEPHRINE BITARTRATE 6.4 MCG: 1 INJECTION, SOLUTION, CONCENTRATE INTRAVENOUS at 10:49

## 2024-10-23 RX ADMIN — ACETAMINOPHEN 975 MG: 325 TABLET, FILM COATED ORAL at 19:53

## 2024-10-23 RX ADMIN — MIDAZOLAM 2 MG: 1 INJECTION INTRAMUSCULAR; INTRAVENOUS at 09:26

## 2024-10-23 RX ADMIN — SODIUM CHLORIDE, POTASSIUM CHLORIDE, SODIUM LACTATE AND CALCIUM CHLORIDE 250 ML: 600; 310; 30; 20 INJECTION, SOLUTION INTRAVENOUS at 18:30

## 2024-10-23 RX ADMIN — HEPARIN SODIUM 13000 UNITS: 1000 INJECTION INTRAVENOUS; SUBCUTANEOUS at 10:28

## 2024-10-23 RX ADMIN — NOREPINEPHRINE BITARTRATE 6.4 MCG: 1 INJECTION, SOLUTION, CONCENTRATE INTRAVENOUS at 09:27

## 2024-10-23 RX ADMIN — Medication 50 MG: at 09:27

## 2024-10-23 RX ADMIN — GABAPENTIN 100 MG: 100 CAPSULE ORAL at 20:22

## 2024-10-23 RX ADMIN — HYDROMORPHONE HYDROCHLORIDE 0.5 MG: 1 INJECTION, SOLUTION INTRAMUSCULAR; INTRAVENOUS; SUBCUTANEOUS at 14:47

## 2024-10-23 RX ADMIN — NOREPINEPHRINE BITARTRATE 0.05 MCG/KG/MIN: 64 SOLUTION INTRAVENOUS at 09:38

## 2024-10-23 RX ADMIN — VANCOMYCIN HYDROCHLORIDE 500 MG: 500 INJECTION, POWDER, LYOPHILIZED, FOR SOLUTION INTRAVENOUS at 22:02

## 2024-10-23 RX ADMIN — PROPOFOL 50 MCG/KG/MIN: 10 INJECTION, EMULSION INTRAVENOUS at 15:50

## 2024-10-23 RX ADMIN — VANCOMYCIN HYDROCHLORIDE 500 MG: 500 INJECTION, POWDER, LYOPHILIZED, FOR SOLUTION INTRAVENOUS at 09:39

## 2024-10-23 RX ADMIN — PROPOFOL 10 MG: 10 INJECTION, EMULSION INTRAVENOUS at 09:27

## 2024-10-23 RX ADMIN — FENTANYL CITRATE 50 MCG: 50 INJECTION INTRAMUSCULAR; INTRAVENOUS at 09:18

## 2024-10-23 RX ADMIN — SODIUM CHLORIDE, POTASSIUM CHLORIDE, SODIUM LACTATE AND CALCIUM CHLORIDE 1000 ML: 600; 310; 30; 20 INJECTION, SOLUTION INTRAVENOUS at 17:30

## 2024-10-23 RX ADMIN — NOREPINEPHRINE BITARTRATE 12.8 MCG: 1 INJECTION, SOLUTION, CONCENTRATE INTRAVENOUS at 10:44

## 2024-10-23 RX ADMIN — EPINEPHRINE 0.03 MCG/KG/MIN: 1 INJECTION INTRAMUSCULAR; INTRAVENOUS; SUBCUTANEOUS at 12:46

## 2024-10-23 RX ADMIN — PROTAMINE SULFATE 75 MG: 10 INJECTION, SOLUTION INTRAVENOUS at 13:01

## 2024-10-23 RX ADMIN — NOREPINEPHRINE BITARTRATE 6.4 MCG: 1 INJECTION, SOLUTION, CONCENTRATE INTRAVENOUS at 09:34

## 2024-10-23 RX ADMIN — NOREPINEPHRINE BITARTRATE 6.4 MCG: 1 INJECTION, SOLUTION, CONCENTRATE INTRAVENOUS at 09:38

## 2024-10-23 RX ADMIN — NOREPINEPHRINE BITARTRATE 6.4 MCG: 1 INJECTION, SOLUTION, CONCENTRATE INTRAVENOUS at 09:30

## 2024-10-23 RX ADMIN — Medication 1 G: at 13:40

## 2024-10-23 RX ADMIN — LEVETIRACETAM 500 MG: 500 TABLET, FILM COATED ORAL at 20:22

## 2024-10-23 RX ADMIN — NOREPINEPHRINE BITARTRATE 0.06 MCG/KG/MIN: 64 SOLUTION INTRAVENOUS at 15:49

## 2024-10-23 RX ADMIN — PROPOFOL 20 MG: 10 INJECTION, EMULSION INTRAVENOUS at 13:43

## 2024-10-23 RX ADMIN — ACETAMINOPHEN 975 MG: 325 TABLET, FILM COATED ORAL at 07:42

## 2024-10-23 RX ADMIN — Medication 1 G: at 09:40

## 2024-10-23 RX ADMIN — Medication 0.06 MCG/KG/MIN: at 15:49

## 2024-10-23 RX ADMIN — FIBRINOGEN (HUMAN) 1150 MG: KIT INTRAVENOUS at 13:31

## 2024-10-23 RX ADMIN — CALCIUM CHLORIDE INJECTION 1 G: 100 INJECTION, SOLUTION INTRAVENOUS at 13:59

## 2024-10-23 RX ADMIN — MIDAZOLAM 2 MG: 1 INJECTION INTRAMUSCULAR; INTRAVENOUS at 09:00

## 2024-10-23 RX ADMIN — SODIUM CHLORIDE, SODIUM GLUCONATE, SODIUM ACETATE, POTASSIUM CHLORIDE AND MAGNESIUM CHLORIDE: 526; 502; 368; 37; 30 INJECTION, SOLUTION INTRAVENOUS at 09:03

## 2024-10-23 RX ADMIN — LIDOCAINE HYDROCHLORIDE 100 MG: 20 INJECTION, SOLUTION INFILTRATION; PERINEURAL at 09:27

## 2024-10-23 RX ADMIN — NOREPINEPHRINE BITARTRATE 12.8 MCG: 1 INJECTION, SOLUTION, CONCENTRATE INTRAVENOUS at 10:08

## 2024-10-23 RX ADMIN — CEFAZOLIN 1 G: 1 INJECTION, POWDER, FOR SOLUTION INTRAMUSCULAR; INTRAVENOUS at 21:14

## 2024-10-23 RX ADMIN — NOREPINEPHRINE BITARTRATE 6.4 MCG: 1 INJECTION, SOLUTION, CONCENTRATE INTRAVENOUS at 14:23

## 2024-10-23 RX ADMIN — SODIUM CHLORIDE, POTASSIUM CHLORIDE, SODIUM LACTATE AND CALCIUM CHLORIDE: 600; 310; 30; 20 INJECTION, SOLUTION INTRAVENOUS at 09:38

## 2024-10-23 RX ADMIN — FENTANYL CITRATE 50 MCG: 50 INJECTION INTRAMUSCULAR; INTRAVENOUS at 09:09

## 2024-10-23 RX ADMIN — SODIUM CHLORIDE, POTASSIUM CHLORIDE, SODIUM LACTATE AND CALCIUM CHLORIDE 250 ML: 600; 310; 30; 20 INJECTION, SOLUTION INTRAVENOUS at 19:01

## 2024-10-23 RX ADMIN — Medication 100 MG: at 14:06

## 2024-10-23 RX ADMIN — CHLORHEXIDINE GLUCONATE 0.12% ORAL RINSE 15 ML: 1.2 LIQUID ORAL at 19:53

## 2024-10-23 RX ADMIN — GABAPENTIN 100 MG: 100 CAPSULE ORAL at 07:42

## 2024-10-23 RX ADMIN — CHLORHEXIDINE GLUCONATE 0.12% ORAL RINSE 10 ML: 1.2 LIQUID ORAL at 08:55

## 2024-10-23 RX ADMIN — NOREPINEPHRINE BITARTRATE 6.4 MCG: 1 INJECTION, SOLUTION, CONCENTRATE INTRAVENOUS at 09:37

## 2024-10-23 RX ADMIN — PROPOFOL 40 MCG/KG/MIN: 10 INJECTION, EMULSION INTRAVENOUS at 13:41

## 2024-10-23 ASSESSMENT — ACTIVITIES OF DAILY LIVING (ADL)
ADLS_ACUITY_SCORE: 0

## 2024-10-23 NOTE — ANESTHESIA PROCEDURE NOTES
Perioperative SVEN Procedure Note    Staff -        Anesthesiologist:  Behrens, Christopher J, MD       Resident/Fellow: Helen Amaya MD       Performed By: fellow  Preanesthesia Checklist:  Patient identified, IV assessed, risks and benefits discussed, monitors and equipment assessed, procedure being performed at surgeon's request and anesthesia consent obtained.    SVEN Probe Insertion    Probe Status PRE Insertion: NO obvious damage  Probe type:  Adult 3D  Bite block used:   Oral Airway  Insertion Technique: Jaw Lift  Insertion complications: None obvious  Billing Report:SVEN report by Anesthesiologist (See Separate Report note)  Probe Status POST Removal: NO obvious damage    SVEN Report  General Procedure Information  Images for this study have been archived.  Modalities: 2D, 3D, CW Doppler, Color flow mapping and PW Doppler  Diagnostic indications for SVEN:   Rheumatic mitral stenosis  Echocardiographic and Doppler Measurements  Right Ventricle:  Cavity size normal.    Hypertrophy not present.   Thrombus not present.    Global function normal.     Left Ventricle:  Cavity size normal.    Hypertrophy present.   Thrombus not present.   Global Function normal.   Ejection Fraction 60%.      Ventricular Regional Function:  1- Basal Anteroseptal:  normal  2- Basal Anterior:  normal  3- Basal Anterolateral:  normal  4- Basal Inferolateral:  normal  5- Basal Inferior:  normal  6- Basal Inferoseptal:  normal  7- Mid Anteroseptal:  normal  8- Mid Anterior:  normal  9- Mid Anterolateral:  normal  10- Mid Inferolateral:  normal  11- Mid Inferior:  normal  12- Mid Inferoseptal:  normal  13- Apical Anterior:  normal  14- Apical Lateral:  normal  15- Apical Inferior:  normal  16- Apical Septal:  normal  17- Preston:  normal    Valves  Aortic Valve: Annulus normal.  Stenosis not present.  Regurgitation absent.  Leaflets normal.  Leaflet motions normal.    Mitral Valve: Annulus mechanical.  Stenosis not present.  Regurgitation  absent.  Leaflets normal.  Leaflet motions normal.    Tricuspid Valve: Annulus normal.  Stenosis not present.  Regurgitation +1.  Leaflets normal.  Leaflet motions normal.    Pulmonic Valve: Annulus normal.  Stenosis not present.  Regurgitation +1.      Aorta: Ascending Aorta: Size normal.  Dissection not present.  Plaque thickness less than 3 mm.  Mobile plaque not present.      Right Atrium:  Size dilated.   Spontaneous echo contrast not present.   Thrombus not present.   Tumor not present.   Device not present.     Left Atrium: Size dilated.  Spontaneous echo contrast not present.  Thrombus not present.  Tumor not present.  Device not present.       Atrial Septum: Intra-atrial septal morphology normal.       Ventricular Septum: Intra-ventricular septum morphology normal.        Other Findings:   Pericardium:  normal. Pleural Effusion:  none.   Cornoary sinus catheter present.    Post Intervention Findings  Procedure(s) performed:  Mitral Valve Repair/Replace. Global function:  Improved. Regional wall motion: Improved. Surgeon(s) notified of all postintervention findings: Yes. Mitral Valve: Valve replaced with mechanical valve.  No paravalvular leak.                Echocardiogram Comments  Echocardiogram comments:   Acoustic windows overall poor for this study.    PRE-PROCEDURE:  Aortic valve: Valve is trileaflet with normal leaflet morphology and motion. No AI/AS. Aortic arch and descending not well visualized.  Mitral valve: Leaflet tips are very thickened with restricted leaflet motion. No significant calcifications seen. There is diastolic bowing of the leaflets. Severe MS. Mild-moderate MR.  Pulmonic valve: Leaflets not well visualized. Trace PI.  Tricuspid valve: Leaflets appear normal in morphology with normal motion. Trace TR.  Biventricular systolic function is preserved; estimated visual LVEF 55-60%. No regional wall motion abnormalities. No evidence of PFO. No ascending aortic dissection. All findings  communicated with surgical team.      POST-PROCEDURE:  Mitral valve: S/p interval placement of mechanical mitral valve. Valve opens and closes appropriately. Appears well seated with no paravalvular leaks. Washing jets visualized. Mean gradient 6 mmHg.  Aortic / pulmonic / tricuspid valves unchanged from pre-procedure.  Biventricular systolic function is preserved and unchanged. No new regional wall motion abnormalities. No ascending aortic dissection. All findings communicated with surgical team.  .

## 2024-10-23 NOTE — ANESTHESIA PROCEDURE NOTES
Airway       Patient location during procedure: OR       Procedure Start/Stop Times: 10/23/2024 9:28 AM and 10/23/2024 9:30 AM  Staff -        Anesthesiologist:  Behrens, Christopher J, MD       Resident/Fellow: Helen Amaya MD       Performed By: residentIndications and Patient Condition       Indications for airway management: luther-procedural       Induction type:intravenous       Mask difficulty assessment: 1 - vent by mask    Final Airway Details       Final airway type: endotracheal airway       Successful airway: ETT - single and Oral  Endotracheal Airway Details        ETT size (mm): 7.0       Cuffed: yes       Cuff volume (mL): 8       Successful intubation technique: video laryngoscopy       VL Blade Size: MAC 3       Grade View of Cords: 1       Adjucts: stylet       Position: Right       Measured from: lips       Secured at (cm): 20       Bite block used: None    Post intubation assessment        Number of attempts at approach: 1       Number of other approaches attempted: 0       Secured with: pink tape       Ease of procedure: easy       Dentition: Intact and Unchanged    Medication(s) Administered   Medication Administration Time: 10/23/2024 9:28 AM

## 2024-10-23 NOTE — ANESTHESIA CARE TRANSFER NOTE
Patient: Efren Saavedra    Procedure: Procedure(s):  Median Sternotomy, Cardiopulmonary Bypass, Mitral Valve Replacement with St Durga Mechanical Valve size 23mm, Interoperative Transesophageal Echocardiogram per Anesthesia       Diagnosis: Mitral regurgitation [I34.0]  Diagnosis Additional Information: No value filed.    Anesthesia Type:   General     Note:    Oropharynx: endotracheal tube in place and oral gastic tube in place  Level of Consciousness: iatrogenic sedation    Level of Supplemental Oxygen (L/min / FiO2): 8  Independent Airway: airway patency not satisfactory and stable  Dentition: dentition unchanged  Vital Signs Stable: post-procedure vital signs reviewed and stable  Report to RN Given: handoff report given  Patient transferred to: ICU    ICU Handoff: Call for PAUSE to initiate/utilize ICU HANDOFF, Identified Patient, Identified Responsible Provider, Reviewed the Pertinent Medical History, Discussed Surgical Course, Reviewed Intra-OP Anesthesia Management and Issues during Anesthesia, Set Expectations for Post Procedure Period and Allowed Opportunity for Questions and Acknowledgement of Understanding      Vitals:  Vitals Value Taken Time   /60 10/23/24 1520   Temp 36.5 10/23/24 1520   Pulse 79 10/23/24 1520   Resp 16 10/23/24 1520   SpO2 100 % 10/23/24 1520   Vitals shown include unfiled device data.    Electronically Signed By: Helen Amaya MD  October 23, 2024  3:33 PM

## 2024-10-23 NOTE — ANESTHESIA PROCEDURE NOTES
Arterial Line Procedure Note    Pre-Procedure   Staff -        Anesthesiologist:  Behrens, Christopher J, MD       Resident/Fellow: eLe Ann Cross MD       Performed By: resident       Location: OR       Pre-Anesthestic Checklist: patient identified, IV checked, risks and benefits discussed, informed consent, monitors and equipment checked, pre-op evaluation and at physician/surgeon's request  Timeout:       Correct Patient: Yes        Correct Procedure: Yes        Correct Site: Yes        Correct Position: Yes   Line Placement:   This line was placed Pre Induction starting at 10/23/2024 9:20 AM and ending at 10/23/2024 9:25 AM  Procedure   Procedure: arterial line       Laterality: left       Insertion Site: radial.  Sterile Prep        Standard elements of sterile barrier followed       Skin prep: Chloraprep  Insertion/Injection        Technique: ultrasound guided        1. Ultrasound was used to evaluate the access site.       2. Artery evaluated via ultrasound for patency/adequacy.       3. Using real-time ultrasound the needle/catheter was observed entering the artery/vein.       Catheter Type/Size: 20 G, 12 cm  Narrative         Secured by: suture       Tegaderm dressing used.       Complications: None apparent,        Arterial waveform: Yes        IBP within 10% of NIBP: Yes

## 2024-10-23 NOTE — BRIEF OP NOTE
[facility]    Brief Operative Note    Pre-operative diagnosis: Mitral regurgitation [I34.0]  Post-operative diagnosis Same as pre-operative diagnosis    Procedure: Median sternotomy, on cardiopulmonary bypass, mitral valve replacement with St Durga Mechanical heart valve  23mm, interoperative transesophageal echocardiogram per Anesthesia, N/A - Chest    Surgeon: Surgeons and Role:     * Deangelo Craig MD - Primary     * Quinn Ledesma PA-C - Assisting     * Senthil Baca MD - Assisting     * Kush Boswell MD - Fellow - Assisting  Anesthesia: General   Estimated Blood Loss: 500 ml    Drains: 2 med  Specimens:   ID Type Source Tests Collected by Time Destination   1 : Left atrial appendage clot Tissue Other SURGICAL PATHOLOGY EXAM Deangelo Craig MD 10/23/2024 11:16 AM    2 : mitral valve leaflets Tissue Heart Valve, Mitral (Bicuspid) SURGICAL PATHOLOGY EXAM Deangelo Craig MD 10/23/2024 12:12 PM      Findings:   Large amount of clot in BORIS removed. Significant calcification at A1/P1 annular area requiring debridement .  Complications: None.  Implants: * No implants in log *          Kush Boswell MD  Cardiothoracic Surgery Fellow  Pager: [phone]

## 2024-10-23 NOTE — ANESTHESIA PROCEDURE NOTES
Central Line/PA Catheter Placement    Pre-Procedure   Staff -        Anesthesiologist:  Behrens, Christopher J, MD       Resident/Fellow: Helen Amaya MD       Performed By: fellow       Location: OR       Pre-Anesthestic Checklist: patient identified, IV checked, site marked, risks and benefits discussed, informed consent, monitors and equipment checked, pre-op evaluation and at physician/surgeon's request  Timeout:       Correct Patient: Yes        Correct Procedure: Yes        Correct Site: Yes        Correct Position: Yes        Correct Laterality: Yes   Line Placement:   This line was placed Post Induction    Procedure   Procedure: central line       Laterality: right       Insertion Site: internal jugular.  Sterile Prep        All elements of maximal sterile barrier technique followed       Patient Prep/Sterile Barriers: draped, hand hygiene, gloves , hat , mask , draped, gown, sterile gel and probe cover       Skin prep: Chloraprep  Insertion/Injection        Technique: ultrasound guided and Seldinger Technique        1. Ultrasound was used to evaluate the access site.       2. Vein evaluated via ultrasound for patency/adequacy.       3. Using real-time ultrasound the needle/catheter was observed entering the artery/vein.       4. Permanent image was captured and entered into the patient's record.       5. The visualized structures were anatomically normal.       6. There were no apparent abnormal pathologic findings.       Introducer Type: 9 Fr, 2-lumen MAC        Type: PA/CVC with Introducer       Catheter Size: 9 Fr       Catheter Length: 20       Number of Lumens: double lumen       PA Catheter Type: CCO         Appropriate RV, RA and PA waveforms noted:  Yes            Withdrawn and Locked at cm: 45            Balloon down at end of the procedure:   Narrative         Secured by: suture       Tegaderm and Biopatch dressing used.       Complications: None apparent,        blood aspirated from all  lumens,        All lumens flushed: Yes       Verification method: SVEN       Tip termination: right atrium

## 2024-10-24 ENCOUNTER — APPOINTMENT (OUTPATIENT)
Dept: GENERAL RADIOLOGY | Facility: CLINIC | Age: 44
DRG: 219 | End: 2024-10-24
Attending: SURGERY
Payer: MEDICARE

## 2024-10-24 ENCOUNTER — APPOINTMENT (OUTPATIENT)
Dept: OCCUPATIONAL THERAPY | Facility: CLINIC | Age: 44
DRG: 219 | End: 2024-10-24
Attending: SURGERY
Payer: MEDICARE

## 2024-10-24 LAB
ALBUMIN SERPL BCG-MCNC: 3.1 G/DL (ref 3.5–5.2)
ALLEN'S TEST: ABNORMAL
ALLEN'S TEST: ABNORMAL
ALP SERPL-CCNC: 41 U/L (ref 40–150)
ALT SERPL W P-5'-P-CCNC: 19 U/L (ref 0–50)
ANION GAP SERPL CALCULATED.3IONS-SCNC: 14 MMOL/L (ref 7–15)
AST SERPL W P-5'-P-CCNC: 51 U/L (ref 0–45)
ATRIAL RATE - MUSE: 76 BPM
BASE EXCESS BLDA CALC-SCNC: -0.9 MMOL/L (ref -3–3)
BASE EXCESS BLDA CALC-SCNC: 0.9 MMOL/L (ref -3–3)
BASE EXCESS BLDV CALC-SCNC: -0.6 MMOL/L (ref -3–3)
BASE EXCESS BLDV CALC-SCNC: 0.4 MMOL/L (ref -3–3)
BILIRUB SERPL-MCNC: 1.1 MG/DL
BUN SERPL-MCNC: 12.1 MG/DL (ref 6–20)
CA-I BLD-MCNC: 4.8 MG/DL (ref 4.4–5.2)
CALCIUM SERPL-MCNC: 9 MG/DL (ref 8.8–10.4)
CHLORIDE SERPL-SCNC: 109 MMOL/L (ref 98–107)
COHGB MFR BLD: >100 % (ref 96–97)
COHGB MFR BLD: >100 % (ref 96–97)
CREAT SERPL-MCNC: 0.7 MG/DL (ref 0.51–0.95)
DIASTOLIC BLOOD PRESSURE - MUSE: NORMAL MMHG
EGFRCR SERPLBLD CKD-EPI 2021: >90 ML/MIN/1.73M2
ERYTHROCYTE [DISTWIDTH] IN BLOOD BY AUTOMATED COUNT: 14.9 % (ref 10–15)
GLUCOSE BLDC GLUCOMTR-MCNC: 115 MG/DL (ref 70–99)
GLUCOSE SERPL-MCNC: 130 MG/DL (ref 70–99)
HCO3 BLD-SCNC: 24 MMOL/L (ref 21–28)
HCO3 BLD-SCNC: 25 MMOL/L (ref 21–28)
HCO3 BLDV-SCNC: 25 MMOL/L (ref 21–28)
HCO3 BLDV-SCNC: 25 MMOL/L (ref 21–28)
HCO3 SERPL-SCNC: 21 MMOL/L (ref 22–29)
HCT VFR BLD AUTO: 30.5 % (ref 35–47)
HGB BLD-MCNC: 10.3 G/DL (ref 11.7–15.7)
INR PPP: 1.25 (ref 0.85–1.15)
INTERPRETATION ECG - MUSE: NORMAL
MAGNESIUM SERPL-MCNC: 2.1 MG/DL (ref 1.7–2.3)
MCH RBC QN AUTO: 29.8 PG (ref 26.5–33)
MCHC RBC AUTO-ENTMCNC: 33.8 G/DL (ref 31.5–36.5)
MCV RBC AUTO: 88 FL (ref 78–100)
O2/TOTAL GAS SETTING VFR VENT: 40 %
OXYHGB MFR BLDV: 74 % (ref 70–75)
OXYHGB MFR BLDV: 74 % (ref 70–75)
P AXIS - MUSE: -19 DEGREES
PCO2 BLD: 37 MM HG (ref 35–45)
PCO2 BLD: 38 MM HG (ref 35–45)
PCO2 BLDV: 41 MM HG (ref 40–50)
PCO2 BLDV: 43 MM HG (ref 40–50)
PH BLD: 7.41 [PH] (ref 7.35–7.45)
PH BLD: 7.43 [PH] (ref 7.35–7.45)
PH BLDV: 7.37 [PH] (ref 7.32–7.43)
PH BLDV: 7.4 [PH] (ref 7.32–7.43)
PHOSPHATE SERPL-MCNC: 5.2 MG/DL (ref 2.5–4.5)
PLATELET # BLD AUTO: 160 10E3/UL (ref 150–450)
PO2 BLD: 156 MM HG (ref 80–105)
PO2 BLD: 161 MM HG (ref 80–105)
PO2 BLDV: 44 MM HG (ref 25–47)
PO2 BLDV: 44 MM HG (ref 25–47)
POTASSIUM SERPL-SCNC: 4.1 MMOL/L (ref 3.4–5.3)
PR INTERVAL - MUSE: 156 MS
PROT SERPL-MCNC: 4.9 G/DL (ref 6.4–8.3)
QRS DURATION - MUSE: 64 MS
QT - MUSE: 378 MS
QTC - MUSE: 425 MS
R AXIS - MUSE: 144 DEGREES
RBC # BLD AUTO: 3.46 10E6/UL (ref 3.8–5.2)
SAO2 % BLDA: 99 % (ref 92–100)
SAO2 % BLDA: 99 % (ref 92–100)
SAO2 % BLDV: 75.6 % (ref 70–75)
SAO2 % BLDV: 76.2 % (ref 70–75)
SODIUM SERPL-SCNC: 144 MMOL/L (ref 135–145)
SYSTOLIC BLOOD PRESSURE - MUSE: NORMAL MMHG
T AXIS - MUSE: 72 DEGREES
VENTRICULAR RATE- MUSE: 76 BPM
WBC # BLD AUTO: 16 10E3/UL (ref 4–11)

## 2024-10-24 PROCEDURE — 80053 COMPREHEN METABOLIC PANEL: CPT

## 2024-10-24 PROCEDURE — 93005 ELECTROCARDIOGRAM TRACING: CPT

## 2024-10-24 PROCEDURE — 85610 PROTHROMBIN TIME: CPT | Performed by: STUDENT IN AN ORGANIZED HEALTH CARE EDUCATION/TRAINING PROGRAM

## 2024-10-24 PROCEDURE — 999N000157 HC STATISTIC RCP TIME EA 10 MIN

## 2024-10-24 PROCEDURE — 120N000002 HC R&B MED SURG/OB UMMC

## 2024-10-24 PROCEDURE — 250N000013 HC RX MED GY IP 250 OP 250 PS 637: Performed by: SURGERY

## 2024-10-24 PROCEDURE — 84100 ASSAY OF PHOSPHORUS: CPT

## 2024-10-24 PROCEDURE — 250N000013 HC RX MED GY IP 250 OP 250 PS 637

## 2024-10-24 PROCEDURE — 82805 BLOOD GASES W/O2 SATURATION: CPT | Performed by: NURSE PRACTITIONER

## 2024-10-24 PROCEDURE — 82330 ASSAY OF CALCIUM: CPT

## 2024-10-24 PROCEDURE — 83735 ASSAY OF MAGNESIUM: CPT

## 2024-10-24 PROCEDURE — 82805 BLOOD GASES W/O2 SATURATION: CPT | Performed by: SURGERY

## 2024-10-24 PROCEDURE — 94003 VENT MGMT INPAT SUBQ DAY: CPT

## 2024-10-24 PROCEDURE — 97530 THERAPEUTIC ACTIVITIES: CPT | Mod: GO | Performed by: OCCUPATIONAL THERAPIST

## 2024-10-24 PROCEDURE — 999N000253 HC STATISTIC WEANING TRIALS

## 2024-10-24 PROCEDURE — 85027 COMPLETE CBC AUTOMATED: CPT

## 2024-10-24 PROCEDURE — 999N000128 HC STATISTIC PERIPHERAL IV START W/O US GUIDANCE

## 2024-10-24 PROCEDURE — 94799 UNLISTED PULMONARY SVC/PX: CPT

## 2024-10-24 PROCEDURE — 250N000011 HC RX IP 250 OP 636: Performed by: SURGERY

## 2024-10-24 PROCEDURE — 99231 SBSQ HOSP IP/OBS SF/LOW 25: CPT | Mod: 24 | Performed by: SURGERY

## 2024-10-24 PROCEDURE — 71045 X-RAY EXAM CHEST 1 VIEW: CPT

## 2024-10-24 PROCEDURE — 97165 OT EVAL LOW COMPLEX 30 MIN: CPT | Mod: GO | Performed by: OCCUPATIONAL THERAPIST

## 2024-10-24 PROCEDURE — 999N000259 HC STATISTIC EXTUBATION

## 2024-10-24 PROCEDURE — 71045 X-RAY EXAM CHEST 1 VIEW: CPT | Mod: 26 | Performed by: STUDENT IN AN ORGANIZED HEALTH CARE EDUCATION/TRAINING PROGRAM

## 2024-10-24 PROCEDURE — 97535 SELF CARE MNGMENT TRAINING: CPT | Mod: GO | Performed by: OCCUPATIONAL THERAPIST

## 2024-10-24 RX ADMIN — CEFAZOLIN 1 G: 1 INJECTION, POWDER, FOR SOLUTION INTRAMUSCULAR; INTRAVENOUS at 13:44

## 2024-10-24 RX ADMIN — LEVETIRACETAM 500 MG: 500 TABLET, FILM COATED ORAL at 08:01

## 2024-10-24 RX ADMIN — GABAPENTIN 100 MG: 100 CAPSULE ORAL at 08:02

## 2024-10-24 RX ADMIN — CHLORHEXIDINE GLUCONATE 0.12% ORAL RINSE 15 ML: 1.2 LIQUID ORAL at 19:54

## 2024-10-24 RX ADMIN — METHOCARBAMOL TABLETS 750 MG: 750 TABLET, COATED ORAL at 08:02

## 2024-10-24 RX ADMIN — POLYETHYLENE GLYCOL 3350 17 G: 17 POWDER, FOR SOLUTION ORAL at 08:01

## 2024-10-24 RX ADMIN — ACETAMINOPHEN 975 MG: 325 TABLET, FILM COATED ORAL at 12:00

## 2024-10-24 RX ADMIN — LEVETIRACETAM 500 MG: 500 TABLET, FILM COATED ORAL at 19:53

## 2024-10-24 RX ADMIN — SENNOSIDES AND DOCUSATE SODIUM 1 TABLET: 8.6; 5 TABLET ORAL at 19:53

## 2024-10-24 RX ADMIN — Medication 40 MG: at 08:01

## 2024-10-24 RX ADMIN — CHLORHEXIDINE GLUCONATE 0.12% ORAL RINSE 15 ML: 1.2 LIQUID ORAL at 08:01

## 2024-10-24 RX ADMIN — ACETAMINOPHEN 975 MG: 325 TABLET, FILM COATED ORAL at 19:53

## 2024-10-24 RX ADMIN — WARFARIN SODIUM 1.5 MG: 3 TABLET ORAL at 18:06

## 2024-10-24 RX ADMIN — METHOCARBAMOL TABLETS 750 MG: 750 TABLET, COATED ORAL at 15:17

## 2024-10-24 RX ADMIN — CEFAZOLIN 1 G: 1 INJECTION, POWDER, FOR SOLUTION INTRAMUSCULAR; INTRAVENOUS at 04:42

## 2024-10-24 RX ADMIN — VANCOMYCIN HYDROCHLORIDE 500 MG: 500 INJECTION, POWDER, LYOPHILIZED, FOR SOLUTION INTRAVENOUS at 09:22

## 2024-10-24 RX ADMIN — ACETAMINOPHEN 975 MG: 325 TABLET, FILM COATED ORAL at 04:11

## 2024-10-24 RX ADMIN — ASPIRIN 81 MG CHEWABLE TABLET 81 MG: 81 TABLET CHEWABLE at 08:02

## 2024-10-24 RX ADMIN — SENNOSIDES AND DOCUSATE SODIUM 1 TABLET: 8.6; 5 TABLET ORAL at 08:02

## 2024-10-24 ASSESSMENT — ACTIVITIES OF DAILY LIVING (ADL)
PREVIOUS_RESPONSIBILITIES: LAUNDRY;HOUSEKEEPING
ADLS_ACUITY_SCORE: 0

## 2024-10-24 NOTE — PROGRESS NOTES
"CLINICAL NUTRITION SERVICES - BRIEF NOTE    Received provider consult for nutrition education with comments post op cardiovascular surgery (automatic consult on post-op order set). S/p mitral valve replacement on 10/23. Nutrition education not indicated.    RD will follow per LOS protocol or if re-consulted.     Deysi Sanford, MS, RD, LD  Available on [facility] - \"4A Clinical Dietitian\"  Weekend/Holiday RD - \"Weekend Clinical Dietitian\"  "

## 2024-10-24 NOTE — PROGRESS NOTES
10/24/24 1120   Appointment Info   Signing Clinician's Name / Credentials (OT) Tracie Boyd   Rehab Comments (OT) sternal   Living Environment   People in Home parent(s);sibling(s)  (brother)   Current Living Arrangements house   Home Accessibility stairs within home;stairs to enter home   Number of Stairs, Main Entrance 1   Stair Railings, Main Entrance railings safe and in good condition   Transportation Anticipated family or friend will provide;public transportation   Living Environment Comments Per mom has 1 SARA and can stay on main level.  Only goes downstairs to trade out stuffed animals occasionally.   Self-Care   Usual Activity Tolerance good   Current Activity Tolerance fair   Regular Exercise Yes   Activity/Exercise Type walking   Exercise Amount/Frequency 2 times/wk   Equipment Currently Used at Home none   Fall history within last six months no   Activity/Exercise/Self-Care Comment No AE, walks arm and arm with someone when in the community and has had balance decline recently   Instrumental Activities of Daily Living (IADL)   Previous Responsibilities laundry;housekeeping  (folds towels, puts dishes away,)   IADL Comments volunteeers 2 days a week for 3 hours a day   General Information   Onset of Illness/Injury or Date of Surgery 10/23/24   Referring Physician Dr Craig   Patient/Family Therapy Goal Statement (OT) dc back to home w mom   Additional Occupational Profile Info/Pertinent History of Current Problem 44 year old female with PMH of rheumatic mitral stenosis, T2MI, seizure disorder on keppra (since 2022), agenesis of the corpus collosum. Initially admitted to [facility] on 10/15 for new-onset decompensated HF with cardiomegaly, now admitted to CVICU s/p mitral valve replacement by Dr. Craig on 10/23/24.   Performance Patterns (Routines, Roles, Habits) mom is caregiver, volunteers at 2 different place 2x a week for a few hours.  Lives with her brother.  Is not very active at home and has support  with ambulation volunteering   Existing Precautions/Restrictions cardiac;fall;NPO;oxygen therapy device and L/min;sternal   Limitations/Impairments safety/cognitive   General Observations and Info activity up w A   Cognitive Status Examination   Orientation Status other (see comments)   Cognitive Status Comments hsa delay at baseline, did not assess today   Visual Perception   Visual Impairment/Limitations WFL   Impact of Vision Impairment on Function (Vision) intact   Sensory   Sensory Comments YUNG   Pain Assessment   Patient Currently in Pain Yes, see Vital Sign flowsheet   Posture   Posture Comments per clincal judgement anticipate below basleine and need for assist with precautions and pain   Range of Motion Comprehensive   Comment, General Range of Motion WFL limited by pain   Strength Comprehensive (MMT)   Comment, General Manual Muscle Testing (MMT) Assessment NT due to precautions   Coordination   Coordination Comments NT   Bed Mobility   Comment (Bed Mobility) per clinical judgement pt will be below baseline and need assist due to precautions and pain   Transfers   Transfer Comments per clinical judgement pt will be below baseline and need assist due to precautions and pain   Balance   Balance Comments per clinical judgement pt will be below baseline and need assist due to precautions and pain   Activities of Daily Living   Comment, BADL Assessment/Training per clinical judgement pt will be below baseline and need assist due to precautions and pain   Comment, IADL Assessment/Training per clinical judgement pt will be below baseline and need assist due to precautions and pain   Additional Documentation Comment, BADL Assessment/Training (Row);Comment, IADL Assessment/Training (Row)   Clinical Impression   Criteria for Skilled Therapeutic Interventions Met (OT) Yes, treatment indicated   OT Diagnosis decreased IND w ADL and IADL, new precautions   OT Problem List-Impairments impacting ADL problems related  to;activity tolerance impaired;balance;mobility;strength;pain;post-surgical precautions;postural control   Assessment of Occupational Performance 5 or more Performance Deficits   Identified Performance Deficits all ADL and IADL are impacted   Planned Therapy Interventions (OT) ADL retraining;IADL retraining;balance training;bed mobility training;risk factor education;progressive activity/exercise;home program guidelines;transfer training   Clinical Decision Making Complexity (OT) problem focused assessment/low complexity   Risk & Benefits of therapy have been explained patient;participants included;participants voiced agreement with care plan;current/potential barriers reviewed;risks/benefits reviewed;care plan/treatment goals reviewed;evaluation/treatment results reviewed;mother   OT Total Evaluation Time   OT Eval, Low Complexity Minutes (11316) 4   OT Goals   Therapy Frequency (OT) Daily   Interventions   Interventions Quick Adds Therapeutic Activity;Therapeutic Procedures/Exercise;Cardiac Rehab;Self-Care/Home Management

## 2024-10-24 NOTE — PROGRESS NOTES
CV ICU PROGRESS NOTE        ASSESSMENT: Efren Saavedra is a 44 year old female with PMH of rheumatic mitral stenosis, T2MI, seizure disorder on keppra (since 2022), agenesis of the corpus collosum. Initially admitted to [facility] on 10/15 for new-onset decompensated HF with cardiomegaly, now admitted to CVICU s/p mitral valve replacement with [place] mechanical valve by Dr. Craig on 10/23/24. Extubated 10/24.    CO-MORBIDITIES:   Mitral valve stenosis, unspecified etiology  (primary encounter diagnosis)  Rheumatic mitral stenosis    CHANGES and MAJOR Updates  - Started anticoagulation with coumadin, INR goal 2.5-3.5  - Extubated today    PLAN:  Neuro/ pain/ sedation:  # Corpus collosum agenesis  # Developmental delay  Baseline mental status - patient oriented to person, place and day of the week and follows commands, but does not know the year.   - Monitor neurological status. Notify the MD for any acute changes in exam.     # Acute postoperative pain  - Scheduled: Tylenol, gabapentin  - PRN: Tylenol, oxycodone, Dilaudid, methocarbamol    # Seizure disorder  On Keppra at baseline  - Continue PTA Keppra.     # Sedation while on mechanical ventilation, resolved  Extubated 10/24    Pulmonary care:   # Post-operative Mechanical ventilation  - Extubated 10/24  - Goal SpO2 > 92%  - Encourage IS q15-30 minutes  - CXR on arrival, then daily   - Monitor CT output    Cardiovascular:    # Acute Heart Failure w/ Preserved EF (60-65%)  # Severe Rheumatic vs. Congenital Mitral Stenosis  # Elevated Troponin 2/2 increased demand  # Hx of Type 2 MI   # Cardiogenic shock  # S/p Mitral Valve Replacement  Pre CPBP: Complete Echo performed on 10/16/24 - LV function is normal with EF of 60-65%, RV function is normal, Rheumatic mitral valve disease is present with severe calcification, mild mitral insufficiency, severe mitral stenosis, gradient across the MV is 12 mmHg,  Post CPBP: Valve was well-seated. No leaks. Gradient across valve  was 6 mmHg, no new abnormalities  - Monitor hemodynamics closely  - Goal MAP >65, SBP <140   - Now off all pressors  - Hold PTA Lasix  - Hold BB, will restart 10/25  - ASA: start 10/24  - Cap TPW when able post-op     GI care:   # At risk for protein calorie malnutrition   - Passed bedside swallow study  - Cardiac Diet  - PPI  - Bowel regimen: MiraLAX, senna    Renal/Fluid/Electrolytes:    # Hypovolemia  BL creat appears to be ~ 0.5-0.8  - Strict I/O, daily weights, avoid/limit nephrotoxins  - Replete lytes PRN per protocol  - Hold PTA lasix    Endocrine:    # Stress hyperglycemia  - Discontinue insulin gtt  - Goal BG <180 for optimal healing    ID/Antibiotics:  # Stress induced leukocytosis  - To complete perioperative regimen   - Cefazolin 10/23 - current   - Vancomycin 10/23 - current  - Monitor fever curve, WBC, and inflammatory markers as appropriate    Heme:     # Acute blood loss anemia  # Acute thrombocytopenia, resolved  No s/sx active bleeding  - Trending CBC   - Hgb goal > 7.0  - Hemoglobin 11.2 post-op, now 10.3 (10/24)  - Start coumadin anticoagulation given mechanical valve    -Will start heparin tomorrow    MSK/Skin:     # Sternotomy    # Surgical Incision  - Sternal precautions, postop incision management protocol  - PT/OT/CR    Prophylaxis:    - VTE: SCD's, therapeutic coumadin  - Bowel regimen: PPI, MiraLAX, senna    Lines/ tubes/ drains:  - PIVs x3  - CTs x2    Disposition:  - Floor status    Patient seen, findings and plan discussed with CVICU staff and cardiothoracic surgeons.        Diego Kay, MS4  [employer]     Resident/Fellow Attestation   I, Barak Jim MD, was present with the medical/AROLDO student who participated in the service and in the documentation of the note.  I have verified the history and personally performed the physical exam and medical decision making.  I agree with the assessment and plan of care as documented in the note.      Barak  "MD Hernán    PGY4  Date of Service (when I saw the patient): 10/24/24        Clinically Significant Risk Factors        # Hypokalemia: Lowest K = 3.1 mmol/L in last 2 days, will replace as needed  # Hyperkalemia: Highest K = 5.5 mmol/L in last 2 days, will monitor as appropriate   # Hyperchloremia: Highest Cl = 109 mmol/L in last 2 days, will monitor as appropriate      # Hypocalcemia: Lowest iCa = 3.4 mg/dL in last 2 days, will monitor and replace as appropriate  # Hypercalcemia: Highest iCa = 5.5 mg/dL in last 2 days, will monitor as appropriate    # Hypoalbuminemia: Lowest albumin = 3 g/dL at 10/23/2024  7:42 PM, will monitor as appropriate  # Coagulation Defect: INR = 1.33 (Ref range: 0.85 - 1.15) and/or PTT = 36 Seconds (Ref range: 22 - 38 Seconds), will monitor for bleeding  # Thrombocytopenia: Lowest platelets = 79 in last 2 days, will monitor for bleeding    # Chronic heart failure with preserved ejection fraction: heart failure noted on problem list and last echo with EF >50%          # Cachexia: Estimated body mass index is 15.57 kg/m  as calculated from the following:    Height as of this encounter: 1.422 m (4' 8\").    Weight as of this encounter: 31.5 kg (69 lb 7.1 oz)., PRESENT ON ADMISSION                     ====================================    TODAY'S PROGRESS  SUBJECTIVE  Patient is doing well. Denies any pain. Per mother, patient appears to be at baseline mental status.     OBJECTIVE  1. VITAL SIGNS  Temp:  [98.2  F (36.8  C)-100.2  F (37.9  C)] 100  F (37.8  C)  Pulse:  [74-90] 85  Resp:  [14-23] 17  BP: ()/(49-70) 114/61  Cuff Mean (mmHg):  [71] 71  MAP:  [53 mmHg-128 mmHg] 71 mmHg  Arterial Line BP: ()/(43-96) 114/52  FiO2 (%):  [40 %-50 %] 40 %  SpO2:  [100 %] 100 %  FiO2 (%): 40 %, Resp: 17, Vent Mode: CMV/AC, Resp Rate (Set): 16 breaths/min, Tidal Volume (Set, mL): 270 mL, PEEP (cm H2O): 5 cmH2O, Pressure Support (cm H2O): 5 cmH2O, Resp Rate (Set): 16 breaths/min, Tidal " Volume (Set, mL): 270 mL, PEEP (cm H2O): 5 cmH2O    2. INTAKE/ OUTPUT  I/O last 3 completed shifts:  In: 6447.39 [I.V.:2927.09; Other:400; NG/GT:90.3; IV Piggyback:2500]  Out: 1645 [Urine:1415; Chest Tube:230]    3. PHYSICAL EXAMINATION    General: Extubated, awake. Resting comfortably in bed. Responding to questions appropriately.  Neuro: Follows commands, moves all extremities.   Resp: intubated, ventilated. Course to clear lung sounds  CV: sinus rhythm  Abdomen: Soft, non-distended, non-tender  Incisions: c/d/i  Extremities: warm and well perfused. edematous  CT: To suction, output, no airleak      4. INVESTIGATIONS  Arterial Blood Gases   Recent Labs   Lab 10/24/24  0405 10/23/24  1945 10/23/24  1531 10/23/24  1350   PH 7.41 7.39 7.41 7.43   PCO2 37 38 40 39   PO2 161* 160* 169* 354*   HCO3 24 23 25 26     Complete Blood Count   Recent Labs   Lab 10/24/24  0405 10/23/24  1942 10/23/24  1529 10/23/24  1350 10/23/24  1311 10/23/24  0958 10/19/24  0556   WBC 16.0* 21.4* 18.7*  --   --   --  10.2   HGB 10.3* 11.2* 12.3 11.2*  --    < > 13.5    186 188  --  79*  --  155    < > = values in this interval not displayed.     Basic Metabolic Panel  Recent Labs   Lab 10/24/24  0405 10/23/24  2318 10/23/24  1945 10/23/24  1942 10/23/24  1539 10/23/24  1529 10/23/24  1350 10/19/24  1007 10/19/24  0556     --   --  142  --  143 140   < > 135   POTASSIUM 4.1  --   --  4.1  --  4.0 4.3   < > 4.1   CHLORIDE 109*  --   --  109*  --  107  --   --  96*   CO2 21*  --   --  20*  --  22  --   --  24   BUN 12.1  --   --  13.4  --  14.3  --   --  20.8*   CR 0.70  --   --  0.76  --  0.76  --   --  0.78   * 120* 116* 121*   < > 118* 124*   < > 64*    < > = values in this interval not displayed.     Liver Function Tests  Recent Labs   Lab 10/24/24  0405 10/23/24  1942 10/23/24  1529 10/23/24  1311   AST 51* 51* 55*  --    ALT 19 20 24  --    ALKPHOS 41 41 47  --    BILITOTAL 1.1 1.6* 1.8*  --    ALBUMIN 3.1* 3.0* 3.2*   "--    INR  --   --  1.33* 1.87*     Pancreatic Enzymes  No lab results found in last 7 days.  Coagulation Profile  Recent Labs   Lab 10/23/24  1529 10/23/24  1311   INR 1.33* 1.87*   PTT 36 33     Lactate  Invalid input(s): \"LACTATE\"    5. RADIOLOGY  Recent Results (from the past 24 hours)   XR Chest Port 1 View    Narrative    Exam: XR CHEST PORT 1 VIEW, 10/23/2024 3:55 PM    Comparison: Correlation made with chest CT dated 10/16/2024    History: s/p mitral valve replacement    Findings:  Single view supine frontal radiographic view of the chest was  obtained. Postsurgical changes of prosthetic mitral valve, with intact  median sternotomy wires, and 2 inferior approach mediastinal drains.  Endotracheal tube terminates 3 cm cephalad from the francoise. Enteric  tube curls in the oropharynx, and the tip terminates in the distal  thoracic esophagus. The enteric tube sidehole is in the mid thoracic  esophagus. A wire projects across the image from the right lateral  superior portion of the radiograph, and tracks across midchest,  possibly external to the patient.. Trachea is midline. Stable  cardiomegaly correlated with the chest CT dated 10/16/2024. No  suspicious focal airspace opacity. Clear costophrenic angles. No  pneumothorax. The visualized upper abdomen is unremarkable. No acute  osseous abnormalities.      Impression    Impression:   1. Support devices as described above. Enteric tube coils in the  patient's oropharynx, with tip and sidehole terminating in the  thoracic esophagus.  2. Post surgical changes of prosthetic mitral valve with intact median  sternotomy wires.  3. No suspicious pulmonary opacities. Stable cardiomegaly.    [Significant Result: Enteric tube curled in oropharynx and terminating  in the thoracic esophagus.    Finding was identified on 10/23/2024 4:12 PM.     Andreas Bhakta RN was contacted by Dr. Saxena on 10/23/2024 4:34 PM and  verbalized understanding of the critical result.     I have " personally reviewed the examination and initial interpretation  and I agree with the findings.    KRISTIE CRONIN MD         SYSTEM ID:  [id number]   XR Abdomen Port 1 View    Narrative    EXAMINATION: XR ABDOMEN PORT 1 VIEW, 10/23/2024 4:03 PM     COMPARISON: None.    HISTORY: Evaluate OG tube positioning.    FINDINGS: Portable supine AP view of the abdomen. Gastric tip projects  over the stomach with sidehole near the gastroesophageal junction.  Partially visualized chest tubes. Mitral valve prosthesis. Intact  median sternotomy wire. Mediastinal surgical clips. Nonobstructive  bowel gas pattern. No definitive pneumatosis or portal venous gas.  Streaky bibasilar opacities, likely atelectasis. Mild convex left  curvature of the lumbar spine.      Impression    IMPRESSION: Gastric tube tip in the stomach with sidehole near the  gastroesophageal junction. Recommend slight advancement.    I have personally reviewed the examination and initial interpretation  and I agree with the findings.    ERIKA BAZAN DO         SYSTEM ID:  [id number]   XR Abdomen Port 1 View    Narrative    Exam: XR ABDOMEN PORT 1 VIEW, 10/23/2024 6:00 PM    Indication: OGT replacement, previous one was coiled in the mouth    Comparison: 10/23/2024    Findings:   Single AP view of the abdomen for purposes of gastric tube position  check. Gastric tube with tip and sidehole in the stomach. Please see  same date chest radiograph for chest findings.      Impression    Impression: Gastric tube with the tip and sidehole in stomach.    I have personally reviewed the examination and initial interpretation  and I agree with the findings.    QUINTIN ROSALES MD         SYSTEM ID:  [id number]

## 2024-10-24 NOTE — PROGRESS NOTES
[facility], Procedure Note          Extubation:       Efren Saavedra  MRN# [id number]   October 24, 2024         Patient extubated at: 10:15 AM, October 24, 2024   Supplemental Oxygen: Via face mask at 3 liters per minute   Cough: The cough is good and productive   Secretion Mode: Able to clear   Secretion Amount: Small amount, thin and clear in color   Respiratory Exam:: Breath sounds: diminished     Location: bilaterally   Skin Exam:: Patient color: pale   Patient Status: Currently appears comfortable. Worked on the flutter valve, good cough non productive.   Arterial Blood Gasses: pH Arterial (no units)   Date Value   10/24/2024 7.43     pO2 Arterial (mm Hg)   Date Value   10/24/2024 156 (H)     pCO2 Arterial (mm Hg)   Date Value   10/24/2024 38     Bicarbonate Arterial (mmol/L)   Date Value   10/24/2024 25            Recorded by Gutierrez Beckford, RT

## 2024-10-24 NOTE — PHARMACY-ANTICOAGULATION SERVICE
Clinical Pharmacy - Warfarin Dosing Consult     Pharmacy has been consulted to manage this patient s warfarin therapy.  Indication: Mechanical Mitral Valve Replacement  Therapy Goal: INR 2.5-3.5  Warfarin Prior to Admission: No  Significant drug interactions: aspirin (increased bleeding risk)    INR   Date Value Ref Range Status   10/24/2024 1.25 (H) 0.85 - 1.15 Final   10/23/2024 1.33 (H) 0.85 - 1.15 Final       Recommend warfarin 1.5 mg today.  Pharmacy will monitor Efren Saavedra daily and order warfarin doses to achieve specified goal.      Please contact pharmacy as soon as possible if the warfarin needs to be held for a procedure or if the warfarin goals change.

## 2024-10-24 NOTE — PLAN OF CARE
Major Shift Events:  Extubated, off pressors, up to chair & bedside commode. Pain controlled. Diet order in & taking pills one at a time, whole   Plan: Orders for 6C  For vital signs and complete assessments, please see documentation flowsheets.     Problem: Adult Inpatient Plan of Care  Goal: Readiness for Transition of Care  Outcome: Adequate for Care Transition     Problem: Cardiovascular Surgery  Goal: Effective Cardiac Function  Outcome: Adequate for Care Transition  Goal: Optimal Cerebral Tissue Perfusion  Outcome: Adequate for Care Transition  Intervention: Protect and Optimize Cerebral Perfusion  Recent Flowsheet Documentation  Taken 10/24/2024 1600 by Rafa Salazar RN  Sensory Stimulation Regulation:   auditory stimulation provided   care clustered   television on   visual stimulation provided  Head of Bed (HOB) Positioning: HOB at 30-45 degrees  Taken 10/24/2024 1200 by Rafa Salazar RN  Sensory Stimulation Regulation:   auditory stimulation provided   care clustered   television on   visual stimulation provided  Head of Bed (HOB) Positioning: HOB at 30-45 degrees  Taken 10/24/2024 0800 by Rafa Salazar RN  Head of Bed (HOB) Positioning: HOB at 30-45 degrees  Goal: Anesthesia/Sedation Recovery  Outcome: Adequate for Care Transition  Intervention: Optimize Anesthesia Recovery  Recent Flowsheet Documentation  Taken 10/24/2024 1600 by Rafa Salazar RN  Safety Promotion/Fall Prevention:   activity supervised   assistive device/personal items within reach   clutter free environment maintained   patient and family education   nonskid shoes/slippers when out of bed   room near nurse's station   room organization consistent   safety round/check completed   supervised activity  Administration (IS): unable to perform  Reorientation Measures:   calendar in view   clock in view   familiar social contact encouraged   reorientation provided  Taken 10/24/2024 1200 by Rafa Salazar RN  Safety Promotion/Fall  Prevention:   activity supervised   assistive device/personal items within reach   clutter free environment maintained   patient and family education   nonskid shoes/slippers when out of bed   room near nurse's station   room organization consistent   safety round/check completed   supervised activity  Administration (IS): unable to perform  Reorientation Measures:   calendar in view   clock in view   familiar social contact encouraged   reorientation provided  Taken 10/24/2024 0800 by Rafa Salazar RN  Safety Promotion/Fall Prevention:   activity supervised   assistive device/personal items within reach   clutter free environment maintained   patient and family education   nonskid shoes/slippers when out of bed   room near nurse's station   room organization consistent   safety round/check completed   supervised activity  Administration (IS): unable to perform  Goal: Acceptable Pain Control  Outcome: Adequate for Care Transition  Intervention: Prevent or Manage Pain  Recent Flowsheet Documentation  Taken 10/24/2024 1600 by Rafa Salazar RN  Pain Management Interventions:   medication (see MAR)   rest   repositioned  Taken 10/24/2024 1200 by Rafa Salazar RN  Pain Management Interventions:   medication (see MAR)   rest   repositioned  Taken 10/24/2024 0800 by Rafa Salazar RN  Pain Management Interventions:   medication (see MAR)   rest   repositioned  Goal: Effective Oxygenation and Ventilation  Outcome: Adequate for Care Transition  Intervention: Promote Airway Secretion Clearance  Recent Flowsheet Documentation  Taken 10/24/2024 1600 by Rafa Salazar RN  Administration (IS): unable to perform  Cough And Deep Breathing: done with encouragement  Taken 10/24/2024 1200 by Rafa Salazar RN  Administration (IS): unable to perform  Cough And Deep Breathing: done with encouragement  Taken 10/24/2024 0800 by Rafa Salazar RN  Administration (IS): unable to perform  Intervention: Optimize Oxygenation  and Ventilation  Recent Flowsheet Documentation  Taken 10/24/2024 1600 by Rafa Salazar RN  Chest Tube Safety:   all connections secured   suction checked  Taken 10/24/2024 1200 by Rafa Salazar RN  Chest Tube Safety:   all connections secured   suction checked  Taken 10/24/2024 0800 by Rafa Salazar RN  Chest Tube Safety:   all connections secured   suction checked   Goal Outcome Evaluation:

## 2024-10-24 NOTE — PROVIDER NOTIFICATION
10/24/24 0021   Weaning Assessment   Weaning Assessment Complete Y   Safety Screen Weaning Assessment PRSR on pressors (may be on Dobutamine <5 mcg/kg/min   Pulse 80   /54   Spontaneous Respiratory Rate 18 breaths/min   Spontaneous Tidal Volume (mL) 266 mL   Spontaneous Minute Ventilation 4.51 mL   RSBI 67.67   Weaning trial discontinued due to: End of designated trial.   Wean Start Time  2340   Wean End Time  0021   Wean Time Calculated (min) 41       PST 5/5 40%   Jannette Thompson, RRT

## 2024-10-24 NOTE — PLAN OF CARE
Major Shift Events:  JONES and follows commands. PERRL. [place]. Sedation managed w/ prop-off since 0610. SR w/ PVCs 70-80s. MAP> 65 w/ epi and levo per MAR. 1L LR given for increased pressor needs. Back up V-wires at 50 bpm. CMV 16/270/40%/5. PST x1 for 30 min pulling tidal volumes of 250+. Adequate uop.       Plan: pressure support and extubate in AM  For vital signs and complete assessments, please see documentation flowsheets.

## 2024-10-25 ENCOUNTER — APPOINTMENT (OUTPATIENT)
Dept: OCCUPATIONAL THERAPY | Facility: CLINIC | Age: 44
DRG: 219 | End: 2024-10-25
Attending: SURGERY
Payer: MEDICARE

## 2024-10-25 ENCOUNTER — APPOINTMENT (OUTPATIENT)
Dept: GENERAL RADIOLOGY | Facility: CLINIC | Age: 44
DRG: 219 | End: 2024-10-25
Attending: SURGERY
Payer: MEDICARE

## 2024-10-25 ENCOUNTER — APPOINTMENT (OUTPATIENT)
Dept: GENERAL RADIOLOGY | Facility: CLINIC | Age: 44
DRG: 219 | End: 2024-10-25
Attending: PHYSICIAN ASSISTANT
Payer: MEDICARE

## 2024-10-25 ENCOUNTER — APPOINTMENT (OUTPATIENT)
Dept: PHYSICAL THERAPY | Facility: CLINIC | Age: 44
DRG: 219 | End: 2024-10-25
Attending: SURGERY
Payer: MEDICARE

## 2024-10-25 LAB
ALBUMIN SERPL BCG-MCNC: 2.9 G/DL (ref 3.5–5.2)
ALP SERPL-CCNC: 39 U/L (ref 40–150)
ALT SERPL W P-5'-P-CCNC: 11 U/L (ref 0–50)
ANION GAP SERPL CALCULATED.3IONS-SCNC: 9 MMOL/L (ref 7–15)
AST SERPL W P-5'-P-CCNC: 38 U/L (ref 0–45)
BILIRUB SERPL-MCNC: 1 MG/DL
BUN SERPL-MCNC: 12.1 MG/DL (ref 6–20)
CA-I BLD-MCNC: 4.3 MG/DL (ref 4.4–5.2)
CALCIUM SERPL-MCNC: 8.1 MG/DL (ref 8.8–10.4)
CHLORIDE SERPL-SCNC: 107 MMOL/L (ref 98–107)
CREAT SERPL-MCNC: 0.51 MG/DL (ref 0.51–0.95)
EGFRCR SERPLBLD CKD-EPI 2021: >90 ML/MIN/1.73M2
ERYTHROCYTE [DISTWIDTH] IN BLOOD BY AUTOMATED COUNT: 14.7 % (ref 10–15)
GLUCOSE SERPL-MCNC: 118 MG/DL (ref 70–99)
HCO3 SERPL-SCNC: 24 MMOL/L (ref 22–29)
HCT VFR BLD AUTO: 28.2 % (ref 35–47)
HGB BLD-MCNC: 9.3 G/DL (ref 11.7–15.7)
INR PPP: 1.63 (ref 0.85–1.15)
MAGNESIUM SERPL-MCNC: 1.9 MG/DL (ref 1.7–2.3)
MCH RBC QN AUTO: 30 PG (ref 26.5–33)
MCHC RBC AUTO-ENTMCNC: 33 G/DL (ref 31.5–36.5)
MCV RBC AUTO: 91 FL (ref 78–100)
PATH REPORT.COMMENTS IMP SPEC: NORMAL
PATH REPORT.COMMENTS IMP SPEC: NORMAL
PATH REPORT.FINAL DX SPEC: NORMAL
PATH REPORT.GROSS SPEC: NORMAL
PATH REPORT.MICROSCOPIC SPEC OTHER STN: NORMAL
PATH REPORT.RELEVANT HX SPEC: NORMAL
PHOSPHATE SERPL-MCNC: 2.1 MG/DL (ref 2.5–4.5)
PHOTO IMAGE: NORMAL
PLATELET # BLD AUTO: 131 10E3/UL (ref 150–450)
POTASSIUM SERPL-SCNC: 3.8 MMOL/L (ref 3.4–5.3)
PROT SERPL-MCNC: 5 G/DL (ref 6.4–8.3)
RBC # BLD AUTO: 3.1 10E6/UL (ref 3.8–5.2)
SODIUM SERPL-SCNC: 140 MMOL/L (ref 135–145)
UFH PPP CHRO-ACNC: <0.1 IU/ML
WBC # BLD AUTO: 13.3 10E3/UL (ref 4–11)

## 2024-10-25 PROCEDURE — 82330 ASSAY OF CALCIUM: CPT

## 2024-10-25 PROCEDURE — 85610 PROTHROMBIN TIME: CPT | Performed by: STUDENT IN AN ORGANIZED HEALTH CARE EDUCATION/TRAINING PROGRAM

## 2024-10-25 PROCEDURE — 250N000011 HC RX IP 250 OP 636: Mod: JZ | Performed by: SURGERY

## 2024-10-25 PROCEDURE — 83735 ASSAY OF MAGNESIUM: CPT

## 2024-10-25 PROCEDURE — 85049 AUTOMATED PLATELET COUNT: CPT

## 2024-10-25 PROCEDURE — 36415 COLL VENOUS BLD VENIPUNCTURE: CPT

## 2024-10-25 PROCEDURE — 97116 GAIT TRAINING THERAPY: CPT | Mod: GP | Performed by: PHYSICAL THERAPIST

## 2024-10-25 PROCEDURE — 71045 X-RAY EXAM CHEST 1 VIEW: CPT

## 2024-10-25 PROCEDURE — 250N000013 HC RX MED GY IP 250 OP 250 PS 637: Performed by: SURGERY

## 2024-10-25 PROCEDURE — 999N000128 HC STATISTIC PERIPHERAL IV START W/O US GUIDANCE

## 2024-10-25 PROCEDURE — 71045 X-RAY EXAM CHEST 1 VIEW: CPT | Mod: 26 | Performed by: RADIOLOGY

## 2024-10-25 PROCEDURE — 250N000013 HC RX MED GY IP 250 OP 250 PS 637: Performed by: NURSE PRACTITIONER

## 2024-10-25 PROCEDURE — 99233 SBSQ HOSP IP/OBS HIGH 50: CPT | Mod: 24 | Performed by: NURSE PRACTITIONER

## 2024-10-25 PROCEDURE — 71045 X-RAY EXAM CHEST 1 VIEW: CPT | Mod: 77

## 2024-10-25 PROCEDURE — 36415 COLL VENOUS BLD VENIPUNCTURE: CPT | Performed by: SURGERY

## 2024-10-25 PROCEDURE — 97530 THERAPEUTIC ACTIVITIES: CPT | Mod: GO

## 2024-10-25 PROCEDURE — 250N000011 HC RX IP 250 OP 636: Performed by: NURSE PRACTITIONER

## 2024-10-25 PROCEDURE — 80053 COMPREHEN METABOLIC PANEL: CPT

## 2024-10-25 PROCEDURE — 82040 ASSAY OF SERUM ALBUMIN: CPT

## 2024-10-25 PROCEDURE — 250N000011 HC RX IP 250 OP 636: Performed by: SURGERY

## 2024-10-25 PROCEDURE — 97161 PT EVAL LOW COMPLEX 20 MIN: CPT | Mod: GP | Performed by: PHYSICAL THERAPIST

## 2024-10-25 PROCEDURE — 120N000005 HC R&B MS OVERFLOW UMMC

## 2024-10-25 PROCEDURE — 250N000013 HC RX MED GY IP 250 OP 250 PS 637

## 2024-10-25 PROCEDURE — 85014 HEMATOCRIT: CPT

## 2024-10-25 PROCEDURE — 82565 ASSAY OF CREATININE: CPT

## 2024-10-25 PROCEDURE — 84100 ASSAY OF PHOSPHORUS: CPT

## 2024-10-25 PROCEDURE — 85520 HEPARIN ASSAY: CPT | Performed by: SURGERY

## 2024-10-25 PROCEDURE — 97530 THERAPEUTIC ACTIVITIES: CPT | Mod: GP | Performed by: PHYSICAL THERAPIST

## 2024-10-25 RX ORDER — MAGNESIUM SULFATE HEPTAHYDRATE 40 MG/ML
2 INJECTION, SOLUTION INTRAVENOUS ONCE
Status: COMPLETED | OUTPATIENT
Start: 2024-10-25 | End: 2024-10-25

## 2024-10-25 RX ORDER — FUROSEMIDE 10 MG/ML
20 INJECTION INTRAMUSCULAR; INTRAVENOUS ONCE
Status: COMPLETED | OUTPATIENT
Start: 2024-10-25 | End: 2024-10-25

## 2024-10-25 RX ORDER — POTASSIUM CHLORIDE 750 MG/1
10 TABLET, EXTENDED RELEASE ORAL ONCE
Status: COMPLETED | OUTPATIENT
Start: 2024-10-25 | End: 2024-10-25

## 2024-10-25 RX ORDER — POTASSIUM CHLORIDE 20MEQ/15ML
10 LIQUID (ML) ORAL ONCE
Status: COMPLETED | OUTPATIENT
Start: 2024-10-25 | End: 2024-10-25

## 2024-10-25 RX ORDER — MAGNESIUM OXIDE 400 MG/1
400 TABLET ORAL EVERY 4 HOURS
Status: DISCONTINUED | OUTPATIENT
Start: 2024-10-25 | End: 2024-10-25

## 2024-10-25 RX ORDER — HEPARIN SODIUM 10000 [USP'U]/100ML
300 INJECTION, SOLUTION INTRAVENOUS CONTINUOUS
Status: DISCONTINUED | OUTPATIENT
Start: 2024-10-25 | End: 2024-10-26

## 2024-10-25 RX ORDER — POLYETHYLENE GLYCOL 3350 17 G/17G
17 POWDER, FOR SOLUTION ORAL 2 TIMES DAILY
Status: DISCONTINUED | OUTPATIENT
Start: 2024-10-25 | End: 2024-10-27

## 2024-10-25 RX ORDER — AMOXICILLIN 250 MG
2 CAPSULE ORAL 2 TIMES DAILY
Status: DISCONTINUED | OUTPATIENT
Start: 2024-10-25 | End: 2024-10-27

## 2024-10-25 RX ADMIN — ASPIRIN 81 MG CHEWABLE TABLET 81 MG: 81 TABLET CHEWABLE at 07:21

## 2024-10-25 RX ADMIN — POLYETHYLENE GLYCOL 3350 17 G: 17 POWDER, FOR SOLUTION ORAL at 20:32

## 2024-10-25 RX ADMIN — ACETAMINOPHEN 975 MG: 325 TABLET, FILM COATED ORAL at 12:41

## 2024-10-25 RX ADMIN — HYDROMORPHONE HYDROCHLORIDE 0.2 MG: 0.2 INJECTION, SOLUTION INTRAMUSCULAR; INTRAVENOUS; SUBCUTANEOUS at 05:02

## 2024-10-25 RX ADMIN — CALCIUM GLUCONATE 1 G: 20 INJECTION, SOLUTION INTRAVENOUS at 22:09

## 2024-10-25 RX ADMIN — WARFARIN SODIUM 1.5 MG: 3 TABLET ORAL at 17:31

## 2024-10-25 RX ADMIN — LEVETIRACETAM 500 MG: 500 TABLET, FILM COATED ORAL at 20:28

## 2024-10-25 RX ADMIN — PANTOPRAZOLE SODIUM 40 MG: 40 TABLET, DELAYED RELEASE ORAL at 07:21

## 2024-10-25 RX ADMIN — ACETAMINOPHEN 975 MG: 325 TABLET, FILM COATED ORAL at 04:52

## 2024-10-25 RX ADMIN — SENNOSIDES AND DOCUSATE SODIUM 1 TABLET: 8.6; 5 TABLET ORAL at 07:22

## 2024-10-25 RX ADMIN — Medication 1 PACKET: at 20:26

## 2024-10-25 RX ADMIN — HEPARIN SODIUM 300 UNITS/HR: 10000 INJECTION, SOLUTION INTRAVENOUS at 10:33

## 2024-10-25 RX ADMIN — FUROSEMIDE 20 MG: 10 INJECTION, SOLUTION INTRAMUSCULAR; INTRAVENOUS at 09:09

## 2024-10-25 RX ADMIN — METHOCARBAMOL TABLETS 750 MG: 750 TABLET, COATED ORAL at 05:17

## 2024-10-25 RX ADMIN — ACETAMINOPHEN 975 MG: 325 TABLET, FILM COATED ORAL at 20:25

## 2024-10-25 RX ADMIN — METHOCARBAMOL TABLETS 750 MG: 750 TABLET, COATED ORAL at 15:40

## 2024-10-25 RX ADMIN — Medication 1 PACKET: at 18:23

## 2024-10-25 RX ADMIN — FUROSEMIDE 20 MG: 10 INJECTION, SOLUTION INTRAMUSCULAR; INTRAVENOUS at 17:30

## 2024-10-25 RX ADMIN — GABAPENTIN 100 MG: 100 CAPSULE ORAL at 07:21

## 2024-10-25 RX ADMIN — MAGNESIUM SULFATE HEPTAHYDRATE 2 G: 2 INJECTION, SOLUTION INTRAVENOUS at 20:44

## 2024-10-25 RX ADMIN — LEVETIRACETAM 500 MG: 500 TABLET, FILM COATED ORAL at 07:21

## 2024-10-25 RX ADMIN — Medication 12.5 MG: at 09:09

## 2024-10-25 RX ADMIN — POTASSIUM CHLORIDE 10 MEQ: 20 SOLUTION ORAL at 20:30

## 2024-10-25 RX ADMIN — POLYETHYLENE GLYCOL 3350 17 G: 17 POWDER, FOR SOLUTION ORAL at 07:22

## 2024-10-25 RX ADMIN — HYDROMORPHONE HYDROCHLORIDE 0.4 MG: 0.2 INJECTION, SOLUTION INTRAMUSCULAR; INTRAVENOUS; SUBCUTANEOUS at 09:46

## 2024-10-25 RX ADMIN — SENNOSIDES AND DOCUSATE SODIUM 2 TABLET: 8.6; 5 TABLET ORAL at 20:29

## 2024-10-25 RX ADMIN — OXYCODONE HYDROCHLORIDE 5 MG: 5 TABLET ORAL at 07:39

## 2024-10-25 RX ADMIN — Medication 12.5 MG: at 20:28

## 2024-10-25 ASSESSMENT — ACTIVITIES OF DAILY LIVING (ADL)
ADLS_ACUITY_SCORE: 0
DEPENDENT_IADLS:: COOKING;CLEANING;LAUNDRY;SHOPPING;MEAL PREPARATION;MEDICATION MANAGEMENT;MONEY MANAGEMENT;TRANSPORTATION
ADLS_ACUITY_SCORE: 0

## 2024-10-25 NOTE — PROGRESS NOTES
CVTS    Patient POD 2 mechanical MVR with Dr. Craig. Chest tubes with minimal output, no pacing requirements. Chest tubes and TPW removed without immediate complications. Keep occlusive dressing on for 24 hours. CXR today and tomorrow am. Start straight rate heparin today and increase to LI heparin tomorrow if INR still subtherapeutic. Transferring to floor. Other cares per CTICU team.     Quinn Ledesma PA-C  Cardiothoracic Surgery  p: [phone]

## 2024-10-25 NOTE — PLAN OF CARE
ICU End of Shift Summary. See flowsheets for vital signs and detailed assessment.    Changes this shift: Patient without acute event or change this shift. Maintaining sats on 1L NC; remains afebrile and hemodynamically stable, A&Ox4.    Plan: continue to monitor patient status; notify provider of changes.

## 2024-10-25 NOTE — PROGRESS NOTES
Transfer  Diagnosis: mitral valve stenosis, MVR 10/23/24  Transferred from:    Via: bed   Accompanied by: mother (legal guardian)   Belongings: Clothing and stuffed animal, kept with patient    Teaching: Call don't fall, use of console, meal times, visiting hours, orientation to unit, when to call for the RN (angina/sob/dizzyness, etc.), and the importance of safety.   Access: PIV   Telemetry:Placed on pt   Ht./Wt.: complete   Two nurse head-to-toe skin assessment performed by Cecelia Bertrand and Fabi Garcia.  Skin issues noted: bruise behind right ear and in right groin; surgical incisions at sternum and epigastrum.  See PCS for assessment and treatment of wounds and surgical sites.

## 2024-10-25 NOTE — PLAN OF CARE
Problem: Adult Inpatient Plan of Care  Goal: Plan of Care Review  Description: The Plan of Care Review/Shift note should be completed every shift.  The Outcome Evaluation is a brief statement about your assessment that the patient is improving, declining, or no change.  This information will be displayed automatically on your shift  note.  Flowsheets (Taken 10/25/2024 2317)  Outcome Evaluation: Psychosocial Assessment completed w/ pt's motherChristine  Plan of Care Reviewed With: parent  Overall Patient Progress: improving     TRAE Trevino, [facility] HospitalSW   4A/4E ICU   Phone: [phone]  Available via appAttach

## 2024-10-25 NOTE — CONSULTS
Care Management Initial Consult    General Information  Assessment completed with: Christine Acosta  Type of CM/SW Visit: Initial Assessment    Primary Care Provider verified and updated as needed: Yes   Readmission within the last 30 days: planned readmission   Return Category: Planned Surgery     Advance Care Planning: Advance Care Planning Reviewed: present on chart (Guardianship Paperwork)          Communication Assessment  Patient's communication style: spoken language (English or Bilingual)    Hearing Difficulty or Deaf: yes   Wear Glasses or Blind: no    Cognitive  Cognitive/Neuro/Behavioral: .WDL except  Level of Consciousness: alert  Arousal Level: opens eyes spontaneously, arouses to voice  Orientation: person, place  Mood/Behavior: calm, cooperative  Best Language: 0 - No aphasia  Speech: hoarse    Living Environment:   People in home: sibling(s), parent(s)  Terrence  Current living Arrangements: house      Able to return to prior arrangements: yes       Family/Social Support:  Care provided by: self, parent(s)  Provides care for: no one, unable/limited ability to care for self  Marital Status: Single  Support system: Parent(s), Sibling(s), Guardian, Other (specify) (ILS worker 5x/week every other week)          Description of Support System: Supportive, Involved    Support Assessment: Adequate family and caregiver support, Adequate social supports    Current Resources:   Patient receiving home care services: No        Community Resources: County Programs, County Worker, Financial/Insurance  Equipment currently used at home: none  Supplies currently used at home: None    Employment/Financial:  Employment Status: disabled        Financial Concerns: none   Referral to Financial Worker: No       Does the patient's insurance plan have a 3 day qualifying hospital stay waiver?  No    Lifestyle & Psychosocial Needs:  Social Drivers of Health     Food Insecurity: Low Risk  (10/15/2024)    Food Insecurity      Within the past 12 months, did you worry that your food would run out before you got money to buy more?: No     Within the past 12 months, did the food you bought just not last and you didn t have money to get more?: No   Depression: Not at risk (9/20/2024)    PHQ-2     PHQ-2 Score: 0   Housing Stability: High Risk (10/15/2024)    Housing Stability     Do you have housing? : No     Are you worried about losing your housing?: No   Tobacco Use: Low Risk  (10/21/2024)    Patient History     Smoking Tobacco Use: Never     Smokeless Tobacco Use: Never     Passive Exposure: Not on file   Financial Resource Strain: Low Risk  (10/15/2024)    Financial Resource Strain     Within the past 12 months, have you or your family members you live with been unable to get utilities (heat, electricity) when it was really needed?: No   Alcohol Use: Not on file   Transportation Needs: Low Risk  (10/15/2024)    Transportation Needs     Within the past 12 months, has lack of transportation kept you from medical appointments, getting your medicines, non-medical meetings or appointments, work, or from getting things that you need?: No   Physical Activity: Not on file   Interpersonal Safety: Unknown (10/23/2024)    Interpersonal Safety     Do you feel physically and emotionally safe where you currently live?: Patient unable to answer     Within the past 12 months, have you been hit, slapped, kicked or otherwise physically hurt by someone?: Patient unable to answer     Within the past 12 months, have you been humiliated or emotionally abused in other ways by your partner or ex-partner?: Patient unable to answer   Stress: Not on file   Social Connections: Not on file   Health Literacy: Not on file       Functional Status:  Prior to admission patient needed assistance:   Dependent ADLs:: Bathing, Independent, Ambulation-no assistive device  Dependent IADLs:: Cooking, Cleaning, Laundry, Shopping, Meal Preparation, Medication Management, Money  Management, Transportation  Assesssment of Functional Status: Not at baseline with ADL Functioning, Not at baseline with mobility, Not at  functional baseline    Mental Health Status:  Mental Health Status: No Current Concerns       Chemical Dependency Status:  Chemical Dependency Status: No Current Concerns             Values/Beliefs:  Spiritual, Cultural Beliefs, Jainism Practices, Values that affect care:  (none)       Cultural/Jainism Practices Patient Routinely Participates In:  (none)  Values/Beliefs Comment: none    Discussed  Partnership in Safe Discharge Planning  document with patient/family: No    Additional Information:  SW consult for elevated risk score. Reviewed chart and met w/ pt's legal guardian and mother, Christine, at bedside. Pt admitted 10/23/2024 for planned MVR.     Pt lives at home w/ her mother, whom is also her legal guardian, and her brother, Phu. Pt has a legal guardian due to developmental disability. Pt lives with her family in a two level home w/ one step to enter. Pt's needs are met on the 1st floor. Pt is independent with most ADLs, requires some assistance with bathing, and ambulates w/o need for an assistive device. Pt is dependent with IADLs. Pt has a waiver and CM, but pt's mother did not have the contact for the CM. Pt receives what appears to be an ILS worker that works 5d/wk every other week and takes pt out in the community.     Discussed discharge planning. Pt was being assessed by PT during our conversation. Mother reports she wants to take pt home and will have 24hr supervision. Discussed with PT and pt anticipated to be able to discharge home.     Next Steps: No further care management intervention anticipated at this time.  Please re-consult if further needs arise.  Care management signing off.         TRAE Trevino, LICSW   4A/4E ICU   Phone: [phone]  Available via [employer]

## 2024-10-25 NOTE — PLAN OF CARE
Goal Outcome Evaluation:      Plan of Care Reviewed With: parent, patient    Overall Patient Progress: no changeOverall Patient Progress: no change    Outcome Evaluation: see note.    ICU End of Shift Summary. See flowsheets for vital signs and detailed assessment.    Changes this shift: no acute changes. Pain management this am. Verbal to mom only. Minimal urine output. Mother at bedside.     Plan:  transfer. Continue POC.       Problem: Adult Inpatient Plan of Care  Goal: Absence of Hospital-Acquired Illness or Injury  Intervention: Prevent Skin Injury  Recent Flowsheet Documentation  Taken 10/25/2024 0400 by Ronald Boudreaux, RN  Body Position:   turned   side-lying 30 degrees  Taken 10/25/2024 0200 by Ronald Boudreaux, RN  Body Position:   turned   side-lying 30 degrees  Taken 10/25/2024 0000 by Ronald Boudreaux RN  Body Position:   turned   side-lying 30 degrees  Taken 10/24/2024 2000 by Ronald Boudreaux, RN  Body Position:   turned   side-lying 30 degrees  Problem: Adult Inpatient Plan of Care  Goal: Optimal Comfort and Wellbeing  Intervention: Monitor Pain and Promote Comfort  Recent Flowsheet Documentation  Taken 10/25/2024 0400 by Ronald Boudreaux, RN  Pain Management Interventions:   medication (see MAR)   rest   repositioned  Taken 10/25/2024 0000 by Ronald Boudreaux RN  Pain Management Interventions:   medication (see MAR)   rest   repositioned  Taken 10/24/2024 2000 by Ronald Boudreaux RN  Pain Management Interventions:   medication (see MAR)   rest   repositioned     Problem: Adult Inpatient Plan of Care  Goal: Optimal Comfort and Wellbeing  Intervention: Provide Person-Centered Care  Recent Flowsheet Documentation  Taken 10/25/2024 0400 by Ronald Boudreaux RN  Trust Relationship/Rapport:   care explained   choices provided   emotional support provided   empathic listening provided   questions answered  Taken 10/25/2024 0000 by Ronald Boudreaux RN  Trust Relationship/Rapport:   care explained   choices  provided   emotional support provided   empathic listening provided   questions answered  Taken 10/24/2024 2000 by Ronald Boudreaux RN  Trust Relationship/Rapport:   care explained   choices provided   emotional support provided   empathic listening provided   questions answered

## 2024-10-25 NOTE — PROGRESS NOTES
"   10/25/24 1400   Appointment Info   Signing Clinician's Name / Credentials (PT) sagar leonard, pt   Living Environment   People in Home parent(s);sibling(s)   Current Living Arrangements house   Home Accessibility stairs to enter home;stairs within home   Number of Stairs, Main Entrance 1   Stair Railings, Main Entrance railings safe and in good condition   Transportation Anticipated family or friend will provide   Living Environment Comments 1 step to enter home, stays on main level of home   Self-Care   Usual Activity Tolerance good   Current Activity Tolerance moderate   Regular Exercise Yes   Activity/Exercise Type walking   Exercise Amount/Frequency 2 times/wk   Equipment Currently Used at Home none   Fall history within last six months no   Activity/Exercise/Self-Care Comment IND with functional mobility for household ambulation,per mom patient prefers to hold onto her arm during community ambulation because she is \"cautious\"   General Information   Onset of Illness/Injury or Date of Surgery 10/23/24   Referring Physician Kush Boswell MD   Patient/Family Therapy Goals Statement (PT) return home   Pertinent History of Current Problem (include personal factors and/or comorbidities that impact the POC) Efren Saavedra is a 44 year old female with PMH of rheumatic mitral stenosis, T2MI, seizure disorder on keppra (since 2022), agenesis of the corpus collosum. Initially admitted to [facility] on 10/15 for new-onset decompensated HF with cardiomegaly, now admitted to CVICU s/p mitral valve replacement with [employer] mechanical valve by Dr. Craig on 10/23/24. Extubated 10/24.   Existing Precautions/Restrictions sternal   Cognition   Follows Commands (Cognition) WFL   Pain Assessment   Patient Currently in Pain Yes, see Vital Sign flowsheet   Posture    Posture Forward head position;Protracted shoulders   Range of Motion (ROM)   ROM Comment B LE grossly WFL   Strength (Manual Muscle Testing)   Strength Comments no " functional deficits noted   Bed Mobility   Comment, (Bed Mobility) supine > sit min assist   Transfers   Comment, (Transfers) sit > stand min assist   Gait/Stairs (Locomotion)   Comment, (Gait/Stairs) ambulated in room with single hand hold assist, wide NICANOR, decreased step length   Balance   Balance Comments required UE support to maintain standing balance   Sensory Examination   Sensory Perception Comments light touch intact B LE   Coordination   Coordination no deficits were identified   Muscle Tone   Muscle Tone no deficits were identified   Clinical Impression   Criteria for Skilled Therapeutic Intervention Yes, treatment indicated   PT Diagnosis (PT) impaired functional mobility s/p MVR   Influenced by the following impairments pain, impaired balance, decreased activity tolerance   Functional limitations due to impairments impaired bed mobility, impaired transfers,impaired gait   Clinical Presentation (PT Evaluation Complexity) stable   Clinical Presentation Rationale clinical judgement   Clinical Decision Making (Complexity) low complexity   Planned Therapy Interventions (PT) balance training;bed mobility training;gait training;stair training;transfer training;progressive activity/exercise   Risk & Benefits of therapy have been explained evaluation/treatment results reviewed;care plan/treatment goals reviewed   PT Total Evaluation Time   PT Eval, Low Complexity Minutes (83781) 8   Physical Therapy Goals   PT Frequency 6x/week   PT Predicted Duration/Target Date for Goal Attainment 11/01/24   PT Goals Bed Mobility;Transfers;Gait;Stairs   PT: Bed Mobility Independent;Supine to/from sit;Within precautions   PT: Transfers Independent;Sit to/from stand;Within precautions   PT: Gait Independent;Greater than 200 feet   PT: Stairs Modified independent;1 stair  (1 rail)   PT Discharge Planning   PT Plan reinforce sternal precautions, supine <> sit, gait without AD   PT Discharge Recommendation (DC Rec) home with assist    PT Rationale for DC Rec mobilizing well, anticipate she will be IND with functional mobility for household distances during expected length of acute stay.   PT Brief overview of current status OOB with min assist.  ambulated 150' with min assist.   Physical Therapy Time and Intention   Total Session Time (sum of timed and untimed services) 8   Discharge Needs Assessment (IRF)   Transportation Concerns none

## 2024-10-25 NOTE — PROGRESS NOTES
RN to RN report received from CarmenMARIA DOLORES nath RN, phone [phone].  All questions answered at this time.  Patient will go to room 8-510.

## 2024-10-25 NOTE — PROGRESS NOTES
CV ICU PROGRESS NOTE        ASSESSMENT: Efren Saavedra is a 44 year old female with PMH of rheumatic mitral stenosis, T2MI, seizure disorder on keppra (since 2022), agenesis of the corpus collosum. Initially admitted to [facility] on 10/15 for new-onset decompensated HF with cardiomegaly, now admitted to CVICU s/p mitral valve replacement with [employer]ga mechanical valve by Dr. Craig on 10/23/24. Extubated 10/24.    CO-MORBIDITIES:   Mitral valve stenosis, unspecified etiology  (primary encounter diagnosis)  Rheumatic mitral stenosis    CHANGES and MAJOR Updates  - Floor status  - Start straight rate heparin  - Pull wires and CTs  - Diurese with Lasix 20 mg (Goal net -500 mL to -1L)  - Start metoprolol 12.5 mg BID    PLAN:  Neuro/ pain/ sedation:  # Corpus collosum agenesis  # Developmental delay  Baseline mental status - patient oriented to person, place and day of the week and follows commands, but does not know the year.   - Monitor neurological status. Notify the MD for any acute changes in exam.     # Acute postoperative pain  - Scheduled: Tylenol, gabapentin  - PRN: Tylenol, oxycodone, Dilaudid, methocarbamol    # Seizure disorder  - Continue PTA Keppra.       Pulmonary care:   - Extubated 10/24  - Goal SpO2 > 92%  - Encourage IS q15-30 minutes  - CXR today after wire and CT removal. Repeat in am.     Cardiovascular:    # Acute Heart Failure w/ Preserved EF (60-65%)  # Severe Rheumatic vs. Congenital Mitral Stenosis  # Elevated Troponin 2/2 increased demand  # Hx of Type 2 MI   # S/p Mitral Valve Replacement  Pre CPBP: Complete Echo performed on 10/16/24 - LV function is normal with EF of 60-65%, RV function is normal, Rheumatic mitral valve disease is present with severe calcification, mild mitral insufficiency, severe mitral stenosis, gradient across the MV is 12 mmHg,  Post CPBP: Valve was well-seated. No leaks. Gradient across valve was 6 mmHg, no new abnormalities  - Monitor hemodynamics closely  - Goal MAP  >65, SBP <140   - Now off all pressors  - Hold PTA Lasix   - Restart PTA metoprolol 12.5 mg BID  - Continue ASA  - TPW pulled  - CTs pulled    GI care:   # At risk for protein calorie malnutrition   - Passed bedside swallow study  - Cardiac Diet  - PPI  - Bowel regimen increased: MiraLAX, senna    Renal/Fluid/Electrolytes:    # Hypovolemia  BL creat appears to be ~ 0.5-0.8  - Strict I/O, daily weights, avoid/limit nephrotoxins  - Replete lytes PRN per protocol  - Diurese with 20 mg Lasix   - Goal net output -500mL to -1L    Endocrine:    # Stress hyperglycemia  - Goal BG <180 for optimal healing    ID/Antibiotics:  # Stress induced leukocytosis  - To complete perioperative regimen   - Cefazolin 10/23 - completed   - Vancomycin 10/23 - completed  - Monitor fever curve, WBC, and inflammatory markers as appropriate    Heme:     # Acute blood loss anemia  # Acute thrombocytopenia, resolved  No s/sx active bleeding  - Trending CBC   - Hgb goal > 7.0  - Hemoglobin 11.2 post-op, now 9.3 (10/25)  - Continue coumadin, goal INR 2.5-3.5   - Start heparin 300 units/hr. Follow anti Xa levels.     MSK/Skin:     # Sternotomy    # Surgical Incision  - Sternal precautions, postop incision management protocol  - PT/OT/CR    Prophylaxis:    - VTE: coumadin, heparin 300 units/hr   - Bowel regimen: PPI, MiraLAX, senna    Lines/ tubes/ drains:  - PIVs x3    Disposition:  - Floor status    Patient seen, findings and plan discussed with CVICU staff and cardiothoracic surgeons.      Diego Kay, MS4  [employer]     I was present with the medical student who participated in the service and in the   documentation of the note. I have verified the history and personally performed the   physical exam and medical decision making. I agree with the assessment and plan of   care as documented in the note.    Kimmie De La Mater      Clinically Significant Risk Factors        # Hypokalemia: Lowest K = 3.1 mmol/L in  "last 2 days, will replace as needed  # Hyperkalemia: Highest K = 5.5 mmol/L in last 2 days, will monitor as appropriate   # Hyperchloremia: Highest Cl = 109 mmol/L in last 2 days, will monitor as appropriate      # Hypocalcemia: Lowest iCa = 3.4 mg/dL in last 2 days, will monitor and replace as appropriate  # Hypercalcemia: Highest iCa = 5.5 mg/dL in last 2 days, will monitor as appropriate    # Hypoalbuminemia: Lowest albumin = 3 g/dL at 10/23/2024  7:42 PM, will monitor as appropriate     # Thrombocytopenia: Lowest platelets = 79 in last 2 days, will monitor for bleeding    # Chronic heart failure with preserved ejection fraction: heart failure noted on problem list and last echo with EF >50%          # Cachexia: Estimated body mass index is 15.57 kg/m  as calculated from the following:    Height as of this encounter: 1.422 m (4' 8\").    Weight as of this encounter: 31.5 kg (69 lb 7.1 oz)., PRESENT ON ADMISSION                     ====================================    TODAY'S PROGRESS  SUBJECTIVE  Patient complains of significant pain, particularly around her chest tubes site. Has not yet stooled post-surgery.   Per mother, her mental status is at baseline.     OBJECTIVE  1. VITAL SIGNS  Temp:  [99  F (37.2  C)-100.2  F (37.9  C)] 99.2  F (37.3  C)  Pulse:  [80-88] 82  Resp:  [17-33] 21  BP: (101-119)/(56-67) 113/60  Cuff Mean (mmHg):  [82] 82  MAP:  [65 mmHg-85 mmHg] 68 mmHg  Arterial Line BP: ()/(47-61) 103/50  FiO2 (%):  [40 %] 40 %  SpO2:  [93 %-100 %] 100 %  FiO2 (%): 40 %, Resp: 21, Vent Mode: (S) CPAP/PS, Resp Rate (Set): 16 breaths/min, Tidal Volume (Set, mL): 270 mL, PEEP (cm H2O): 5 cmH2O, Pressure Support (cm H2O): 5 cmH2O, Resp Rate (Set): 16 breaths/min, Tidal Volume (Set, mL): 270 mL, PEEP (cm H2O): 5 cmH2O    2. INTAKE/ OUTPUT  I/O last 3 completed shifts:  In: 1160.61 [P.O.:480; I.V.:500.31; NG/GT:180.3]  Out: 685 [Urine:460; Chest Tube:225]    3. PHYSICAL EXAMINATION    General: Extubated, " "awake. Resting comfortably in bed. Responding to questions appropriately.  Neuro: Follows commands, moves all extremities.   Resp: Lungs CTAB  CV: sinus rhythm  Abdomen: Soft, non-distended, non-tender  Incisions: c/d/i  Extremities: warm and well perfused. edematous  CT: To suction, output, no airleak      4. INVESTIGATIONS  Arterial Blood Gases   Recent Labs   Lab 10/24/24  0910 10/24/24  0405 10/23/24  1945 10/23/24  1531   PH 7.43 7.41 7.39 7.41   PCO2 38 37 38 40   PO2 156* 161* 160* 169*   HCO3 25 24 23 25     Complete Blood Count   Recent Labs   Lab 10/25/24  0543 10/24/24  0405 10/23/24  1942 10/23/24  1529   WBC 13.3* 16.0* 21.4* 18.7*   HGB 9.3* 10.3* 11.2* 12.3   * 160 186 188     Basic Metabolic Panel  Recent Labs   Lab 10/24/24  0745 10/24/24  0405 10/23/24  2318 10/23/24  1945 10/23/24  1942 10/23/24  1539 10/23/24  1529 10/23/24  1350 10/19/24  1007 10/19/24  0556   NA  --  144  --   --  142  --  143 140   < > 135   POTASSIUM  --  4.1  --   --  4.1  --  4.0 4.3   < > 4.1   CHLORIDE  --  109*  --   --  109*  --  107  --   --  96*   CO2  --  21*  --   --  20*  --  22  --   --  24   BUN  --  12.1  --   --  13.4  --  14.3  --   --  20.8*   CR  --  0.70  --   --  0.76  --  0.76  --   --  0.78   * 130* 120* 116* 121*   < > 118* 124*   < > 64*    < > = values in this interval not displayed.     Liver Function Tests  Recent Labs   Lab 10/25/24  0543 10/24/24  0955 10/24/24  0405 10/23/24  1942 10/23/24  1529 10/23/24  1311   AST  --   --  51* 51* 55*  --    ALT  --   --  19 20 24  --    ALKPHOS  --   --  41 41 47  --    BILITOTAL  --   --  1.1 1.6* 1.8*  --    ALBUMIN  --   --  3.1* 3.0* 3.2*  --    INR 1.63* 1.25*  --   --  1.33* 1.87*     Pancreatic Enzymes  No lab results found in last 7 days.  Coagulation Profile  Recent Labs   Lab 10/25/24  0543 10/24/24  0955 10/23/24  1529 10/23/24  1311   INR 1.63* 1.25* 1.33* 1.87*   PTT  --   --  36 33     Lactate  Invalid input(s): \"LACTATE\"    5. " RADIOLOGY  Recent Results (from the past 24 hours)   XR Chest Port 1 View    Narrative    Exam: XR CHEST PORT 1 VIEW, 10/23/2024 3:55 PM    Comparison: Correlation made with chest CT dated 10/16/2024    History: s/p mitral valve replacement    Findings:  Single view supine frontal radiographic view of the chest was  obtained. Postsurgical changes of prosthetic mitral valve, with intact  median sternotomy wires, and 2 inferior approach mediastinal drains.  Endotracheal tube terminates 3 cm cephalad from the francoise. Enteric  tube curls in the oropharynx, and the tip terminates in the distal  thoracic esophagus. The enteric tube sidehole is in the mid thoracic  esophagus. A wire projects across the image from the right lateral  superior portion of the radiograph, and tracks across midchest,  possibly external to the patient.. Trachea is midline. Stable  cardiomegaly correlated with the chest CT dated 10/16/2024. No  suspicious focal airspace opacity. Clear costophrenic angles. No  pneumothorax. The visualized upper abdomen is unremarkable. No acute  osseous abnormalities.      Impression    Impression:   1. Support devices as described above. Enteric tube coils in the  patient's oropharynx, with tip and sidehole terminating in the  thoracic esophagus.  2. Post surgical changes of prosthetic mitral valve with intact median  sternotomy wires.  3. No suspicious pulmonary opacities. Stable cardiomegaly.    [Significant Result: Enteric tube curled in oropharynx and terminating  in the thoracic esophagus.    Finding was identified on 10/23/2024 4:12 PM.     Andreas Bhakta RN was contacted by Dr. Saxena on 10/23/2024 4:34 PM and  verbalized understanding of the critical result.     I have personally reviewed the examination and initial interpretation  and I agree with the findings.    KRISTIE CRONIN MD         SYSTEM ID:  [id number]   XR Abdomen Port 1 View    Narrative    EXAMINATION: XR ABDOMEN PORT 1 VIEW, 10/23/2024  4:03 PM     COMPARISON: None.    HISTORY: Evaluate OG tube positioning.    FINDINGS: Portable supine AP view of the abdomen. Gastric tip projects  over the stomach with sidehole near the gastroesophageal junction.  Partially visualized chest tubes. Mitral valve prosthesis. Intact  median sternotomy wire. Mediastinal surgical clips. Nonobstructive  bowel gas pattern. No definitive pneumatosis or portal venous gas.  Streaky bibasilar opacities, likely atelectasis. Mild convex left  curvature of the lumbar spine.      Impression    IMPRESSION: Gastric tube tip in the stomach with sidehole near the  gastroesophageal junction. Recommend slight advancement.    I have personally reviewed the examination and initial interpretation  and I agree with the findings.    ERIKA BAZAN DO         SYSTEM ID:  [id number]   XR Abdomen Port 1 View    Narrative    Exam: XR ABDOMEN PORT 1 VIEW, 10/23/2024 6:00 PM    Indication: OGT replacement, previous one was coiled in the mouth    Comparison: 10/23/2024    Findings:   Single AP view of the abdomen for purposes of gastric tube position  check. Gastric tube with tip and sidehole in the stomach. Please see  same date chest radiograph for chest findings.      Impression    Impression: Gastric tube with the tip and sidehole in stomach.    I have personally reviewed the examination and initial interpretation  and I agree with the findings.    QUINTIN ROSALES MD         SYSTEM ID:  [id number]

## 2024-10-26 ENCOUNTER — APPOINTMENT (OUTPATIENT)
Dept: GENERAL RADIOLOGY | Facility: CLINIC | Age: 44
DRG: 219 | End: 2024-10-26
Attending: SURGERY
Payer: MEDICARE

## 2024-10-26 ENCOUNTER — APPOINTMENT (OUTPATIENT)
Dept: PHYSICAL THERAPY | Facility: CLINIC | Age: 44
DRG: 219 | End: 2024-10-26
Attending: SURGERY
Payer: MEDICARE

## 2024-10-26 LAB
ALBUMIN SERPL BCG-MCNC: 3 G/DL (ref 3.5–5.2)
ALP SERPL-CCNC: 51 U/L (ref 40–150)
ALT SERPL W P-5'-P-CCNC: 12 U/L (ref 0–50)
ANION GAP SERPL CALCULATED.3IONS-SCNC: 13 MMOL/L (ref 7–15)
AST SERPL W P-5'-P-CCNC: 35 U/L (ref 0–45)
BILIRUB SERPL-MCNC: 0.7 MG/DL
BUN SERPL-MCNC: 14.7 MG/DL (ref 6–20)
CA-I BLD-MCNC: 4.1 MG/DL (ref 4.4–5.2)
CALCIUM SERPL-MCNC: 8.1 MG/DL (ref 8.8–10.4)
CHLORIDE SERPL-SCNC: 103 MMOL/L (ref 98–107)
CREAT SERPL-MCNC: 0.59 MG/DL (ref 0.51–0.95)
EGFRCR SERPLBLD CKD-EPI 2021: >90 ML/MIN/1.73M2
ERYTHROCYTE [DISTWIDTH] IN BLOOD BY AUTOMATED COUNT: 14.6 % (ref 10–15)
GLUCOSE SERPL-MCNC: 108 MG/DL (ref 70–99)
HCO3 SERPL-SCNC: 22 MMOL/L (ref 22–29)
HCT VFR BLD AUTO: 28.1 % (ref 35–47)
HGB BLD-MCNC: 8.8 G/DL (ref 11.7–15.7)
INR PPP: 2.09 (ref 0.85–1.15)
INR PPP: 2.55 (ref 0.85–1.15)
MAGNESIUM SERPL-MCNC: 2.4 MG/DL (ref 1.7–2.3)
MCH RBC QN AUTO: 29.1 PG (ref 26.5–33)
MCHC RBC AUTO-ENTMCNC: 31.3 G/DL (ref 31.5–36.5)
MCV RBC AUTO: 93 FL (ref 78–100)
PHOSPHATE SERPL-MCNC: 2.7 MG/DL (ref 2.5–4.5)
PLATELET # BLD AUTO: 172 10E3/UL (ref 150–450)
POTASSIUM SERPL-SCNC: 2.9 MMOL/L (ref 3.4–5.3)
POTASSIUM SERPL-SCNC: 3.4 MMOL/L (ref 3.4–5.3)
POTASSIUM SERPL-SCNC: 3.7 MMOL/L (ref 3.4–5.3)
PROT SERPL-MCNC: 5.3 G/DL (ref 6.4–8.3)
RBC # BLD AUTO: 3.02 10E6/UL (ref 3.8–5.2)
SODIUM SERPL-SCNC: 138 MMOL/L (ref 135–145)
UFH PPP CHRO-ACNC: <0.1 IU/ML
WBC # BLD AUTO: 12.3 10E3/UL (ref 4–11)

## 2024-10-26 PROCEDURE — 85520 HEPARIN ASSAY: CPT | Performed by: NURSE PRACTITIONER

## 2024-10-26 PROCEDURE — 82565 ASSAY OF CREATININE: CPT

## 2024-10-26 PROCEDURE — 80051 ELECTROLYTE PANEL: CPT

## 2024-10-26 PROCEDURE — 36415 COLL VENOUS BLD VENIPUNCTURE: CPT | Performed by: SURGERY

## 2024-10-26 PROCEDURE — 97116 GAIT TRAINING THERAPY: CPT | Mod: GP

## 2024-10-26 PROCEDURE — 250N000011 HC RX IP 250 OP 636: Performed by: SURGERY

## 2024-10-26 PROCEDURE — 97530 THERAPEUTIC ACTIVITIES: CPT | Mod: GP

## 2024-10-26 PROCEDURE — 250N000013 HC RX MED GY IP 250 OP 250 PS 637: Performed by: NURSE PRACTITIONER

## 2024-10-26 PROCEDURE — 258N000003 HC RX IP 258 OP 636: Performed by: SURGERY

## 2024-10-26 PROCEDURE — 85610 PROTHROMBIN TIME: CPT | Performed by: SURGERY

## 2024-10-26 PROCEDURE — 120N000005 HC R&B MS OVERFLOW UMMC

## 2024-10-26 PROCEDURE — 85041 AUTOMATED RBC COUNT: CPT

## 2024-10-26 PROCEDURE — 250N000009 HC RX 250: Performed by: SURGERY

## 2024-10-26 PROCEDURE — 71045 X-RAY EXAM CHEST 1 VIEW: CPT | Mod: 26 | Performed by: STUDENT IN AN ORGANIZED HEALTH CARE EDUCATION/TRAINING PROGRAM

## 2024-10-26 PROCEDURE — 71045 X-RAY EXAM CHEST 1 VIEW: CPT

## 2024-10-26 PROCEDURE — 84132 ASSAY OF SERUM POTASSIUM: CPT | Performed by: SURGERY

## 2024-10-26 PROCEDURE — 83735 ASSAY OF MAGNESIUM: CPT

## 2024-10-26 PROCEDURE — 82330 ASSAY OF CALCIUM: CPT

## 2024-10-26 PROCEDURE — 85018 HEMOGLOBIN: CPT

## 2024-10-26 PROCEDURE — 85610 PROTHROMBIN TIME: CPT | Performed by: STUDENT IN AN ORGANIZED HEALTH CARE EDUCATION/TRAINING PROGRAM

## 2024-10-26 PROCEDURE — 84155 ASSAY OF PROTEIN SERUM: CPT

## 2024-10-26 PROCEDURE — 250N000013 HC RX MED GY IP 250 OP 250 PS 637: Performed by: SURGERY

## 2024-10-26 PROCEDURE — 250N000013 HC RX MED GY IP 250 OP 250 PS 637

## 2024-10-26 PROCEDURE — 84100 ASSAY OF PHOSPHORUS: CPT

## 2024-10-26 PROCEDURE — 36415 COLL VENOUS BLD VENIPUNCTURE: CPT

## 2024-10-26 RX ORDER — POTASSIUM CHLORIDE 20MEQ/15ML
20 LIQUID (ML) ORAL ONCE
Status: DISCONTINUED | OUTPATIENT
Start: 2024-10-26 | End: 2024-10-26

## 2024-10-26 RX ORDER — POTASSIUM CHLORIDE 1.5 G/1.58G
20 POWDER, FOR SOLUTION ORAL ONCE
Status: COMPLETED | OUTPATIENT
Start: 2024-10-26 | End: 2024-10-26

## 2024-10-26 RX ORDER — LIDOCAINE 4 G/G
1 PATCH TOPICAL
Status: DISCONTINUED | OUTPATIENT
Start: 2024-10-27 | End: 2024-10-28 | Stop reason: HOSPADM

## 2024-10-26 RX ORDER — HYDROMORPHONE HCL IN WATER/PF 6 MG/30 ML
0.2 PATIENT CONTROLLED ANALGESIA SYRINGE INTRAVENOUS EVERY 4 HOURS PRN
Status: DISCONTINUED | OUTPATIENT
Start: 2024-10-26 | End: 2024-10-27

## 2024-10-26 RX ORDER — POTASSIUM CHLORIDE 20MEQ/15ML
10 LIQUID (ML) ORAL ONCE
Status: COMPLETED | OUTPATIENT
Start: 2024-10-26 | End: 2024-10-26

## 2024-10-26 RX ORDER — WARFARIN SODIUM 1 MG/1
1 TABLET ORAL
Status: COMPLETED | OUTPATIENT
Start: 2024-10-26 | End: 2024-10-26

## 2024-10-26 RX ORDER — POTASSIUM CHLORIDE 750 MG/1
40 TABLET, EXTENDED RELEASE ORAL ONCE
Status: COMPLETED | OUTPATIENT
Start: 2024-10-26 | End: 2024-10-26

## 2024-10-26 RX ORDER — FUROSEMIDE 10 MG/ML
40 INJECTION INTRAMUSCULAR; INTRAVENOUS
Status: DISCONTINUED | OUTPATIENT
Start: 2024-10-26 | End: 2024-10-28

## 2024-10-26 RX ADMIN — HYDROMORPHONE HYDROCHLORIDE 0.2 MG: 0.2 INJECTION, SOLUTION INTRAMUSCULAR; INTRAVENOUS; SUBCUTANEOUS at 00:55

## 2024-10-26 RX ADMIN — OXYCODONE HYDROCHLORIDE 5 MG: 5 TABLET ORAL at 11:22

## 2024-10-26 RX ADMIN — ACETAMINOPHEN 650 MG: 325 TABLET, FILM COATED ORAL at 20:51

## 2024-10-26 RX ADMIN — PANTOPRAZOLE SODIUM 40 MG: 40 TABLET, DELAYED RELEASE ORAL at 08:03

## 2024-10-26 RX ADMIN — OXYCODONE HYDROCHLORIDE 5 MG: 5 TABLET ORAL at 17:48

## 2024-10-26 RX ADMIN — GABAPENTIN 100 MG: 100 CAPSULE ORAL at 08:03

## 2024-10-26 RX ADMIN — LEVETIRACETAM 500 MG: 500 TABLET, FILM COATED ORAL at 08:03

## 2024-10-26 RX ADMIN — POTASSIUM CHLORIDE 40 MEQ: 750 TABLET, EXTENDED RELEASE ORAL at 21:17

## 2024-10-26 RX ADMIN — Medication 12.5 MG: at 08:03

## 2024-10-26 RX ADMIN — SENNOSIDES AND DOCUSATE SODIUM 2 TABLET: 8.6; 5 TABLET ORAL at 08:02

## 2024-10-26 RX ADMIN — POTASSIUM CHLORIDE 20 MEQ: 1.5 POWDER, FOR SOLUTION ORAL at 11:22

## 2024-10-26 RX ADMIN — Medication 12.5 MG: at 20:51

## 2024-10-26 RX ADMIN — ACETAMINOPHEN 650 MG: 325 TABLET, FILM COATED ORAL at 08:01

## 2024-10-26 RX ADMIN — FUROSEMIDE 40 MG: 10 INJECTION, SOLUTION INTRAVENOUS at 11:00

## 2024-10-26 RX ADMIN — HYDROMORPHONE HYDROCHLORIDE 0.2 MG: 0.2 INJECTION, SOLUTION INTRAMUSCULAR; INTRAVENOUS; SUBCUTANEOUS at 04:53

## 2024-10-26 RX ADMIN — WARFARIN SODIUM 1 MG: 1 TABLET ORAL at 17:49

## 2024-10-26 RX ADMIN — FUROSEMIDE 40 MG: 10 INJECTION, SOLUTION INTRAVENOUS at 15:24

## 2024-10-26 RX ADMIN — POTASSIUM PHOSPHATE, MONOBASIC POTASSIUM PHOSPHATE, DIBASIC 9 MMOL: 224; 236 INJECTION, SOLUTION, CONCENTRATE INTRAVENOUS at 11:00

## 2024-10-26 RX ADMIN — ASPIRIN 81 MG CHEWABLE TABLET 81 MG: 81 TABLET CHEWABLE at 08:02

## 2024-10-26 RX ADMIN — LEVETIRACETAM 500 MG: 500 TABLET, FILM COATED ORAL at 20:51

## 2024-10-26 RX ADMIN — POTASSIUM CHLORIDE 10 MEQ: 20 SOLUTION ORAL at 15:04

## 2024-10-26 RX ADMIN — ACETAMINOPHEN 650 MG: 325 TABLET, FILM COATED ORAL at 12:10

## 2024-10-26 NOTE — PLAN OF CARE
D AVSS with sat's 93-94% on room air. Heart regular with PVC's and lungs decreased in bases otherwise clear. Voiced c/o incisional pain during the night which treated with IV Dilaudid and denied nausea. Up to bathroom to void an adequate amount and was noted to have her menses with a few blood clots in her urine. Taking in a minimal amount of food/fluids despite encouragement. Heparin running per order and electrolytes where replaced last night. Mother in room offering great support and educating staff on particulars of the patient.   I Vital's, assessment and med's per order.  A Resting in bed with call light in reach.  P Continue to monitor and update MD with changes.

## 2024-10-26 NOTE — PROGRESS NOTES
D: Pt  with PMH of rheumatic mitral stenosis, T2MI, seizure disorder on keppra (since 2022), agenesis of the corpus collosum. Admitted to [facility] on 10/15 for new-onset decompensated HF with cardiomegaly, now admitted to  mitral valve replacement on 10/23/24 Per MD note.      I: Monitored vitals and assessed pt status.   Changed: Hep gtt discontinued per NP order.   PRN: Oxycodone X 1,Tylenol    A: A0x4. VSS, on RA. SR with PVS. Afebrile. Pain controlled with Tylenol,Oxycodone prn with effect. Lytes replaced per protocol. K= (+20_mEq)  Phos= (+ _6 mmol). No BM for shift. Midsternal Dressings removed by NP, open to air. CT site  dressing change, Vaseline gauze strip applied and gauze dressing applied, small amount of serosanguinous drainage observed.  Lasix given. Potassium recheck 3.7,replaced  MD does not want calcium gluconate IV, discontinued. Heparin gtt discontinued.  Coccyx covered with Mepilex.    P: Continue to monitor Pt status and report changes to treatment team.

## 2024-10-26 NOTE — PLAN OF CARE
Goal Outcome Evaluation:      Plan of Care Reviewed With: patient, parent    Overall Patient Progress: improvingOverall Patient Progress: improving    Outcome Evaluation: Chest tubes removed, may removed, transferred to

## 2024-10-26 NOTE — PROGRESS NOTES
Cardiovascular Surgery Progress Note  10/26/2024         Assessment and Plan:     Efren Saavedra is a 44 year old female with PMH of rheumatic mitral stenosis, T2MI, seizure disorder on Keppra (since 2022), and agenesis of the corpus collosum. Initially admitted to [facility] on 10/15 for new-onset decompensated HF with cardiomegaly.  She discharged home and returned for elective mechanical mitral valve replacement by Dr. Craig on 10/23/24.    Cardiovascular:   HFpEF  Mitral stenosis s/p mechanical MVR  Hypervolemia  Preop echo showed LV EF 60-65%  Will order postop baseline echo for tomorrow   - ASA 81 mg   - Metoprolol tartrate 12.5 mg BID (PTA dose 50mg BID)   - Diuresis as below   - Anticoagulation as below    Chest tubes: Removed in ICU 10/25  TPW: Removed in ICU 10/25    Pulmonary:  - Extubated POD 1; now saturating well on RA   - Supplemental O2 PRN to keep sats > 92%  - Pulm toilet, IS, OOB/activity and deep breathing    Neurology / MSK:  Acute post-operative pain  - Multimodal analgesia with acetaminophen, oxycodone PRN, IV dilaudid PRN, Robaxin prn, lidocaine patches    Seizure disorder  Corpus collosum agenesis  Developmental delay   - PTA Keppra resumed    S/p sternotomy   - PT/OT/CR consults   - Sternal precautions     / Renal / FE:  - Baseline SCr ~0.5-0.8; most recent creatinine at baseline  - Diuresis: 40mg IV Lasix BID (PTA dose 40mg PO BID)  - Replace electrolytes prn per protocol  - Strict I/O, daily standing weights per protocol    GI / Nutrition:   - Cardiac diet, continue bowel regimen    Endocrine:  Stress-induced hyperglycemia, resolved   - Initially managed on insulin drip postop, transitioned to sliding scale; can likely look to discontinue over the next 24 hrs if maintains euglycemia without supplemental insulin    Infectious Disease:  Stress-induced leukocytosis  - WBC downtrending, afebrile, no signs or symptoms of infection  - Completed perioperative antibiotics    Hematology:   Acute  "blood loss anemia  Thrombocytopenia, resolved  Hgb stable; Plt WNL, no signs or symptoms of active bleeding    Anticoagulation:   - ASA 81 mg  - Coumadin for mechanical valve, INR goal 2.5-3.5. Most recent INR therapeutic.    - Discontinue heparin gtt bridge    Prophylaxis:   - Stress ulcer prophylaxis: Pantoprazole 40 mg daily for 30 days post-operatively  - DVT prophylaxis: Heparin > warfarin, SCD, OOB/activity    Disposition:   - Transferred to  on 10/25  - Therapies recommending discharge to home with assistance  - Medically Ready for Discharge: Anticipated in 2-4 Days      Niko Villeda, CNP, ACN-  Cardiothoracic Surgery  [employer] Pager [phone]        Interval History:     Transferred out of ICU yesterday evening.    States pain is mainly at her sternum; slept OK but bothered by pain and now needing to go to the bathroom frequently.   Tolerating diet, + BM. No nausea or vomiting.  Denies dyspnea.  Denies chest pain, dizziness/lightheadedness, and sternal popping/clicking/snapping.         Physical Exam:   Blood pressure 132/82, pulse 83, temperature 97.9  F (36.6  C), temperature source Oral, resp. rate 17, height 1.422 m (4' 8\"), weight 35.7 kg (78 lb 12.8 oz), SpO2 95%, not currently breastfeeding.  Vitals:    10/23/24 0654 10/26/24 0320   Weight: 31.5 kg (69 lb 7.1 oz) 35.7 kg (78 lb 12.8 oz)      Admission weight: 31.5 kg  24 hr Fluid status:  +376 mL    Gen: NAD, sitting in recliner chair, calm, cooperative on exam  Neuro: A&Ox4, no focal deficits, JONES  CV: RRR on monitor  Pulm: unlabored breathing on RA  Abd: SNDNT  Ext: mild peripheral edema, no gross deformities  Incision: clean, dry, intact, no erythema or induration, skin glue intact, sternum stable  Tubes/drain sites: shadowing on dressing       Clinically Significant Risk Factors           # Hypocalcemia: Lowest iCa = 4.1 mg/dL in last 2 days, will monitor and replace as appropriate     # Hypoalbuminemia: Lowest albumin = 2.9 g/dL at " "10/25/2024  5:43 AM, will monitor as appropriate      # Acute heart failure with preserved ejection fraction: heart failure noted on problem list, last echo with EF >50%, and receiving IV diuretics          # Cachexia: Estimated body mass index is 17.67 kg/m  as calculated from the following:    Height as of this encounter: 1.422 m (4' 8\").    Weight as of this encounter: 35.7 kg (78 lb 12.8 oz)., PRESENT ON ADMISSION     # Financial/Environmental Concerns: none              Data:    Imaging:  reviewed recent imaging    Recent Results (from the past 24 hours)   XR Chest Port 1 View    Narrative    Exam: XR CHEST PORT 1 VIEW, 10/26/2024 11:48 AM    Comparison: Radiograph 10/25/2024, 10/24/2024    History: s/p MVR    Findings:  Stable enlarged cardiomediastinal silhouette. Mitral valve prosthesis.  Intact median sternotomy wires. Similar diffuse interstitial opacities  with slightly increased patchy bibasilar opacities. Possible small  bilateral pleural effusions. No pneumothorax. Visualized upper abdomen  is unremarkable. No acute osseous abnormality.      Impression    Impression:   Similar diffuse interstitial opacities with slightly increased patchy  bibasilar opacities. Possible small bilateral pleural effusions.    I have personally reviewed the examination and initial interpretation  and I agree with the findings.    CARMINA LYMAN DO         SYSTEM ID:  [id number]       Labs:  BMP  Recent Labs   Lab 10/26/24  1202 10/26/24  0550 10/25/24  0543 10/24/24  0745 10/24/24  0405 10/23/24  1945 10/23/24  1942   NA  --  138 140  --  144  --  142   POTASSIUM 3.7 3.4 3.8  --  4.1  --  4.1   CHLORIDE  --  103 107  --  109*  --  109*   YUE  --  8.1* 8.1*  --  9.0  --  9.5   CO2  --  22 24  --  21*  --  20*   BUN  --  14.7 12.1  --  12.1  --  13.4   CR  --  0.59 0.51  --  0.70  --  0.76   GLC  --  108* 118* 115* 130*   < > 121*    < > = values in this interval not displayed.     CBC  Recent Labs   Lab 10/26/24  0550 " 10/25/24  0543 10/24/24  0405 10/23/24  1942   WBC 12.3* 13.3* 16.0* 21.4*   RBC 3.02* 3.10* 3.46* 3.74*   HGB 8.8* 9.3* 10.3* 11.2*   HCT 28.1* 28.2* 30.5* 33.1*   MCV 93 91 88 89   MCH 29.1 30.0 29.8 29.9   MCHC 31.3* 33.0 33.8 33.8   RDW 14.6 14.7 14.9 14.6    131* 160 186     INR  Recent Labs   Lab 10/26/24  1311 10/26/24  0550 10/25/24  0543 10/24/24  0955   INR 2.09* 2.55* 1.63* 1.25*      Hepatic Panel  Recent Labs   Lab 10/26/24  0550 10/25/24  0543 10/24/24  0405 10/23/24  1942   AST 35 38 51* 51*   ALT 12 11 19 20   ALKPHOS 51 39* 41 41   BILITOTAL 0.7 1.0 1.1 1.6*   ALBUMIN 3.0* 2.9* 3.1* 3.0*     GLUCOSE:   Recent Labs   Lab 10/26/24  0550 10/25/24  0543 10/24/24  0745 10/24/24  0405 10/23/24  2318 10/23/24  1945   * 118* 115* 130* 120* 116*

## 2024-10-27 ENCOUNTER — APPOINTMENT (OUTPATIENT)
Dept: OCCUPATIONAL THERAPY | Facility: CLINIC | Age: 44
DRG: 219 | End: 2024-10-27
Attending: SURGERY
Payer: MEDICARE

## 2024-10-27 ENCOUNTER — APPOINTMENT (OUTPATIENT)
Dept: CARDIOLOGY | Facility: CLINIC | Age: 44
DRG: 219 | End: 2024-10-27
Attending: SURGERY
Payer: MEDICARE

## 2024-10-27 LAB
ANION GAP SERPL CALCULATED.3IONS-SCNC: 11 MMOL/L (ref 7–15)
BUN SERPL-MCNC: 13.6 MG/DL (ref 6–20)
CALCIUM SERPL-MCNC: 7.8 MG/DL (ref 8.8–10.4)
CHLORIDE SERPL-SCNC: 105 MMOL/L (ref 98–107)
CREAT SERPL-MCNC: 0.64 MG/DL (ref 0.51–0.95)
EGFRCR SERPLBLD CKD-EPI 2021: >90 ML/MIN/1.73M2
ERYTHROCYTE [DISTWIDTH] IN BLOOD BY AUTOMATED COUNT: 14.7 % (ref 10–15)
GLUCOSE SERPL-MCNC: 90 MG/DL (ref 70–99)
HCO3 SERPL-SCNC: 25 MMOL/L (ref 22–29)
HCT VFR BLD AUTO: 27.7 % (ref 35–47)
HGB BLD-MCNC: 8.8 G/DL (ref 11.7–15.7)
INR PPP: 2.41 (ref 0.85–1.15)
LVEF ECHO: NORMAL
MAGNESIUM SERPL-MCNC: 1.9 MG/DL (ref 1.7–2.3)
MCH RBC QN AUTO: 29.6 PG (ref 26.5–33)
MCHC RBC AUTO-ENTMCNC: 31.8 G/DL (ref 31.5–36.5)
MCV RBC AUTO: 93 FL (ref 78–100)
PHOSPHATE SERPL-MCNC: 2 MG/DL (ref 2.5–4.5)
PLATELET # BLD AUTO: 216 10E3/UL (ref 150–450)
POTASSIUM SERPL-SCNC: 3.8 MMOL/L (ref 3.4–5.3)
POTASSIUM SERPL-SCNC: 4.1 MMOL/L (ref 3.4–5.3)
RBC # BLD AUTO: 2.97 10E6/UL (ref 3.8–5.2)
SODIUM SERPL-SCNC: 141 MMOL/L (ref 135–145)
WBC # BLD AUTO: 10.1 10E3/UL (ref 4–11)

## 2024-10-27 PROCEDURE — 97530 THERAPEUTIC ACTIVITIES: CPT | Mod: GO

## 2024-10-27 PROCEDURE — 36415 COLL VENOUS BLD VENIPUNCTURE: CPT | Performed by: SURGERY

## 2024-10-27 PROCEDURE — 85027 COMPLETE CBC AUTOMATED: CPT

## 2024-10-27 PROCEDURE — 82310 ASSAY OF CALCIUM: CPT | Performed by: SURGERY

## 2024-10-27 PROCEDURE — 84100 ASSAY OF PHOSPHORUS: CPT

## 2024-10-27 PROCEDURE — 93321 DOPPLER ECHO F-UP/LMTD STD: CPT

## 2024-10-27 PROCEDURE — 83735 ASSAY OF MAGNESIUM: CPT

## 2024-10-27 PROCEDURE — 85610 PROTHROMBIN TIME: CPT | Performed by: STUDENT IN AN ORGANIZED HEALTH CARE EDUCATION/TRAINING PROGRAM

## 2024-10-27 PROCEDURE — 250N000013 HC RX MED GY IP 250 OP 250 PS 637: Performed by: SURGERY

## 2024-10-27 PROCEDURE — 93308 TTE F-UP OR LMTD: CPT | Mod: 26 | Performed by: INTERNAL MEDICINE

## 2024-10-27 PROCEDURE — 120N000005 HC R&B MS OVERFLOW UMMC

## 2024-10-27 PROCEDURE — 80048 BASIC METABOLIC PNL TOTAL CA: CPT | Performed by: SURGERY

## 2024-10-27 PROCEDURE — 36415 COLL VENOUS BLD VENIPUNCTURE: CPT | Performed by: STUDENT IN AN ORGANIZED HEALTH CARE EDUCATION/TRAINING PROGRAM

## 2024-10-27 PROCEDURE — 97535 SELF CARE MNGMENT TRAINING: CPT | Mod: GO

## 2024-10-27 PROCEDURE — 250N000013 HC RX MED GY IP 250 OP 250 PS 637

## 2024-10-27 PROCEDURE — 93321 DOPPLER ECHO F-UP/LMTD STD: CPT | Mod: 26 | Performed by: INTERNAL MEDICINE

## 2024-10-27 PROCEDURE — 93325 DOPPLER ECHO COLOR FLOW MAPG: CPT | Mod: 26 | Performed by: INTERNAL MEDICINE

## 2024-10-27 PROCEDURE — 93325 DOPPLER ECHO COLOR FLOW MAPG: CPT

## 2024-10-27 PROCEDURE — 250N000011 HC RX IP 250 OP 636: Performed by: SURGERY

## 2024-10-27 PROCEDURE — 84132 ASSAY OF SERUM POTASSIUM: CPT | Performed by: SURGERY

## 2024-10-27 RX ORDER — POLYETHYLENE GLYCOL 3350 17 G/17G
17 POWDER, FOR SOLUTION ORAL DAILY
Status: DISCONTINUED | OUTPATIENT
Start: 2024-10-28 | End: 2024-10-28 | Stop reason: HOSPADM

## 2024-10-27 RX ORDER — MAGNESIUM OXIDE 400 MG/1
400 TABLET ORAL EVERY 4 HOURS
Status: COMPLETED | OUTPATIENT
Start: 2024-10-27 | End: 2024-10-27

## 2024-10-27 RX ORDER — HYDRALAZINE HYDROCHLORIDE 20 MG/ML
10 INJECTION INTRAMUSCULAR; INTRAVENOUS EVERY 6 HOURS PRN
Status: DISCONTINUED | OUTPATIENT
Start: 2024-10-27 | End: 2024-10-28 | Stop reason: HOSPADM

## 2024-10-27 RX ORDER — POTASSIUM CHLORIDE 750 MG/1
10 TABLET, EXTENDED RELEASE ORAL ONCE
Status: COMPLETED | OUTPATIENT
Start: 2024-10-27 | End: 2024-10-27

## 2024-10-27 RX ORDER — WARFARIN SODIUM 1 MG/1
1 TABLET ORAL
Status: COMPLETED | OUTPATIENT
Start: 2024-10-27 | End: 2024-10-27

## 2024-10-27 RX ORDER — METHOCARBAMOL 500 MG/1
500 TABLET, FILM COATED ORAL EVERY 6 HOURS PRN
Status: DISCONTINUED | OUTPATIENT
Start: 2024-10-27 | End: 2024-10-28 | Stop reason: HOSPADM

## 2024-10-27 RX ORDER — METOPROLOL TARTRATE 25 MG/1
25 TABLET, FILM COATED ORAL 2 TIMES DAILY
Status: DISCONTINUED | OUTPATIENT
Start: 2024-10-27 | End: 2024-10-28 | Stop reason: HOSPADM

## 2024-10-27 RX ORDER — NORETHINDRONE ACETATE AND ETHINYL ESTRADIOL 1MG-20(21)
1 KIT ORAL DAILY
Status: DISCONTINUED | OUTPATIENT
Start: 2024-10-27 | End: 2024-10-28 | Stop reason: HOSPADM

## 2024-10-27 RX ORDER — AMOXICILLIN 250 MG
1 CAPSULE ORAL
Status: DISCONTINUED | OUTPATIENT
Start: 2024-10-27 | End: 2024-10-28 | Stop reason: HOSPADM

## 2024-10-27 RX ADMIN — OXYCODONE HYDROCHLORIDE 10 MG: 10 TABLET ORAL at 02:00

## 2024-10-27 RX ADMIN — ACETAMINOPHEN 650 MG: 325 TABLET, FILM COATED ORAL at 20:06

## 2024-10-27 RX ADMIN — MAGNESIUM OXIDE TAB 400 MG (241.3 MG ELEMENTAL MG) 400 MG: 400 (241.3 MG) TAB at 09:07

## 2024-10-27 RX ADMIN — MAGNESIUM OXIDE TAB 400 MG (241.3 MG ELEMENTAL MG) 400 MG: 400 (241.3 MG) TAB at 12:47

## 2024-10-27 RX ADMIN — ACETAMINOPHEN 650 MG: 325 TABLET, FILM COATED ORAL at 09:15

## 2024-10-27 RX ADMIN — Medication 1 PACKET: at 12:45

## 2024-10-27 RX ADMIN — ASPIRIN 81 MG CHEWABLE TABLET 81 MG: 81 TABLET CHEWABLE at 09:07

## 2024-10-27 RX ADMIN — WARFARIN SODIUM 1 MG: 1 TABLET ORAL at 17:45

## 2024-10-27 RX ADMIN — Medication 1 PACKET: at 16:22

## 2024-10-27 RX ADMIN — LEVETIRACETAM 500 MG: 500 TABLET, FILM COATED ORAL at 19:57

## 2024-10-27 RX ADMIN — Medication 1 PACKET: at 09:05

## 2024-10-27 RX ADMIN — LIDOCAINE 1 PATCH: 4 PATCH TOPICAL at 10:45

## 2024-10-27 RX ADMIN — NORETHINDRONE ACETATE AND ETHINYL ESTRADIOL 1 TABLET: KIT at 09:15

## 2024-10-27 RX ADMIN — METOPROLOL TARTRATE 25 MG: 25 TABLET, FILM COATED ORAL at 09:14

## 2024-10-27 RX ADMIN — POTASSIUM CHLORIDE 10 MEQ: 750 TABLET, EXTENDED RELEASE ORAL at 04:27

## 2024-10-27 RX ADMIN — FUROSEMIDE 40 MG: 10 INJECTION, SOLUTION INTRAVENOUS at 09:15

## 2024-10-27 RX ADMIN — LEVETIRACETAM 500 MG: 500 TABLET, FILM COATED ORAL at 09:05

## 2024-10-27 RX ADMIN — METOPROLOL TARTRATE 25 MG: 25 TABLET, FILM COATED ORAL at 19:57

## 2024-10-27 RX ADMIN — METHOCARBAMOL 500 MG: 500 TABLET ORAL at 14:06

## 2024-10-27 RX ADMIN — FUROSEMIDE 40 MG: 10 INJECTION, SOLUTION INTRAVENOUS at 16:22

## 2024-10-27 RX ADMIN — PANTOPRAZOLE SODIUM 40 MG: 40 TABLET, DELAYED RELEASE ORAL at 09:05

## 2024-10-27 RX ADMIN — ACETAMINOPHEN 650 MG: 325 TABLET, FILM COATED ORAL at 16:23

## 2024-10-27 NOTE — PLAN OF CARE
Temp: 98.1  F (36.7  C) BP: 115/68 Pulse: 86 Resp: 16 SpO2: 95 % O2 Device: None (Room air)    D: Pertinent PMH includes rheumatic mitral stenosis, T2MI, seizure disorder on keppra (since 2022), agenesis of the corpus collosum. Admitted to [facility] on 10/15 for new-onset decompensated HF with cardiomegaly, discharged and readmitted 10/23 for mitral valve replacement      I/A: Ronna is alert, appears to be oriented to person, place and situation. Follows commands, verbal communication is limited, pt's mother remains in room, very helpful with cares and in supporting communication. Tele in place, SR with occasional to frequent PVCs. VSS on room air. L PIV, SL. Two small, loose bowel movements overnight, uses bedside commode. PRN Tylenol x1 and PRN oxy x1 for incisional pain. Up w/assist of 1. Replaced K twice this shift.  Appeared to sleep well between cares     P: Continue to monitor and follow POC. Post-op echo possible 10/27 per CVTS note. Notify CVTS with changes.        Problem: Adult Inpatient Plan of Care  Goal: Plan of Care Review  Description: The Plan of Care Review/Shift note should be completed every shift.  The Outcome Evaluation is a brief statement about your assessment that the patient is improving, declining, or no change.  This information will be displayed automatically on your shift  note.  Outcome: Progressing  Flowsheets (Taken 10/27/2024 0215)  Outcome Evaluation: Incisional pain, managed with PRN oxy, potassium replaced x1 overnight  Overall Patient Progress: no change   Goal Outcome Evaluation:           Overall Patient Progress: no changeOverall Patient Progress: no change    Outcome Evaluation: Incisional pain, managed with PRN oxy, potassium replaced x1 overnight

## 2024-10-27 NOTE — PROGRESS NOTES
Cardiovascular Surgery Progress Note  10/27/2024         Assessment and Plan:     Efren Saavedra is a 44 year old female with PMH of rheumatic mitral stenosis, T2MI, seizure disorder on Keppra (since 2022), and agenesis of the corpus collosum. Initially admitted to [facility] on 10/15 for new-onset decompensated HF with cardiomegaly.  She discharged home and returned for elective mechanical mitral valve replacement by Dr. Craig on 10/23/24.    Cardiovascular:   HFpEF  Mitral stenosis s/p mechanical MVR  Hypervolemia, improving  Preop echo showed LV EF 60-65%  Postop baseline echo pending   - ASA 81 mg   - Increase metoprolol tartrate to 25 mg BID (PTA dose 50mg BID)   - Diuresis as below   - Anticoagulation as below    Chest tubes: Removed in ICU 10/25  TPW: Removed in ICU 10/25    Pulmonary:  - Extubated POD 1; now saturating well on RA   - Supplemental O2 PRN to keep sats > 92%  - Pulm toilet, IS, OOB/activity and deep breathing    Neurology / MSK:  Acute post-operative pain  - Multimodal analgesia with acetaminophen, oxycodone PRN, Robaxin prn, lidocaine patches    Seizure disorder  Corpus collosum agenesis  Developmental delay   - PTA Keppra resumed    S/p sternotomy   - PT/OT/CR consults   - Sternal precautions     / Renal / FE:  - Baseline SCr ~0.5-0.8; most recent creatinine at baseline  - Diuresis: 40mg IV Lasix BID (PTA dose 40mg PO BID)  - Replace electrolytes prn per protocol  - Strict I/O, daily standing weights per protocol    GI / Nutrition:   - Regular diet  - Continue bowel regimen, having antegrade bowel function    Endocrine:  Stress-induced hyperglycemia, resolved   - Initially managed on insulin drip postop, transitioned to sliding scale and since discontinued with maintenance of euglycemia without supplemental insulin   - PTA oral contraception resumed    Infectious Disease:  Stress-induced leukocytosis, resolved  - WBC WNL, afebrile, no signs or symptoms of infection  - Completed perioperative  "antibiotics    Hematology:   Acute blood loss anemia  Thrombocytopenia, resolved  Warfarin anticoagulation for mechanical MVR  Hgb stable; Plt WNL, no signs or symptoms of active bleeding    Anticoagulation:   - ASA 81 mg  - Coumadin for mechanical valve, INR goal 2.5-3.5. Most recent INR subtherapeutic.    - Defer resumption of heparin bridge for now    Prophylaxis:   - Stress ulcer prophylaxis: Pantoprazole 40 mg daily for 30 days post-operatively  - DVT prophylaxis: warfarin, SCD, OOB/activity    Disposition:   - Transferred to  on 10/25  - Therapies recommending discharge to home with assistance pending stable INR  - Medically Ready for Discharge: Anticipated in 2-4 Days      Niko Villeda, CNP, ACNPC-AG  Cardiothoracic Surgery  [employer] Pager [phone]        Interval History:     NAEO  C/o continued discomfort at her sternum.  Eating OK without N/V, having BM.  Slept OK.  Denies dyspnea.  Denies sternal popping/clicking/snapping.         Physical Exam:   Blood pressure 117/67, pulse 87, temperature 99.7  F (37.6  C), temperature source Oral, resp. rate 16, height 1.422 m (4' 8\"), weight 33.5 kg (73 lb 14.4 oz), SpO2 95%, not currently breastfeeding.  Vitals:    10/23/24 0654 10/26/24 0320 10/27/24 0729   Weight: 31.5 kg (69 lb 7.1 oz) 35.7 kg (78 lb 12.8 oz) 33.5 kg (73 lb 14.4 oz)      Admission weight: 31.5 kg  24 hr Fluid status:  -2.74 L    Gen: NAD, resting in bed, calm, cooperative on exam  Neuro: A&O, no focal deficits, JONES, baseline mental status per family  CV: RRR on monitor, WWP  Pulm: unlabored breathing on RA  Abd: SNDNT  Ext: mild peripheral edema, no gross deformities  Incision: clean, dry, intact, no erythema or induration, skin glue intact, sternum stable  Tubes/drain sites: C/D without associated erythema or induration       Clinically Significant Risk Factors        # Hypokalemia: Lowest K = 2.9 mmol/L in last 2 days, will replace as needed    # Hypocalcemia: Lowest iCa = 4.1 mg/dL " "in last 2 days, will monitor and replace as appropriate     # Hypoalbuminemia: Lowest albumin = 2.9 g/dL at 10/25/2024  5:43 AM, will monitor as appropriate        # Acute heart failure with preserved ejection fraction: heart failure noted on problem list, last echo with EF >50%, and receiving IV diuretics          # Cachexia: Estimated body mass index is 16.57 kg/m  as calculated from the following:    Height as of this encounter: 1.422 m (4' 8\").    Weight as of this encounter: 33.5 kg (73 lb 14.4 oz).        # Financial/Environmental Concerns: none              Data:    Imaging:  reviewed recent imaging    Recent Results (from the past 24 hours)   XR Chest Port 1 View    Narrative    Exam: XR CHEST PORT 1 VIEW, 10/26/2024 11:48 AM    Comparison: Radiograph 10/25/2024, 10/24/2024    History: s/p MVR    Findings:  Stable enlarged cardiomediastinal silhouette. Mitral valve prosthesis.  Intact median sternotomy wires. Similar diffuse interstitial opacities  with slightly increased patchy bibasilar opacities. Possible small  bilateral pleural effusions. No pneumothorax. Visualized upper abdomen  is unremarkable. No acute osseous abnormality.      Impression    Impression:   Similar diffuse interstitial opacities with slightly increased patchy  bibasilar opacities. Possible small bilateral pleural effusions.    I have personally reviewed the examination and initial interpretation  and I agree with the findings.    CARMINA LYMAN DO         SYSTEM ID:  [id number]       Labs:  [facility]  Recent Labs   Lab 10/27/24  0608 10/27/24  0152 10/26/24  1959 10/26/24  1202 10/26/24  0550 10/25/24  0543 10/24/24  0745 10/24/24  0405     --   --   --  138 140  --  144   POTASSIUM 4.1 3.8 2.9* 3.7 3.4 3.8  --  4.1   CHLORIDE 105  --   --   --  103 107  --  109*   YUE 7.8*  --   --   --  8.1* 8.1*  --  9.0   CO2 25  --   --   --  22 24  --  21*   BUN 13.6  --   --   --  14.7 12.1  --  12.1   CR 0.64  --   --   --  0.59 0.51  --  0.70 "   GLC 90  --   --   --  108* 118* 115* 130*     CBC  Recent Labs   Lab 10/27/24  0608 10/26/24  0550 10/25/24  0543 10/24/24  0405   WBC 10.1 12.3* 13.3* 16.0*   RBC 2.97* 3.02* 3.10* 3.46*   HGB 8.8* 8.8* 9.3* 10.3*   HCT 27.7* 28.1* 28.2* 30.5*   MCV 93 93 91 88   MCH 29.6 29.1 30.0 29.8   MCHC 31.8 31.3* 33.0 33.8   RDW 14.7 14.6 14.7 14.9    172 131* 160     INR  Recent Labs   Lab 10/27/24  0635 10/26/24  1311 10/26/24  0550 10/25/24  0543   INR 2.41* 2.09* 2.55* 1.63*      Hepatic Panel  Recent Labs   Lab 10/26/24  0550 10/25/24  0543 10/24/24  0405 10/23/24  1942   AST 35 38 51* 51*   ALT 12 11 19 20   ALKPHOS 51 39* 41 41   BILITOTAL 0.7 1.0 1.1 1.6*   ALBUMIN 3.0* 2.9* 3.1* 3.0*     GLUCOSE:   Recent Labs   Lab 10/27/24  0608 10/26/24  0550 10/25/24  0543 10/24/24  0745 10/24/24  0405 10/23/24  2318   GLC 90 108* 118* 115* 130* 120*

## 2024-10-27 NOTE — PHARMACY-ADMISSION MEDICATION HISTORY
Pharmacist Admission Medication History    Admission medication history is complete. The information provided in this note is only as accurate as the sources available at the time of the update.    Information Source(s): Family member, CareEverywhere/SureScripts, and recent discharge summary 10/19/24      Pertinent Information: Pt's mother was knowledgeable about home medications. Notes they were only given 1 week supply of furosemide on discharge from hospital on 10/19, will need refills prescribed on discharge if plan to continue    Changes made to PTA medication list:  Added: None  Deleted: None  Changed: None    Allergies reviewed with patient and updates made in EHR: yes    Medication History Completed By: Yani Sr [facility] 10/27/2024 3:27 PM    PTA Med List   Medication Sig Last Dose/Taking    furosemide (LASIX) 40 MG tablet Take 1 tablet (40 mg) by mouth 2 times daily. 10/22/2024 at  7:00 PM    ketoconazole (NIZORAL) 2 % external cream Apply topically daily as needed for itching. More than a month    levETIRAcetam (KEPPRA) 250 MG tablet Take 500 mg by mouth 2 times daily 10/23/2024 at  5:30 AM    metoprolol tartrate (LOPRESSOR) 50 MG tablet Take 1 tablet (50 mg) by mouth 2 times daily. 10/23/2024 at  5:30 AM    norethindrone-ethinyl estradiol (JUNEL FE 1/20) 1-20 MG-MCG tablet Take 1 tablet by mouth daily. Past Week

## 2024-10-28 ENCOUNTER — APPOINTMENT (OUTPATIENT)
Dept: OCCUPATIONAL THERAPY | Facility: CLINIC | Age: 44
DRG: 219 | End: 2024-10-28
Attending: SURGERY
Payer: MEDICARE

## 2024-10-28 ENCOUNTER — TELEPHONE (OUTPATIENT)
Dept: CARDIOLOGY | Facility: CLINIC | Age: 44
End: 2024-10-28
Payer: MEDICARE

## 2024-10-28 VITALS
DIASTOLIC BLOOD PRESSURE: 62 MMHG | HEART RATE: 75 BPM | WEIGHT: 72.8 LBS | TEMPERATURE: 97.5 F | HEIGHT: 56 IN | OXYGEN SATURATION: 99 % | RESPIRATION RATE: 17 BRPM | SYSTOLIC BLOOD PRESSURE: 101 MMHG | BODY MASS INDEX: 16.38 KG/M2

## 2024-10-28 LAB
ANION GAP SERPL CALCULATED.3IONS-SCNC: 13 MMOL/L (ref 7–15)
ATRIAL RATE - MUSE: 84 BPM
BUN SERPL-MCNC: 10 MG/DL (ref 6–20)
CALCIUM SERPL-MCNC: 7.8 MG/DL (ref 8.8–10.4)
CHLORIDE SERPL-SCNC: 101 MMOL/L (ref 98–107)
CREAT SERPL-MCNC: 0.58 MG/DL (ref 0.51–0.95)
DIASTOLIC BLOOD PRESSURE - MUSE: NORMAL MMHG
EGFRCR SERPLBLD CKD-EPI 2021: >90 ML/MIN/1.73M2
ERYTHROCYTE [DISTWIDTH] IN BLOOD BY AUTOMATED COUNT: 15.1 % (ref 10–15)
GLUCOSE SERPL-MCNC: 95 MG/DL (ref 70–99)
HCO3 SERPL-SCNC: 25 MMOL/L (ref 22–29)
HCT VFR BLD AUTO: 28.7 % (ref 35–47)
HGB BLD-MCNC: 9.3 G/DL (ref 11.7–15.7)
INR PPP: 2.2 (ref 0.85–1.15)
INTERPRETATION ECG - MUSE: NORMAL
MAGNESIUM SERPL-MCNC: 1.8 MG/DL (ref 1.7–2.3)
MCH RBC QN AUTO: 29.9 PG (ref 26.5–33)
MCHC RBC AUTO-ENTMCNC: 32.4 G/DL (ref 31.5–36.5)
MCV RBC AUTO: 92 FL (ref 78–100)
P AXIS - MUSE: -15 DEGREES
PHOSPHATE SERPL-MCNC: 2.4 MG/DL (ref 2.5–4.5)
PLATELET # BLD AUTO: 253 10E3/UL (ref 150–450)
POTASSIUM SERPL-SCNC: 3.6 MMOL/L (ref 3.4–5.3)
PR INTERVAL - MUSE: 166 MS
QRS DURATION - MUSE: 68 MS
QT - MUSE: 368 MS
QTC - MUSE: 434 MS
R AXIS - MUSE: 130 DEGREES
RBC # BLD AUTO: 3.11 10E6/UL (ref 3.8–5.2)
SODIUM SERPL-SCNC: 139 MMOL/L (ref 135–145)
SYSTOLIC BLOOD PRESSURE - MUSE: NORMAL MMHG
T AXIS - MUSE: 93 DEGREES
VENTRICULAR RATE- MUSE: 84 BPM
WBC # BLD AUTO: 10.9 10E3/UL (ref 4–11)

## 2024-10-28 PROCEDURE — 82565 ASSAY OF CREATININE: CPT | Performed by: SURGERY

## 2024-10-28 PROCEDURE — 250N000013 HC RX MED GY IP 250 OP 250 PS 637

## 2024-10-28 PROCEDURE — 36415 COLL VENOUS BLD VENIPUNCTURE: CPT | Performed by: SURGERY

## 2024-10-28 PROCEDURE — 250N000009 HC RX 250: Performed by: SURGERY

## 2024-10-28 PROCEDURE — 258N000003 HC RX IP 258 OP 636: Performed by: SURGERY

## 2024-10-28 PROCEDURE — 250N000013 HC RX MED GY IP 250 OP 250 PS 637: Performed by: SURGERY

## 2024-10-28 PROCEDURE — 97535 SELF CARE MNGMENT TRAINING: CPT | Mod: GO

## 2024-10-28 PROCEDURE — 250N000011 HC RX IP 250 OP 636: Performed by: SURGERY

## 2024-10-28 PROCEDURE — 85610 PROTHROMBIN TIME: CPT | Performed by: STUDENT IN AN ORGANIZED HEALTH CARE EDUCATION/TRAINING PROGRAM

## 2024-10-28 PROCEDURE — 85027 COMPLETE CBC AUTOMATED: CPT

## 2024-10-28 PROCEDURE — 80048 BASIC METABOLIC PNL TOTAL CA: CPT | Performed by: SURGERY

## 2024-10-28 PROCEDURE — 250N000013 HC RX MED GY IP 250 OP 250 PS 637: Performed by: INTERNAL MEDICINE

## 2024-10-28 PROCEDURE — 250N000013 HC RX MED GY IP 250 OP 250 PS 637: Performed by: FAMILY MEDICINE

## 2024-10-28 PROCEDURE — 83735 ASSAY OF MAGNESIUM: CPT

## 2024-10-28 PROCEDURE — 84100 ASSAY OF PHOSPHORUS: CPT

## 2024-10-28 RX ORDER — METHOCARBAMOL 500 MG/1
500 TABLET, FILM COATED ORAL 3 TIMES DAILY PRN
Qty: 10 TABLET | Refills: 0 | Status: ON HOLD | OUTPATIENT
Start: 2024-10-28 | End: 2024-10-30

## 2024-10-28 RX ORDER — POTASSIUM CHLORIDE 750 MG/1
20 TABLET, EXTENDED RELEASE ORAL DAILY
Qty: 10 TABLET | Refills: 0 | Status: ON HOLD | OUTPATIENT
Start: 2024-10-28 | End: 2024-11-03

## 2024-10-28 RX ORDER — LIDOCAINE 4 G/G
1 PATCH TOPICAL EVERY 24 HOURS
Qty: 10 PATCH | Refills: 0 | Status: ON HOLD | OUTPATIENT
Start: 2024-10-28 | End: 2024-11-03

## 2024-10-28 RX ORDER — MAGNESIUM OXIDE 400 MG/1
400 TABLET ORAL EVERY 4 HOURS
Status: COMPLETED | OUTPATIENT
Start: 2024-10-28 | End: 2024-10-28

## 2024-10-28 RX ORDER — POTASSIUM CHLORIDE 750 MG/1
10 TABLET, EXTENDED RELEASE ORAL ONCE
Status: COMPLETED | OUTPATIENT
Start: 2024-10-28 | End: 2024-10-28

## 2024-10-28 RX ORDER — FUROSEMIDE 40 MG/1
40 TABLET ORAL DAILY
Qty: 5 TABLET | Refills: 0 | Status: ON HOLD | OUTPATIENT
Start: 2024-10-28 | End: 2024-10-30

## 2024-10-28 RX ORDER — METOPROLOL TARTRATE 50 MG
25 TABLET ORAL 2 TIMES DAILY
Qty: 28 TABLET | Refills: 0 | Status: SHIPPED | OUTPATIENT
Start: 2024-10-28 | End: 2024-10-30

## 2024-10-28 RX ORDER — ASPIRIN 81 MG/1
81 TABLET, CHEWABLE ORAL DAILY
Qty: 100 TABLET | Refills: 0 | Status: ON HOLD | OUTPATIENT
Start: 2024-10-29 | End: 2024-10-30

## 2024-10-28 RX ORDER — AMOXICILLIN 250 MG
1 CAPSULE ORAL
Status: ON HOLD | COMMUNITY
Start: 2024-10-28 | End: 2024-11-03

## 2024-10-28 RX ORDER — PANTOPRAZOLE SODIUM 40 MG/1
40 TABLET, DELAYED RELEASE ORAL DAILY
Qty: 21 TABLET | Refills: 0 | Status: SHIPPED | OUTPATIENT
Start: 2024-10-29

## 2024-10-28 RX ORDER — OXYCODONE HYDROCHLORIDE 5 MG/1
5 TABLET ORAL 2 TIMES DAILY PRN
Qty: 4 TABLET | Refills: 0 | Status: SHIPPED | OUTPATIENT
Start: 2024-10-28 | End: 2024-11-12

## 2024-10-28 RX ORDER — WARFARIN SODIUM 3 MG/1
1.5 TABLET ORAL DAILY
Qty: 6 TABLET | Refills: 0 | Status: ON HOLD | OUTPATIENT
Start: 2024-10-28 | End: 2024-11-03

## 2024-10-28 RX ORDER — ACETAMINOPHEN 325 MG/1
650-975 TABLET ORAL EVERY 6 HOURS PRN
COMMUNITY
Start: 2024-10-28

## 2024-10-28 RX ADMIN — MAGNESIUM OXIDE TAB 400 MG (241.3 MG ELEMENTAL MG) 400 MG: 400 (241.3 MG) TAB at 10:29

## 2024-10-28 RX ADMIN — POTASSIUM PHOSPHATE, MONOBASIC POTASSIUM PHOSPHATE, DIBASIC 9 MMOL: 224; 236 INJECTION, SOLUTION, CONCENTRATE INTRAVENOUS at 10:29

## 2024-10-28 RX ADMIN — METOPROLOL TARTRATE 25 MG: 25 TABLET, FILM COATED ORAL at 08:32

## 2024-10-28 RX ADMIN — ACETAMINOPHEN 650 MG: 325 TABLET, FILM COATED ORAL at 09:12

## 2024-10-28 RX ADMIN — LEVETIRACETAM 500 MG: 500 TABLET, FILM COATED ORAL at 08:32

## 2024-10-28 RX ADMIN — FUROSEMIDE 40 MG: 10 INJECTION, SOLUTION INTRAVENOUS at 08:32

## 2024-10-28 RX ADMIN — ACETAMINOPHEN 650 MG: 325 TABLET, FILM COATED ORAL at 03:12

## 2024-10-28 RX ADMIN — ASPIRIN 81 MG CHEWABLE TABLET 81 MG: 81 TABLET CHEWABLE at 08:32

## 2024-10-28 RX ADMIN — LIDOCAINE 1 PATCH: 4 PATCH TOPICAL at 08:31

## 2024-10-28 RX ADMIN — PANTOPRAZOLE SODIUM 40 MG: 40 TABLET, DELAYED RELEASE ORAL at 08:32

## 2024-10-28 RX ADMIN — POLYETHYLENE GLYCOL 3350 17 G: 17 POWDER, FOR SOLUTION ORAL at 08:32

## 2024-10-28 RX ADMIN — NORETHINDRONE ACETATE AND ETHINYL ESTRADIOL 1 TABLET: KIT at 08:31

## 2024-10-28 RX ADMIN — MAGNESIUM OXIDE TAB 400 MG (241.3 MG ELEMENTAL MG) 400 MG: 400 (241.3 MG) TAB at 13:44

## 2024-10-28 RX ADMIN — POTASSIUM CHLORIDE 10 MEQ: 750 TABLET, EXTENDED RELEASE ORAL at 09:12

## 2024-10-28 ASSESSMENT — ACTIVITIES OF DAILY LIVING (ADL)
ADLS_ACUITY_SCORE: 0

## 2024-10-28 NOTE — PLAN OF CARE
Goal Outcome Evaluation:      Plan of Care Reviewed With: patient, parent    Overall Patient Progress: improvingOverall Patient Progress: improving    Outcome Evaluation: ambulated in hallway, pain managed with prn robaxin and Tylenol    Patient POD 4 after mitral valve replacement.  PMH of rheumatic mitral stenosis, DM2, seizure disorder, agenesis of the corpus callosum, and developmental delay.     Neuro: A&O x4, denied dizziness.  Respiratory:  Denied shortness of breath, lung sounds diminished in left lung base.  Cardiac: Valve click noted.  No edema noted.    GI: Denied nausea, bowel sounds hyperactive.  Had small loose stools during shift, bowel medications discontinued.  : Had good urine output, see I&O flowsheet.  Skin: See PCS for assessment and treatment of wounds and surgical incisions.   VS: In sinus rhythm with occasional PVCs, HR 80s, SBP 90s-110s, SaO2 95-96% on room air.    Drips:  None.    Electrolytes: Magnesium and phosphorus replaced per protocol.  Pain: Reported sternal pain that was managed with prn acetaminophen and methocarbamol.  Tests/procedures: Echocardiogram performed during shift, see Chart Review for results.    Mobility: Ambulated in hallway with standby assist and use of walker.  Social: Brother and cousin visited during shift, mother stayed with patient and assisted with cares.    Plan:  Continue to monitor pain, VS, heart rhythm, skin and surgical site integrity, fluid status, bowel status, cardiac and respiratory status.  Notify care team of changes in patient condition or other concerns.  Encourage activity to maintain strength.

## 2024-10-28 NOTE — DISCHARGE INSTRUCTIONS
"    AFTER YOU GO HOME FROM YOUR HEART SURGERY  (Mechanical mitral valve replacement surgery - 10/23/2024)    You had a sternotomy, avoid lifting anything greater than 10 pounds for 8 weeks after surgery and then less than 20 pounds for an additional 4 weeks.   Please try to sleep on your back for the first 4-6 weeks to avoid extra stress on your sternum (breastbone) while it is healing.   Do not reach backwards or use arms to push out of chair.   Do not let people pull on your arms to assist with standing.   Avoid twisting or reaching too far across your body.    Avoid strenuous activities such as bowling, vacuuming, raking, shoveling, golf or tennis for 12 weeks after your surgery.   It is okay to resume sex if you feel comfortable in doing so. You may have to try different positions with your partner.    Splint your chest incision by hugging a pillow or bringing your arms across your chest when coughing or sneezing.     No driving for 4 weeks after surgery or while on pain medication.     You can sometimes hear a mechanical valve \"clicking,\" this is normal and not a sign of something wrong.    Shower or wash your incisions daily with soap and water (or as instructed), pat dry.   Keep wound clean and dry, showers are okay after discharge, but don't let spray hit directly on incision.   No baths or swimming for 1 month.   Cover chest tube sites with dry gauze until they stop draining, then leave open to air. It is not abnormal for chest tube sites to drain yellowish/clear fluid for up to 2-3 weeks after surgery.   Watch for signs of infection: increased redness, tenderness, warmth or any drainage that appears infected (pus like) or is persistent.  Also a temperature > 100.5 F or chills. Call your surgeon or primary care provider's office immediately.   Remove any skin glue left on incisions after 10-14 days. This will not affect your incision and can speed up healing.    Exercise is very important in your recovery. " Please follow the guidelines set up for you in your cardiac rehab classes at the hospital. If outpatient cardiac rehab was ordered for you, we highly recommend you participate. If you have problems arranging your cardiac rehab, please call [phone] for all locations, with the exception of [place], please call [phone] and [place], please call [phone].    Avoid sitting for prolonged periods of time, try to walk every hour during the day. If you have a leg incision, elevate your leg often when you are not walking.    Check your weight when you get home from the hospital and continue to check it daily through your recovery for at least a month. If you notice a weight gain of 2-3 pounds in a week, notify your primary care physician, cardiologist or surgeon.    Bowel activity may be slow after surgery. If necessary, you may take an over the counter laxative such as milk of magnesia or Miralax. You may have stool softeners prescribed (docusate sodium, Senokot). We recommend using stool softeners while using narcotics for pain (oxycodone/percocet, hydrocodone/vicodin, hydromorphone/dilaudid).      If you have been prescribed opioid pain medications, wean OFF of them (oxycodone, dilaudid, hydrocodone) as soon as possible. You should continue taking acetaminophen as long as you have any surgical pain as the first choice for pain control and add narcotics only as necessary for pain to be tolerable.      DENTAL VISITS AFTER SURGERY  If you have had your heart valve repaired or replaced, we do not recommend having any dental work done for 6 months and you will need to take an antibiotic prior to dental visits from now on.  Please notify your dentist before any procedure for the proper treatment needed. The antibiotic is taken by mouth one hour prior to visit. This includes routine cleanings.    DO NOT SMOKE.  IF YOU NEED HELP QUITTING, PLEASE TALK WITH YOUR CARDIOLOGIST OR PRIMARY DOCTOR.    You are on a blood  thinner, follow the instructions you were given in the hospital and DO NOT SKIP this medication. Try and take it the same time everyday. Your primary care physician or coumadin clinic will manage the dosing. INR goal is 2.5-3.5.    REGARDING PRESCRIPTION REFILLS.  If you need a refill on your pain medication contact us to discuss your pain and a possible one time refill.   All other medications will be adjusted, discontinued and re-filled by your primary care physician and/or your cardiologist as they were prior to your surgery. We have given you enough for one to three month with possibly one refill.    POST-OPERATIVE CLINIC VISITS  You will have a follow up visit in [facility] Provider Clinic at the [facility] ([facility] on [facility].  You will then return to the care of your primary provider and your cardiologist. Future medication refills should come from your PCP or Cardiologist.   You should see your primary care provider about 2 weeks after discharge.   It is important to see your cardiologist about 4 weeks after discharge.    If you do not hear from a  in 7 days, please call [phone] (choose option 1) and request to be seen with a general cardiologist or someone that you have seen in the past.   If there is a need to return to see CT Surgery please call our  at [phone].    SURGICAL QUESTIONS  Please call on of the RN Coordinators listed below with surgical recovery and medication questions.  They will assist you with your needs and contact other surgery care team members as indicated.    On weekends or after hours, please call [phone] and ask the  to page the Cardiothoracic Surgery fellow on call.      Thank you,    Your Cardiothoracic Surgery Team   Tiffani Skinner, RN Care Coordinator -  [phone]  Chanelle Medellin, RN Care Coordinator - [phone]  Sumaya Cardenas RN Care Coordinator - [phone]  Tiffani Canchola, RN Care Coordinator -  [phone]  Mariza Alex RN Care Coordinator - [phone]

## 2024-10-28 NOTE — PLAN OF CARE
AO X 4, RA, VSS, sinus dys, HR 70's, pale skin, surgical incisions healing, showered, BM X 1, adequate UOP, Tylenol PRN for incisional pain X 1. Continue with POC. Discharging home today with outpatient cardiac rehab.

## 2024-10-28 NOTE — PLAN OF CARE
DISCHARGE   Discharged to: Home  Via: Automobile  Accompanied by: Family  Discharge Instructions: diet, activity, medications, follow up appointments, when to call the MD, and what to watchout for (i.e. s/s of infection, increasing SOB, palpitations, chest pain,)  Prescriptions: To be filled by [facility] pharmacy per pt's request; medication list reviewed & sent with pt  Follow Up Appointments: arranged; information given  Belongings: All sent with pt  IV: out  Telemetry: off  Pt exhibits understanding of above discharge instructions; all questions answered.  Discharge Paperwork: faxed

## 2024-10-28 NOTE — PROGRESS NOTES
Care Management Discharge Note    Discharge Date: 10/29/2024       Discharge Disposition:  (undetermined)    Discharge Services:  (undetermined)    Discharge DME:  (undetermined)    Discharge Transportation: family or friend will provide    Private pay costs discussed: Not applicable    Does the patient's insurance plan have a 3 day qualifying hospital stay waiver?  No    PAS Confirmation Code:    Patient/family educated on Medicare website which has current facility and service quality ratings: no    Education Provided on the Discharge Plan: Yes  Persons Notified of Discharge Plans: Yes LG/mom Christine   Patient/Family in Agreement with the Plan:      Handoff Referral Completed: Yes, [facility] PCP: Internal handoff referral completed    Additional Information:  -CM updated in rounds that patient medically ready for discharge home +  OP CR today  -Primary team requesting next INR juan david for tomorrow or Wednesday latest  -CM met with patient and LG/ mom Christine who was at bedside to review discharge planning for today which includes INR follow up. Mom stated patient gets IN[facility] checked at [facility]. CM called [facility] to schedule INR appointment for tomorrow. CM got appointment for 10/29 @ 1:10pm. Juan David on AVS.   -CM verified Mom would be transporting patient home.     Fay Rios RN BSN  Tete RN Care Coordinator   Covering Unit 6C  Phone [phone]      SEARCHABLE in [facility] - search CARE COORDINATOR       [place] [address] [place] ([phone]) Saturday & Sunday; ([phone])  Recognized Holidays     Units: 5A Onc 5201 - 5219 RNCC,  5A Onc 5220 thru 5240 RNCC, 5C OFFSERVICE [phone] RNCC & 5C OFF SERVICE [id number] RNCC     Units: 6B Vocera, 6C Card 6401 thru 6420 RNCC, 6C Card 6502 thru 6514 RNCC & 6C Card 6515 thru 6519 RNCC     Units: 7A SOT RNCC Vocera, 7B Med Surg Vocera, 7C Med Surg 7401 thru 7418 RNCC & 7C Med Surg 7502 thru 7521 RNCC     Units: 6A Vocera & 4A CVICU Vocera, 4C MICU Vocera, and 4E SICU  Vocera       Units: 5 Ortho Vocera & 5 Med Surg Vocera      Units: 6 Med Surg Vocera & 8 Med Surg Vocera

## 2024-10-28 NOTE — PLAN OF CARE
Hours of Care:  1900 - 0700    PRN: tylenol x 1    Mother at bedside. Assist x 1    Neuro: A/O x 4. Makes needs known  Respiratory: On room air. Lung sounds diminished in bases. Denies shortness of breath at rest.  Cardiac: VSS. SR with occPVCs.    GI/: Last BM 10/27. No report of N/V. Voids appropriately. Up to commode at bedside  Skin: See PCS for assessment and treatment of wounds and surgical incisions.  LDA: L PIV  Electrolytes: RN managed magnesium, phosphorus, potassium.   Pain: Denies  Diet: Regular    P: Continue to follow POC and report changes to [facility].      Goal Outcome Evaluation:      Plan of Care Reviewed With: patient, parent    Overall Patient Progress: improvingOverall Patient Progress: improving    Outcome Evaluation: pain managed with prn tylenol. Mother at bedside assisting with cares

## 2024-10-28 NOTE — OP NOTE
PREOPERATIVE DIAGNOSES:     1.  Severe mitral stenosis  2. Congestive heart failure     POSTOPERATIVE DIAGNOSES:   1.  Severe mitral stenosis.  2. Congestive heart failure  3.  Left atrial appendage clot     PROCEDURE:  1. Mitral valve replacement with a 23 mm St Durga mechanical valve  2. Removal of left atrial appendage clot     SURGEON:  Deangelo Craig MD     ASSISTANT:  MD Quinn Crump MD      OPERATIVE INDICATIONS:  Efren Saavedra is a 44 year old female with PMH of rheumatic mitral stenosis, T2MI, seizure disorder on keppra (since 2022), agenesis of the corpus collosum. Initially admitted to [facility] on 10/15 for new-onset decompensated HF with cardiomegaly  Decision was made to proceed with  mitral valve replacement.  I discussed risks of surgery with the patient's family including risk of death, stroke, bleeding, wound failure, renal failure, arrhythmias.  They understand and willing to proceed with surgery.     OPERATIVE FINDINGS:  The patient's sternum was of adequate quality.  Pericardial space was free of any adhesions.   The mitral valve has thickening of both leaflets and significant calcifications at A1 P1 junction.  Also moderate sized clot in LA appendage.     DESCRIPTION OF PROCEDURE:  The patient was brought to the operating room in stable, but critical condition.  Following the administration of general anesthesia, the patient's chest, abdomen, lower extremities were prepped and draped in the usual sterile manner.  A median sternotomy was performed.  Preparation of cardiopulmonary bypass included ACT-guided heparinization and admission of Amicar.  Aorta was cannulated with 18-Armenian arterial cannula via 3 0 tevek pursestring sutures x 2.  Venous cannula was inserted into both superior and inferior vena cava.  Full cardiopulmonary bypass was initiated.  Antegrade and retrograde cardioplegia were also placed.  Aortic pressure was temporarily reduced and the aorta was crossclamped.   1.5 liters of cold blood cardioplegia administered with antegrade and retrograde routes.   Next, our attention was turned to the mitral valve.  Left atriotomy was performed.  Adequate exposure of the mitral leaflet was obtained using the Estech retractor system. The clot was removed that was protruding through the left atrial appendage.  The anterior and posterior mitral valve leaflets were excised along with the thickened chordae.   Two 0 tevdek mattress sutures were placed on the annulus with pledgets on the atrial side.  These sutures then passed through a sewing ring of a 23 mm St. Durga mechanical valve, which was seated and tied without difficulty using Cor-Knot device.  Left atriotomy was closed with 3-0 Prolene pledgeted sutures.   Warm dose of retrograde hotshot cardioplegia was given and with high suction on the aortic root vent, the cross-clamp was released.  Organized rhythm resumed without the need for defibrillations.  Rewarming reperfusion was allowed.  De-airing was confirmed by echography.  The patient gradually weaned off cardiopulmonary bypass using moderate dose  epinephrine.  Following termination of cardiopulmonary bypass, LV function was normal with normal function of mitral valve with no paravalvular leak.  Protamine was given and all cannulas removed.  Careful hemostasis was obtained.  Two right atrial and two right ventricular pacing wires were placed. Two anterior mediastinal and bilateral pleural chest tube was placed.  Sternum was closed with surgical steel wires.  Fascia, subcutaneous tissue, skin of chest were closed in layers.  The patient transferred to ICU in stable critical condition.  I was present for and performed the entire operation myself.

## 2024-10-28 NOTE — PHARMACY-ANTICOAGULATION SERVICE
Clinical Pharmacy- Warfarin Discharge Note  This patient is currently on warfarin for the treatment of  mechanical MVR .  INR Goal= 2.5-3.5    Anticoagulation Dose History  More data exists         Latest Ref Rng & Units 10/16/2024 10/23/2024 10/24/2024 10/25/2024 10/26/2024 10/27/2024 10/28/2024   Recent Dosing and Labs   warfarin ANTICOAGULANT (COUMADIN) 1 MG tablet - - - - - 1 mg, $Given 1 mg, $Given -   warfarin ANTICOAGULANT (COUMADIN) 1.5 mg TABS half-tab - - - 1.5 mg, $Given 1.5 mg, $Given - - -   INR 0.85 - 1.15 1.16  1.33  1.87  1.25  1.63  2.09  2.55  2.41  2.20       Details          Multiple values from one day are sorted in reverse-chronological order               Vitamin K doses administered during the last 7 days: none   FFP administered during the last 7 days: no  Recommend discharging the patient on a warfarin regimen of 1.5 mg (1/2 tablet) today with a prescription for warfarin 3mg tablets. Further dosing to be determined by outpatient INR clinic.      The patient should have an INR checked  tomorrow, 10/28 .     Yani Sr, Glenn, BCPS      Never

## 2024-10-28 NOTE — PLAN OF CARE
Physical Therapy Discharge Summary    Reason for therapy discharge:    Discharged to home.    Progress towards therapy goal(s). See goals on Care Plan in [facility] electronic health record for goal details.  Goals partially met.  Barriers to achieving goals:   discharge from facility.    Therapy recommendation(s):    Outpatient cardiac rehab to progress activity tolerance

## 2024-10-28 NOTE — PLAN OF CARE
Occupational Therapy Discharge Summary    Reason for therapy discharge:    Discharged to home.    Progress towards therapy goal(s). See goals on Care Plan in [facility] electronic health record for goal details.  Goals partially met.  Barriers to achieving goals:   discharge from facility.    Therapy recommendation(s):    Continued therapy is recommended.  Rationale/Recommendations:  pt will benefit from OP CR for ongoing progression of cardiopulmonary health. Will also benefit from A as needed from family for precaution maintenance.

## 2024-10-28 NOTE — DISCHARGE SUMMARY
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 resolved  Acute blood loss anemia, stable  Acute blood loss thrombocytopenia, resolved  Chronic warfarin anticoagulation for mechanical mitral valve, INR 2.5-3.5  Hypervolemia, improving  HFpEF, stable  Seizure disorder  Corpus callosum agenesis  Developmental delay    PROCEDURES PERFORMED:   Date: 10/223/2024    Surgeon: Dr. Craig    Procedure/Surgery Information   Procedure: Procedure(s):  Median Sternotomy, Cardiopulmonary Bypass, Mitral Valve Replacement with St Durga Mechanical Valve size 23mm, Interoperative Transesophageal Echocardiogram per Anesthesia   Surgeon(s): Surgeons and Role:     * Deangelo Craig MD - Primary     * Quinn Ledesma PA-C - Assisting     * Senthil Baca MD - Assisting     * Kush Boswell MD - Fellow - Assisting   Specimens: ID Type Source Tests Collected by Time Destination   1 : Left atrial appendage clot Tissue Other SURGICAL PATHOLOGY EXAM Deangelo Craig MD 10/23/2024 11:16 AM    2 : mitral valve leaflets Tissue Heart Valve, Mitral (Bicuspid) SURGICAL PATHOLOGY EXAM Deangelo Craig MD 10/23/2024 12:12 PM             PATHOLOGY RESULTS:    Final Diagnosis   A. LEFT ATRIAL APPENDAGE CLOT, EXCISION:  -Blood clot.     B. MITRAL VALVE LEAFLETS, EXCISION:  -Mitral valve leaflets with myxoid degeneration and coarse calcifications.    Electronically signed by Indio Williamson MD on 10/25/2024 at  5:22 PM       CONSULTS:    PT/OT  Care Management    BRIEF HISTORY OF ILLNESS:  Efren Saavedra is a 44 year with pertinent medical history of seizure disorder, intellectual disability, and rheumatic mitral stenosis who was recently hospitalized for acute decompensated heart failure with preserved ejection fraction and type II MI with moderate malnutrition in the context of chronic illness/disease.  She was referred to CV Surgery for consideration of mitral valve replacement and she returned on an elective basis for surgery.    HOSPITAL COURSE: Efren Saavedra is a 44 year old female who on 10/23/2024 underwent the above-named procedures and tolerated the operation well.     Postoperatively was admitted to the CVICU.  Patient was extubated on POD 1.  Blood pressure and cardiac index were managed with vasopressors and inotropic agents which were continuously weaned until no longer needed.       Patient was transiently hyperglycemic and treated with insulin infusion then transitioned to sliding scale insulin per protocol. Blood sugars remained stable such that exogenous insulin was no longer required and no further glycemic control agents will be required at discharge.    Patient was subsequently transferred to the surgical telemetry floor on POD 2.    While on the surgical unit, the patient continued to progress well. Chest tubes and temporary pacemaker wires were removed when deemed appropriate.     Patient was fluid overloaded and treated with diuretics. She remains up about 3 lbs from preoperative weight and will discharge with 5 days of diuretic therapy with potassium supplementation; ongoing need will require re-evaluation during clinic follow-up.      Prior to discharge, her pain was controlled well, she was working well with therapies, able to perform most ADLs, ambulate without difficulty, and had full return of bowel and bladder  "function.  On 10/28/24 she was discharged home in stable condition. Sternal precautions will continue for 12 weeks post-operatively with a upper extremity weight limit of 10 lb (pushing/pulling/lifting) for the first 8 weeks followed by an additional 4 weeks with a limit of 20 lb.  Follow up with Cardiology and Cardiac Surgery have been arranged. Pt encouraged to follow up with PCP and Cardiac Rehab upon discharge.    Patient discharged on aspirin:  Yes - 81 mg  Patient discharged on beta blocker: yes - metoprolol tartrate   Patient discharged on ACE Inhibitor/ARB: no      Patient discharged on statin: no          Discharge Disposition:     Discharged to home            Condition on Discharge:     Discharge condition: Stable   Discharge vitals: /62 (BP Location: Right arm)   Pulse 75   Temp 97.5  F (36.4  C) (Oral)   Resp 17   Ht 1.422 m (4' 8\")   Wt 33 kg (72 lb 12.8 oz)   SpO2 99%   BMI 16.32 kg/m     Code status on discharge: Full Code     Vitals:    10/26/24 0320 10/27/24 0729 10/28/24 0614   Weight: 35.7 kg (78 lb 12.8 oz) 33.5 kg (73 lb 14.4 oz) 33 kg (72 lb 12.8 oz)         DAY OF DISCHARGE PHYSICAL EXAM:    Gen: NAD, resting in bed, calm, cooperative on exam  Neuro: Alert and grossly oriented, no focal deficits, baseline mental status per family, follows commands  CV: RRR on monitor - SR, WWP  Pulm: unlabored breathing on RA  Abd: SNTND, no guarding or peritoneal signs  Ext: negligible peripheral edema, JONES, no gross deformities  Incision: clean, dry, intact with skin glue in place, no erythema or induration, sternum stable  Tubes/drain sites: dressing C/D    CBC RESULTS:   Recent Labs   Lab Test 10/28/24  0601 10/27/24  0608 10/26/24  0550   WBC 10.9 10.1 12.3*   HGB 9.3* 8.8* 8.8*   HCT 28.7* 27.7* 28.1*    216 172     CMP RESULTS:  Recent Labs   Lab Test 10/28/24  0601 10/27/24  0608 10/27/24  0152 10/26/24  1202 10/26/24  0550 10/25/24  0543 10/24/24  0745 10/24/24  0405    141  " --   --  138 140  --  144   POTASSIUM 3.6 4.1 3.8   < > 3.4 3.8  --  4.1   CHLORIDE 101 105  --   --  103 107  --  109*   CO2 25 25  --   --  22 24  --  21*   ANIONGAP 13 11  --   --  13 9  --  14   GLC 95 90  --   --  108* 118*   < > 130*   BUN 10.0 13.6  --   --  14.7 12.1  --  12.1   CR 0.58 0.64  --   --  0.59 0.51  --  0.70   GFRESTIMATED >90 >90  --   --  >90 >90  --  >90   YUE 7.8* 7.8*  --   --  8.1* 8.1*  --  9.0   BILITOTAL  --   --   --   --  0.7 1.0  --  1.1   ALKPHOS  --   --   --   --  51 39*  --  41   ALT  --   --   --   --  12 11  --  19   AST  --   --   --   --  35 38  --  51*    < > = values in this interval not displayed.     Recent Labs   Lab Test 10/28/24  0601 10/27/24  0635 10/26/24  1311   INR 2.20* 2.41* 2.09*     Recent Labs   Lab 10/28/24  0601 10/27/24  0608 10/26/24  0550 10/25/24  0543 10/24/24  0745 10/24/24  0405   GLC 95 90 108* 118* 115* 130*       ECHOCARDIOGRAM  Recent Results (from the past 4320 hours)   Echocardiogram Limited   Result Value    LVEF  70%    [facility]    [id number]  [id number]  [id number]  [id number]^DANA^RHONA^SUZANNA     [facility],[place]  Echocardiography Laboratory  [address] [place] [id number]     Name: KATE BYRNES  MRN: [id number]  : 1980  Study Date: 10/27/2024 09:52 AM  Age: 44 yrs  Gender: Female  Patient Location: [facility]  Reason For Study: Mitral Valve Replacement  Ordering Physician: RHONA CORTEZ  Performed By: Elena Mckeon     BSA: 1.2 m2  Height: 56 in  Weight: 78 lb  HR: 89  BP: 126/74 mmHg  ______________________________________________________________________________  Procedure  Limited Portable Echo Adult.  ______________________________________________________________________________  Interpretation Summary  MVR w/ St. Durga mechanical (10/23/24)  The prosthetic mitral valve is well-seated.  Doppler interrogation of the mitral valve is normal.  Trivial pericardial effusion is present.  Global and  regional left ventricular function is hyperkinetic with an EF >70%.  Right ventricular function, chamber size, wall motion, and thickness are  normal.     This study was compared with the study from 10/16/2024 .  MVR is new.     ______________________________________________________________________________  Left Ventricle  Left ventricular size is normal. Global and regional left ventricular function  is hyperkinetic with an EF >70%.     Right Ventricle  Right ventricular function, chamber size, wall motion, and thickness are  normal.     Mitral Valve  Trace mitral insufficiency is present. The mean mitral valve gradient is 4.1  mmHg. MVR w/ St. Durga mechanical (10/23/24). The prosthetic mitral valve is  well-seated. Doppler interrogation of the mitral valve is normal.     Aortic Valve  Aortic valve is normal in structure and function.     Vessels  The aorta root is normal.     Pericardium  Trivial pericardial effusion is present.     Compared to Previous Study  This study was compared with the study from 10/16/2024 . MVR is new.  ______________________________________________________________________________  MMode/2D Measurements & Calculations  IVSd: 0.83 cm  LVIDd: 3.2 cm  LVIDs: 2.0 cm  LVPWd: 0.71 cm  FS: 38.9 %  LV mass(C)d: 62.0 grams  LV mass(C)dI: 52.1 grams/m2  RWT: 0.44     Doppler Measurements & Calculations  MV E max adwoa: 138.0 cm/sec  MV max P.5 mmHg  MV mean P.1 mmHg  MV V2 VTI: 29.5 cm     LV V1 max P.2 mmHg  LV V1 max: 103.0 cm/sec  LV V1 VTI: 18.3 cm  TR max adwoa: 129.8 cm/sec  TR max P.7 mmHg     ______________________________________________________________________________  Report approved by: Son ALBERTS 10/27/2024 12:13 PM             PRE-ADMISSION MEDICATIONS:  Medications Prior to Admission   Medication Sig Dispense Refill Last Dose/Taking    ketoconazole (NIZORAL) 2 % external cream Apply topically daily as needed for itching.   More than a month    levETIRAcetam  (KEPPRA) 250 MG tablet Take 500 mg by mouth 2 times daily   10/23/2024 at  5:30 AM    norethindrone-ethinyl estradiol (JUNEL FE 1/20) 1-20 MG-MCG tablet Take 1 tablet by mouth daily. 90 tablet 3 Past Week    [DISCONTINUED] furosemide (LASIX) 40 MG tablet Take 1 tablet (40 mg) by mouth 2 times daily. 14 tablet 0 10/22/2024 at  7:00 PM    [DISCONTINUED] metoprolol tartrate (LOPRESSOR) 50 MG tablet Take 1 tablet (50 mg) by mouth 2 times daily. 28 tablet 0 10/23/2024 at  5:30 AM         DISCHARGE MEDICATIONS:      Review of your medicines        START taking        Dose / Directions   acetaminophen 325 MG tablet  Commonly known as: TYLENOL  Used for: S/P MVR (mitral valve replacement)      Dose: 650-975 mg  Take 2-3 tablets (650-975 mg) by mouth every 6 hours as needed for pain (For optimal non-opioid multimodal pain management to improve pain control.).  Refills: 0     aspirin 81 MG chewable tablet  Commonly known as: ASA  Used for: S/P MVR (mitral valve replacement)      Dose: 81 mg  Start taking on: October 29, 2024  Take 1 tablet (81 mg) by mouth or NG Tube daily.  Quantity: 100 tablet  Refills: 0     Lidocaine 4 % Patch  Commonly known as: LIDOCARE  Used for: S/P MVR (mitral valve replacement)      Dose: 1 patch  Place 1 patch over 12 hours onto the skin every 24 hours. To prevent lidocaine toxicity, patient should be patch free for 12 hrs daily.  Quantity: 10 patch  Refills: 0     methocarbamol 500 MG tablet  Commonly known as: ROBAXIN  Used for: S/P MVR (mitral valve replacement)      Dose: 500 mg  Take 1 tablet (500 mg) by mouth or Feeding Tube 3 times daily as needed for muscle spasms.  Quantity: 10 tablet  Refills: 0     oxyCODONE 5 MG tablet  Commonly known as: ROXICODONE  Used for: S/P MVR (mitral valve replacement)      Dose: 5 mg  Take 1 tablet (5 mg) by mouth 2 times daily as needed for moderate pain.  Quantity: 4 tablet  Refills: 0     pantoprazole 40 MG EC tablet  Commonly known as: PROTONIX  Used for:  S/P MVR (mitral valve replacement)      Dose: 40 mg  Start taking on: October 29, 2024  Take 1 tablet (40 mg) by mouth daily.  Quantity: 21 tablet  Refills: 0     potassium chloride prachi ER 10 MEQ CR tablet  Commonly known as: KLOR-CON M10  Used for: S/P MVR (mitral valve replacement)      Dose: 20 mEq  Take 2 tablets (20 mEq) by mouth daily for 5 days.  Quantity: 10 tablet  Refills: 0     senna-docusate 8.6-50 MG tablet  Commonly known as: SENOKOT-S/PERICOLACE  Used for: S/P MVR (mitral valve replacement)      Dose: 1 tablet  Take 1 tablet by mouth nightly as needed for constipation.  Refills: 0     warfarin ANTICOAGULANT 3 MG tablet  Commonly known as: COUMADIN  Used for: S/P MVR (mitral valve replacement)      Dose: 1.5 mg  Take 0.5 tablets (1.5 mg) by mouth daily. Until your next INR check; then, future dosing at the direction of your warfarin clinic.  Quantity: 6 tablet  Refills: 0            CONTINUE these medicines which may have CHANGED, or have new prescriptions. If we are uncertain of the size of tablets/capsules you have at home, strength may be listed as something that might have changed.        Dose / Directions   furosemide 40 MG tablet  Commonly known as: LASIX  This may have changed: when to take this  Used for: Rheumatic mitral stenosis      Dose: 40 mg  Take 1 tablet (40 mg) by mouth daily.  Quantity: 5 tablet  Refills: 0     metoprolol tartrate 50 MG tablet  Commonly known as: LOPRESSOR  This may have changed: how much to take  Used for: Rheumatic mitral stenosis      Dose: 25 mg  Take 0.5 tablets (25 mg) by mouth 2 times daily.  Quantity: 28 tablet  Refills: 0            CONTINUE these medicines which have NOT CHANGED        Dose / Directions   ketoconazole 2 % external cream  Commonly known as: NIZORAL  Used for: Tinea versicolor      Apply topically daily as needed for itching.  Refills: 0     levETIRAcetam 250 MG tablet  Commonly known as: KEPPRA  Indication: Seizure      Dose: 500 mg  Take  500 mg by mouth 2 times daily  Refills: 0     norethindrone-ethinyl estradiol 1-20 MG-MCG tablet  Commonly known as: JUNEL FE 1/20  Used for: Encounter for contraceptive management, unspecified type      Dose: 1 tablet  Take 1 tablet by mouth daily.  Quantity: 90 tablet  Refills: 3               Where to get your medicines        These medications were sent to [facility] - [place], [place] - [address]  [address] [address]      Phone: [phone]   aspirin 81 MG chewable tablet  furosemide 40 MG tablet  Lidocaine 4 % Patch  methocarbamol 500 MG tablet  metoprolol tartrate 50 MG tablet  oxyCODONE 5 MG tablet  pantoprazole 40 MG EC tablet  potassium chloride prachi ER 10 MEQ CR tablet  warfarin ANTICOAGULANT 3 MG tablet       Some of these will need a paper prescription and others can be bought over the counter. Ask your nurse if you have questions.    You don't need a prescription for these medications  acetaminophen 325 MG tablet  senna-docusate 8.6-50 MG tablet         Anticoagulation Dose History  More data exists         Latest Ref Rng & Units 10/16/2024 10/23/2024 10/24/2024 10/25/2024 10/26/2024 10/27/2024 10/28/2024   Recent Dosing and Labs   warfarin ANTICOAGULANT (COUMADIN) 1 MG tablet - - - - - 1 mg, $Given 1 mg, $Given -   warfarin ANTICOAGULANT (COUMADIN) 1.5 mg TABS half-tab - - - 1.5 mg, $Given 1.5 mg, $Given - - -   INR 0.85 - 1.15 1.16  1.33  1.87  1.25  1.63  2.09  2.55  2.41  2.20       Details          Multiple values from one day are sorted in reverse-chronological order               CC  Cee Velez, CNP, ACNPC-AG  [facility]   Cardiothoracic Surgery  Office phone: [phone]  Office fax: [phone]

## 2024-10-29 ENCOUNTER — TELEPHONE (OUTPATIENT)
Dept: FAMILY MEDICINE | Facility: OTHER | Age: 44
End: 2024-10-29

## 2024-10-29 ENCOUNTER — HOSPITAL ENCOUNTER (EMERGENCY)
Facility: HOSPITAL | Age: 44
Discharge: HOME | DRG: 307 | End: 2024-10-29
Attending: STUDENT IN AN ORGANIZED HEALTH CARE EDUCATION/TRAINING PROGRAM | Admitting: STUDENT IN AN ORGANIZED HEALTH CARE EDUCATION/TRAINING PROGRAM
Payer: MEDICARE

## 2024-10-29 ENCOUNTER — LAB (OUTPATIENT)
Dept: LAB | Facility: OTHER | Age: 44
End: 2024-10-29
Attending: SURGERY
Payer: MEDICARE

## 2024-10-29 ENCOUNTER — TELEPHONE (OUTPATIENT)
Dept: CARDIOLOGY | Facility: CLINIC | Age: 44
End: 2024-10-29
Payer: MEDICARE

## 2024-10-29 VITALS
TEMPERATURE: 98.7 F | SYSTOLIC BLOOD PRESSURE: 109 MMHG | HEART RATE: 79 BPM | DIASTOLIC BLOOD PRESSURE: 64 MMHG | OXYGEN SATURATION: 98 % | RESPIRATION RATE: 21 BRPM

## 2024-10-29 DIAGNOSIS — I05.9 MITRAL VALVE DISEASE: ICD-10-CM

## 2024-10-29 DIAGNOSIS — Z95.2 S/P MVR (MITRAL VALVE REPLACEMENT): Primary | ICD-10-CM

## 2024-10-29 DIAGNOSIS — I05.9 MITRAL VALVE DISEASE: Primary | ICD-10-CM

## 2024-10-29 DIAGNOSIS — R79.1 SUBTHERAPEUTIC INTERNATIONAL NORMALIZED RATIO (INR): ICD-10-CM

## 2024-10-29 DIAGNOSIS — Z09 HOSPITAL DISCHARGE FOLLOW-UP: ICD-10-CM

## 2024-10-29 DIAGNOSIS — Q24.8 ABNORMAL CARDIAC VALVE: Primary | ICD-10-CM

## 2024-10-29 LAB — INR PPP: 1.51 (ref 0.85–1.15)

## 2024-10-29 PROCEDURE — 99282 EMERGENCY DEPT VISIT SF MDM: CPT | Performed by: STUDENT IN AN ORGANIZED HEALTH CARE EDUCATION/TRAINING PROGRAM

## 2024-10-29 PROCEDURE — 85610 PROTHROMBIN TIME: CPT | Mod: ZL

## 2024-10-29 PROCEDURE — 36415 COLL VENOUS BLD VENIPUNCTURE: CPT | Mod: ZL

## 2024-10-29 PROCEDURE — 99282 EMERGENCY DEPT VISIT SF MDM: CPT

## 2024-10-29 ASSESSMENT — ACTIVITIES OF DAILY LIVING (ADL): ADLS_ACUITY_SCORE: 0

## 2024-10-29 NOTE — TELEPHONE ENCOUNTER
[facility] Call Center    Phone Message    May a detailed message be left on voicemail: yes     Reason for Call: Other: Kim is calling to inform that the INR lab draw was completed today as scheduled but is cannot be run without the order.  Please call Kim to inform that lab order is entered.  Lab was drawn at 1:10 pm and needs to be run within 8 hours.     Action Taken: Other: cardio    Travel Screening: Not Applicable    Thank you!  Specialty Access Center       Date of Service:

## 2024-10-29 NOTE — TELEPHONE ENCOUNTER
Still have not heard from cardiothoracic group. Review with pharmacist with anticoagulation group, Rita Zamora, [facility]. Patients guardian Christine (mom) called me back, reviewed patients warfarin dose, her mom states patient did not get warfarin 10/28, she thinks patient received medications before patient left hospital at 3 pm. I do not see inpatient dosing that warfarin was given on 10/28. Warfarin was given this am 10/29/24 1.5 mg per directions on the AVS to take daily. Mother was advised to have patient take another warfarin 1.5 mg now. She will also take patient to the ED for evaluation of recent mitral valve replacement and low INR today. If Heparin is needed this should be decided on by ED.

## 2024-10-29 NOTE — ED TRIAGE NOTES
Pt presents due to INR not being in therapeutic range. Pt recently had a mitral valve replacement.

## 2024-10-30 ENCOUNTER — TELEPHONE (OUTPATIENT)
Dept: CARDIOLOGY | Facility: CLINIC | Age: 44
End: 2024-10-30
Payer: MEDICARE

## 2024-10-30 ENCOUNTER — TELEPHONE (OUTPATIENT)
Dept: FAMILY MEDICINE | Facility: OTHER | Age: 44
End: 2024-10-30

## 2024-10-30 ENCOUNTER — CARE COORDINATION (OUTPATIENT)
Dept: CARDIOLOGY | Facility: CLINIC | Age: 44
End: 2024-10-30
Payer: MEDICARE

## 2024-10-30 ENCOUNTER — TELEPHONE (OUTPATIENT)
Dept: CARDIAC REHAB | Facility: HOSPITAL | Age: 44
End: 2024-10-30

## 2024-10-30 ENCOUNTER — HOSPITAL ENCOUNTER (INPATIENT)
Facility: HOSPITAL | Age: 44
LOS: 4 days | Discharge: HOME | DRG: 307 | End: 2024-11-03
Attending: FAMILY MEDICINE | Admitting: INTERNAL MEDICINE
Payer: MEDICARE

## 2024-10-30 ENCOUNTER — OFFICE VISIT (OUTPATIENT)
Dept: FAMILY MEDICINE | Facility: OTHER | Age: 44
End: 2024-10-30
Attending: FAMILY MEDICINE
Payer: MEDICARE

## 2024-10-30 ENCOUNTER — APPOINTMENT (OUTPATIENT)
Dept: CT IMAGING | Facility: HOSPITAL | Age: 44
DRG: 307 | End: 2024-10-30
Attending: FAMILY MEDICINE
Payer: MEDICARE

## 2024-10-30 VITALS
WEIGHT: 74.3 LBS | RESPIRATION RATE: 20 BRPM | HEIGHT: 56 IN | HEART RATE: 78 BPM | TEMPERATURE: 97.8 F | BODY MASS INDEX: 16.71 KG/M2 | OXYGEN SATURATION: 99 % | SYSTOLIC BLOOD PRESSURE: 126 MMHG | DIASTOLIC BLOOD PRESSURE: 77 MMHG

## 2024-10-30 DIAGNOSIS — Z95.2 S/P MVR (MITRAL VALVE REPLACEMENT): Primary | ICD-10-CM

## 2024-10-30 DIAGNOSIS — G40.909 SEIZURE DISORDER (H): ICD-10-CM

## 2024-10-30 DIAGNOSIS — Z95.2 S/P MITRAL VALVE REPLACEMENT: Primary | ICD-10-CM

## 2024-10-30 DIAGNOSIS — I05.0 RHEUMATIC MITRAL STENOSIS: ICD-10-CM

## 2024-10-30 DIAGNOSIS — Z95.2 S/P MVR (MITRAL VALVE REPLACEMENT): ICD-10-CM

## 2024-10-30 PROBLEM — R79.1 SUBTHERAPEUTIC INTERNATIONAL NORMALIZED RATIO (INR): Status: ACTIVE | Noted: 2024-10-30

## 2024-10-30 LAB
ALBUMIN SERPL BCG-MCNC: 3.7 G/DL (ref 3.5–5.2)
ALP SERPL-CCNC: 86 U/L (ref 40–150)
ALT SERPL W P-5'-P-CCNC: 25 U/L (ref 0–50)
ANION GAP SERPL CALCULATED.3IONS-SCNC: 14 MMOL/L (ref 7–15)
AST SERPL W P-5'-P-CCNC: 29 U/L (ref 0–45)
BILIRUB SERPL-MCNC: 0.4 MG/DL
BUN SERPL-MCNC: 10.4 MG/DL (ref 6–20)
CALCIUM SERPL-MCNC: 9.1 MG/DL (ref 8.8–10.4)
CHLORIDE SERPL-SCNC: 96 MMOL/L (ref 98–107)
CREAT SERPL-MCNC: 0.64 MG/DL (ref 0.51–0.95)
EGFRCR SERPLBLD CKD-EPI 2021: >90 ML/MIN/1.73M2
ERYTHROCYTE [DISTWIDTH] IN BLOOD BY AUTOMATED COUNT: 15.5 % (ref 10–15)
GLUCOSE SERPL-MCNC: 85 MG/DL (ref 70–99)
HCO3 SERPL-SCNC: 24 MMOL/L (ref 22–29)
HCT VFR BLD AUTO: 31.9 % (ref 35–47)
HGB BLD-MCNC: 10.2 G/DL (ref 11.7–15.7)
INR PPP: 1.71 (ref 0.85–1.15)
MCH RBC QN AUTO: 29.5 PG (ref 26.5–33)
MCHC RBC AUTO-ENTMCNC: 32 G/DL (ref 31.5–36.5)
MCV RBC AUTO: 92 FL (ref 78–100)
PLATELET # BLD AUTO: 352 10E3/UL (ref 150–450)
POTASSIUM SERPL-SCNC: 4.4 MMOL/L (ref 3.4–5.3)
PROT SERPL-MCNC: 6.6 G/DL (ref 6.4–8.3)
RBC # BLD AUTO: 3.46 10E6/UL (ref 3.8–5.2)
SODIUM SERPL-SCNC: 134 MMOL/L (ref 135–145)
WBC # BLD AUTO: 15.3 10E3/UL (ref 4–11)

## 2024-10-30 PROCEDURE — 120N000001 HC R&B MED SURG/OB

## 2024-10-30 PROCEDURE — 36416 COLLJ CAPILLARY BLOOD SPEC: CPT | Mod: ZL | Performed by: FAMILY MEDICINE

## 2024-10-30 PROCEDURE — 36415 COLL VENOUS BLD VENIPUNCTURE: CPT | Performed by: INTERNAL MEDICINE

## 2024-10-30 PROCEDURE — 250N000013 HC RX MED GY IP 250 OP 250 PS 637: Performed by: INTERNAL MEDICINE

## 2024-10-30 PROCEDURE — 99496 TRANSJ CARE MGMT HIGH F2F 7D: CPT | Performed by: FAMILY MEDICINE

## 2024-10-30 PROCEDURE — G0463 HOSPITAL OUTPT CLINIC VISIT: HCPCS | Performed by: FAMILY MEDICINE

## 2024-10-30 PROCEDURE — 36415 COLL VENOUS BLD VENIPUNCTURE: CPT | Mod: ZL | Performed by: FAMILY MEDICINE

## 2024-10-30 PROCEDURE — 85610 PROTHROMBIN TIME: CPT | Mod: ZL | Performed by: FAMILY MEDICINE

## 2024-10-30 PROCEDURE — G0463 HOSPITAL OUTPT CLINIC VISIT: HCPCS | Mod: 25 | Performed by: FAMILY MEDICINE

## 2024-10-30 PROCEDURE — G1010 CDSM STANSON: HCPCS

## 2024-10-30 PROCEDURE — 99222 1ST HOSP IP/OBS MODERATE 55: CPT | Mod: AI | Performed by: INTERNAL MEDICINE

## 2024-10-30 PROCEDURE — 85014 HEMATOCRIT: CPT | Performed by: INTERNAL MEDICINE

## 2024-10-30 PROCEDURE — G0463 HOSPITAL OUTPT CLINIC VISIT: HCPCS

## 2024-10-30 PROCEDURE — 70450 CT HEAD/BRAIN W/O DYE: CPT | Mod: ME

## 2024-10-30 PROCEDURE — 82040 ASSAY OF SERUM ALBUMIN: CPT | Performed by: INTERNAL MEDICINE

## 2024-10-30 PROCEDURE — 85041 AUTOMATED RBC COUNT: CPT | Performed by: INTERNAL MEDICINE

## 2024-10-30 PROCEDURE — 80053 COMPREHEN METABOLIC PANEL: CPT | Performed by: INTERNAL MEDICINE

## 2024-10-30 RX ORDER — AMOXICILLIN 250 MG
2 CAPSULE ORAL 2 TIMES DAILY PRN
Status: DISCONTINUED | OUTPATIENT
Start: 2024-10-30 | End: 2024-11-03 | Stop reason: HOSPADM

## 2024-10-30 RX ORDER — AMOXICILLIN 250 MG
1 CAPSULE ORAL
Status: DISCONTINUED | OUTPATIENT
Start: 2024-10-30 | End: 2024-10-30

## 2024-10-30 RX ORDER — LEVETIRACETAM 500 MG/1
500 TABLET ORAL 2 TIMES DAILY
Status: DISCONTINUED | OUTPATIENT
Start: 2024-10-30 | End: 2024-11-03 | Stop reason: HOSPADM

## 2024-10-30 RX ORDER — NALOXONE HYDROCHLORIDE 0.4 MG/ML
0.4 INJECTION, SOLUTION INTRAMUSCULAR; INTRAVENOUS; SUBCUTANEOUS
Status: DISCONTINUED | OUTPATIENT
Start: 2024-10-30 | End: 2024-11-03 | Stop reason: HOSPADM

## 2024-10-30 RX ORDER — METHOCARBAMOL 500 MG/1
500 TABLET, FILM COATED ORAL 3 TIMES DAILY PRN
Status: DISCONTINUED | OUTPATIENT
Start: 2024-10-30 | End: 2024-11-03 | Stop reason: HOSPADM

## 2024-10-30 RX ORDER — ONDANSETRON 2 MG/ML
4 INJECTION INTRAMUSCULAR; INTRAVENOUS EVERY 6 HOURS PRN
Status: DISCONTINUED | OUTPATIENT
Start: 2024-10-30 | End: 2024-11-03 | Stop reason: HOSPADM

## 2024-10-30 RX ORDER — LIDOCAINE 40 MG/G
CREAM TOPICAL
Status: DISCONTINUED | OUTPATIENT
Start: 2024-10-30 | End: 2024-11-03 | Stop reason: HOSPADM

## 2024-10-30 RX ORDER — NALOXONE HYDROCHLORIDE 0.4 MG/ML
0.2 INJECTION, SOLUTION INTRAMUSCULAR; INTRAVENOUS; SUBCUTANEOUS
Status: DISCONTINUED | OUTPATIENT
Start: 2024-10-30 | End: 2024-11-03 | Stop reason: HOSPADM

## 2024-10-30 RX ORDER — ASPIRIN 81 MG/1
81 TABLET, CHEWABLE ORAL DAILY
Status: DISCONTINUED | OUTPATIENT
Start: 2024-10-31 | End: 2024-11-03 | Stop reason: HOSPADM

## 2024-10-30 RX ORDER — FUROSEMIDE 40 MG/1
40 TABLET ORAL DAILY
Status: ON HOLD | COMMUNITY
End: 2024-11-03

## 2024-10-30 RX ORDER — METOPROLOL TARTRATE 50 MG
50 TABLET ORAL 2 TIMES DAILY
Qty: 180 TABLET | Refills: 3 | Status: SHIPPED | OUTPATIENT
Start: 2024-10-30

## 2024-10-30 RX ORDER — METOPROLOL TARTRATE 25 MG/1
50 TABLET, FILM COATED ORAL 2 TIMES DAILY
Status: DISCONTINUED | OUTPATIENT
Start: 2024-10-30 | End: 2024-11-03 | Stop reason: HOSPADM

## 2024-10-30 RX ORDER — ASPIRIN 81 MG/1
81 TABLET, CHEWABLE ORAL EVERY MORNING
COMMUNITY
End: 2024-11-18

## 2024-10-30 RX ORDER — CALCIUM CARBONATE 500 MG/1
1000 TABLET, CHEWABLE ORAL 4 TIMES DAILY PRN
Status: DISCONTINUED | OUTPATIENT
Start: 2024-10-30 | End: 2024-11-03 | Stop reason: HOSPADM

## 2024-10-30 RX ORDER — HEPARIN SODIUM 10000 [USP'U]/100ML
0-5000 INJECTION, SOLUTION INTRAVENOUS CONTINUOUS
Status: DISCONTINUED | OUTPATIENT
Start: 2024-10-30 | End: 2024-11-03 | Stop reason: HOSPADM

## 2024-10-30 RX ORDER — NORETHINDRONE ACETATE AND ETHINYL ESTRADIOL .02; 1 MG/1; MG/1
1 TABLET ORAL DAILY
Status: DISCONTINUED | OUTPATIENT
Start: 2024-10-30 | End: 2024-11-03 | Stop reason: HOSPADM

## 2024-10-30 RX ORDER — PANTOPRAZOLE SODIUM 40 MG/1
40 TABLET, DELAYED RELEASE ORAL DAILY
Status: DISCONTINUED | OUTPATIENT
Start: 2024-10-31 | End: 2024-11-03 | Stop reason: HOSPADM

## 2024-10-30 RX ORDER — POTASSIUM CHLORIDE 1500 MG/1
20 TABLET, EXTENDED RELEASE ORAL DAILY
Status: DISCONTINUED | OUTPATIENT
Start: 2024-10-30 | End: 2024-11-03 | Stop reason: HOSPADM

## 2024-10-30 RX ORDER — METHOCARBAMOL 500 MG/1
500 TABLET, FILM COATED ORAL 3 TIMES DAILY PRN
COMMUNITY
End: 2024-11-12

## 2024-10-30 RX ORDER — AMOXICILLIN 250 MG
1 CAPSULE ORAL 2 TIMES DAILY PRN
Status: DISCONTINUED | OUTPATIENT
Start: 2024-10-30 | End: 2024-11-03 | Stop reason: HOSPADM

## 2024-10-30 RX ORDER — FUROSEMIDE 40 MG/1
40 TABLET ORAL DAILY
Status: DISCONTINUED | OUTPATIENT
Start: 2024-10-30 | End: 2024-11-03 | Stop reason: HOSPADM

## 2024-10-30 RX ORDER — OXYCODONE HYDROCHLORIDE 5 MG/1
5 TABLET ORAL 2 TIMES DAILY PRN
Status: DISCONTINUED | OUTPATIENT
Start: 2024-10-30 | End: 2024-11-03 | Stop reason: HOSPADM

## 2024-10-30 RX ORDER — ACETAMINOPHEN 325 MG/1
650-975 TABLET ORAL EVERY 6 HOURS PRN
Status: DISCONTINUED | OUTPATIENT
Start: 2024-10-30 | End: 2024-11-03 | Stop reason: HOSPADM

## 2024-10-30 RX ORDER — ONDANSETRON 4 MG/1
4 TABLET, ORALLY DISINTEGRATING ORAL EVERY 6 HOURS PRN
Status: DISCONTINUED | OUTPATIENT
Start: 2024-10-30 | End: 2024-11-03 | Stop reason: HOSPADM

## 2024-10-30 RX ADMIN — METOPROLOL TARTRATE 50 MG: 25 TABLET, FILM COATED ORAL at 21:06

## 2024-10-30 RX ADMIN — LEVETIRACETAM 500 MG: 500 TABLET, FILM COATED ORAL at 21:06

## 2024-10-30 RX ADMIN — ACETAMINOPHEN 650 MG: 325 TABLET, FILM COATED ORAL at 18:35

## 2024-10-30 ASSESSMENT — ACTIVITIES OF DAILY LIVING (ADL)
ADLS_ACUITY_SCORE: 0

## 2024-10-30 NOTE — TELEPHONE ENCOUNTER
Called patient guardian (sophia) for follow up post surgical visit and up date Sophia of pt INR level.  No answer, left voicemail.

## 2024-10-30 NOTE — PHARMACY-ANTICOAGULATION SERVICE
Clinical Pharmacy - Warfarin Dosing Consult     Pharmacy has been consulted to manage this patient s warfarin therapy.  Indication: Mechanical Mitral Valve Replacement  Therapy Goal: INR 2.5-3.5  OP Anticoag Clinic: [facility]  Warfarin Prior to Admission: Yes  Warfarin PTA Regimen: 1.5 mg daily  Recent documented change in oral intake/nutrition: Unknown    INR   Date Value Ref Range Status   10/30/2024 1.71 (H) 0.85 - 1.15 Final   10/29/2024 1.51 (H) 0.85 - 1.15 Final       Patient already took warfarin dose today.  Pharmacy will monitor Efren Saavedra daily and order warfarin doses to achieve specified goal.      Please contact pharmacy as soon as possible if the warfarin needs to be held for a procedure or if the warfarin goals change.

## 2024-10-30 NOTE — DISCHARGE INSTRUCTIONS
Take an additional half tablet of warfarin tonight.      Continue taking half tablet daily starting tomorrow morning as instructed.  Please call your surgery team tomorrow to arrange follow-up in the warfarin clinic as her daily dose may need to be adjusted.  Try to get her INR checked either tomorrow or the next day if possible

## 2024-10-30 NOTE — H&P
"[facility]    History and Physical - Hospitalist Service       Date of Admission:  10/30/2024    Assessment & Plan      Efren Saavedra is a 44 year old female admitted on 10/30/2024.      1.  Subtherapeutic INR in fresh mitral valve replacement: INR last checked was 1.7 this morning.  It was 1.5 yesterday.  Cardiothoracic team wishes her to be admitted for IV heparin until she is therapeutic.  Will place her on IV heparin protocol.  She will get a dose of warfarin tonight I will have our pharmacy dose this.  Once she is therapeutic can be discharged home.  Anticipate that to be within the next 1 to 2 days.    2.  Status post mitral valve replacement: Crisp mitral click.  She is doing very well from this no dyspnea.  Wounds look good.  No bleeding issues at all.    3.  History of seizure disorder.  Will continue with her Keppra.  Has had no recent issues.        Diet: Combination Diet Regular Diet Adult    DVT Prophylaxis: Warfarin and IV heparin  Roberts Catheter: Not present  Lines: None     Cardiac Monitoring: None  Code Status: Full Code      Clinically Significant Risk Factors Present on Admission                # Drug Induced Coagulation Defect: home medication list includes an anticoagulant medication  # Drug Induced Platelet Defect: home medication list includes an antiplatelet medication    # Chronic heart failure with preserved ejection fraction: heart failure noted on problem list and last echo with EF >50%        # Cachexia: Estimated body mass index is 16.66 kg/m  as calculated from the following:    Height as of an earlier encounter on 10/30/24: 1.422 m (4' 8\").    Weight as of an earlier encounter on 10/30/24: 33.7 kg (74 lb 4.8 oz).       # Financial/Environmental Concerns:           Disposition Plan     Medically Ready for Discharge: Anticipated in 2-4 Days           Taj Ahumada MD  Hospitalist Service  [facility]  Securely message with [employer] (more info)  Text page via " [facility] Paging/Directory     ______________________________________________________________________    Chief Complaint   Subtherapeutic INR    History is obtained from the patient, electronic health record, emergency department physician, and patient's mother    History of Present Illness   Efren Saavedra is a 44 year old female who recently underwent a mitral valve replacement due to mitral stenosis.  She had this performed on 10/23 done at the [facility]. Coronary angiogram showed no evidence of any coronary artery disease.  It was an uncomplicated procedure.  She was on heparin and started on warfarin.  She was actually discharged on 10/28.  Her INR was 2.2 at that time.  They will ask her to be 2.5-3.5 range.  She did not get a dose of warfarin on Monday 10/28.  Yesterday 10/29 she actually got 2 doses of 1.5 mg in the morning 1.5 mg in the evening.  Her INR was 1.5.  She was sent to the ER for evaluation with the thought perhaps she was going to be admitted for IV heparin.  That did not occur.  Then today she saw her primary care provider Dr. Lee the cardio thoracic team recommended that she be admitted for IV heparin.  They waited for a repeat INR and was 1.7.  Her mother did give her 1.5 mg of warfarin this morning.  So at this point she has been admitted we are going to place her on IV heparin will probably dose her with a dose of warfarin tonight we will get her on an every evening schedule of this.  When she is therapeutic at 2.5-3.5 range she can be discharged.    She has been doing well the last couple of days.  She has had no shortness of breath no chest pains of any major consequence got 1 dose of oxycodone otherwise is getting Tylenol.  No shortness of breath no bleeding troubles at all her wounds are doing fine.  Appetites been okay.  No swelling at all.  I am going to hold her Lasix dosing she really does not appear to be grossly volume overloaded at this point.  Has been taking  her medications without problem.  Is rather hungry.  She has been are running around the clinic all day today.      Past Medical History    Past Medical History:   Diagnosis Date    Seizures (H)        Past Surgical History   Past Surgical History:   Procedure Laterality Date    CV CORONARY ANGIOGRAM N/A 10/18/2024    Procedure: Coronary Angiogram;  Surgeon: Hernandez Rivera MD;  Location:  [facility]ARD[facility] CATH [facility]    CV RIGHT HEART CATH MEASUREMENTS RECORDED N/A 10/18/2024    Procedure: Right Heart Catheterization;  Surgeon: Hernandez Rivera MD;  Location:  HEART CARDIAC CATH LAB    feeding tube      REPLACE VALVE MITRAL N/A 10/23/2024    Procedure: Median Sternotomy, Cardiopulmonary Bypass, Mitral Valve Replacement with [employer] Mechanical Valve size 23mm, Interoperative Transesophageal Echocardiogram per Anesthesia;  Surgeon: Deangelo Craig MD;  Location: [facility] OR    TRANSESOPHAGEAL ECHOCARDIOGRAM INTRAOPERATIVE N/A 8/13/2024    Procedure: ECHOCARDIOGRAM, TRANSESOPHAGEAL, WITH ANESTHESIA;  Surgeon: GENERIC ANESTHESIA PROVIDER;  Location: [facility] OR    ventilation tubes, bilateral         Prior to Admission Medications   Prior to Admission Medications   Prescriptions Last Dose Informant Patient Reported? Taking?   Lidocaine (LIDOCARE) 4 % Patch   No No   Sig: Place 1 patch over 12 hours onto the skin every 24 hours. To prevent lidocaine toxicity, patient should be patch free for 12 hrs daily.   Patient not taking: Reported on 10/30/2024   acetaminophen (TYLENOL) 325 MG tablet   No No   Sig: Take 2-3 tablets (650-975 mg) by mouth every 6 hours as needed for pain (For optimal non-opioid multimodal pain management to improve pain control.).   aspirin (ASA) 81 MG chewable tablet   No No   Sig: Take 1 tablet (81 mg) by mouth or NG Tube daily.   furosemide (LASIX) 40 MG tablet   No No   Sig: Take 1 tablet (40 mg) by mouth daily.   ketoconazole (NIZORAL) 2 % external cream   Yes No   Sig: Apply  topically daily as needed for itching.   Patient not taking: Reported on 10/30/2024   levETIRAcetam (KEPPRA) 250 MG tablet   Yes No   Sig: Take 500 mg by mouth 2 times daily   methocarbamol (ROBAXIN) 500 MG tablet   No No   Sig: Take 1 tablet (500 mg) by mouth or Feeding Tube 3 times daily as needed for muscle spasms.   metoprolol tartrate (LOPRESSOR) 50 MG tablet   No No   Sig: Take 1 tablet (50 mg) by mouth 2 times daily.   norethindrone-ethinyl estradiol (JUNEL FE 1/20) 1-20 MG-MCG tablet   No No   Sig: Take 1 tablet by mouth daily.   oxyCODONE (ROXICODONE) 5 MG tablet   No No   Sig: Take 1 tablet (5 mg) by mouth 2 times daily as needed for moderate pain.   pantoprazole (PROTONIX) 40 MG EC tablet   No No   Sig: Take 1 tablet (40 mg) by mouth daily.   potassium chloride prachi ER (KLOR-CON M10) 10 MEQ CR tablet   No No   Sig: Take 2 tablets (20 mEq) by mouth daily for 5 days.   senna-docusate (SENOKOT-S/PERICOLACE) 8.6-50 MG tablet   No No   Sig: Take 1 tablet by mouth nightly as needed for constipation.   warfarin ANTICOAGULANT (COUMADIN) 3 MG tablet   No No   Sig: Take 0.5 tablets (1.5 mg) by mouth daily. Until your next INR check; then, future dosing at the direction of your warfarin clinic.      Facility-Administered Medications: None        Review of Systems    The 10 point Review of Systems is negative other than noted in the HPI or here.      Social History   I have reviewed this patient's social history and updated it with pertinent information if needed.  Social History     Tobacco Use    Smoking status: Never    Smokeless tobacco: Never    Tobacco comments:     no passive exposure   Substance Use Topics    Alcohol use: No    Drug use: No         Family History   I have reviewed this patient's family history and updated it with pertinent information if needed.  Family History   Problem Relation Age of Onset    Cerebrovascular Disease Paternal Grandmother         CVA    Heart Disease Maternal Grandfather          disease    Hypertension Father     Other - See Comments Father         post polio    Parkinsonism Maternal Grandmother     Heart Disease Paternal Grandfather          Allergies   Allergies   Allergen Reactions    Azithromycin      Abdominal pain/ cramping     Clotrimazole Rash    Omnicef [Cefdinir] Rash     Itchy and bad rash        Physical Exam   Vital Signs:     BP: 122/68 Pulse: 75            Weight: 0 lbs 0 oz    Constitutional: awake, alert, cooperative, no apparent distress, and appears stated age  ENT: Normocephalic, without obvious abnormality, atraumatic, sinuses nontender on palpation, external ears without lesions, oral pharynx with moist mucous membranes, tonsils without erythema or exudates, gums normal and good dentition.  Respiratory: No increased work of breathing, good air exchange, clear to auscultation bilaterally, no crackles or wheezing  Cardiovascular: Regular rate and rhythm.  Very crisp mitral valve click noted.  No murmurs auscultated.  Anterior chest wounds are healed nicely.  Minimal drainage from the lower line sites.  GI: No scars, normal bowel sounds, soft, non-distended, non-tender, no masses palpated, no hepatosplenomegally  Musculoskeletal: There is no redness, warmth, or swelling of the joints.  Full range of motion noted.  Motor strength is 5 out of 5 all extremities bilaterally.  Tone is normal.    Medical Decision Making       60 MINUTES SPENT BY ME on the date of service doing chart review, history, exam, documentation & further activities per the note.      Data     I have personally reviewed the following data over the past 24 hrs:    INR:  1.71 (H) PTT:  N/A   D-dimer:  N/A Fibrinogen:  N/A       Imaging results reviewed over the past 24 hrs:   No results found for this or any previous visit (from the past 24 hours).

## 2024-10-30 NOTE — TELEPHONE ENCOUNTER
9:51 AM    Reason for Call: Phone Call    Description: Guardian/Mother called to request a call back because she has questions regarding care for her daughter after open heart surgery 10/23. She reported needing a blood draw and an appointment with PCP.    Was an appointment offered for this call? No  If yes : Appointment type              Date    Preferred method for responding to this message: Telephone Call  What is your phone number ?  [phone]     If we cannot reach you directly, may we leave a detailed response at the number you provided? Yes    Can this message wait until your PCP/provider returns, if available today? Not applicable    Rose Vaughan

## 2024-10-30 NOTE — PROGRESS NOTES
Assessment & Plan     S/P MVR (mitral valve replacement) / Rheumatic mitral stenosis  Doing well from resp, cardiac and pain standpoint. Using tylenol only with occasional muscle relaxer. Incisions are clean, slightly drainage, recommended covering with vaseline and bandaid.     Unfortunately, sub therapeutic INR. Discussed with cardiothoracic team, they would like pt admitted for heparin gtt. Patient and caregiver aware and in agreement. Of note, caregiver accidentally increased lopressor dose to PTA dosing. Currently doing well, BP controlled, HR stable. Okay to continue this dose until cardiology follow up  - Reflex INR  - Reflex INR  - metoprolol tartrate (LOPRESSOR) 50 MG tablet  Dispense: 180 tablet; Refill: 3    Seizure disorder (H)  Very stable, would like to est care with closer provider, new referral placed.   - Adult Neurology  Referral      MED REC REQUIRED  Post Medication Reconciliation Status: discharge medications reconciled, continue medications without change    No follow-ups on file.    Jefferson Friend is a 44 year old, presenting for the following health issues:  Hospital F/U        10/30/2024     1:11 PM   Additional Questions   Roomed by laureano ayala   Accompanied by family         10/30/2024     1:11 PM   Patient Reported Additional Medications   Patient reports taking the following new medications none     HPI       Hospital Follow-up Visit:    Hospital/Nursing Home/IP Rehab Facility: [facility]  Date of Admission: 10/23  Date of Discharge: 10/28  Reason(s) for Admission: mitral valve replacement  Was the patient in the ICU or did the patient experience delirium during hospitalization?  Yes   Do you have any other stressors you would like to discuss with your provider? No    Problems taking medications regularly:  None  Medication changes since discharge: tylenol 325 prn,asprin 81mg, lidocaine 4% patch prn, robaxin 500mg prn,  "oxycodone 5mg prn, pantoprazole 40mg, potassium chloride 10 meq, senna, warfarin 3mg  Problems adhering to non-medication therapy:  referral was placed for cardiac rehab but has not been called to schedule    Summary of hospitalization:  [facility] discharge summary reviewed  Diagnostic Tests/Treatments reviewed.  Follow up needed: INR  Other Healthcare Providers Involved in Patient s Care:         Specialist appointment - cardiothoracic.   Update since discharge: improved.    Plan of care communicated with patient and family    Review of Systems  Constitutional, neuro, ENT, endocrine, pulmonary, cardiac, gastrointestinal, genitourinary, musculoskeletal, integument and psychiatric systems are negative, except as otherwise noted.      Objective    /77 (BP Location: Left arm, Patient Position: Sitting, Cuff Size: Child)   Pulse 78   Temp 97.8  F (36.6  C) (Tympanic)   Resp 20   Ht 1.422 m (4' 8\")   Wt 33.7 kg (74 lb 4.8 oz)   SpO2 99%   BMI 16.66 kg/m    Body mass index is 16.66 kg/m .  Physical Exam   GENERAL: alert and no distress  NECK: no adenopathy and thyroid normal to palpation  RESP: lungs clear to auscultation - no rales, rhonchi or wheezes  CV: regular rates and rhythm, no murmur, click or rub, peripheral pulses strong, and no peripheral edema  ABDOMEN: soft, nontender, no hepatosplenomegaly, no masses and bowel sounds normal  MS: no gross musculoskeletal defects noted, no edema  SKIN: midline incision appears to be healing well. Two smaller abd incision are widened, glue and scab has come off, these are not deep, okay to cover.   PSYCH: mentation appears normal, affect normal/bright    Results for orders placed or performed in visit on 10/30/24   Reflex INR     Status: Abnormal   Result Value Ref Range    INR 1.71 (H) 0.85 - 1.15           Signed Electronically by: Cee Velez MD    "

## 2024-10-30 NOTE — TELEPHONE ENCOUNTER
Writer informed INR 1.5 at clinic on 10/29  Spoke to Jeane GALVEZ 10/30. Patient visited ED and was discharge. CVTS team aware and plans to admit patient for heparin gtt to reach therapeutic INR.

## 2024-10-30 NOTE — ED NOTES
Pt is here with parent with c/o being told to come in to get INR checked in [facility] clinic today and it was 1.4 and was told it was not therapeutic after mitral valve replacement on last week. Pt verbalized pain on surgery area. Incision sites look WDL, no drainage or redness.

## 2024-10-30 NOTE — PROGRESS NOTES
Patient seeing her Primary Dr. Velez for post op discharge follow up-. Writer messaged Dr. Velez in regards to sub therapeutic INR. INR check at Dr. Velez clinic was subtherapeutic, writer talked to CVTS and advised to admit to hospital for heparin gtt. Per conversation with Dr. Velez, she will admit patient to hospital.

## 2024-10-30 NOTE — ED PROVIDER NOTES
ED Provider Note      History     Chief Complaint   Patient presents with    Abnormal Labs        HPI    Efren Saavedra is a 44 year old year old with history of history of rheumatic valve disease with mitral stenosis s/p mechanical mitral valve replacement who was discharged yesterday from the [facility] who presents for subtherapeutic INR.  Patient's goal INR is 2.5-3.5 per chart review.  Reviewed her discharge summary from yesterday from [facility].  She was discharged and instructed to take 0.5 tablets/day until INR check and then adjust medications per warfarin clinic.  Unfortunately she has not had a follow-up with the warfarin cardiac.  She had a single INR checked today and afterwards was called saying the INR was subtherapeutic and that she needed to go to the ER.  Per chart review, her INR was 1.5.        A medically appropriate review of systems was performed with pertinent positives and negatives noted in the HPI, and all other systems negative.    Physical Exam   /74   Pulse 80   Temp 98.7  F (37.1  C) (Tympanic)   Resp 21   SpO2 98%    Physical Exam  General: no acute distress.  HENT: MMM, no oropharyngeal lesions  Eyes: PERRL, normal sclerae  Neck: non-tender, supple  Cardio: Regular rate and rhythm. Extremities well perfused  Resp: Normal work of breathing, no accessory muscle use  Abdomen: non tender, non-distended, no rebound, no guarding  Neuro: alert and oriented. Grossly normal strength and sensation in all extremities.   MSK: no deformities. Grossly normal ROM in extremities.   Integumentary/Skin: Midline chest incision clean dry intact, no erythema, no significant tenderness.  Other midline small incisions without surrounding erythema or fluctuance.      Procedures, & Data      Procedures      Medical Decision Making and ED Course    Ronna is a 44-year-old with history of recent mechanical mitral valve replacement who was discharged yesterday  from [facility] who presents for subtherapeutic INR.  Her INR today was 1.5.  Of note, she did not take her warfarin yesterday likely resulting in a subtherapeutic INR today.  She only took half a tablet this morning.  They have not been able to follow-up with the warfarin clinic yet.  Considered rechecking an INR but unlikely to be of much use as she just had an INR earlier today.  Will recommend taking an additional half tablet tonight, as her goal INR is 2.5-3.5, and then continuing 1/2 tablet daily as indicated by her discharge summary until following up with the warfarin clinic for further adjustment.  Recommended checking INR tomorrow if possible and calling neurosurgery team to get set up with an INR clinic.           I have reviewed the nursing notes. I have reviewed the findings, diagnosis, and plan with the patient.    Impression   Final diagnoses:   Subtherapeutic international normalized ratio (INR)         Armand Floyd MD  October 29, 2024       Armand Floyd MD  10/29/24 2014

## 2024-10-31 LAB
ALBUMIN SERPL BCG-MCNC: 3.2 G/DL (ref 3.5–5.2)
ALP SERPL-CCNC: 74 U/L (ref 40–150)
ALT SERPL W P-5'-P-CCNC: 20 U/L (ref 0–50)
ANION GAP SERPL CALCULATED.3IONS-SCNC: 13 MMOL/L (ref 7–15)
AST SERPL W P-5'-P-CCNC: 22 U/L (ref 0–45)
BILIRUB SERPL-MCNC: 0.5 MG/DL
BUN SERPL-MCNC: 10.7 MG/DL (ref 6–20)
CALCIUM SERPL-MCNC: 8.4 MG/DL (ref 8.8–10.4)
CHLORIDE SERPL-SCNC: 99 MMOL/L (ref 98–107)
CREAT SERPL-MCNC: 0.68 MG/DL (ref 0.51–0.95)
EGFRCR SERPLBLD CKD-EPI 2021: >90 ML/MIN/1.73M2
ERYTHROCYTE [DISTWIDTH] IN BLOOD BY AUTOMATED COUNT: 15.5 % (ref 10–15)
GLUCOSE SERPL-MCNC: 78 MG/DL (ref 70–99)
HCO3 SERPL-SCNC: 23 MMOL/L (ref 22–29)
HCT VFR BLD AUTO: 29.7 % (ref 35–47)
HGB BLD-MCNC: 9.5 G/DL (ref 11.7–15.7)
HOLD SPECIMEN: NORMAL
HOLD SPECIMEN: NORMAL
INR PPP: 1.7 (ref 0.85–1.15)
MCH RBC QN AUTO: 29.1 PG (ref 26.5–33)
MCHC RBC AUTO-ENTMCNC: 32 G/DL (ref 31.5–36.5)
MCV RBC AUTO: 91 FL (ref 78–100)
PLATELET # BLD AUTO: 307 10E3/UL (ref 150–450)
POTASSIUM SERPL-SCNC: 4.9 MMOL/L (ref 3.4–5.3)
PROT SERPL-MCNC: 5.8 G/DL (ref 6.4–8.3)
RBC # BLD AUTO: 3.27 10E6/UL (ref 3.8–5.2)
SODIUM SERPL-SCNC: 135 MMOL/L (ref 135–145)
UFH PPP CHRO-ACNC: 0.18 IU/ML
UFH PPP CHRO-ACNC: 0.4 IU/ML
UFH PPP CHRO-ACNC: 0.46 IU/ML
WBC # BLD AUTO: 10.4 10E3/UL (ref 4–11)

## 2024-10-31 PROCEDURE — 99232 SBSQ HOSP IP/OBS MODERATE 35: CPT | Performed by: INTERNAL MEDICINE

## 2024-10-31 PROCEDURE — 250N000011 HC RX IP 250 OP 636: Performed by: INTERNAL MEDICINE

## 2024-10-31 PROCEDURE — 36415 COLL VENOUS BLD VENIPUNCTURE: CPT | Performed by: INTERNAL MEDICINE

## 2024-10-31 PROCEDURE — 85610 PROTHROMBIN TIME: CPT | Performed by: INTERNAL MEDICINE

## 2024-10-31 PROCEDURE — 85520 HEPARIN ASSAY: CPT | Performed by: INTERNAL MEDICINE

## 2024-10-31 PROCEDURE — 85041 AUTOMATED RBC COUNT: CPT | Performed by: INTERNAL MEDICINE

## 2024-10-31 PROCEDURE — 120N000001 HC R&B MED SURG/OB

## 2024-10-31 PROCEDURE — 85014 HEMATOCRIT: CPT | Performed by: INTERNAL MEDICINE

## 2024-10-31 PROCEDURE — 250N000013 HC RX MED GY IP 250 OP 250 PS 637: Performed by: INTERNAL MEDICINE

## 2024-10-31 PROCEDURE — 84155 ASSAY OF PROTEIN SERUM: CPT | Performed by: INTERNAL MEDICINE

## 2024-10-31 PROCEDURE — 36415 COLL VENOUS BLD VENIPUNCTURE: CPT | Performed by: FAMILY MEDICINE

## 2024-10-31 PROCEDURE — 82310 ASSAY OF CALCIUM: CPT | Performed by: INTERNAL MEDICINE

## 2024-10-31 PROCEDURE — 85520 HEPARIN ASSAY: CPT | Performed by: FAMILY MEDICINE

## 2024-10-31 RX ORDER — WARFARIN SODIUM 2.5 MG/1
2.5 TABLET ORAL ONCE
Status: COMPLETED | OUTPATIENT
Start: 2024-10-31 | End: 2024-10-31

## 2024-10-31 RX ADMIN — ACETAMINOPHEN 650 MG: 325 TABLET, FILM COATED ORAL at 15:44

## 2024-10-31 RX ADMIN — ACETAMINOPHEN 975 MG: 325 TABLET, FILM COATED ORAL at 01:32

## 2024-10-31 RX ADMIN — NORETHINDRONE ACETATE AND ETHINYL ESTRADIOL 1 TABLET: .02; 1 TABLET ORAL at 18:00

## 2024-10-31 RX ADMIN — METOPROLOL TARTRATE 50 MG: 25 TABLET, FILM COATED ORAL at 09:03

## 2024-10-31 RX ADMIN — METOPROLOL TARTRATE 50 MG: 25 TABLET, FILM COATED ORAL at 20:41

## 2024-10-31 RX ADMIN — LEVETIRACETAM 500 MG: 500 TABLET, FILM COATED ORAL at 09:03

## 2024-10-31 RX ADMIN — WARFARIN SODIUM 2.5 MG: 2.5 TABLET ORAL at 11:01

## 2024-10-31 RX ADMIN — ASPIRIN 81 MG CHEWABLE TABLET 81 MG: 81 TABLET CHEWABLE at 09:03

## 2024-10-31 RX ADMIN — LEVETIRACETAM 500 MG: 500 TABLET, FILM COATED ORAL at 20:41

## 2024-10-31 RX ADMIN — PANTOPRAZOLE SODIUM 40 MG: 40 TABLET, DELAYED RELEASE ORAL at 08:10

## 2024-10-31 RX ADMIN — ACETAMINOPHEN 650 MG: 325 TABLET, FILM COATED ORAL at 09:12

## 2024-10-31 NOTE — PLAN OF CARE
Pt IV infusing heparin @ 550 units/hr per orders. Pt reporting some pain this morning, see flowsheets for pain assessment. Tylenol given x1 with good relief. Up with SBA. Ambulating up to bathroom. Appetite good. VSS. Warfarin dose given per orders. Mepilex on pt abdomen replaced, no complications noted at this time.     Face to face report given with opportunity to observe patient.    Report given to ISIS Almonte RN   10/31/2024  [id number]        Goal Outcome Evaluation:      Plan of Care Reviewed With: parent    Overall Patient Progress: improvingOverall Patient Progress: improving

## 2024-10-31 NOTE — PLAN OF CARE
[facility] Admission Note:    Patient admitted to 3212/3212-1 at approximately 1645 via wheel chair accompanied by mother from clinic . Patient ambulated to bed via self.. Patient is alert and oriented X 3, reports pain; rates at 4 on 0-10 scale.  Patient oriented to room, unit, hourly rounding, and plan of care. Explained admission packet and patient handbook with patient bill of rights brochure. Will continue to monitor and document as needed.     Inpatient Nursing criteria listed below was met:    Health care directives status obtained and documented: No    Patient identifies a surrogate decision maker: Yes If yes, who:mother Sophia Contact Information:see facesheet     If initial lactic acid greater than 2.0, repeat lactic acid drawn within one hour of arrival to unit: NA. If no, state reason: na    Clergy visit ordered if patient requests: N/A    Skin issues/needs documented: N/A    Isolation Patient: no Education given, correct sign in place and documentation row added to PCS:   na    Fall Prevention N/A: Care plan updated, education given and documented, sticker and magnet in place: N/A    Care Plan initiated:  unable to find subtherapeutic INR    Education Documented (including assessment): Yes    Patient has discharge needs : No If yes, please explain:not at this time.    Pt was admitted. Had supper. ER came to start IV. Pt received heparin bolus then started at 400 units/hr. Recheck for 0030 tonight. Pt then at shift change c/o headache. Pt had prn tylenol about half hour ago for midsternum pain from recent surgery. Turned lights down and gave pt a cool washclothe. Next nurses updated and they will message the night doctor.    Face to face report given with opportunity to observe patient.    Report given to Everton Phillips RN   10/30/2024  8:01 PM

## 2024-10-31 NOTE — PLAN OF CARE
Plan of Care Reviewed With: mother    Overall Patient Progress: progressing     PIV cnts to infuse at 550 unit/hr. Tylenol administered x1 for surgical site pain to midline chest. No drainage noted. Cnts with sternal precautions. Up via SBA to bathroom and ambulating in halls. Warfarin administered this evening. Bed in lowest position. Call light in reach. ID band in place. Makes needs known. Mother at bedside and attentive to pt's needs.     Face to face report given with opportunity to observe patient.    Report given to Kathryn & Jocelyn, RNs    Gage Jim RN   10/31/2024  7:00 PM

## 2024-10-31 NOTE — PROGRESS NOTES
Consult for elevated risk score acknowledged.   Medical record and Lauri Score reviewed. Participated in interdisciplinary rounds.  No apparent needs noted at this time. Care Transitions will remain available if needs arise. COLLEEN Chinchilla @[phone] October 31, 2024

## 2024-10-31 NOTE — MEDICATION SCRIBE - ADMISSION MEDICATION HISTORY
Medication Scribe Admission Medication History    Admission medication history is complete. The information provided in this note is only as accurate as the sources available at the time of the update.    Information Source(s): Family member and CareEverywhere/SureScripts via in-person    Pertinent Information:   Patient's guardian manages medications- reliable historian, came with medication list from discharge at [facility].   Coumadin- newly initiated, last dose 1.5 mg at 0710 today 10/30/24 PTA. Missed dose on 10/28/24; given 1.5 mg on 10/29/24 (AM) and advised to take an additional 1.5 mg in the evening- mom confirms second dose given yesterday at 2030.     Changes made to PTA medication list:  Added: None  Deleted: None  Changed: removed feeding tube route (pt does not have feeding tube), added 5 day therapy to lasix.     Allergies reviewed with patient and updates made in EHR: no    Medication History Completed By: Sumaya López 10/30/2024 8:10 PM    PTA Med List   Medication Sig Note Last Dose/Taking    acetaminophen (TYLENOL) 325 MG tablet Take 2-3 tablets (650-975 mg) by mouth every 6 hours as needed for pain (For optimal non-opioid multimodal pain management to improve pain control.).  10/30/2024 at  7:10 AM    aspirin (ASA) 81 MG chewable tablet Take 81 mg by mouth every morning.  10/30/2024 at  7:10 AM    furosemide (LASIX) 40 MG tablet Take 40 mg by mouth daily. For 5 days.  10/30/2024 at  7:10 AM    ketoconazole (NIZORAL) 2 % external cream Apply topically daily as needed for itching.  Past Month    levETIRAcetam (KEPPRA) 250 MG tablet Take 500 mg by mouth 2 times daily. (Morning/6PM)  10/30/2024 at  7:10 AM    methocarbamol (ROBAXIN) 500 MG tablet Take 500 mg by mouth 3 times daily as needed for muscle spasms.  10/29/2024 at  4:50 PM    metoprolol tartrate (LOPRESSOR) 50 MG tablet Take 1 tablet (50 mg) by mouth 2 times daily.  10/30/2024 at  7:10 AM    norethindrone-ethinyl estradiol  (JUNEL FE 1/20) 1-20 MG-MCG tablet Take 1 tablet by mouth daily.  10/30/2024 at  7:10 AM    oxyCODONE (ROXICODONE) 5 MG tablet Take 1 tablet (5 mg) by mouth 2 times daily as needed for moderate pain.  Past Week    pantoprazole (PROTONIX) 40 MG EC tablet Take 1 tablet (40 mg) by mouth daily.  10/30/2024 at  9:12 AM    potassium chloride pracih ER (KLOR-CON M10) 10 MEQ CR tablet Take 2 tablets (20 mEq) by mouth daily for 5 days. 10/30/2024: 3 doses remaining.  10/30/2024 at 11:00 AM    warfarin ANTICOAGULANT (COUMADIN) 3 MG tablet Take 0.5 tablets (1.5 mg) by mouth daily. Until your next INR check; then, future dosing at the direction of your warfarin clinic.  10/30/2024 at  7:30 AM

## 2024-10-31 NOTE — PROGRESS NOTES
[facility]    Hospitalist Progress Note    Efren Saavedra is a 44 year old female who recently underwent a mitral valve replacement due to mitral stenosis.  She had this performed on 10/23 done at the [facility]. Coronary angiogram showed no evidence of any coronary artery disease.  It was an uncomplicated procedure.  She was on heparin and started on warfarin.  She was actually discharged on 10/28.  Her INR was 2.2 at that time.  They will ask her to be 2.5-3.5 range.  She did not get a dose of warfarin on Monday 10/28.  Yesterday 10/29 she actually got 2 doses of 1.5 mg in the morning 1.5 mg in the evening.  Her INR was 1.5.  She was sent to the ER for evaluation with the thought perhaps she was going to be admitted for IV heparin.  That did not occur.  Then today she saw her primary care provider Dr. Velez the cardio thoracic team recommended that she be admitted for IV heparin.  They waited for a repeat INR and was 1.7.  Her mother did give her 1.5 mg of warfarin this morning.  So at this point she has been admitted we are going to place her on IV heparin will probably dose her with a dose of warfarin tonight we will get her on an every evening schedule of this.  When she is therapeutic at 2.5-3.5 range she can be discharged.     She has been doing well the last couple of days.  She has had no shortness of breath no chest pains of any major consequence got 1 dose of oxycodone otherwise is getting Tylenol.  No shortness of breath no bleeding troubles at all her wounds are doing fine.  Appetites been okay.  No swelling at all.  I am going to hold her Lasix dosing she really does not appear to be grossly volume overloaded at this point.  Has been taking her medications without problem.  Is rather hungry.  She has been are running around the clinic all day today.        Assessment & Plan  Efren Saavedra is a 44 year old female admitted on 10/30/2024.       1.  Subtherapeutic INR in  fresh mitral valve replacement: INR last checked was 1.7 this morning.  It was 1.5 yesterday.  Cardiothoracic team wishes her to be admitted for IV heparin until she is therapeutic.  Will place her on IV heparin protocol.  She will get a dose of warfarin tonight I will have our pharmacy dose this.  Once she is therapeutic can be discharged home.  Anticipate that to be within the next 1 to 2 days.  -10/31: INR is 1.7 this morning.  Unfortunately she did not get a dose last night.  Will continue with the IV heparin.  Dosing her warfarin today until she gets to 2.5.     2.  Status post mitral valve replacement: Crisp mitral click.  She is doing very well from this no dyspnea.  Wounds look good.  No bleeding issues at all.  -10/31: Hemodynamically stable.  Ambulate     3.  History of seizure disorder.  Will continue with her Keppra.  Has had no recent issues.       Diet: Combination Diet Regular Diet Adult  Fluids: None    DVT Prophylaxis: Warfarin and IV heparin  Code Status: Full Code    Disposition: Expected discharge in 1-2 days once therapeutic INR.    Taj Ahumada MD    Interval History   Patient is alert appropriate denies any real issues.  Ate breakfast.  We will get her up and ambulate.  INR still subtherapeutic 1.7.  She did not get any dosing of her warfarin last night.  Will make sure she gets dose tonight.  Continue with the IV heparin.  Hemoglobin stable afebrile sternal wound looks good.    -Data reviewed today: I reviewed all new labs and imaging results over the last 24 hours. I personally reviewed no images or EKG's today.    Physical Exam   Temp: 98.6  F (37  C) Temp src: Tympanic BP: 114/63 Pulse: 80   Resp: 18 SpO2: 97 % O2 Device: None (Room air)    Vitals:    10/30/24 1655 10/31/24 0500   Weight: 32.6 kg (71 lb 12.8 oz) 32.1 kg (70 lb 12.3 oz)     Vital Signs with Ranges  Temp:  [97.8  F (36.6  C)-99.1  F (37.3  C)] 98.6  F (37  C)  Pulse:  [72-80] 80  Resp:  [18-20] 18  BP: ()/(53-77)  114/63  SpO2:  [96 %-99 %] 97 %  I/O last 3 completed shifts:  In: 565.1 [P.O.:360; I.V.:205.1]  Out: -     Peripheral IV 10/30/24 Anterior;Right Lower forearm (Active)   Site Assessment [facility] 10/31/24 0918   Line Status Infusing 10/31/24 0918   Dressing Other (Comment) 10/31/24 0918   Dressing Status clean;dry;intact 10/31/24 0918   Line Intervention Flushed 10/30/24 1811   Phlebitis Scale 0-->no symptoms 10/31/24 0918   Infiltration? no 10/31/24 0918   Number of days: 1       Incision/Surgical Site 10/23/24 Medial Chest (Active)   Incision Assessment WDL 10/31/24 0818   Nadine-Incision Assessment Erythema 10/30/24 1950   Closure Sutures 10/31/24 0555   Incision Drainage Amount None 10/31/24 0818   Dressing Intervention Open to air / No Dressing 10/31/24 0818   Number of days: 8       Incision/Surgical Site 10/25/24 Upper Epigastrum (Active)   Number of days: 6     No line/device    Constitutional: Alert and oriented x3. No distress    HEENT: Normocephalic/atraumatic, PERRL, EOMI, mouth dryness, neck supple, no LN.     Cardiovascular: RRR.  Very crisp mitral valve click murmur, no  rubs, or gallops.  .  Pulses 2 sternal wound looks good    Respiratory: Clear to auscultation bilaterally    Abdomen: Soft, nontender, nondistended, no organomegaly. Bowel sounds present    Extremities: Warm/dry.  No edema    Neuro:   Non focal, cranial nerves intact, Moves all extremities.  Medications   Current Facility-Administered Medications   Medication Dose Route Frequency Provider Last Rate Last Admin    heparin 25,000 units in 0.45% NaCl 250 mL ANTICOAGULANT infusion  0-5,000 Units/hr Intravenous Continuous Taj Ahumada MD 5.5 mL/hr at 10/31/24 0755 550 Units/hr at 10/31/24 0755    Patient is already receiving anticoagulation with heparin, enoxaparin (LOVENOX), warfarin (COUMADIN)  or other anticoagulant medication   Does not apply Continuous PRN Taj Ahumada MD         Current Facility-Administered Medications    Medication Dose Route Frequency Provider Last Rate Last Admin    aspirin (ASA) chewable tablet 81 mg  81 mg Oral or NG Tube Daily Taj Ahumada MD   81 mg at 10/31/24 0903    [Held by provider] furosemide (LASIX) tablet 40 mg  40 mg Oral Daily Taj Ahumada MD        levETIRAcetam (KEPPRA) tablet 500 mg  500 mg Oral BID Taj Ahumada MD   500 mg at 10/31/24 0903    metoprolol tartrate (LOPRESSOR) tablet 50 mg  50 mg Oral BID Taj Ahumada MD   50 mg at 10/31/24 0903    norethindrone-ethinyl estradiol (JUNEL FE 1/20) 1-20 MG-MCG per tablet 1 tablet  1 tablet Oral Daily Taj Ahumada MD        pantoprazole (PROTONIX) EC tablet 40 mg  40 mg Oral Daily Taj Ahumada MD   40 mg at 10/31/24 0810    [Held by provider] potassium chloride prachi ER (KLOR-CON M20) CR tablet 20 mEq  20 mEq Oral Daily Taj Ahumada MD        sodium chloride (PF) 0.9% PF flush 3 mL  3 mL Intracatheter Q8H Taj Ahumada MD        Warfarin Dose Required Daily - Pharmacist Managed  1 each Oral See Admin Instructions Taj Ahumada MD           Data   Recent Labs   Lab 10/31/24  0702 10/30/24  1714 10/30/24  1502 10/29/24  1310 10/28/24  0601   WBC 10.4 15.3*  --   --  10.9   HGB 9.5* 10.2*  --   --  9.3*   MCV 91 92  --   --  92    352  --   --  253   INR 1.70*  --  1.71* 1.51* 2.20*    134*  --   --  139   POTASSIUM 4.9 4.4  --   --  3.6   CHLORIDE 99 96*  --   --  101   CO2 23 24  --   --  25   BUN 10.7 10.4  --   --  10.0   CR 0.68 0.64  --   --  0.58   ANIONGAP 13 14  --   --  13   YUE 8.4* 9.1  --   --  7.8*   GLC 78 85  --   --  95   ALBUMIN 3.2* 3.7  --   --   --    PROTTOTAL 5.8* 6.6  --   --   --    BILITOTAL 0.5 0.4  --   --   --    ALKPHOS 74 86  --   --   --    ALT 20 25  --   --   --    AST 22 29  --   --   --        Recent Results (from the past 24 hours)   CT Head w/o Contrast    Narrative    PROCEDURE: CT HEAD W/O CONTRAST     HISTORY: headache; Headache; Bleeding risk; New or  worsening HA;  Currently taking an anticoagulant.    COMPARISON: None.    TECHNIQUE:  Helical images of the head from the foramen magnum to the  vertex were obtained without contrast.    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 There are no  masses or ventricular shifts or extracerebral collections. The  brainstem appears normal. There is elongation of the right cerebellar  tonsil extending below foramen magnum.  There are opacified air cells  bilaterally. The remainder of the paranasal sinuses are clear. No  acute skull fracture is seen.      Impression    IMPRESSION: Agenesis of the corpus callosum. No acute brain  abnormality.      ANA BURGOS MD         SYSTEM ID:  [id number]

## 2024-10-31 NOTE — PLAN OF CARE
Pt received bolus Heparin, drip currently infusing at 400 units/hr. Pt c/o new onset headache, on-call provider updated. Orders received, pt down for STAT CT of Head.

## 2024-10-31 NOTE — PHARMACY-ANTICOAGULATION SERVICE
Pharmacy Consult-Warfarin Assessment Day #2    Efren Saavedra is a 44 year old female admitted on 10/30/2024 with subtherapeutic INR     Primary Indication(s) for Anticoagulation: mechanical valve replacement     Goal INR: 2.5-3.5    FYI, patient is followed by the Anticoagulation/Protime Clinic at: n/a, patient is a newer start, has not been seen at an Anticoagulation Clinic yet     Patient previously anticoagulated on Coumadin : n/a, patient is a new start, will be managed by [facility] tablet strength(s): 3 mg    Factors that may increase patient's bleeding risk and/or sensitivity to warfarin (Coumadin) include: heparin drip, ASA      Factors that may decrease patient's bleeding risk and/or sensitivity to warfarin (Coumadin) include: 10/28 missed dose       Anticoagulation Dose History  More data exists         10/25/2024 10/26/2024 10/27/2024 10/28/2024 10/29/2024 10/30/2024 10/31/2024   Recent Dosing and Labs   warfarin ANTICOAGULANT (COUMADIN) 1 MG tablet - 1 mg, $Given 1 mg, $Given No dose (Mom thought was given prior to discharging but wasn't)  1.5 mg in AM  1.5 mg in PM  1.5 mg in AM  -   warfarin ANTICOAGULANT (COUMADIN) 1.5 mg TABS half-tab 1.5 mg, $Given - - - - - -   INR 1.63  2.09  2.55  2.41  2.20  1.51  1.71  1.70        CBC RESULTS:   Recent Labs   Lab Test 10/31/24  0702   HGB 9.5*          Assessment/Plan: Discussed case with Cassie from [facility] who had gotten the outpatient Anticoagulation referral on 10/28/24 but has not had an appt with the patient yet. We decided to do a 2.5 mg dose today. Spoke with Dr. Ahumada and okay to give dose now instead of tonight. Will recheck INR tomorrow AM and reassess.     Thank You for the Consult. Will continue to follow.    Aurelia Covarrubias [facility] ....................  10/31/2024   8:40 AM

## 2024-10-31 NOTE — PLAN OF CARE
Goal Outcome Evaluation:      Plan of Care Reviewed With: patient    Overall Patient Progress: improving    Pt A&O, VS and assessments as Charted. Heparin drip currently infusing @550 units/hr. Gave a bolus of 1000units due to AntiXA being 0.18. Patient reported headache at start of shift, provider notified and pt down for STAT CT of Head. CT negative for bleed. Tolerating oral intake, regular diet. Voiding w/o difficulty. Appears to be spotting in brief. SBA when transferring/ambulate, mother at bedside all night. Free from falls and injuries. Bed alarm on.     Face to face report given with opportunity to observe patient.    Report given to Gage Chaudhry RN   10/31/2024  7:10 AM

## 2024-11-01 LAB
HOLD SPECIMEN: NORMAL
INR PPP: 1.79 (ref 0.85–1.15)
UFH PPP CHRO-ACNC: 0.13 IU/ML
UFH PPP CHRO-ACNC: 0.17 IU/ML
UFH PPP CHRO-ACNC: 0.51 IU/ML

## 2024-11-01 PROCEDURE — 99231 SBSQ HOSP IP/OBS SF/LOW 25: CPT | Performed by: INTERNAL MEDICINE

## 2024-11-01 PROCEDURE — 250N000013 HC RX MED GY IP 250 OP 250 PS 637: Performed by: INTERNAL MEDICINE

## 2024-11-01 PROCEDURE — 85520 HEPARIN ASSAY: CPT | Performed by: INTERNAL MEDICINE

## 2024-11-01 PROCEDURE — 250N000011 HC RX IP 250 OP 636: Performed by: INTERNAL MEDICINE

## 2024-11-01 PROCEDURE — 36415 COLL VENOUS BLD VENIPUNCTURE: CPT | Performed by: INTERNAL MEDICINE

## 2024-11-01 PROCEDURE — 120N000001 HC R&B MED SURG/OB

## 2024-11-01 PROCEDURE — 85610 PROTHROMBIN TIME: CPT | Performed by: INTERNAL MEDICINE

## 2024-11-01 RX ADMIN — HEPARIN SODIUM 700 UNITS/HR: 10000 INJECTION, SOLUTION INTRAVENOUS at 09:41

## 2024-11-01 RX ADMIN — ACETAMINOPHEN 650 MG: 325 TABLET, FILM COATED ORAL at 09:36

## 2024-11-01 RX ADMIN — LEVETIRACETAM 500 MG: 500 TABLET, FILM COATED ORAL at 21:40

## 2024-11-01 RX ADMIN — ASPIRIN 81 MG CHEWABLE TABLET 81 MG: 81 TABLET CHEWABLE at 09:37

## 2024-11-01 RX ADMIN — METOPROLOL TARTRATE 50 MG: 25 TABLET, FILM COATED ORAL at 21:40

## 2024-11-01 RX ADMIN — LEVETIRACETAM 500 MG: 500 TABLET, FILM COATED ORAL at 09:36

## 2024-11-01 RX ADMIN — PANTOPRAZOLE SODIUM 40 MG: 40 TABLET, DELAYED RELEASE ORAL at 09:36

## 2024-11-01 RX ADMIN — WARFARIN SODIUM 6 MG: 3 TABLET ORAL at 18:20

## 2024-11-01 RX ADMIN — NORETHINDRONE ACETATE AND ETHINYL ESTRADIOL 1 TABLET: .02; 1 TABLET ORAL at 09:36

## 2024-11-01 RX ADMIN — HEPARIN SODIUM 650 UNITS/HR: 10000 INJECTION, SOLUTION INTRAVENOUS at 21:31

## 2024-11-01 RX ADMIN — ACETAMINOPHEN 650 MG: 325 TABLET, FILM COATED ORAL at 16:39

## 2024-11-01 RX ADMIN — METOPROLOL TARTRATE 50 MG: 25 TABLET, FILM COATED ORAL at 09:36

## 2024-11-01 NOTE — PLAN OF CARE
Goal Outcome Evaluation:      Plan of Care Reviewed With: patient    Overall Patient Progress: improving    Pt AxO, VS and assessments as charted. R forearm IV Heparin Drip infusing at 550 Units/hr. Denied any pain during shift. On regular diet. SBA when transferring to bathroom. AM hep10A lab came back low at 0.17, Bolus of 950 and increase to 700 units/hr ordered. Free from falls and injuries. Call light within reach, Bed alarm on.     Face to face report given with opportunity to observe patient.    Report given to ISIS Almonte, ISIS   11/1/2024  7:25 AM

## 2024-11-01 NOTE — PROGRESS NOTES
"CLINICAL NUTRITION SERVICES  -  ASSESSMENT NOTE    REASON FOR ASSESSMENT:  low weight      NUTRITION HISTORY  Efren Saavedra is a 44 year old female admitted for subtherapeutic INR after mitral valve replacement. PMH includes rheumatic mitral stenosis, seizure disorder, intellectual disability. Low weight is typical.  Has been around 70-75lbs for 10+ years. Some recent weight loss. Pt reports good appetite.     Allergies     Allergies   Allergen Reactions    Azithromycin      Abdominal pain/ cramping     Clotrimazole Rash    Omnicef [Cefdinir] Rash     Itchy and bad rash       CURRENT NUTRITION ORDERS  Diet Order:   Orders Placed This Encounter      Combination Diet Regular Diet Adult  Current Intake/Tolerance: 4 meals with 100% intake    ANTHROPOMETRICS  Height: 4' 9\"  Weight: 71 lbs 6.86 oz  Body mass index is 15.46 kg/m .  Weight Status:  Underweight BMI <18.5  Weight History: 4lb (5%) weight loss in about 1 month  Wt Readings from Last 10 Encounters:   11/01/24 32.4 kg (71 lb 6.9 oz)   10/30/24 33.7 kg (74 lb 4.8 oz)   10/28/24 33 kg (72 lb 12.8 oz)   10/18/24 31.5 kg (69 lb 6.4 oz)   10/15/24 34.9 kg (77 lb)   09/20/24 33.7 kg (74 lb 3.2 oz)   08/27/24 33.5 kg (73 lb 13.7 oz)   08/19/24 33.5 kg (73 lb 12.8 oz)   08/13/24 33.2 kg (73 lb 3.1 oz)   06/27/24 34.1 kg (75 lb 2.8 oz)        LABS  Labs reviewed    MEDICATIONS  Medications reviewed    ASSESSED NUTRITION NEEDS: 32.6kg (admit weight)  Estimated Energy Needs: 970-1140+ kcals (30-35 Kcal/Kg)  Estimated Protein Needs: 40-50 grams protein (1.2-1.5 g pro/Kg)    MALNUTRITION:  % Weight Loss:  Up to 5% in 1 month (moderate malnutrition) (weight is on the lower end of usual range)  % Intake:  No decreased intake noted  Muscle and Subcutaneous Fat Loss: mild/moderate, very low weight    Malnutrition Diagnosis: Diagnosed with moderate malnutrition 10/16/24, no change  In Context of:  Chronic illness or disease    NUTRITION INTERVENTIONS  Recommendations / " Nutrition Prescription  Encourage intake during meal times. Encourage protein foods.  Pt did not want Ensure    MONITORING AND EVALUATION:  Intake, weight, labs

## 2024-11-01 NOTE — PHARMACY-ANTICOAGULATION SERVICE
Pharmacy Consult-Warfarin Assessment Day #3    Efren Saavedra is a 44 year old female admitted on 10/30/2024 with <principal problem not specified>    Primary Indication(s) for Anticoagulation: mechanical valve replacement      Goal INR: 2.5-3.5     FYI, patient is followed by the Anticoagulation/Protime Clinic at: n/a, patient is a newer start, has not been seen at an Anticoagulation Clinic yet      Patient previously anticoagulated on Coumadin : n/a, patient is a new start, will be managed by GI       Home tablet strength(s): 3 mg     Factors that may increase patient's bleeding risk and/or sensitivity to warfarin (Coumadin) include: heparin drip, ASA       Factors that may decrease patient's bleeding risk and/or sensitivity to warfarin (Coumadin) include: 10/28 missed dose, appears patient has not had a therapeutic INR yet (has always been subtherapeutic)     Anticoagulation Dose History  More data exists         Latest Ref Rng & Units 10/26/2024 10/27/2024 10/28/2024 10/29/2024 10/30/2024 10/31/2024 11/1/2024   Recent Dosing and Labs   warfarin ANTICOAGULANT (COUMADIN) 1 MG tablet - 1 mg, $Given 1 mg, $Given No dose (Mom thought was given prior to discharging but wasn't)  1.5 mg in AM  1.5 mg in PM  1.5 mg in AM  - -   warfarin ANTICOAGULANT (COUMADIN) 2.5 MG tablet - - - - - - 2.5 mg, $Given -   INR 0.85 - 1.15 2.09  2.55  2.41  2.20  1.51  1.71  1.70  1.79       Details          Multiple values from one day are sorted in reverse-chronological order             CBC RESULTS:   Recent Labs   Lab Test 10/31/24  0702   HGB 9.5*          Assessment/Plan: INR subtherapeutic. Bridging with continuous heparin infusion until INR therapeutic. Give warfarin 6 mg today per Dr Henderson. Recheck INR tomorrow with am labs.    Thank You for the Consult. Will continue to follow.    Destiny Sanchez [facility] ....................  11/1/2024   7:40 AM

## 2024-11-01 NOTE — PLAN OF CARE
Plan of Care Reviewed With: mother    Overall Patient Progress: progressing     PIV cnts to infuse at 500 unit/hr. Tylenol administered x2. No drainage noted to midline surgical incision. Cnts with sternal precautions. Up via SBA to bathroom. 6 mg Warfarin administered this evening. Bed in lowest position. Call light in reach. ID band in place. Makes needs known. Mother at bedside and attentive to pt's needs.     Face to face report given with opportunity to observe patient.    Report given to ISIS Murphy RN   11/1/2024  7:00 PM

## 2024-11-01 NOTE — PROGRESS NOTES
[facility]  Hospitalist Progress Note          Assessment and Plan:      Hospitalist Progress Note     Efren Saavedra is a 44 year old female who recently underwent a mitral valve replacement due to mitral stenosis.  She had this performed on 10/23 done at the [facility]. Coronary angiogram showed no evidence of any coronary artery disease.  It was an uncomplicated procedure.  She was on heparin and started on warfarin.  She was actually discharged on 10/28.  Her INR was 2.2 at that time.  They will ask her to be 2.5-3.5 range.  She did not get a dose of warfarin on Monday 10/28.  Yesterday 10/29 she actually got 2 doses of 1.5 mg in the morning 1.5 mg in the evening.  Her INR was 1.5.  She was sent to the ER for evaluation with the thought perhaps she was going to be admitted for IV heparin.  That did not occur.  Then today she saw her primary care provider Dr. Velez the cardio thoracic team recommended that she be admitted for IV heparin.  They waited for a repeat INR and was 1.7.  Her mother did give her 1.5 mg of warfarin this morning.  So at this point she has been admitted we are going to place her on IV heparin will probably dose her with a dose of warfarin tonight we will get her on an every evening schedule of this.  When she is therapeutic at 2.5-3.5 range she can be discharged.     She has been doing well the last couple of days.  She has had no shortness of breath no chest pains of any major consequence got 1 dose of oxycodone otherwise is getting Tylenol.  No shortness of breath no bleeding troubles at all her wounds are doing fine.  Appetites been okay.  No swelling at all.  I am going to hold her Lasix dosing she really does not appear to be grossly volume overloaded at this point.  Has been taking her medications without problem.  Is rather hungry.  She has been are running around the clinic all day today.         Assessment & Plan  Efren Saavedra is a 44 year old  "female admitted on 10/30/2024.       1.  Subtherapeutic INR in fresh mitral valve replacement: INR last checked was 1.7 this morning.  It was 1.5 yesterday.  Cardiothoracic team wishes her to be admitted for IV heparin until she is therapeutic.  Will place her on IV heparin protocol.  She will get a dose of warfarin tonight I will have our pharmacy dose this.  Once she is therapeutic can be discharged home.  Anticipate that to be within the next 1 to 2 days.  -10/31: INR is 1.7 this morning.  Unfortunately she did not get a dose last night.  Will continue with the IV heparin.  Dosing her warfarin today until she gets to 2.5.  -11/1: INR 1.79 this morning.     2.  Status post mitral valve replacement: Crisp mitral click.  She is doing very well from this no dyspnea.  Wounds look good.  No bleeding issues at all.  -10/31: Hemodynamically stable.  Ambulate  -11/1: Stable.     3.  History of seizure disorder.  Will continue with her Keppra.  Has had no recent issues.        Diet: Combination Diet Regular Diet Adult  Fluids: None     DVT Prophylaxis: Warfarin and IV heparin  Code Status: Full Code     Disposition: Expected discharge in 1-2 days once therapeutic INR.       Clinically Significant Risk Factors         # Hyponatremia: Lowest Na = 134 mmol/L in last 2 days, will monitor as appropriate  # Hypochloremia: Lowest Cl = 96 mmol/L in last 2 days, will monitor as appropriate  # Hypocalcemia: Lowest Ca = 8.4 mg/dL in last 2 days, will monitor and replace as appropriate     # Hypoalbuminemia: Lowest albumin = 3.2 g/dL at 10/31/2024  7:02 AM, will monitor as appropriate      # Chronic heart failure with preserved ejection fraction: heart failure noted on problem list and last echo with EF >50%          # Cachexia: Estimated body mass index is 15.46 kg/m  as calculated from the following:    Height as of this encounter: 1.448 m (4' 9\").    Weight as of this encounter: 32.4 kg (71 lb 6.9 oz)., PRESENT ON ADMISSION     # " Financial/Environmental Concerns:                 Interval History:     Remained stable.  INR 1.79 this morning.              Medications:     Current Facility-Administered Medications   Medication Dose Route Frequency Provider Last Rate Last Admin    aspirin (ASA) chewable tablet 81 mg  81 mg Oral or NG Tube Daily Tja Ahumada MD   81 mg at 10/31/24 0903    [Held by provider] furosemide (LASIX) tablet 40 mg  40 mg Oral Daily Taj Ahumada MD        levETIRAcetam (KEPPRA) tablet 500 mg  500 mg Oral BID Taj Ahumada MD   500 mg at 10/31/24 2041    metoprolol tartrate (LOPRESSOR) tablet 50 mg  50 mg Oral BID Taj Ahumada MD   50 mg at 10/31/24 2041    norethindrone-ethinyl estradiol (MICROGESTIN ) 1-20 MG-MCG per tablet 1 tablet  1 tablet Oral Daily Taj Ahumada MD   1 tablet at 10/31/24 1800    pantoprazole (PROTONIX) EC tablet 40 mg  40 mg Oral Daily Taj Ahumada MD   40 mg at 10/31/24 0810    [Held by provider] potassium chloride prachi ER (KLOR-CON M20) CR tablet 20 mEq  20 mEq Oral Daily Taj Ahumada MD        sodium chloride (PF) 0.9% PF flush 3 mL  3 mL Intracatheter Q8H Taj Ahumada MD        warfarin ANTICOAGULANT (COUMADIN) half-tab 3 mg  3 mg Oral ONCE at 18:00 Taj Ahumada MD        Warfarin Dose Required Daily - Pharmacist Managed  1 each Oral See Admin Instructions Taj Ahumada MD                      Physical Exam:     Vitals:    10/31/24 2037 24 0126 24 0514 24 0535   BP: (!) 94/49 98/53 109/58    BP Location: Left arm Left arm Left arm    Patient Position: Semi-Garnica's Semi-Garnica's Semi-Garnica's    Cuff Size: Adult Small Adult Small Adult Small    Pulse: 83 78 80    Resp: 20 18 18    Temp: 99.5  F (37.5  C) 99.3  F (37.4  C) 98.6  F (37  C)    TempSrc: Tympanic Tympanic Tympanic    SpO2: 96% 98% 96%    Weight:    32.4 kg (71 lb 6.9 oz)   Height:             Vital Sign Ranges  Temperature Temp  Av  F (37.2  C)  Min: 98.6  F (37   C)  Max: 99.5  F (37.5  C)   Blood pressure Systolic (24hrs), Av , Min:94 , Max:109        Diastolic (24hrs), Av, Min:49, Max:58      Pulse Pulse  Av.5  Min: 77  Max: 83   Respirations Resp  Av.8  Min: 18  Max: 20   Pulse oximetry SpO2  Av.5 %  Min: 96 %  Max: 98 %         Intake/Output Summary (Last 24 hours) at 2024 09  Last data filed at 2024 0905  Gross per 24 hour   Intake 641.92 ml   Output --   Net 641.92 ml       General:  Alert and Orientated.  NAD.  Heart:  RRR, S1 S2, opening and closing snaps of mitral valve appreciated.  Resp: CTA bilaterally.  No wheezes, no rhonchi, no crackles.  Abd: Soft/NT/ND/Positve BS.  Ext: No edema noted in either lower extremity  Neuro:  No focal Neurologic deficits noted.    Peripheral IV 10/30/24 Anterior;Right Lower forearm (Active)   Site Assessment WDL 24   Line Status Infusing 24   Dressing Other (Comment) 24   Dressing Status clean;dry;intact 24   Line Intervention Flushed 10/30/24 1811   Phlebitis Scale 0-->no symptoms 24   Infiltration? no 2418   Number of days: 2       Incision/Surgical Site 10/23/24 Medial Chest (Active)   Incision Assessment WDL 2414   Nadine-Incision Assessment Erythema 10/30/24 1950   Closure Sutures 10/31/24 0555   Incision Drainage Amount None 2414   Dressing Intervention Open to air / No Dressing 24 0514   Number of days: 9       Incision/Surgical Site 10/25/24 Upper Epigastrum (Active)   Number of days: 7     No line/device             Data:     Lab Results   Component Value Date    WBC 10.4 10/31/2024    WBC 15.3 (H) 10/30/2024    WBC 10.9 10/28/2024    WBC 10.1 10/27/2024    WBC 12.3 (H) 10/26/2024    HGB 9.5 (L) 10/31/2024    HGB 10.2 (L) 10/30/2024    HGB 9.3 (L) 10/28/2024    HGB 8.8 (L) 10/27/2024    HGB 8.8 (L) 10/26/2024    HCT 29.7 (L) 10/31/2024    HCT 31.9 (L) 10/30/2024    HCT 28.7 (L) 10/28/2024    HCT 27.7  (L) 10/27/2024    HCT 28.1 (L) 10/26/2024     10/31/2024     10/30/2024     10/28/2024     10/27/2024     10/26/2024     10/31/2024     (L) 10/30/2024     10/28/2024     10/27/2024     10/26/2024    POTASSIUM 4.9 10/31/2024    POTASSIUM 4.4 10/30/2024    POTASSIUM 3.6 10/28/2024    POTASSIUM 4.1 10/27/2024    POTASSIUM 3.8 10/27/2024    CHLORIDE 99 10/31/2024    CHLORIDE 96 (L) 10/30/2024    CHLORIDE 101 10/28/2024    CHLORIDE 105 10/27/2024    CHLORIDE 103 10/26/2024    CO2 23 10/31/2024    CO2 24 10/30/2024    CO2 25 10/28/2024    CO2 25 10/27/2024    CO2 22 10/26/2024    BUN 10.7 10/31/2024    BUN 10.4 10/30/2024    BUN 10.0 10/28/2024    BUN 13.6 10/27/2024    BUN 14.7 10/26/2024    CR 0.68 10/31/2024    CR 0.64 10/30/2024    CR 0.58 10/28/2024    CR 0.64 10/27/2024    CR 0.59 10/26/2024    GLC 78 10/31/2024    GLC 85 10/30/2024    GLC 95 10/28/2024    GLC 90 10/27/2024     (H) 10/26/2024    DD <0.30 06/27/2024    NTBNPI 7,347 (H) 10/14/2024    AST 22 10/31/2024    AST 29 10/30/2024    AST 35 10/26/2024    AST 38 10/25/2024    AST 51 (H) 10/24/2024    ALT 20 10/31/2024    ALT 25 10/30/2024    ALT 12 10/26/2024    ALT 11 10/25/2024    ALT 19 10/24/2024    ALKPHOS 74 10/31/2024    ALKPHOS 86 10/30/2024    ALKPHOS 51 10/26/2024    ALKPHOS 39 (L) 10/25/2024    ALKPHOS 41 10/24/2024    BILITOTAL 0.5 10/31/2024    BILITOTAL 0.4 10/30/2024    BILITOTAL 0.7 10/26/2024    BILITOTAL 1.0 10/25/2024    BILITOTAL 1.1 10/24/2024    INR 1.79 (H) 11/01/2024    INR 1.70 (H) 10/31/2024    INR 1.71 (H) 10/30/2024    INR 1.51 (H) 10/29/2024    INR 2.20 (H) 10/28/2024     Lactic Acid   Date Value Ref Range Status   10/23/2024 1.4 0.7 - 2.0 mmol/L Final   10/23/2024 1.2 0.7 - 2.0 mmol/L Final   10/17/2022 0.8 0.7 - 2.0 mmol/L Final     Lactic Acid POCT   Date Value Ref Range Status   10/23/2024 1.3 0.7 - 2.0 mmol/L Final   10/23/2024 1.2 0.7 - 2.0 mmol/L Final    10/23/2024 1.3 0.7 - 2.0 mmol/L Final       Armand Henderson, DO

## 2024-11-02 LAB
ERYTHROCYTE [DISTWIDTH] IN BLOOD BY AUTOMATED COUNT: 16 % (ref 10–15)
HCT VFR BLD AUTO: 27.3 % (ref 35–47)
HGB BLD-MCNC: 8.8 G/DL (ref 11.7–15.7)
HOLD SPECIMEN: NORMAL
INR PPP: 2.08 (ref 0.85–1.15)
MCH RBC QN AUTO: 29.4 PG (ref 26.5–33)
MCHC RBC AUTO-ENTMCNC: 32.2 G/DL (ref 31.5–36.5)
MCV RBC AUTO: 91 FL (ref 78–100)
PLATELET # BLD AUTO: 240 10E3/UL (ref 150–450)
RBC # BLD AUTO: 2.99 10E6/UL (ref 3.8–5.2)
UFH PPP CHRO-ACNC: 0.41 IU/ML
UFH PPP CHRO-ACNC: 0.42 IU/ML
WBC # BLD AUTO: 11.7 10E3/UL (ref 4–11)

## 2024-11-02 PROCEDURE — 36415 COLL VENOUS BLD VENIPUNCTURE: CPT | Performed by: INTERNAL MEDICINE

## 2024-11-02 PROCEDURE — 120N000001 HC R&B MED SURG/OB

## 2024-11-02 PROCEDURE — 85027 COMPLETE CBC AUTOMATED: CPT | Performed by: INTERNAL MEDICINE

## 2024-11-02 PROCEDURE — 99232 SBSQ HOSP IP/OBS MODERATE 35: CPT | Performed by: INTERNAL MEDICINE

## 2024-11-02 PROCEDURE — 250N000013 HC RX MED GY IP 250 OP 250 PS 637: Performed by: INTERNAL MEDICINE

## 2024-11-02 PROCEDURE — 85520 HEPARIN ASSAY: CPT | Performed by: INTERNAL MEDICINE

## 2024-11-02 PROCEDURE — 85610 PROTHROMBIN TIME: CPT | Performed by: INTERNAL MEDICINE

## 2024-11-02 RX ADMIN — METOPROLOL TARTRATE 50 MG: 25 TABLET, FILM COATED ORAL at 20:47

## 2024-11-02 RX ADMIN — METOPROLOL TARTRATE 50 MG: 25 TABLET, FILM COATED ORAL at 10:25

## 2024-11-02 RX ADMIN — ACETAMINOPHEN 650 MG: 325 TABLET, FILM COATED ORAL at 20:51

## 2024-11-02 RX ADMIN — WARFARIN SODIUM 3 MG: 3 TABLET ORAL at 10:24

## 2024-11-02 RX ADMIN — LEVETIRACETAM 500 MG: 500 TABLET, FILM COATED ORAL at 10:25

## 2024-11-02 RX ADMIN — PANTOPRAZOLE SODIUM 40 MG: 40 TABLET, DELAYED RELEASE ORAL at 10:26

## 2024-11-02 RX ADMIN — LEVETIRACETAM 500 MG: 500 TABLET, FILM COATED ORAL at 20:46

## 2024-11-02 RX ADMIN — ASPIRIN 81 MG CHEWABLE TABLET 81 MG: 81 TABLET CHEWABLE at 10:23

## 2024-11-02 RX ADMIN — NORETHINDRONE ACETATE AND ETHINYL ESTRADIOL 1 TABLET: .02; 1 TABLET ORAL at 10:26

## 2024-11-02 NOTE — PROGRESS NOTES
[facility]  Hospitalist Progress Note          Assessment and Plan:     Hospitalist Progress Note     Efren Saavedra is a 44 year old female who recently underwent a mitral valve replacement due to mitral stenosis.  She had this performed on 10/23 done at the [facility]. Coronary angiogram showed no evidence of any coronary artery disease.  It was an uncomplicated procedure.  She was on heparin and started on warfarin.  She was actually discharged on 10/28.  Her INR was 2.2 at that time.  They will ask her to be 2.5-3.5 range.  She did not get a dose of warfarin on Monday 10/28.  Yesterday 10/29 she actually got 2 doses of 1.5 mg in the morning 1.5 mg in the evening.  Her INR was 1.5.  She was sent to the ER for evaluation with the thought perhaps she was going to be admitted for IV heparin.  That did not occur.  Then today she saw her primary care provider Dr. Velez the cardio thoracic team recommended that she be admitted for IV heparin.  They waited for a repeat INR and was 1.7.  Her mother did give her 1.5 mg of warfarin this morning.  So at this point she has been admitted we are going to place her on IV heparin will probably dose her with a dose of warfarin tonight we will get her on an every evening schedule of this.  When she is therapeutic at 2.5-3.5 range she can be discharged.     She has been doing well the last couple of days.  She has had no shortness of breath no chest pains of any major consequence got 1 dose of oxycodone otherwise is getting Tylenol.  No shortness of breath no bleeding troubles at all her wounds are doing fine.  Appetites been okay.  No swelling at all.  I am going to hold her Lasix dosing she really does not appear to be grossly volume overloaded at this point.  Has been taking her medications without problem.  Is rather hungry.  She has been are running around the clinic all day today.         Assessment & Plan  Efren Saavedra is a 44 year old  [FreeTextEntry1] : #PAD with lifestyle limiting claudication, prior R toe wound with delayed healing. Known CTOs bilateral SFAs. No BTK disease. s/p RSFA IVL, DCBA and selective stenting of the proximal and distal SFA JEN x2 and LSFA IVL, DCBA and selective stenting of the proximal and distal SFA JEN x2.  No further claudication, doing well.   -exercise therapy; GDMT; RF modification -home blood pressure monitoring -hepatic steatosis on ultrasound; hepatic lesion seen on chest CT, recommended liver MRI (still waiting to follow-up with primary care) -LFTs abnormal but stable on statin; on zetia as well -repeat lipid panel today -polyvascular disease with lipid profile far from goal; will try to explore patient assistance program and coupons -f/u 3 months -continue ASA 81mg daily   #CAD s/p 2vCABG, preserved LVEF -GDMT, working on HL therapy   #HTN: home BPs 120-130/80s, Told him to continue to check at home.  f/u 3 months  "female admitted on 10/30/2024.       1.  Subtherapeutic INR in fresh mitral valve replacement: INR last checked was 1.7 this morning.  It was 1.5 yesterday.  Cardiothoracic team wishes her to be admitted for IV heparin until she is therapeutic.  Will place her on IV heparin protocol.  She will get a dose of warfarin tonight I will have our pharmacy dose this.  Once she is therapeutic can be discharged home.  Anticipate that to be within the next 1 to 2 days.  -10/31: INR is 1.7 this morning.  Unfortunately she did not get a dose last night.  Will continue with the IV heparin.  Dosing her warfarin today until she gets to 2.5.  -11/1: INR 1.79 this morning.  -11/2: INR 2.08 this morning.  Discussed with pharmacy we will give her 3 mg of Coumadin today.     2.  Status post mitral valve replacement: Crisp mitral click.  She is doing very well from this no dyspnea.  Wounds look good.  No bleeding issues at all.  -10/31: Hemodynamically stable.  Ambulate  -11/1: Stable.  -11/2: Stable     3.  History of seizure disorder.  Will continue with her Keppra.  Has had no recent issues.        Diet: Combination Diet Regular Diet Adult  Fluids: None     DVT Prophylaxis: Warfarin and IV heparin  Code Status: Full Code     Disposition: Expected discharge in 1-2 days once therapeutic INR.  Hopeful we will achieve INR of greater than 2.5 tomorrow and can discharge patient.       Clinically Significant Risk Factors               # Hypoalbuminemia: Lowest albumin = 3.2 g/dL at 10/31/2024  7:02 AM, will monitor as appropriate      # Chronic heart failure with preserved ejection fraction: heart failure noted on problem list and last echo with EF >50%          # Cachexia: Estimated body mass index is 15.84 kg/m  as calculated from the following:    Height as of this encounter: 1.448 m (4' 9\").    Weight as of this encounter: 33.2 kg (73 lb 3.1 oz)., PRESENT ON ADMISSION  # Moderate Malnutrition: based on nutrition assessment, PRESENT ON " ADMISSION   # Financial/Environmental Concerns:                 Interval History:     Patient remained stable.  INR 2.08 today.  Patient and mother were updated.              Medications:     Current Facility-Administered Medications   Medication Dose Route Frequency Provider Last Rate Last Admin    aspirin (ASA) chewable tablet 81 mg  81 mg Oral or NG Tube Daily Taj Ahumada MD   81 mg at 24 0937    [Held by provider] furosemide (LASIX) tablet 40 mg  40 mg Oral Daily Taj Ahumada MD        levETIRAcetam (KEPPRA) tablet 500 mg  500 mg Oral BID Taj Ahumada MD   500 mg at 24    metoprolol tartrate (LOPRESSOR) tablet 50 mg  50 mg Oral BID Taj Ahumada MD   50 mg at 24    norethindrone-ethinyl estradiol (MICROGESTIN ) 1-20 MG-MCG per tablet 1 tablet  1 tablet Oral Daily Taj Ahumada MD   1 tablet at 24 0936    pantoprazole (PROTONIX) EC tablet 40 mg  40 mg Oral Daily Taj Ahumada MD   40 mg at 24 0936    [Held by provider] potassium chloride prachi ER (KLOR-CON M20) CR tablet 20 mEq  20 mEq Oral Daily Taj Ahumada MD        sodium chloride (PF) 0.9% PF flush 3 mL  3 mL Intracatheter Q8H Taj Ahumada MD        Warfarin Dose Required Daily - Pharmacist Managed  1 each Oral See Admin Instructions Taj Ahumada MD                      Physical Exam:     Vitals:    24 1644 24 1822 24 2134 24 0509   BP: (!) 93/46 127/53 107/54 117/69   BP Location: Left arm  Left arm Left arm   Patient Position:   Semi-Garnica's Semi-Garnica's   Cuff Size: Adult Small  Adult Small Adult Small   Pulse: 74 78 82 82   Resp: 16  16 14   Temp:   99.8  F (37.7  C) 99.3  F (37.4  C)   TempSrc:   Tympanic Tympanic   SpO2: 97%  98% 95%   Weight:    33.2 kg (73 lb 3.1 oz)   Height:             Vital Sign Ranges  Temperature Temp  Av.2  F (37.3  C)  Min: 98.7  F (37.1  C)  Max: 99.8  F (37.7  C)   Blood pressure Systolic (24hrs), Av ,  Min:93 , Max:127        Diastolic (24hrs), Av, Min:46, Max:69      Pulse Pulse  Av  Min: 74  Max: 89   Respirations Resp  Avg: 15.5  Min: 14  Max: 16   Pulse oximetry SpO2  Av.8 %  Min: 95 %  Max: 98 %         Intake/Output Summary (Last 24 hours) at 2024 0833  Last data filed at 2024 0509  Gross per 24 hour   Intake 1236.9 ml   Output --   Net 1236.9 ml     General:  Alert.  NAD.  Heart:  RRR, S1 S2, opening and closing snaps of mitral valve appreciated.  Resp: CTA bilaterally.  No wheezes, no rhonchi, no crackles.  Abd: Soft/NT/ND/Positve BS.  Ext: No edema noted in either lower extremity  Neuro: Moves All extremities.    Peripheral IV 10/30/24 Anterior;Right Lower forearm (Active)   Site Assessment WDL 24 0500   Line Status Infusing 24 0500   Dressing Transparent 24 0500   Dressing Status clean;dry;intact 24 0500   Line Intervention Flushed 10/30/24 1811   Phlebitis Scale 0-->no symptoms 24 0500   Infiltration? no 24 0500   Number of days: 3       Incision/Surgical Site 10/23/24 Medial Chest (Active)   Incision Assessment WDL 24 0509   Nadine-Incision Assessment Erythema 10/30/24 1950   Closure Sutures 10/31/24 0555   Incision Drainage Amount None 24 0509   Dressing Intervention Open to air / No Dressing 24 0509   Number of days: 10       Incision/Surgical Site 10/25/24 Upper Epigastrum (Active)   Number of days: 8     No line/device             Data:     Lab Results   Component Value Date    WBC 11.7 (H) 2024    WBC 10.4 10/31/2024    WBC 15.3 (H) 10/30/2024    HGB 8.8 (L) 2024    HGB 9.5 (L) 10/31/2024    HGB 10.2 (L) 10/30/2024    HCT 27.3 (L) 2024    HCT 29.7 (L) 10/31/2024    HCT 31.9 (L) 10/30/2024     2024     10/31/2024     10/30/2024     10/31/2024     (L) 10/30/2024     10/28/2024    POTASSIUM 4.9 10/31/2024    POTASSIUM 4.4 10/30/2024    POTASSIUM 3.6 10/28/2024     CHLORIDE 99 10/31/2024    CHLORIDE 96 (L) 10/30/2024    CHLORIDE 101 10/28/2024    CO2 23 10/31/2024    CO2 24 10/30/2024    CO2 25 10/28/2024    BUN 10.7 10/31/2024    BUN 10.4 10/30/2024    BUN 10.0 10/28/2024    CR 0.68 10/31/2024    CR 0.64 10/30/2024    CR 0.58 10/28/2024    GLC 78 10/31/2024    GLC 85 10/30/2024    GLC 95 10/28/2024    DD <0.30 06/27/2024    NTBNPI 7,347 (H) 10/14/2024    AST 22 10/31/2024    AST 29 10/30/2024    AST 35 10/26/2024    ALT 20 10/31/2024    ALT 25 10/30/2024    ALT 12 10/26/2024    ALKPHOS 74 10/31/2024    ALKPHOS 86 10/30/2024    ALKPHOS 51 10/26/2024    BILITOTAL 0.5 10/31/2024    BILITOTAL 0.4 10/30/2024    BILITOTAL 0.7 10/26/2024    INR 2.08 (H) 11/02/2024    INR 1.79 (H) 11/01/2024    INR 1.70 (H) 10/31/2024     Lactic Acid   Date Value Ref Range Status   10/23/2024 1.4 0.7 - 2.0 mmol/L Final   10/23/2024 1.2 0.7 - 2.0 mmol/L Final   10/17/2022 0.8 0.7 - 2.0 mmol/L Final     Lactic Acid POCT   Date Value Ref Range Status   10/23/2024 1.3 0.7 - 2.0 mmol/L Final   10/23/2024 1.2 0.7 - 2.0 mmol/L Final   10/23/2024 1.3 0.7 - 2.0 mmol/L Final       Armand Henderson, DO

## 2024-11-02 NOTE — PLAN OF CARE
"BP 99/54 (BP Location: Left arm, Cuff Size: Adult Small)   Pulse 70   Temp 98.6  F (37  C) (Tympanic)   Resp 14   Ht 1.448 m (4' 9\")   Wt 33.2 kg (73 lb 3.1 oz)   SpO2 95%   BMI 15.84 kg/m          A&O x4. Denies pain. LS clear, HRR w mitral valve click heard. Pulses 2+, midline incision open to air w no bleeding. Heparin infusion cont 6.5 ml/hr per order w redraw timed with morning labs. SBA, call light in reach and mother (legal guardian) at bedside.     Face to face report given with opportunity to observe patient.    Report given to ISIS Shelley RN   11/2/2024  6:58 PM                " Patient is in the supine position.   The body was positioned using the following devices: safety strap and gel pad mattress.  The head was positioned using the following devices: gel pad mattress and regular pillow.  The left arm was positioned using the following devices: arm board and gel arm pads.  The right arm was positioned using the following devices: arm board and gel arm pads.  The patient was positioned by Tamara Mcintyre, Hussein Keyes

## 2024-11-02 NOTE — PLAN OF CARE
"Reason for hospital stay:  Subtherapeutic INR  Living situation PTA: Home with mother    Goal outcome evaluation:     Plan of Care Reviewed With:  Patient's mother and patient      Overall Patient Progress:  Progressing    Significant events in the last 24 hours:  INR 2.08 and Heparin level 0.13 and 0.42. Increased Heparin infusion to 650 units/hr and also administered a bolus of 950 units overnight.     Neuro:  A&O x4  CV:  Apical pulse regular, valve click noted  Respiratory:  Maintaining sats on room air. Lung sounds clear and equal bilaterally  GI:  Bowel sounds audible and normoactive  :  Voiding spontaneously without difficulty  Skin:  Scattered bruising, scab to abdomen, and midline chest incision - open to air.   Lines/Drains:  IV infusing Heparin at 650 units/hr  Nutrition: Regular diet  Mobility: Stand by assist    Safety:  Call light within reach, patient's mother at bedside, calls appropriately, lighting adjusted, nonskid footwear in use, bed in lowest position, wheels locked, room near unit station.     Pain Management:  Patient denied pain overnight    Most recent vitals: /69 (BP Location: Left arm, Patient Position: Semi-Garnica's, Cuff Size: Adult Small)   Pulse 82   Temp 99.3  F (37.4  C) (Tympanic)   Resp 14   Ht 1.448 m (4' 9\")   Wt 33.2 kg (73 lb 3.1 oz)   SpO2 95%   BMI 15.84 kg/m        Face to face report given with opportunity to observe patient.  Report given to ISIS Chinchilla RN   11/2/2024  7:31 AM  "

## 2024-11-02 NOTE — PLAN OF CARE
Goal Outcome Evaluation:       Provider notified that heparin XA has not been drawn yet. Lab is has attempted multiple times without success to attain a sample. They are sending a 3rd person to come draw blood

## 2024-11-02 NOTE — PLAN OF CARE
"Goal Outcome Evaluation:         Reason for hospital stay:  s/p MVR  Living situation PTA: home with mother     Goal outcome evaluation:     Plan of Care Reviewed With:  mother or pt       Overall Patient Progress:  improving        Neuro:  WNL  CV:  mitral valve click heard, apical regular   Respiratory:  room air, lungs clear   GI:  loose stools x2  :  WNL  Skin:  midline surgical incision to chest and dressing to abd from chest tubes   Lines/Drains:  iv infusion   Nutrition: regular   Mobility:stand by    Safety:  call light in place, bed in low position with wheels locked, call light in place, non slid socks on when OOB     Pain Management:  denied       Comments:  pt had 2 loose stools - per mother pt had been taking stool softeners before coming to hospital post surgery     Most recent vitals: /61 (BP Location: Left arm, Patient Position: Sitting, Cuff Size: Adult Small)   Pulse 82   Temp 98  F (36.7  C) (Tympanic)   Resp 16   Ht 1.448 m (4' 9\")   Wt 33.2 kg (73 lb 3.1 oz)   SpO2 99%   BMI 15.84 kg/m      Discharge care conference held.       Face to face report given with opportunity to observe patient.  Report given to Lyudmila Cabello RN   11/2/2024  \3:08 PM                     "

## 2024-11-02 NOTE — PHARMACY-ANTICOAGULATION SERVICE
Pharmacy Consult-Warfarin Assessment Day #10 from initiation    Efren Saavedra is a 44 year old female admitted on 10/30/2024 with <principal problem not specified>    Primary Indication(s) for Anticoagulation: Mitral Valve Replacement    Goal INR: 2.5-3.5    FYI, patient is followed by the Anticoagulation/Protime Clinic at: n/a, patient is a newer start, has not been seen at an Anticoagulation Clinic yet; will be managed at [facility]    Home tablet strength(s): 3 mg    Factors that may increase patient's bleeding risk and/or sensitivity to warfarin (Coumadin) include: Low hemoglobin, aspirin, heparin drip, low albumin    Factors that may decrease patient's bleeding risk and/or sensitivity to warfarin (Coumadin) include: Missed dose on 10/28/24, Protamine & Fibryga given on 10/23/24      Anticoagulation Dose History  More data exists          10/23/2024 10/24/2024 10/25/24 10/26/2024 10/27/2024 10/28/2024 10/29/2024 10/30/2024 10/31/2024 11/1/2024 11/2/2024      warfarin ANTICOAGULANT (COUMADIN) 1 MG tablet     1 mg, $Given  1 mg, $Given Missed Dose 1.5 mg AM +   1.5 mg PM 1.5 mg PTA - - -   warfarin ANTICOAGULANT (COUMADIN) 1.5 mg TABS half-tab   1.5 mg, $Given  1.5 mg, $Given   - - - - - 6 mg, $Given -   warfarin ANTICOAGULANT (COUMADIN) 2.5 MG tablet  Protamine 75 mg IV  +   Fibryga 1150 mg    - - - - 2.5 mg, $Given - -   INR   1.25  1.63  2.55  2.41  2.20  1.51  1.71  1.70  1.79  2.08       Details                 CBC RESULTS:   Recent Labs   Lab Test 11/02/24  0343   HGB 8.8*          Assessment/Plan: INR subtherapeutic, not yet seeing effects from 6 mg dose. Average dose/day from initiation = 1.8 mg. Will give warfarin 3 mg today per discussion with Dr. Henderson. Check INR with AM labs      Warfarin Dosing Procedure (for reference)      Thank You for the Consult. Will continue to follow.    Efren Garcia [facility] ....................  11/2/2024   8:21 AM

## 2024-11-03 VITALS
TEMPERATURE: 97.8 F | RESPIRATION RATE: 16 BRPM | SYSTOLIC BLOOD PRESSURE: 132 MMHG | HEIGHT: 57 IN | HEART RATE: 93 BPM | DIASTOLIC BLOOD PRESSURE: 75 MMHG | OXYGEN SATURATION: 93 % | BODY MASS INDEX: 15.79 KG/M2 | WEIGHT: 73.19 LBS

## 2024-11-03 LAB
HOLD SPECIMEN: NORMAL
HOLD SPECIMEN: NORMAL
INR PPP: 3.68 (ref 0.85–1.15)
UFH PPP CHRO-ACNC: 0.71 IU/ML

## 2024-11-03 PROCEDURE — 85520 HEPARIN ASSAY: CPT | Performed by: INTERNAL MEDICINE

## 2024-11-03 PROCEDURE — 99239 HOSP IP/OBS DSCHRG MGMT >30: CPT | Performed by: INTERNAL MEDICINE

## 2024-11-03 PROCEDURE — 36415 COLL VENOUS BLD VENIPUNCTURE: CPT | Performed by: INTERNAL MEDICINE

## 2024-11-03 PROCEDURE — 85610 PROTHROMBIN TIME: CPT | Performed by: INTERNAL MEDICINE

## 2024-11-03 PROCEDURE — 250N000013 HC RX MED GY IP 250 OP 250 PS 637: Performed by: INTERNAL MEDICINE

## 2024-11-03 PROCEDURE — 250N000011 HC RX IP 250 OP 636: Performed by: INTERNAL MEDICINE

## 2024-11-03 RX ORDER — WARFARIN SODIUM 3 MG/1
TABLET ORAL
Status: SHIPPED
Start: 2024-11-03 | End: 2024-11-05

## 2024-11-03 RX ADMIN — LEVETIRACETAM 500 MG: 500 TABLET, FILM COATED ORAL at 08:36

## 2024-11-03 RX ADMIN — ASPIRIN 81 MG CHEWABLE TABLET 81 MG: 81 TABLET CHEWABLE at 08:36

## 2024-11-03 RX ADMIN — NORETHINDRONE ACETATE AND ETHINYL ESTRADIOL 1 TABLET: .02; 1 TABLET ORAL at 08:35

## 2024-11-03 RX ADMIN — HEPARIN SODIUM 650 UNITS/HR: 10000 INJECTION, SOLUTION INTRAVENOUS at 03:18

## 2024-11-03 RX ADMIN — PANTOPRAZOLE SODIUM 40 MG: 40 TABLET, DELAYED RELEASE ORAL at 08:36

## 2024-11-03 RX ADMIN — METOPROLOL TARTRATE 50 MG: 25 TABLET, FILM COATED ORAL at 08:35

## 2024-11-03 ASSESSMENT — ACTIVITIES OF DAILY LIVING (ADL)
ADLS_ACUITY_SCORE: 0

## 2024-11-03 NOTE — PHARMACY-ANTICOAGULATION SERVICE
Pharmacy Consult-Warfarin Assessment Day #11 from initiation    Efren Saavedra is a 44 year old female admitted on 10/30/2024 with <principal problem not specified>    Primary Indication(s) for Anticoagulation: Mitral Valve Replacement    Goal INR: 2.5-3.5    FYI, patient is followed by the Anticoagulation/Protime Clinic at: n/a, patient is a newer start, has not been seen at an Anticoagulation Clinic yet; will be managed at [facility]    Home tablet strength(s): 3 mg    Factors that may increase patient's bleeding risk and/or sensitivity to warfarin (Coumadin) include: Low hemoglobin, aspirin, heparin drip, low albumin    Factors that may decrease patient's bleeding risk and/or sensitivity to warfarin (Coumadin) include: Missed dose on 10/28/24, Protamine & Fibryga given on 10/23/24      Anticoagulation Dose History  More data exists          10/23/2024 10/24/2024 10/25/24 10/26/2024 10/27/2024 10/28/2024 10/29/2024 10/30/2024 10/31/2024 11/1/2024 11/2/2024 11/3/2024       warfarin ANTICOAGULANT (COUMADIN) 1 MG tablet     1 mg, $Given  1 mg, $Given Missed Dose 1.5 mg AM +   1.5 mg PM 1.5 mg PTA - - -    warfarin ANTICOAGULANT (COUMADIN) 1.5 mg TABS half-tab   1.5 mg, $Given  1.5 mg, $Given   - - - - - 6 mg, $Given    Per Dr. Henderson 3 mg, $Given    warfarin ANTICOAGULANT (COUMADIN) 2.5 MG tablet  Protamine 75 mg IV  +   Fibryga 1150 mg    - - - - 2.5 mg, $Given - -    INR   1.25  1.63  2.55  2.41  2.20  1.51  1.71  1.70  1.79  2.08  3.68       Details                 CBC RESULTS:   Recent Labs   Lab Test 11/02/24  0343   HGB 8.8*          Assessment/Plan: INR supratherapeutic due to 6 mg dose given on 11/1/24. Average daily dose thus far ~1.9 mg/day. Plan is for discharge today.       Hold warfarin on 11/3/2024;  Recheck INR on 11/4/24 and receive additional dosing instructions per Anticoagulation Clinic.      Thank You for the Consult. Will continue to follow.    Efren Garcia [facility] on 11/3/2024 at 8:30  AM

## 2024-11-03 NOTE — DISCHARGE SUMMARY
"[facility]  Hospitalist Discharge Summary      Date of Admission:  10/30/2024  Date of Discharge:  11/3/2024  Discharging Provider: Armand Henderson DO  Discharge Service: Hospitalist Service    Discharge Diagnoses   Status post mitral valve replacement on October 23, 2024.  Subtherapeutic INR.  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  Developmental delay.    Clinically Significant Risk Factors     # Cachexia: Estimated body mass index is 15.84 kg/m  as calculated from the following:    Height as of this encounter: 1.448 m (4' 9\").    Weight as of this encounter: 33.2 kg (73 lb 3.1 oz).  # Moderate Malnutrition: based on nutrition assessment      Follow-ups Needed After Discharge   Follow-up Appointments       Follow-up and recommended labs and tests       1) INR tomorrow  2) Anticoagulation Clinic referral  3) Follow up with CV surgery as scheduled  4) Follow up PCP 1-2 weeks.                Unresulted Labs Ordered in the Past 30 Days of this Admission       No orders found from 9/30/2024 to 10/31/2024.        These results will be followed up by PCP    Discharge Disposition   Discharged to home  Condition at discharge: Stable    Hospital Course   Bulmaro walter is a 44-year-old female who recently underwent mitral valve replacement due to rheumatic mitral valve stenosis on October 23, 2024.  Procedure was done at the [facility].  Coronary angiogram did not reveal any evidence of underlying coronary disease.  Patient was subsequently discharged on October 28, 2024 at which time her INR was 2.2.  Subsequently on October 29 her INR was 1.5.  Patient was sent to the emergency department for further evaluation.  Per recommendation of the cardiothoracic team they requested the patient be hospitalized and be placed on a heparin drip " until she is in therapeutic range.  Patient has remained vitally stable throughout this hospitalization.  Initial and follow-up labs were fairly unremarkable.  With her initial presentation her INR was 1.71 followed by 1.79 then 2.08 and today 3.68.  Patient will be discharged on Coumadin as discussed with the pharmacy with repeat INR tomorrow.  In addition an additional referral for the anticoagulation clinic was placed at time of discharge today.  Recommendation patient follow-up with primary care provider in 1 to 2 weeks and CV surgery at the [facility] as scheduled.      Consultations This Hospital Stay   PHARMACY IP CONSULT  PHARMACY TO DOSE WARFARIN  CARE MANAGEMENT / SOCIAL WORK IP CONSULT    Code Status   Full Code    Time Spent on this Encounter   IArmand DO, personally saw the patient today and spent greater than 30 minutes discharging this patient.       Armand Henderson DO  [facility]  [address] [place] [id number]  Phone: [phone]  Fax: [phone]  ______________________________________________________________________    Physical Exam   Vital Signs: Temp: 98.7  F (37.1  C) Temp src: Tympanic BP: 132/75 Pulse: 93   Resp: 16 SpO2: 93 % O2 Device: None (Room air)    Weight: 73 lbs 3.08 oz  General:  Alert.  NAD.  Heart:  RRR, S1 S2, opening and closing snaps of mitral valve appreciated.  Resp: CTA bilaterally.  No wheezes, no rhonchi, no crackles.  Abd: Soft/NT/ND/Positve BS.  Skin: Chest incision and tube sites without any erythema or drainage.  Ext: No edema noted in either lower extremity  Neuro: Moves All extremities.       Primary Care Physician   Cee Velez    Discharge Orders      INR     Anticoagulation Clinic Referral      Reason for your hospital stay    1) INR tomorrow  2) Anticoagulation Clinic referral  3) Follow up with CV surgery as scheduled  4) Follow up PCP 1-2 weeks.     Follow-up and recommended labs and tests     1)  INR tomorrow  2) Anticoagulation Clinic referral  3) Follow up with CV surgery as scheduled  4) Follow up PCP 1-2 weeks.     Activity    Your activity upon discharge: activity as tolerated     Full Code     Diet    Follow this diet upon discharge: Current Diet:Orders Placed This Encounter      Combination Diet Regular Diet Adult       Significant Results and Procedures   Most Recent 3 CBC's:  Recent Labs   Lab Test 11/02/24  0343 10/31/24  0702 10/30/24  1714   WBC 11.7* 10.4 15.3*   HGB 8.8* 9.5* 10.2*   MCV 91 91 92    307 352     Most Recent 3 BMP's:  Recent Labs   Lab Test 10/31/24  0702 10/30/24  1714 10/28/24  0601    134* 139   POTASSIUM 4.9 4.4 3.6   CHLORIDE 99 96* 101   CO2 23 24 25   BUN 10.7 10.4 10.0   CR 0.68 0.64 0.58   ANIONGAP 13 14 13   YUE 8.4* 9.1 7.8*   GLC 78 85 95     Most Recent 3 INR's:  Recent Labs   Lab Test 11/03/24  0621 11/02/24  0342 11/01/24  0548   INR 3.68* 2.08* 1.79*   ,   Results for orders placed or performed during the hospital encounter of 10/30/24   CT Head w/o Contrast    Narrative    PROCEDURE: CT HEAD W/O CONTRAST     HISTORY: headache; Headache; Bleeding risk; New or worsening HA;  Currently taking an anticoagulant.    COMPARISON: None.    TECHNIQUE:  Helical images of the head from the foramen magnum to the  vertex were obtained without contrast.    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 There are no  masses or ventricular shifts or extracerebral collections. The  brainstem appears normal. There is elongation of the right cerebellar  tonsil extending below foramen magnum.  There are opacified air cells  bilaterally. The remainder of the paranasal sinuses are clear. No  acute skull fracture is seen.      Impression    IMPRESSION: Agenesis of the corpus callosum. No acute brain  abnormality.      ANA BURGOS MD         SYSTEM ID:  [id number]       Discharge Medications   Current Discharge Medication List        CONTINUE these medications which  have CHANGED    Details   warfarin ANTICOAGULANT (COUMADIN) 3 MG tablet Hold warfarin on 11/3/2024;  Recheck INR on 11/4/24 and receive additional dosing instructions per Anticoagulation Clinic.    Associated Diagnoses: S/P MVR (mitral valve replacement)           CONTINUE these medications which have NOT CHANGED    Details   acetaminophen (TYLENOL) 325 MG tablet Take 2-3 tablets (650-975 mg) by mouth every 6 hours as needed for pain (For optimal non-opioid multimodal pain management to improve pain control.).    Associated Diagnoses: S/P MVR (mitral valve replacement)      aspirin (ASA) 81 MG chewable tablet Take 81 mg by mouth every morning.      furosemide (LASIX) 40 MG tablet Take 40 mg by mouth daily. For 5 days.      ketoconazole (NIZORAL) 2 % external cream Apply topically daily as needed for itching.    Associated Diagnoses: Tinea versicolor      levETIRAcetam (KEPPRA) 250 MG tablet Take 500 mg by mouth 2 times daily. (Morning/6PM)      methocarbamol (ROBAXIN) 500 MG tablet Take 500 mg by mouth 3 times daily as needed for muscle spasms.      metoprolol tartrate (LOPRESSOR) 50 MG tablet Take 1 tablet (50 mg) by mouth 2 times daily.  Qty: 180 tablet, Refills: 3    Associated Diagnoses: Rheumatic mitral stenosis      norethindrone-ethinyl estradiol (JUNEL FE 1/20) 1-20 MG-MCG tablet Take 1 tablet by mouth daily.  Qty: 90 tablet, Refills: 3    Associated Diagnoses: Encounter for contraceptive management, unspecified type      oxyCODONE (ROXICODONE) 5 MG tablet Take 1 tablet (5 mg) by mouth 2 times daily as needed for moderate pain.  Qty: 4 tablet, Refills: 0    Associated Diagnoses: S/P MVR (mitral valve replacement)      pantoprazole (PROTONIX) 40 MG EC tablet Take 1 tablet (40 mg) by mouth daily.  Qty: 21 tablet, Refills: 0    Associated Diagnoses: S/P MVR (mitral valve replacement)           STOP taking these medications       Lidocaine (LIDOCARE) 4 % Patch Comments:   Reason for Stopping:         potassium  chloride prachi ER (KLOR-CON M10) 10 MEQ CR tablet Comments:   Reason for Stopping:         senna-docusate (SENOKOT-S/PERICOLACE) 8.6-50 MG tablet Comments:   Reason for Stopping:             Allergies   Allergies   Allergen Reactions    Azithromycin      Abdominal pain/ cramping     Clotrimazole Rash    Omnicef [Cefdinir] Rash     Itchy and bad rash

## 2024-11-03 NOTE — PLAN OF CARE
"Goal Outcome Evaluation:      Plan of Care Reviewed With: patient, parent    Overall Patient Progress: improvingOverall Patient Progress: improving         AxOX4 with development delays. Mom at bedside to help communicate. VSS on RA. . R forearm IV Heparin gtt  at 650 Units/hr. Xa WNL two draws in a row, next will be draw in morning. C/o pain in the middle of his upper abdomen where his chest tube was, covered by a mepi. Gave patient tylenol per mom request for relief. Call light within reach, Bed alarm on.      Face to face report given with opportunity to observe patient.     Report given to Gage DICKINSON RN   11/3/2024    Blood pressure 109/60, pulse 81, temperature 98.7  F (37.1  C), temperature source Tympanic, resp. rate 16, height 1.448 m (4' 9\"), weight 33.2 kg (73 lb 3.1 oz), SpO2 96%, not currently breastfeeding.   "

## 2024-11-03 NOTE — PLAN OF CARE
Goal Outcome Evaluation:         Patient discharged at 10:48 AM via wheel chair accompanied by mother and staff. Prescriptions sent to patients preferred pharmacy. All belongings sent with patient.     Discharge instructions reviewed with pts mother. Listed belongings gathered and returned to patient. All personal     Patient discharged to home .   Report called to N/A    Surgical Patient   Surgical Procedures during stay: no  Did patient receive discharge instruction on wound care and recognition of infection symptoms? Yes reinforced to monitor previous surgical incision     MISC  Follow up appointment made:   pt mother instructed to call first thing in the morning  Home medications returned to patient: Yes  Patient reports pain was well managed at discharge: Yes      Pt Is A&O, VSS, reports no pain at discharge. Please see charting for details on physical assessment

## 2024-11-04 ENCOUNTER — PATIENT OUTREACH (OUTPATIENT)
Dept: CARE COORDINATION | Facility: OTHER | Age: 44
End: 2024-11-04

## 2024-11-04 ENCOUNTER — ANTICOAGULATION THERAPY VISIT (OUTPATIENT)
Dept: ANTICOAGULATION | Facility: OTHER | Age: 44
End: 2024-11-04
Attending: FAMILY MEDICINE
Payer: MEDICARE

## 2024-11-04 ENCOUNTER — LAB (OUTPATIENT)
Dept: LAB | Facility: OTHER | Age: 44
End: 2024-11-04
Payer: MEDICARE

## 2024-11-04 DIAGNOSIS — Z95.2 S/P MITRAL VALVE REPLACEMENT: ICD-10-CM

## 2024-11-04 DIAGNOSIS — Z95.2 S/P MVR (MITRAL VALVE REPLACEMENT): Primary | ICD-10-CM

## 2024-11-04 LAB — INR PPP: 1.91 (ref 0.85–1.15)

## 2024-11-04 PROCEDURE — 36415 COLL VENOUS BLD VENIPUNCTURE: CPT | Mod: ZL

## 2024-11-04 PROCEDURE — 85610 PROTHROMBIN TIME: CPT | Mod: ZL

## 2024-11-04 NOTE — PROGRESS NOTES
"ANTICOAGULATION  MANAGEMENT: NEW REFERRAL      SUBJECTIVE/OBJECTIVE     Efren Saavedra, a 44 year old female  is newly referred to [facility].    Anticoagulation:    Previously on warfarin: No, new to anticoagulation day 12 on warfarin  Warfarin initiation date (approximate): 10/24/24   Indication(s): Mechanical MVR   Goal Range: 2.5-3.5   Anticoagulation Bridge/Overlap: No   Referring provider: from inpatient provider; ambulatory responsible provider from primary or specialty care needed.  Request sent to Dr Velez to oversee management.    General Dietary/Social Hx:    Typical vitamin K intake: moderate; consistent     Other dietary considerations: None     Social History:   Social History     Tobacco Use    Smoking status: Never    Smokeless tobacco: Never    Tobacco comments:     no passive exposure   Substance Use Topics    Alcohol use: No    Drug use: No       In the past 2 weeks, patient estimates taking medications as instructed % of time: 100%    Results:        Recent labs: (last 7 days)     11/04/24  1155   INR 1.91*       Wt Readings from Last 2 Encounters:   11/02/24 73 lb 3.1 oz (33.2 kg)   10/30/24 74 lb 4.8 oz (33.7 kg)      Estimated body mass index is 15.84 kg/m  as calculated from the following:    Height as of 10/30/24: 4' 9\" (1.448 m).    Weight as of 11/2/24: 73 lb 3.1 oz (33.2 kg).  Lab Results   Component Value Date    AST 22 10/31/2024    ALT 20 10/31/2024    ALBUMIN 3.2 (L) 10/31/2024     Lab Results   Component Value Date    CR 0.68 10/31/2024     Estimated Creatinine Clearance: 55.9 mL/min (based on SCr of 0.68 mg/dL).    ASSESSMENT     Goal INR 2.5-3.5 standard for indication(s) above  Establishing initial warfarin maintenance dose (on warfarin < 30 days)   Factors that may increase sensitivity to warfarin: Baseline INR >=1.3 and Female gender  Factors that may reduce sensitivity to warfarin: Age <55  Potential significant drug interactions with home " medications: None noted  Starting warfarin dose is appropriate for patient's anticipated sensitivity to warfarin    PLAN     Dosing Instructions:  adjustment made going off last weeks dose while in hospital and on heparin for bridging  with INR in 1 day       Summary  As of 11/4/2024      Full warfarin instructions:  4.5 mg every Mon; 3 mg all other days   Next INR check:  11/5/2024               Education provided:   Please call back if any changes to your diet, medications or how you've been taking warfarin  Taking warfarin: purpose of warfarin and how it works, take warfarin at same time each day; preferably in the evening, and Importance of taking warfarin as instructed  Goal range and lab monitoring: goal range and significance of current result, Importance of therapeutic range, and frequency of lab work when starting warfarin and importance of following up when instructed (extends after stability established)  Dietary considerations: importance of consistent vitamin K intake  Healthy lifestyle considerations: limit alcohol intake to no more than 1 to 2 drinks in 24 hours if you choose to drink alcohol  Symptom monitoring: monitoring for bleeding signs and symptoms and monitoring for clotting signs and symptoms  Importance of notifying anticoagulation clinic for: changes in medications; a sooner lab recheck maybe needed and diarrhea, nausea/vomiting, reduced intake, cold/flu, and/or infections; a sooner lab recheck maybe needed  Information on home INR monitoring    Education still needed:   None required      Telephone call with Christine Saavedra (GUARDIAN) who agrees to plan and repeated back plan correctly    Lab visit scheduled    Standing orders placed in Epic: Point of Care INR (Lab 5000)    Plan made with [facility] Pharmacist Pratibha Coelho, RN  Anticoagulation Clinic  11/4/2024

## 2024-11-04 NOTE — PROGRESS NOTES
Assessment & Plan     S/P MVR (mitral valve replacement) / Mitral valve disease / Subtherapeutic international normalized ratio (INR)  INR unfortunately dropped to 1.9 on 11/4, dose of warfarin adjusted  (4.5mg) and back to 2.4 today. Continue with 3mg warfarin tonight. Pt has Pharm D appt tomorrow, keep this and plan for repeat INR as scheduled. Pt is otherwise doing well. Pain around incisions is mild, these appear to be healing well. Breathing is stable.   - INR POCT  - warfarin ANTICOAGULANT (COUMADIN) 3 MG tablet  Dispense: 30 tablet; Refill: 3    Post op Seroma of subcutaneus tissue after non dermatologic procedure  Superficial fluid collection superior midline chest incision. This is mildly tender, but not warm or red. Incision is healthy appearing and not draining. Suspect this is small post op fluid collection, should slowly resorb. If increasing in size, recommend imaging, grandmother and pt in agreement with monitoring.    MED REC REQUIRED  Post Medication Reconciliation Status: discharge medications reconciled and changed, per note/orders      No follow-ups on file.    Jefferson Friend is a 44 year old, presenting for the following health issues:  Hospital F/U        11/5/2024    12:55 PM   Additional Questions   Roomed by Elisha oSlomon   Accompanied by mom         11/5/2024    12:55 PM   Patient Reported Additional Medications   Patient reports taking the following new medications none     HPI        Hospital Follow-up Visit:    Hospital/Nursing Home/IP Rehab Facility: [facility]  Date of Admission: 10/30  Date of Discharge: 11/3  Reason(s) for Admission: subtherapeutic INR  Was the patient in the ICU or did the patient experience delirium during hospitalization?  No  Do you have any other stressors you would like to discuss with your provider? Health Concerns    Problems taking medications regularly:  None  Medication changes since discharge: None  Problems adhering to non-medication  "therapy:  None    Summary of hospitalization:  [facility] discharge summary reviewed  Diagnostic Tests/Treatments reviewed.  Follow up needed: INR  Other Healthcare Providers Involved in Patient s Care:         Specialist appointment - pharmD, cardiothoracic  Update since discharge: improved.         Plan of care communicated with patient and family    Review of Systems  Constitutional, HEENT, cardiovascular, pulmonary, gi and gu systems are negative, except as otherwise noted.      Objective    /68 (BP Location: Left arm, Patient Position: Sitting, Cuff Size: Child)   Pulse 74   Temp 98.7  F (37.1  C) (Tympanic)   Resp 17   Ht 1.448 m (4' 9\")   Wt 34.6 kg (76 lb 4.8 oz)   SpO2 98%   BMI 16.51 kg/m    Body mass index is 16.51 kg/m .  Physical Exam   GENERAL: alert and no distress  NECK: no adenopathy and thyroid normal to palpation  RESP: lungs clear to auscultation - no rales, rhonchi or wheezes  CV: regular rates and rhythm, no peripheral edema, and mitral click  ABDOMEN: soft, nontender, no hepatosplenomegaly, no masses and bowel sounds normal  MS: no gross musculoskeletal defects noted, no edema  SKIN: abd incisions healing well, pt does have fullness superior to chest wall incision without erythema, warmth  NEURO: Normal strength and tone and mentation intact  PSYCH: mentation appears normal, affect normal/bright    Results for orders placed or performed in visit on 11/05/24   INR point of care (finger stick)     Status: Abnormal   Result Value Ref Range    INR 2.4 (H) 0.9 - 1.1    Narrative    This test is intended for monitoring Coumadin therapy. Results are not accurate in patients with prolonged INR due to factor deficiency.           Signed Electronically by: Cee Velez MD    "

## 2024-11-04 NOTE — PROGRESS NOTES
Clinic Care Coordination Contact  Care Team Conversations    RN CC called patient to complete TCM but patient did not answer. VM message left for patient informing her I will call back later today or she could call my contact number when she is able.    Kimberly Boecker, [facility]  Care Coordination

## 2024-11-04 NOTE — PROGRESS NOTES
Clinic Care Coordination Contact  Transitions of Care Outreach  No chief complaint on file.      Most Recent Admission Date: 10/30/2024   Most Recent Admission Diagnosis:      Most Recent Discharge Date: 11/3/2024   Most Recent Discharge Diagnosis: S/P MVR (mitral valve replacement) - Z95.2  S/P mitral valve replacement - Z95.2     Transitions of Care Assessment    Discharge Assessment  How are you doing now that you are home?: Patient's mom reports she is doing very well. Help warfarin yesterday and INR scheduled for today. Will not restart warfarin until instructed to do so.Follow up with Rosie scheduled for tomorrow at 1 PM.  How are your symptoms? (Red Flag symptoms escalate to triage hotline per guidelines): Improved  Do you know how to contact your clinic care team if you have future questions or changes to your health status? : Yes  Does the patient have their discharge instructions? : Yes  Does the patient have questions regarding their discharge instructions? : No  Were you started on any new medications or were there changes to any of your previous medications? : No  Does the patient have all of their medications?: Yes  Do you have questions regarding any of your medications? : No  Do you have all of your needed medical supplies or equipment (DME)?  (i.e. oxygen tank, CPAP, cane, etc.): Yes              Care Management   TCM complete. No further contact by this RN.    Care Mgmt General Assessment                         Follow up Plan     Discharge Follow-Up  Discharge follow up appointment scheduled in alignment with recommended follow up timeframe or Transitions of Risk Category? (Low = within 30 days; Moderate= within 14 days; High= within 7 days): Yes  Discharge Follow Up Appointment Date: 11/05/24  Discharge Follow Up Appointment Scheduled with?: Primary Care Provider    Future Appointments   Date Time Provider Department Center   11/4/2024 11:45 AM HC LAB HCLAB[facility] [place]   11/4/2024  2:45 PM GH  ANTI COAG 2 GHCP [place]   11/5/2024  1:00 PM Cee Velez MD HCFP [facility] [place]   11/6/2024 10:00 AM Pratibha Combs, [facility] NBMTM [facility]   11/12/2024  1:15 PM UC CVTS UCCTS [facility]   11/18/2024  2:30 PM Beto Cloud,  HCCARD [facility] [place]   11/21/2024  1:30 PM Marcelina Oneal PA-C HCENT [facility] [place]   3/21/2025  2:30 PM Cee Velez MD HCFP [facility] [place]       Outpatient Plan as outlined on AVS reviewed with patient.    For any urgent concerns, please contact our 24 hour nurse triage line: [phone] ([phone])       Kimberly J Boecker, RN

## 2024-11-04 NOTE — ANESTHESIA POSTPROCEDURE EVALUATION
Patient: Efren Saavedra    Procedure: Procedure(s):  Median Sternotomy, Cardiopulmonary Bypass, Mitral Valve Replacement with St Durga Mechanical Valve size 23mm, Interoperative Transesophageal Echocardiogram per Anesthesia       Anesthesia Type:  General    Note:  Disposition: ICU            ICU Sign Out: Anesthesiologist/ICU physician sign out WAS performed   Postop Pain Control: Uneventful            Sign Out: Well controlled pain   PONV: No   Neuro/Psych: Uneventful            Sign Out: Acceptable/Baseline neuro status   Airway/Respiratory: Uneventful            Sign Out: AIRWAY IN SITU/Resp. Support               Airway in situ/Resp. Support: ETT                 Reason: Planned Pre-op   CV/Hemodynamics: Uneventful            Sign Out: Acceptable CV status   Other NRE: NONE   DID A NON-ROUTINE EVENT OCCUR? No           Last vitals:  Vitals:    10/28/24 0700 10/28/24 0900 10/28/24 1008   BP:   101/62   Pulse: 86  75   Resp:  17    Temp:   36.4  C (97.5  F)   SpO2:   99%       Electronically Signed By: Christopher J. Behrens, MD  November 4, 2024  1:12 PM

## 2024-11-05 ENCOUNTER — ANTICOAGULATION THERAPY VISIT (OUTPATIENT)
Dept: ANTICOAGULATION | Facility: OTHER | Age: 44
End: 2024-11-05
Payer: MEDICARE

## 2024-11-05 ENCOUNTER — OFFICE VISIT (OUTPATIENT)
Dept: FAMILY MEDICINE | Facility: OTHER | Age: 44
End: 2024-11-05
Attending: FAMILY MEDICINE
Payer: MEDICARE

## 2024-11-05 ENCOUNTER — TELEPHONE (OUTPATIENT)
Dept: FAMILY MEDICINE | Facility: OTHER | Age: 44
End: 2024-11-05

## 2024-11-05 ENCOUNTER — CARE COORDINATION (OUTPATIENT)
Dept: CARDIOLOGY | Facility: CLINIC | Age: 44
End: 2024-11-05
Payer: MEDICARE

## 2024-11-05 ENCOUNTER — LAB (OUTPATIENT)
Dept: LAB | Facility: OTHER | Age: 44
End: 2024-11-05
Payer: MEDICARE

## 2024-11-05 VITALS
BODY MASS INDEX: 16.46 KG/M2 | WEIGHT: 76.3 LBS | SYSTOLIC BLOOD PRESSURE: 118 MMHG | HEIGHT: 57 IN | DIASTOLIC BLOOD PRESSURE: 68 MMHG | TEMPERATURE: 98.7 F | RESPIRATION RATE: 17 BRPM | HEART RATE: 74 BPM | OXYGEN SATURATION: 98 %

## 2024-11-05 DIAGNOSIS — Z95.2 S/P MVR (MITRAL VALVE REPLACEMENT): ICD-10-CM

## 2024-11-05 DIAGNOSIS — L76.34 POSTOPERATIVE SEROMA OF SUBCUTANEOUS TISSUE AFTER NON-DERMATOLOGIC PROCEDURE: ICD-10-CM

## 2024-11-05 DIAGNOSIS — Z95.2 S/P MVR (MITRAL VALVE REPLACEMENT): Primary | ICD-10-CM

## 2024-11-05 DIAGNOSIS — Z95.2 S/P MITRAL VALVE REPLACEMENT: ICD-10-CM

## 2024-11-05 DIAGNOSIS — I05.9 MITRAL VALVE DISEASE: ICD-10-CM

## 2024-11-05 DIAGNOSIS — R79.1 SUBTHERAPEUTIC INTERNATIONAL NORMALIZED RATIO (INR): Primary | ICD-10-CM

## 2024-11-05 LAB — INR BLD: 2.4 (ref 0.9–1.1)

## 2024-11-05 PROCEDURE — G2211 COMPLEX E/M VISIT ADD ON: HCPCS | Performed by: FAMILY MEDICINE

## 2024-11-05 PROCEDURE — G0463 HOSPITAL OUTPT CLINIC VISIT: HCPCS

## 2024-11-05 PROCEDURE — 85610 PROTHROMBIN TIME: CPT | Mod: ZL | Performed by: FAMILY MEDICINE

## 2024-11-05 PROCEDURE — 36416 COLLJ CAPILLARY BLOOD SPEC: CPT | Mod: ZL | Performed by: FAMILY MEDICINE

## 2024-11-05 PROCEDURE — 99214 OFFICE O/P EST MOD 30 MIN: CPT | Performed by: FAMILY MEDICINE

## 2024-11-05 RX ORDER — WARFARIN SODIUM 3 MG/1
TABLET ORAL
Status: CANCELLED | OUTPATIENT
Start: 2024-11-05

## 2024-11-05 RX ORDER — WARFARIN SODIUM 3 MG/1
3 TABLET ORAL DAILY
Qty: 30 TABLET | Refills: 3 | Status: SHIPPED | OUTPATIENT
Start: 2024-11-05

## 2024-11-05 ASSESSMENT — PAIN SCALES - GENERAL: PAINLEVEL_OUTOF10: SEVERE PAIN (6)

## 2024-11-05 NOTE — PROGRESS NOTES
ANTICOAGULATION MANAGEMENT     Efren Saavedra 44 year old female is on warfarin with subtherapeutic INR result. (Goal INR 2.5-3.5)    Recent labs: (last 7 days)     11/05/24  1326   INR 2.4*       ASSESSMENT     Source(s): Chart Review and Patient/Caregiver Call     Warfarin doses taken: Warfarin taken as instructed  Diet: No new diet changes identified  Medication/supplement changes: None noted  New illness, injury, or hospitalization: No  Signs or symptoms of bleeding or clotting: No  Previous result: Subtherapeutic  Additional findings: None       PLAN     Recommended plan for no diet, medication or health factor changes affecting INR     Dosing Instructions: Continue your current warfarin dose with next INR in 3 days       Summary  As of 11/5/2024      Full warfarin instructions:  4.5 mg every Mon; 3 mg all other days   Next INR check:  11/7/2024               Telephone call with Christine Saavedra (GUARDIAN) who verbalizes understanding and agrees to plan    Lab visit scheduled    Education provided: None required    Plan made with [facility] Pharmacist Pratibha Coelho RN  11/5/2024  Anticoagulation Clinic  [employer] for routing messages: bill BASURTO  [facility] patient phone line: [phone]        _______________________________________________________________________     Anticoagulation Episode Summary       Current INR goal:  2.5-3.5   TTR:  --   Target end date:  Indefinite   Send INR reminders to:  NIKKI BASURTO    Indications    S/P MVR (mitral valve replacement) [Z95.2]  S/P mitral valve replacement [Z95.2]             Comments:  MVR 10/23/24             Anticoagulation Care Providers       Provider Role Specialty Phone number    Cee Velez MD [facility] [phone]

## 2024-11-05 NOTE — TELEPHONE ENCOUNTER
FYI, patient is followed by the Anticoagulation/Protime Clinic at: n/a, patient is a newer start, has not been seen at an Anticoagulation Clinic yet; will be managed at [facility]   Mother confirms that they will be working with [facility].  Rochelle Nelson LPN

## 2024-11-05 NOTE — PROGRESS NOTES
Received message from pt PCP, Dr Velez regarding patient follow up post discharge. Patient INR 1.96 not at goal (2.5-3.5). Writer talked to CVTS Jeane GALVEZ, recommend to increase warfarin dosage and recheck INR tomorrow 11/6. Message relayed to Dr. Velez

## 2024-11-06 ENCOUNTER — VIRTUAL VISIT (OUTPATIENT)
Dept: PHARMACY | Facility: CLINIC | Age: 44
End: 2024-11-06
Attending: FAMILY MEDICINE

## 2024-11-06 DIAGNOSIS — I50.32 CHRONIC HEART FAILURE WITH PRESERVED EJECTION FRACTION (H): ICD-10-CM

## 2024-11-06 DIAGNOSIS — Z78.9 TAKES DIETARY SUPPLEMENTS: ICD-10-CM

## 2024-11-06 DIAGNOSIS — R56.9 SEIZURE (H): ICD-10-CM

## 2024-11-06 DIAGNOSIS — R52 PAIN: ICD-10-CM

## 2024-11-06 DIAGNOSIS — R21 RASH: ICD-10-CM

## 2024-11-06 DIAGNOSIS — Z95.2 S/P MITRAL VALVE REPLACEMENT: Primary | ICD-10-CM

## 2024-11-06 DIAGNOSIS — Z30.9 ENCOUNTER FOR CONTRACEPTIVE MANAGEMENT, UNSPECIFIED TYPE: ICD-10-CM

## 2024-11-06 PROCEDURE — 99207 PR NO CHARGE LOS: CPT | Mod: 93 | Performed by: PHARMACIST

## 2024-11-06 NOTE — PROGRESS NOTES
Medication Therapy Management (MTM) Encounter    ASSESSMENT:                            Medication Adherence/Access: No issues identified.    MVR/HFpEF: Current medications are appropriate. Last INR was slightly subtherapeutic and rechecking tomorrow. Follow-up plan in place with cardiology.    Pain: Stable.    Seizure Disorder: Stable.    Contraception: Stable.    Rash: Stable.    Supplements: Advised to stay off vitamins due to increased bleed risk post-op.    PLAN:                            Continue current medications.    Follow-up: Return as needed if medication questions or concerns.    SUBJECTIVE/OBJECTIVE:                          Ronna Saavedra is a 44 year old female seen for a transitions of care visit. She was discharged from [facility] on 11/3/24 for status post mitral valve replacement. Patient was accompanied by her guardian Christine.     Reason for visit: Transitions of care medication review.    Allergies/ADRs: Reviewed in chart  Past Medical History: Reviewed in chart  Tobacco: She reports that she has never smoked. She has never used smokeless tobacco.  Alcohol: none    Medication Adherence/Access:   Patient has developmental delay. Christine serves as primary historian today. Patient is present and available for questions.  Patient uses pill box(es), set up by guardian.  Patient takes medications 3 time(s) per day.   Per patient, misses medication 0 time(s) per week.   Medication barriers: none.   The patient fills medications at [facility]: NO, fills medications at [facility].    MVR/HFpEF:  Metoprolol tartrate 50mg twice daily.   Aspirin 81mg daily.  Warfarin 4.5mg last Monday and 3mg on all other days.  INR goal 2.5-3.5, rechecking tomorrow. Denies any bruising/bleeding issues.  Pantoprazole 40mg daily (stress ulcer prophylaxis for 30 days post-op).  Patient reports no current heartburn symptoms.   Patient reports no current medication side effects.  Patient does not  self-monitor blood pressure.    Follows with cardiology at [facility] - upcoming visit on 11/14.    BP Readings from Last 3 Encounters:   11/05/24 118/68   11/03/24 132/75   10/30/24 126/77     Pulse Readings from Last 3 Encounters:   11/05/24 74   11/03/24 93   10/30/24 78     INR   Date Value Ref Range Status   11/05/2024 2.4 (H) 0.9 - 1.1 Final     Pain:  Tylenol 650mg usually at bedtime if complaining.  Only took 1 dose of oxycodone 5mg on first night and none since.  Methocarbamol 500mg gives if greater pain level and no use in last week.  Patient feels current therapy is effective and reports no side effects.    Seizure Disorder:   Keppra 500mg twice daily.   A few years since last had seizure. Follows with neurology at [facility].  Patient reports no current medication side effects.    Estimated Creatinine Clearance: 58.3 mL/min (based on SCr of 0.68 mg/dL).    Contraception:  Junel FE 1/20 1 tablet daily.  No concerns at this time.    Rash:  Ketoconazole 2% cream for rash on back sporadic use.  No concerns at this time.    Supplements:  Stopped taking multivitamin and vitamin D since hospital.    Today's Vitals: There were no vitals taken for this visit.  ----------------  Post Discharge Medication Reconciliation Status: discharge medications reconciled and changed, per note/orders.    I spent 15 minutes with this patient today. I offer these suggestions for consideration by Cee Velez MD. A copy of the visit note was provided to the patient's provider(s).    A summary of these recommendations was sent via [employer].    Pratibha Combs, PharmD, BCACP  Medication Therapy Management Pharmacist  [facility]    Telemedicine Visit Details  The patient's medications can be safely assessed via a telemedicine encounter.  Type of service:  Telephone visit  Originating Location (pt. Location): Home    Distant Location (provider location):  On-site  Start Time:  1015  End Time:  1030     Medication  Therapy Recommendations  No medication therapy recommendations to display

## 2024-11-06 NOTE — PATIENT INSTRUCTIONS
"Recommendations from today's MTM visit:                                                    MTM (medication therapy management) is a service provided by a clinical pharmacist designed to help you get the most of out of your medicines.   Today we reviewed what your medicines are for, how to know if they are working, that your medicines are safe and how to make your medicine regimen as easy as possible.      Continue current medications.    Follow-up: Return as needed if medication questions or concerns.    It was great speaking with you today.  I value your experience and would be very thankful for your time in providing feedback in our clinic survey. In the next few days, you may receive an email or text message from [employer] with a link to a survey related to your  clinical pharmacist.\"     To schedule another MTM appointment, please call the clinic directly or you may call the MTM scheduling line at [phone] or toll-free at [phone].     My Clinical Pharmacist's contact information:                                                      Please feel free to contact me with any questions or concerns you have.      Pratibha Combs, PharmD, BCACP  Medication Therapy Management Pharmacist  Voicemail: [phone] (Mon/Wed only)     "

## 2024-11-07 ENCOUNTER — ANTICOAGULATION THERAPY VISIT (OUTPATIENT)
Dept: ANTICOAGULATION | Facility: OTHER | Age: 44
End: 2024-11-07
Attending: FAMILY MEDICINE
Payer: MEDICARE

## 2024-11-07 ENCOUNTER — LAB (OUTPATIENT)
Dept: LAB | Facility: OTHER | Age: 44
End: 2024-11-07
Attending: FAMILY MEDICINE
Payer: MEDICARE

## 2024-11-07 DIAGNOSIS — Z95.2 S/P MITRAL VALVE REPLACEMENT: ICD-10-CM

## 2024-11-07 DIAGNOSIS — Z95.2 S/P MVR (MITRAL VALVE REPLACEMENT): Primary | ICD-10-CM

## 2024-11-07 DIAGNOSIS — Z95.2 S/P MVR (MITRAL VALVE REPLACEMENT): ICD-10-CM

## 2024-11-07 DIAGNOSIS — I05.9 MITRAL VALVE DISEASE: ICD-10-CM

## 2024-11-07 LAB — INR BLD: 3 (ref 0.9–1.1)

## 2024-11-07 PROCEDURE — 85610 PROTHROMBIN TIME: CPT | Mod: ZL

## 2024-11-07 PROCEDURE — 36416 COLLJ CAPILLARY BLOOD SPEC: CPT | Mod: ZL

## 2024-11-07 NOTE — PROGRESS NOTES
ANTICOAGULATION MANAGEMENT     Efren Saavedra 44 year old female is on warfarin with therapeutic INR result. (Goal INR 2.5-3.5)    Recent labs: (last 7 days)     11/07/24  1236   INR 3.0*       ASSESSMENT     Source(s): Chart Review and Patient/Caregiver Call     Warfarin doses taken: Warfarin taken as instructed  Diet: No new diet changes identified  Medication/supplement changes: None noted  New illness, injury, or hospitalization: No  Signs or symptoms of bleeding or clotting: No  Previous result: Subtherapeutic  Additional findings: Recent heart valve replacement in last 10 weeks and New start on day 15 of warfarin       PLAN     Recommended plan for no diet, medication or health factor changes and recent heart valve replacement last 10 weeks  affecting INR     Dosing Instructions: Continue your current warfarin dose with next INR in 4 days       Summary  As of 11/7/2024      Full warfarin instructions:  4.5 mg every Mon; 3 mg all other days   Next INR check:  11/11/2024               Telephone call with Christine Saavedra (GUARDIAN) who verbalizes understanding and agrees to plan    Lab visit scheduled    Education provided: None required    Plan made with [facility] Pharmacist Pratibha Coelho RN  11/7/2024  Anticoagulation Clinic  [employer] for routing messages: p NIKKI BASURTO  [facility] patient phone line: [phone]        _______________________________________________________________________     Anticoagulation Episode Summary       Current INR goal:  2.5-3.5   TTR:  --   Target end date:  Indefinite   Send INR reminders to:  NIKKI BASURTO    Indications    S/P MVR (mitral valve replacement) [Z95.2]  S/P mitral valve replacement [Z95.2]             Comments:  MVR 10/23/24             Anticoagulation Care Providers       Provider Role Specialty Phone number    Cee Velez MD [facility] [phone]

## 2024-11-11 ENCOUNTER — ANTICOAGULATION THERAPY VISIT (OUTPATIENT)
Dept: ANTICOAGULATION | Facility: OTHER | Age: 44
End: 2024-11-11
Payer: MEDICARE

## 2024-11-11 ENCOUNTER — LAB (OUTPATIENT)
Dept: LAB | Facility: OTHER | Age: 44
End: 2024-11-11
Payer: MEDICARE

## 2024-11-11 DIAGNOSIS — Z95.2 S/P MVR (MITRAL VALVE REPLACEMENT): Primary | ICD-10-CM

## 2024-11-11 DIAGNOSIS — Z95.2 S/P MVR (MITRAL VALVE REPLACEMENT): ICD-10-CM

## 2024-11-11 DIAGNOSIS — Z95.2 S/P MITRAL VALVE REPLACEMENT: ICD-10-CM

## 2024-11-11 DIAGNOSIS — I05.9 MITRAL VALVE DISEASE: ICD-10-CM

## 2024-11-11 LAB — INR BLD: 4.2 (ref 0.9–1.1)

## 2024-11-11 PROCEDURE — 36416 COLLJ CAPILLARY BLOOD SPEC: CPT | Mod: ZL

## 2024-11-11 PROCEDURE — 85610 PROTHROMBIN TIME: CPT | Mod: ZL

## 2024-11-11 NOTE — PROGRESS NOTES
ANTICOAGULATION MANAGEMENT     Efren Saavedra 44 year old female is on warfarin with supratherapeutic INR result. (Goal INR 2.5-3.5)    Recent labs: (last 7 days)     11/11/24  1240   INR 4.2*       ASSESSMENT     Source(s): Chart Review and Patient/Caregiver Call     Warfarin doses taken: Warfarin taken as instructed  Diet: No new diet changes identified  New illness, injury, or hospitalization: No  Medication/supplement changes: None noted  Signs or symptoms of bleeding or clotting: No  Previous INR: Therapeutic last visit; previously outside of goal range  Additional findings: Recent heart valve replacement in last 10 weeks       PLAN     Recommended plan for no diet, medication or health factor changes affecting INR     Dosing Instructions: decrease your warfarin dose (13.3% change) with next INR in 3 days       Summary  As of 11/11/2024      Full warfarin instructions:  1.5 mg every Mon; 3 mg all other days   Next INR check:  11/14/2024               Telephone call with Mother jose who verbalizes understanding and agrees to plan    Lab visit scheduled    Education provided: Please call back if any changes to your diet, medications or how you've been taking warfarin    Plan made with [facility] Pharmacist Pratibha Ortez, RN  Anticoagulation Clinic  11/11/2024    _______________________________________________________________________     Anticoagulation Episode Summary       Current INR goal:  2.5-3.5   TTR:  33.8% (3 d)   Target end date:  Indefinite   Send INR reminders to:  NIKKI BASURTO    Indications    S/P MVR (mitral valve replacement) [Z95.2]  S/P mitral valve replacement [Z95.2]             Comments:  MVR 10/23/24 10 weeks is Jan 1             Anticoagulation Care Providers       Provider Role Specialty Phone number    Cee Velez MD [facility] [phone]

## 2024-11-11 NOTE — PROGRESS NOTES
Virtual Visit Details    Type of service:  Video Visit   Video Start Time: 1:09 PM  Video End Time:1:25 PM    Originating Location (pt. Location): Home    Distant Location (provider location):  On-site  Platform used for Video Visit: [facility]    CARDIOTHORACIC SURGERY FOLLOW-UP VISIT     Efren Saavedra   1980   [id number]      Reason for visit: Post-op clinic visit    ASSESSMENT/PLAN:  Efren Saavedra is a 44 year old female who is status post MVR with Dr. Craig on 10/23/2024.    Rheumatic valve disease with mitral valve stenosis s/p mechanical MVR (St. Durga size 23mm)  Chronic warfarin anticoagulation for mechanical mitral valve, INR 2.5-3.5  Developmental delay    Surgically doing very well. Pain is minimal and well controlled. Midline sternal incision healing well without signs of infection. Increasing activity and strength.  Appears euvolemic from description of mom.    Hemodynamics are stable. Medications reviewed and no changes were needed today.  Continue to follow up with anticoagulation clinic.   Follow up with cardiology as scheduled on 11/18.  Continue Outpatient Cardiac Rehab until completed.   Continue sternal precautions for 12 weeks from surgery date.   Suspect elevation just superior to sternal incision is very likely a normal variant of subcutaneous tissue elevation in the setting of sternotomy and sternal incision closure. Low concern for infection or seroma. Continue to monitor - would watch for signs of infection including redness, growth in size, drainage, fevers, sweats, or rigors. Will likely go down over time but may never be fully flat.    Postoperative restrictions have been reviewed and all of the patient's questions were answered. Our contact information was given to the patient if he should have any further questions/concerns. No further follow up is needed with us.  The total time spent with the patient was 25 minutes, > 50% of which was spent in counseling and coordination of  "care.    This visit was conducted via telemedicine, see above for details    Mariza Archer PA-C  Cardiothoracic Surgery    1:20 PM November 12, 2024  __________________________________________________________________________________    HPI: Efren Saavedra is a 44 year old female with a PMH of seizure disorder, intellectual disability, and rheumatic mitral stenosis who was recently hospitalized for acute decompensated heart failure with preserved ejection fraction and type II MI with moderate malnutrition in the context of chronic illness/disease.  Presents to clinic for a follow-up appointment after surgery. Hospital course was remarkable for  hypervolemia, up 3 lbs from admit weight. Discharged home on 5 days of diuretics. She was discharged home on 10/28/24.    On 10/29, she was found to have subtherapeutic INR. Patient readmitted to OSH for bridging on 10/29 for subtherapeutic INR and discharged to home on 11/3. She saw family medicine on 11/5. At this time, there was noted to be a \"seroma of subcutaneous tissue\" at superior midline chest incision. No drainage. PCP elected to monitor. On further discussion with them, this is most likely a normal variant of subcutaneous tissue elevation in the setting of sternotomy and sternal incision closure. There has been no drainage, redness, or tenderness. It has not grown in size and has been very stable.     Since discharge, has been recovering well  according to her mom.  States pain is minimal. Patient continues to need acetaminophen for pain management.   Sleep is fine.   Participating in therapy starting next week.   Breathing has been normal. Continues to work on C&DB. Denies SOB at rest, PND, orthopnea. No cough.   Patient denies any fever, chills, edema, SOB, lightheadedness, nausea, and vomiting.    Patient is on Coumadin. Her most recent INR was supra therapeutic and they are continuing to adjust.   Blood glucose levels have been under good control.   Has had an " excellent appetite. Having regular bowel movements and voiding without difficulty.   Denies sternal popping or clicking or incisional drainage/erythema.   No psychiatric concerns.    ROS:  Review of symptoms otherwise negative unless commented about in HPI.     LABS:    CBC RESULTS:   Recent Labs   Lab Test 11/02/24  0343 10/31/24  0702 10/30/24  1714   WBC 11.7* 10.4 15.3*   HGB 8.8* 9.5* 10.2*   HCT 27.3* 29.7* 31.9*    307 352       CMP RESULTS:  Recent Labs   Lab Test 10/31/24  0702 10/30/24  1714 10/28/24  0601 10/26/24  1202 10/26/24  0550    134* 139   < > 138   POTASSIUM 4.9 4.4 3.6   < > 3.4   CHLORIDE 99 96* 101   < > 103   CO2 23 24 25   < > 22   ANIONGAP 13 14 13   < > 13   GLC 78 85 95   < > 108*   BUN 10.7 10.4 10.0   < > 14.7   CR 0.68 0.64 0.58   < > 0.59   GFRESTIMATED >90 >90 >90   < > >90   YUE 8.4* 9.1 7.8*   < > 8.1*   BILITOTAL 0.5 0.4  --   --  0.7   ALKPHOS 74 86  --   --  51   ALT 20 25  --   --  12   AST 22 29  --   --  35    < > = values in this interval not displayed.     Recent Labs   Lab Test 11/07/24  1236 11/05/24  1326 11/04/24  1155 11/03/24  0621 11/02/24  0342   INR 3.0* 2.4* 1.91* 3.68* 2.08*       IMAGING: None    PHYSICAL EXAM:   There were no vitals taken for this visit.    General: alert and oriented x 3, pleasant, no acute distress, normal mood and affect. Mom and brother present at visit.  Neuro: no focal deficits   CV: no peripheral edema per mom's evaluation  Pulm: easy work of breathing  Incision: sternal incision clean dry and intact without erythema, swelling or drainage from limited assessment over video    PROCEDURES:  None       CURRENT MEDICATIONS:   Current Outpatient Medications   Medication Sig Dispense Refill    acetaminophen (TYLENOL) 325 MG tablet Take 2-3 tablets (650-975 mg) by mouth every 6 hours as needed for pain (For optimal non-opioid multimodal pain management to improve pain control.).      aspirin (ASA) 81 MG chewable tablet Take 81 mg  by mouth every morning.      ketoconazole (NIZORAL) 2 % external cream Apply topically daily as needed for itching.      levETIRAcetam (KEPPRA) 250 MG tablet Take 500 mg by mouth 2 times daily. (Morning/6PM)      methocarbamol (ROBAXIN) 500 MG tablet Take 500 mg by mouth 3 times daily as needed for muscle spasms.      metoprolol tartrate (LOPRESSOR) 50 MG tablet Take 1 tablet (50 mg) by mouth 2 times daily. 180 tablet 3    norethindrone-ethinyl estradiol (JUNEL FE 1/20) 1-20 MG-MCG tablet Take 1 tablet by mouth daily. 90 tablet 3    oxyCODONE (ROXICODONE) 5 MG tablet Take 1 tablet (5 mg) by mouth 2 times daily as needed for moderate pain. 4 tablet 0    pantoprazole (PROTONIX) 40 MG EC tablet Take 1 tablet (40 mg) by mouth daily. 21 tablet 0    warfarin ANTICOAGULANT (COUMADIN) 3 MG tablet Take 1 tablet (3 mg) by mouth daily. 30 tablet 3     No current facility-administered medications for this visit.       CC  Cee Velez

## 2024-11-12 ENCOUNTER — VIRTUAL VISIT (OUTPATIENT)
Dept: CARDIOLOGY | Facility: CLINIC | Age: 44
End: 2024-11-12
Attending: SURGERY
Payer: MEDICARE

## 2024-11-12 VITALS — HEIGHT: 57 IN | WEIGHT: 70 LBS | BODY MASS INDEX: 15.1 KG/M2

## 2024-11-12 DIAGNOSIS — Z95.2 S/P MVR (MITRAL VALVE REPLACEMENT): Primary | ICD-10-CM

## 2024-11-12 PROCEDURE — 99024 POSTOP FOLLOW-UP VISIT: CPT | Mod: 95

## 2024-11-12 ASSESSMENT — PAIN SCALES - GENERAL: PAINLEVEL_OUTOF10: NO PAIN (0)

## 2024-11-12 NOTE — NURSING NOTE
Current patient location: [address]  [place] [address]    Is the patient currently in the state of [place]? YES    Visit mode:VIDEO    If the visit is dropped, the patient can be reconnected by:VIDEO VISIT: Text to cell phone:   Telephone Information:   Mobile [phone]    and VIDEO VISIT: Send to e-mail at: [email or web address]    Will anyone else be joining the visit? Pts mother and brother  (If patient encounters technical issues they should call [phone])    Are changes needed to the allergy or medication list?  Pt not taking Oxycodone or Robaxin, no other changes per pts mother     Patients mother denies any changes since echeck-in completion and states all information entered during echeck-in remains accurate.    Are refills needed on medications prescribed by this physician? NO, does not believe so     Rooming Documentation:  Questionnaire(s) completed    Pts vitals were checked last week, does not recall exact readings per pts mother     Reason for visit: RECHECK    Anahy Marshall MA VVF

## 2024-11-12 NOTE — LETTER
11/12/2024      RE: Efren Saavedra  [address] [place] [address]       Dear Colleague,    Thank you for the opportunity to participate in the care of your patient, Efren Saavedra, at the [facility] [place] [facility]. Please see a copy of my visit note below.    Virtual Visit Details    Type of service:  Video Visit   Video Start Time: 1:09 PM  Video End Time:1:25 PM    Originating Location (pt. Location): Home    Distant Location (provider location):  On-site  Platform used for Video Visit: [facility]    CARDIOTHORACIC SURGERY FOLLOW-UP VISIT     Efren Saavedra   1980   [id number]      Reason for visit: Post-op clinic visit    ASSESSMENT/PLAN:  Efren Saavedra is a 44 year old female who is status post MVR with Dr. Craig on 10/23/2024.    Rheumatic valve disease with mitral valve stenosis s/p mechanical MVR ([employer] [place] size 23mm)  Chronic warfarin anticoagulation for mechanical mitral valve, INR 2.5-3.5  Developmental delay    Surgically doing very well. Pain is minimal and well controlled. Midline sternal incision healing well without signs of infection. Increasing activity and strength.  Appears euvolemic from description of mom.    Hemodynamics are stable. Medications reviewed and no changes were needed today.  Continue to follow up with anticoagulation clinic.   Follow up with cardiology as scheduled on 11/18.  Continue Outpatient Cardiac Rehab until completed.   Continue sternal precautions for 12 weeks from surgery date.   Suspect elevation just superior to sternal incision is very likely a normal variant of subcutaneous tissue elevation in the setting of sternotomy and sternal incision closure. Low concern for infection or seroma. Continue to monitor - would watch for signs of infection including redness, growth in size, drainage, fevers, sweats, or rigors. Will likely go down over time but may never be fully  "flat.    Postoperative restrictions have been reviewed and all of the patient's questions were answered. Our contact information was given to the patient if he should have any further questions/concerns. No further follow up is needed with us.  The total time spent with the patient was 25 minutes, > 50% of which was spent in counseling and coordination of care.    This visit was conducted via telemedicine, see above for details    Mariza Gianni OWENS  Cardiothoracic Surgery    1:20 PM November 12, 2024  __________________________________________________________________________________    HPI: Efren Saavedra is a 44 year old female with a PMH of seizure disorder, intellectual disability, and rheumatic mitral stenosis who was recently hospitalized for acute decompensated heart failure with preserved ejection fraction and type II MI with moderate malnutrition in the context of chronic illness/disease.  Presents to clinic for a follow-up appointment after surgery. Hospital course was remarkable for  hypervolemia, up 3 lbs from admit weight. Discharged home on 5 days of diuretics. She was discharged home on 10/28/24.    On 10/29, she was found to have subtherapeutic INR. Patient readmitted to OSH for bridging on 10/29 for subtherapeutic INR and discharged to home on 11/3. She saw family medicine on 11/5. At this time, there was noted to be a \"seroma of subcutaneous tissue\" at superior midline chest incision. No drainage. PCP elected to monitor. On further discussion with them, this is most likely a normal variant of subcutaneous tissue elevation in the setting of sternotomy and sternal incision closure. There has been no drainage, redness, or tenderness. It has not grown in size and has been very stable.     Since discharge, has been recovering well  according to her mom.  States pain is minimal. Patient continues to need acetaminophen for pain management.   Sleep is fine.   Participating in therapy starting next week. "   Breathing has been normal. Continues to work on C&DB. Denies SOB at rest, PND, orthopnea. No cough.   Patient denies any fever, chills, edema, SOB, lightheadedness, nausea, and vomiting.    Patient is on Coumadin. Her most recent INR was supra therapeutic and they are continuing to adjust.   Blood glucose levels have been under good control.   Has had an excellent appetite. Having regular bowel movements and voiding without difficulty.   Denies sternal popping or clicking or incisional drainage/erythema.   No psychiatric concerns.    ROS:  Review of symptoms otherwise negative unless commented about in HPI.     LABS:    CBC RESULTS:   Recent Labs   Lab Test 11/02/24  0343 10/31/24  0702 10/30/24  1714   WBC 11.7* 10.4 15.3*   HGB 8.8* 9.5* 10.2*   HCT 27.3* 29.7* 31.9*    307 352       CMP RESULTS:  Recent Labs   Lab Test 10/31/24  0702 10/30/24  1714 10/28/24  0601 10/26/24  1202 10/26/24  0550    134* 139   < > 138   POTASSIUM 4.9 4.4 3.6   < > 3.4   CHLORIDE 99 96* 101   < > 103   CO2 23 24 25   < > 22   ANIONGAP 13 14 13   < > 13   GLC 78 85 95   < > 108*   BUN 10.7 10.4 10.0   < > 14.7   CR 0.68 0.64 0.58   < > 0.59   GFRESTIMATED >90 >90 >90   < > >90   YUE 8.4* 9.1 7.8*   < > 8.1*   BILITOTAL 0.5 0.4  --   --  0.7   ALKPHOS 74 86  --   --  51   ALT 20 25  --   --  12   AST 22 29  --   --  35    < > = values in this interval not displayed.     Recent Labs   Lab Test 11/07/24  1236 11/05/24  1326 11/04/24  1155 11/03/24  0621 11/02/24  0342   INR 3.0* 2.4* 1.91* 3.68* 2.08*       IMAGING: None    PHYSICAL EXAM:   There were no vitals taken for this visit.    General: alert and oriented x 3, pleasant, no acute distress, normal mood and affect. Mom and brother present at visit.  Neuro: no focal deficits   CV: no peripheral edema per mom's evaluation  Pulm: easy work of breathing  Incision: sternal incision clean dry and intact without erythema, swelling or drainage from limited assessment over  video    PROCEDURES:  None       CURRENT MEDICATIONS:   Current Outpatient Medications   Medication Sig Dispense Refill     acetaminophen (TYLENOL) 325 MG tablet Take 2-3 tablets (650-975 mg) by mouth every 6 hours as needed for pain (For optimal non-opioid multimodal pain management to improve pain control.).       aspirin (ASA) 81 MG chewable tablet Take 81 mg by mouth every morning.       ketoconazole (NIZORAL) 2 % external cream Apply topically daily as needed for itching.       levETIRAcetam (KEPPRA) 250 MG tablet Take 500 mg by mouth 2 times daily. (Morning/6PM)       methocarbamol (ROBAXIN) 500 MG tablet Take 500 mg by mouth 3 times daily as needed for muscle spasms.       metoprolol tartrate (LOPRESSOR) 50 MG tablet Take 1 tablet (50 mg) by mouth 2 times daily. 180 tablet 3     norethindrone-ethinyl estradiol (JUNEL FE 1/20) 1-20 MG-MCG tablet Take 1 tablet by mouth daily. 90 tablet 3     oxyCODONE (ROXICODONE) 5 MG tablet Take 1 tablet (5 mg) by mouth 2 times daily as needed for moderate pain. 4 tablet 0     pantoprazole (PROTONIX) 40 MG EC tablet Take 1 tablet (40 mg) by mouth daily. 21 tablet 0     warfarin ANTICOAGULANT (COUMADIN) 3 MG tablet Take 1 tablet (3 mg) by mouth daily. 30 tablet 3     No current facility-administered medications for this visit.       CC  Cee Velez        Please do not hesitate to contact me if you have any questions/concerns.     Sincerely,     Cardiovascular Thoracic Surgery

## 2024-11-12 NOTE — PATIENT INSTRUCTIONS
Hemodynamics are stable. Medications reviewed and no changes were needed today.  Continue to follow up with anticoagulation clinic.   Follow up with cardiology as scheduled on 11/18.  Continue Outpatient Cardiac Rehab until completed.   Continue sternal precautions for 12 weeks from surgery date.   Suspect elevation just superior to sternal incision is very likely a normal variant of subcutaneous tissue elevation in the setting of sternotomy and sternal incision closure. Low concern for infection or seroma. Continue to monitor - would watch for signs of infection including redness, growth in size, drainage, fevers, sweats, or rigors. Will likely go down over time but may never be fully flat.

## 2024-11-14 ENCOUNTER — LAB (OUTPATIENT)
Dept: LAB | Facility: OTHER | Age: 44
End: 2024-11-14
Payer: MEDICARE

## 2024-11-14 ENCOUNTER — ANTICOAGULATION THERAPY VISIT (OUTPATIENT)
Dept: ANTICOAGULATION | Facility: OTHER | Age: 44
End: 2024-11-14
Payer: MEDICARE

## 2024-11-14 DIAGNOSIS — I05.9 MITRAL VALVE DISEASE: ICD-10-CM

## 2024-11-14 DIAGNOSIS — Z95.2 S/P MVR (MITRAL VALVE REPLACEMENT): Primary | ICD-10-CM

## 2024-11-14 DIAGNOSIS — Z95.2 S/P MVR (MITRAL VALVE REPLACEMENT): ICD-10-CM

## 2024-11-14 DIAGNOSIS — Z95.2 S/P MITRAL VALVE REPLACEMENT: ICD-10-CM

## 2024-11-14 LAB — INR BLD: 3.3 (ref 0.9–1.1)

## 2024-11-14 PROCEDURE — 36416 COLLJ CAPILLARY BLOOD SPEC: CPT | Mod: ZL

## 2024-11-14 PROCEDURE — 85610 PROTHROMBIN TIME: CPT | Mod: ZL

## 2024-11-14 NOTE — PROGRESS NOTES
ANTICOAGULATION MANAGEMENT     Efren Saavedra 44 year old female is on warfarin with therapeutic INR result. (Goal INR 2.5-3.5)    Recent labs: (last 7 days)     11/14/24  1223   INR 3.3*       ASSESSMENT     Source(s): Chart Review and Patient/Caregiver Call     Warfarin doses taken: Warfarin taken as instructed  Diet: No new diet changes identified  Medication/supplement changes: None noted  New illness, injury, or hospitalization: No  Signs or symptoms of bleeding or clotting: No  Previous result: Supratherapeutic  Additional findings: Recent heart valve replacement in last 10 weeks and New start on day 22 of warfarin       PLAN     Recommended plan for no diet, medication or health factor changes affecting INR     Dosing Instructions: Continue your current warfarin dose with next INR in 5 days       Summary  As of 11/14/2024      Full warfarin instructions:  1.5 mg every Mon; 3 mg all other days   Next INR check:  11/18/2024               Telephone call with Christine Saavedra (GUARDIAN) who verbalizes understanding and agrees to plan    Lab visit scheduled    Education provided: None required    Plan made with [facility] Pharmacist Pratibha Coelho RN  11/14/2024  Anticoagulation Clinic  [employer] for routing messages: bill BASURTO  [facility] patient phone line: [phone]        _______________________________________________________________________     Anticoagulation Episode Summary       Current INR goal:  2.5-3.5   TTR:  28.5% (6 d)   Target end date:  Indefinite   Send INR reminders to:  NIKKI BASURTO    Indications    S/P MVR (mitral valve replacement) [Z95.2]  S/P mitral valve replacement [Z95.2]             Comments:  MVR 10/23/24 12 weeks is Dominic 15             Anticoagulation Care Providers       Provider Role Specialty Phone number    Cee Velez MD [facility] [phone]

## 2024-11-18 ENCOUNTER — LAB (OUTPATIENT)
Dept: LAB | Facility: OTHER | Age: 44
End: 2024-11-18
Attending: INTERNAL MEDICINE
Payer: MEDICARE

## 2024-11-18 ENCOUNTER — ANTICOAGULATION THERAPY VISIT (OUTPATIENT)
Dept: ANTICOAGULATION | Facility: OTHER | Age: 44
End: 2024-11-18
Attending: FAMILY MEDICINE
Payer: MEDICARE

## 2024-11-18 ENCOUNTER — OFFICE VISIT (OUTPATIENT)
Dept: CARDIOLOGY | Facility: OTHER | Age: 44
End: 2024-11-18
Attending: INTERNAL MEDICINE
Payer: MEDICARE

## 2024-11-18 VITALS
WEIGHT: 76 LBS | HEART RATE: 81 BPM | SYSTOLIC BLOOD PRESSURE: 116 MMHG | HEIGHT: 57 IN | BODY MASS INDEX: 16.39 KG/M2 | RESPIRATION RATE: 20 BRPM | DIASTOLIC BLOOD PRESSURE: 68 MMHG | OXYGEN SATURATION: 94 %

## 2024-11-18 DIAGNOSIS — I05.0 MITRAL VALVE STENOSIS, UNSPECIFIED ETIOLOGY: ICD-10-CM

## 2024-11-18 DIAGNOSIS — Z95.2 S/P MVR (MITRAL VALVE REPLACEMENT): ICD-10-CM

## 2024-11-18 DIAGNOSIS — I51.7 CARDIOMEGALY: ICD-10-CM

## 2024-11-18 DIAGNOSIS — Z95.2 S/P MVR (MITRAL VALVE REPLACEMENT): Primary | ICD-10-CM

## 2024-11-18 DIAGNOSIS — I05.9 MITRAL VALVE DISEASE: ICD-10-CM

## 2024-11-18 DIAGNOSIS — Z95.2 S/P MITRAL VALVE REPLACEMENT: ICD-10-CM

## 2024-11-18 DIAGNOSIS — Q23.2 CONGENITAL STENOSIS OF MITRAL VALVE: Primary | ICD-10-CM

## 2024-11-18 DIAGNOSIS — I05.0 RHEUMATIC MITRAL STENOSIS: ICD-10-CM

## 2024-11-18 LAB — INR BLD: 3.3 (ref 0.9–1.1)

## 2024-11-18 PROCEDURE — G0463 HOSPITAL OUTPT CLINIC VISIT: HCPCS

## 2024-11-18 PROCEDURE — 85610 PROTHROMBIN TIME: CPT | Mod: ZL

## 2024-11-18 PROCEDURE — 36416 COLLJ CAPILLARY BLOOD SPEC: CPT | Mod: ZL

## 2024-11-18 RX ORDER — AMOXICILLIN 500 MG/1
CAPSULE ORAL
Qty: 4 CAPSULE | Refills: 3 | Status: SHIPPED | OUTPATIENT
Start: 2024-11-18

## 2024-11-18 RX ORDER — ASPIRIN 81 MG/1
81 TABLET, CHEWABLE ORAL EVERY MORNING
Qty: 90 TABLET | Refills: 3 | Status: SHIPPED | OUTPATIENT
Start: 2024-11-18

## 2024-11-18 ASSESSMENT — PAIN SCALES - GENERAL: PAINLEVEL_OUTOF10: NO PAIN (0)

## 2024-11-18 NOTE — PROGRESS NOTES
ANTICOAGULATION MANAGEMENT     Efren Saavedra 44 year old female is on warfarin with therapeutic INR result. (Goal INR 2.5-3.5)    Recent labs: (last 7 days)     11/18/24  1345   INR 3.3*       ASSESSMENT     Source(s): Chart Review and Patient/Caregiver Call     Warfarin doses taken: Warfarin taken as instructed  Diet: No new diet changes identified  Medication/supplement changes: None noted  New illness, injury, or hospitalization: No  Signs or symptoms of bleeding or clotting: No  Previous result: Therapeutic last visit; previously outside of goal range  Additional findings: None       PLAN     Recommended plan for no diet, medication or health factor changes affecting INR     Dosing Instructions: Continue your current warfarin dose with next INR in 1 week       Summary  As of 11/18/2024      Full warfarin instructions:  1.5 mg every Mon; 3 mg all other days   Next INR check:  11/26/2024               Telephone call with Christine Saavedra (GUARDIAN) who verbalizes understanding and agrees to plan    Lab visit scheduled    Education provided: None required    Plan made per [facility] anticoagulation protocol    Cassie Coelho RN  11/18/2024  Anticoagulation Clinic  [employer] for routing messages: bill BASURTO  [facility] patient phone line: [phone]        _______________________________________________________________________     Anticoagulation Episode Summary       Current INR goal:  2.5-3.5   TTR:  55.9% (1.4 wk)   Target end date:  Indefinite   Send INR reminders to:  NIKKI BASURTO    Indications    S/P MVR (mitral valve replacement) [Z95.2]  S/P mitral valve replacement (Resolved) [Z95.2]             Comments:  MVR 10/23/24 12 weeks is Dominic 15             Anticoagulation Care Providers       Provider Role Specialty Phone number    Cee Velez MD [facility] [phone]

## 2024-11-18 NOTE — PROGRESS NOTES
[facility] HEART CARE   CARDIOLOGY PROGRESS NOTE     Chief Complaint   Patient presents with    Consult          Diagnosis:  MS.  -SMVR on 10/23/24, [facility].    - 23 mm [employer] Durga mechanical valve.  -Severe on 10/15/2024.  -Severe on 7/9/2024, [facility].    -MG 10 mmHg at 95 bpm, [facility].  -Rheumatic.  2.  HFPF.   -BNP of 7347, 10/14/24.  3.  Anemia-acute blood loss from valve replacement.  4.  Elevated troponin.   -Peak 68 on 10/15/2024.  5.  Seizures.  6.  Agenesis of corpus collosum.  7.  Intellectual disability.  8.  Cardiac cath.   -On 10/15/2024, [facility].   -No disease in all vessels.    Assessment/Plan:    1.  MS.  History of severe mitral stenosis.  Had been to [facility] ER for cough with upper respiratory tract infection.  She had been short of breath.  Symptoms did not improve with inhaler.  Was to have an echocardiogram.  Echocardiogram completed on 7/9/2024 through [facility] showed severe mitral stenosis.  Given the option of [place] or going to the [facility] in the [facility].  The [facility] was closer.  SVEN on 8/15/2024 suggested moderate mitral stenosis.  Repeat transthoracic echocardiogram on 10/15/2024 suggested severe mitral stenosis with a mean gradient of 12 mmHg at a heart rate of 77 bpm.  She had mild pulmonary hypertension at 50 mmHg.  Had a cardiac cath on 10/15/2024 without appreciable disease.  Underwent valve replacement on 10/23/2024 at the [facility] with a mechanical mitral valve.  Had a 23 mm [place] Durga valve replacement.  Repeat echocardiogram following the procedure showed normal functioning valve.  The valve was well-seated.  Discussed the importance of an 81 mg aspirin a day and amoxicillin, 2 g 30-60 minutes prior to dental procedures/cleaning.  She does not drive.  Discussed the importance of only lifting 10 pounds for the first 6 weeks and 20 pounds for the remainder 6 weeks.  She is to start cardiac rehab in the next week.  She is following with the Coumadin clinic through [place].  Stable  echocardiogram with valve replacement 10/23/2024.  2.  Follow-up 1 year or sooner with issues.  Given prescription for aspirin 81 mg daily and amoxicillin, 2 g 30 to 60 minutes prior to dental procedures/cleanings.      Interval history:  See above.         Relevant testing:  ECHO on 10/23/24:  MVR w/ St. Durga mechanical (10/23/24)  The prosthetic mitral valve is well-seated.  Doppler interrogation of the mitral valve is normal.  Trivial pericardial effusion is present.  Global and regional left ventricular function is hyperkinetic with an EF >70%.  Right ventricular function, chamber size, wall motion, and thickness are  normal.  This study was compared with the study from 10/16/2024 .  MVR is new.    ECHO on 10/15/24:  Global and regional left ventricular function is normal with an EF of 60-65%.  Right ventricular function, chamber size, wall motion, and thickness are  normal.  Rheumatic mitral valve disease is present with severe calcification.  Mild mitral insufficiency is present.  Severe mitral stenosis is present.  The mean gradient across the mitral valve is 12 mmHg. Heart rate 77BPM.  Mild (pulmonary artery systolic pressure<50mmHg) pulmonary hypertension is  present.  IVC diameter and respiratory changes fall into an intermediate range  suggesting an RA pressure of 8 mmHg.  No pericardial effusion is present.    - Non contrast CT 10/16/24:   1. Severe dilation of the left atrium and extensive calcifications along the mitral annulus, unchanged when compared to prior CT 9/24/2024, consistent with mitral stenosis.  2. Enlarged main pulmonary trunk likely sequelae of pulmonary hypertension  3. Persistent interlobular septal thickening consistent with pulmonary edema. Scattered bibasilar predominant groundglass opacities are unchanged, may represent infectious/inflammatory process. Can consider follow-up chest CT in 4-6 weeks to reassess.  4. Mediastinal lymphadenopathy favored to be reactive    - RHC and Cors  10/18:   nonobstructive CAD, mildly elevated right and left sided filling pressures, reduced cardiac output     SVEN on 8/13/24:  SVEN ordered to evaluate mitral valve.  Technically difficult study. Extremely poor acoustic windows.  Moderate mitral stenosis is present. The etiology of the mitral stenosis is  rheumatic. No parachute mitral valve or mitral valve arcade.  The mean gradient across the mitral valve is 7 mmHg at a heart rate of 93 bpm.  Mitral valve pressure half time is 193 ms, which estimates a mitral valve size  of 1.1 cm^2.  Bhatt score = 7 (2 for mobility, 1 for thickening, 3 for calcification, 1  for subvalvular thickening).                ICD-10-CM    1. Congenital stenosis of mitral valve  Q23.2 Adult Cardiology Eval  Referral      2. Cardiomegaly  I51.7 Adult Cardiology Eval  Referral      3. Mitral valve stenosis, unspecified etiology  I05.0 amoxicillin (AMOXIL) 500 MG capsule     aspirin (ASA) 81 MG chewable tablet      4. Mitral valve disease  I05.9 amoxicillin (AMOXIL) 500 MG capsule     aspirin (ASA) 81 MG chewable tablet      5. Rheumatic mitral stenosis  I05.0 amoxicillin (AMOXIL) 500 MG capsule     aspirin (ASA) 81 MG chewable tablet      6. S/P MVR (mitral valve replacement)  Z95.2 amoxicillin (AMOXIL) 500 MG capsule     aspirin (ASA) 81 MG chewable tablet          Past Medical History:   Diagnosis Date    Seizures (H)        Past Surgical History:   Procedure Laterality Date    CV CORONARY ANGIOGRAM N/A 10/18/2024    Procedure: Coronary Angiogram;  Surgeon: Hernandez Rivera MD;  Location:  HEART CARDIAC CATH LAB    CV RIGHT HEART CATH MEASUREMENTS RECORDED N/A 10/18/2024    Procedure: Right Heart Catheterization;  Surgeon: Hernandez Rivera MD;  Location:  HEART CARDIAC CATH LAB    feeding tube      REPLACE VALVE MITRAL N/A 10/23/2024    Procedure: Median Sternotomy, Cardiopulmonary Bypass, Mitral Valve Replacement with St Durga Mechanical  Valve size 23mm, Interoperative Transesophageal Echocardiogram per Anesthesia;  Surgeon: Deangelo Craig MD;  Location: [facility] OR    TRANSESOPHAGEAL ECHOCARDIOGRAM INTRAOPERATIVE N/A 8/13/2024    Procedure: ECHOCARDIOGRAM, TRANSESOPHAGEAL, WITH ANESTHESIA;  Surgeon: GENERIC ANESTHESIA PROVIDER;  Location: [facility] OR    ventilation tubes, bilateral         Allergies   Allergen Reactions    Azithromycin      Abdominal pain/ cramping     Clotrimazole Rash    Omnicef [Cefdinir] Rash     Itchy and bad rash       Current Outpatient Medications   Medication Sig Dispense Refill    acetaminophen (TYLENOL) 325 MG tablet Take 2-3 tablets (650-975 mg) by mouth every 6 hours as needed for pain (For optimal non-opioid multimodal pain management to improve pain control.).      amoxicillin (AMOXIL) 500 MG capsule Take 2 g, 4 pills, 30-60 minutes prior to dental procedures/cleanings 4 capsule 3    aspirin (ASA) 81 MG chewable tablet Take 1 tablet (81 mg) by mouth every morning. 90 tablet 3    ketoconazole (NIZORAL) 2 % external cream Apply topically daily as needed for itching.      levETIRAcetam (KEPPRA) 250 MG tablet Take 500 mg by mouth 2 times daily. (Morning/6PM)      metoprolol tartrate (LOPRESSOR) 50 MG tablet Take 1 tablet (50 mg) by mouth 2 times daily. 180 tablet 3    norethindrone-ethinyl estradiol (JUNEL FE 1/20) 1-20 MG-MCG tablet Take 1 tablet by mouth daily. 90 tablet 3    pantoprazole (PROTONIX) 40 MG EC tablet Take 1 tablet (40 mg) by mouth daily. 21 tablet 0    warfarin ANTICOAGULANT (COUMADIN) 3 MG tablet Take 1 tablet (3 mg) by mouth daily. 30 tablet 3       Social History     Socioeconomic History    Marital status: Single     Spouse name: Not on file    Number of children: Not on file    Years of education: Not on file    Highest education level: Not on file   Occupational History    Not on file   Tobacco Use    Smoking status: Never    Smokeless tobacco: Never    Tobacco comments:     no passive exposure   Vaping Use     Vaping status: Never Used   Substance and Sexual Activity    Alcohol use: No    Drug use: No    Sexual activity: Not on file   Other Topics Concern     Service Not Asked    Blood Transfusions Not Asked    Caffeine Concern No    Occupational Exposure Not Asked    Hobby Hazards Not Asked    Sleep Concern Not Asked    Stress Concern Not Asked    Weight Concern Not Asked    Special Diet Not Asked    Back Care Not Asked    Exercise Not Asked    Bike Helmet Not Asked    Seat Belt Not Asked    Self-Exams Not Asked    Parent/sibling w/ CABG, MI or angioplasty before 65F 55M? No   Social History Narrative    Not on file     Social Drivers of Health     Financial Resource Strain: Low Risk  (10/30/2024)    Financial Resource Strain     Within the past 12 months, have you or your family members you live with been unable to get utilities (heat, electricity) when it was really needed?: No   Food Insecurity: Low Risk  (10/30/2024)    Food Insecurity     Within the past 12 months, did you worry that your food would run out before you got money to buy more?: No     Within the past 12 months, did the food you bought just not last and you didn t have money to get more?: No   Transportation Needs: Low Risk  (10/30/2024)    Transportation Needs     Within the past 12 months, has lack of transportation kept you from medical appointments, getting your medicines, non-medical meetings or appointments, work, or from getting things that you need?: No   Physical Activity: Not on file   Stress: Not on file   Social Connections: Not on file   Interpersonal Safety: Low Risk  (10/30/2024)    Interpersonal Safety     Do you feel physically and emotionally safe where you currently live?: Yes     Within the past 12 months, have you been hit, slapped, kicked or otherwise physically hurt by someone?: No     Within the past 12 months, have you been humiliated or emotionally abused in other ways by your partner or ex-partner?: No   Housing  Stability: Low Risk  (10/30/2024)    Housing Stability     Do you have housing? : Yes     Are you worried about losing your housing?: No   Recent Concern: Housing Stability - High Risk (10/15/2024)    Housing Stability     Do you have housing? : No     Are you worried about losing your housing?: No       LAB RESULTS:   Lab on 11/14/2024   Component Date Value Ref Range Status    INR 11/14/2024 3.3 (H)  0.9 - 1.1 Final   Lab on 11/11/2024   Component Date Value Ref Range Status    INR 11/11/2024 4.2 (H)  0.9 - 1.1 Final   Lab on 11/07/2024   Component Date Value Ref Range Status    INR 11/07/2024 3.0 (H)  0.9 - 1.1 Final   Office Visit on 11/05/2024   Component Date Value Ref Range Status    INR 11/05/2024 2.4 (H)  0.9 - 1.1 Final   Lab on 11/04/2024   Component Date Value Ref Range Status    INR 11/04/2024 1.91 (H)  0.85 - 1.15 Final   Office Visit on 10/30/2024   Component Date Value Ref Range Status    INR 10/30/2024 1.71 (H)  0.85 - 1.15 Final   Lab on 10/29/2024   Component Date Value Ref Range Status    INR 10/29/2024 1.51 (H)  0.85 - 1.15 Final   Lab on 09/26/2024   Component Date Value Ref Range Status    Iron 09/26/2024 112  37 - 145 ug/dL Final    Iron Binding Capacity 09/26/2024 321  240 - 430 ug/dL Final    Iron Sat Index 09/26/2024 35  15 - 46 % Final    Vitamin B12 09/26/2024 1,317 (H)  232 - 1,245 pg/mL Final    WBC Count 09/26/2024 9.3  4.0 - 11.0 10e3/uL Final    RBC Count 09/26/2024 5.14  3.80 - 5.20 10e6/uL Final    Hemoglobin 09/26/2024 15.0  11.7 - 15.7 g/dL Final    Hematocrit 09/26/2024 44.3  35.0 - 47.0 % Final    MCV 09/26/2024 86  78 - 100 fL Final    MCH 09/26/2024 29.2  26.5 - 33.0 pg Final    MCHC 09/26/2024 33.9  31.5 - 36.5 g/dL Final    RDW 09/26/2024 13.3  10.0 - 15.0 % Final    Platelet Count 09/26/2024 197  150 - 450 10e3/uL Final    % Neutrophils 09/26/2024 77  % Final    % Lymphocytes 09/26/2024 9  % Final    % Monocytes 09/26/2024 13  % Final    % Eosinophils 09/26/2024 1  %  "Final    % Basophils 09/26/2024 1  % Final    % Immature Granulocytes 09/26/2024 0  % Final    NRBCs per 100 WBC 09/26/2024 0  <1 /100 Final    Absolute Neutrophils 09/26/2024 7.2  1.6 - 8.3 10e3/uL Final    Absolute Lymphocytes 09/26/2024 0.8  0.8 - 5.3 10e3/uL Final    Absolute Monocytes 09/26/2024 1.2  0.0 - 1.3 10e3/uL Final    Absolute Eosinophils 09/26/2024 0.1  0.0 - 0.7 10e3/uL Final    Absolute Basophils 09/26/2024 0.1  0.0 - 0.2 10e3/uL Final    Absolute Immature Granulocytes 09/26/2024 0.0  <=0.4 10e3/uL Final    Absolute NRBCs 09/26/2024 0.0  10e3/uL Final        Review of systems: Negative except that which was noted in the HPI.    Physical examination:  /68   Pulse 81   Resp 20   Ht 1.448 m (4' 9\")   Wt 34.5 kg (76 lb)   SpO2 94%   BMI 16.45 kg/m      GENERAL APPEARANCE: healthy, alert and no distress  CHEST: lungs clear to auscultation - no rales, rhonchi or wheezes, no use of accessory muscles, no retractions, respirations are unlabored, normal respiratory rate  CARDIOVASCULAR: regular rhythm, normal S1 with physiologic split S2, no S3 or S4 and no murmur, click or rub  EXTREMITIES: no clubbing, cyanosis or edema.    Total time spent on day of visit, including review of tests, obtaining/reviewing separately obtained history, ordering medications/tests/procedures, communicating with PCP/consultants, and documenting in electronic medical record: 60 minutes.              Thank you for allowing me to participate in the care of your patient. Please do not hesitate to contact me if you have any questions.     Beto Cloud, DO          "

## 2024-11-18 NOTE — PATIENT INSTRUCTIONS
Thank you for allowing Dr JANELLE Cloud and our  team to participate in your care. Please call our office at [phone] with scheduling questions or if you need to cancel or change your appointment. With any other questions or concerns you may call cardiology nurse at  [phone].       If you experience chest pain, chest pressure, chest tightness, shortness of breath, fainting, lightheadedness, nausea, vomiting, or other concerning symptoms, please report to the Emergency Department or call 911. These symptoms may be emergent, and best treated in the Emergency Department.

## 2024-11-21 ENCOUNTER — TELEPHONE (OUTPATIENT)
Dept: OTOLARYNGOLOGY | Facility: OTHER | Age: 44
End: 2024-11-21

## 2024-11-21 NOTE — TELEPHONE ENCOUNTER
Call received from Sophia (Mother) requesting an earlier appointment for Ronna, ear cleaning.  She had to cancel her last cleaning due to heart surgery.   Appointment today was canceled due to provider out.  Sophia stated, she needs to get in sooner than January, she is not able to hear well.  New appointment scheduled, other canceled, she is aware of new date and check in time.

## 2024-11-25 ENCOUNTER — TELEPHONE (OUTPATIENT)
Dept: CARDIOLOGY | Facility: CLINIC | Age: 44
End: 2024-11-25
Payer: MEDICARE

## 2024-11-25 ENCOUNTER — OFFICE VISIT (OUTPATIENT)
Dept: FAMILY MEDICINE | Facility: OTHER | Age: 44
End: 2024-11-25
Attending: STUDENT IN AN ORGANIZED HEALTH CARE EDUCATION/TRAINING PROGRAM
Payer: MEDICARE

## 2024-11-25 ENCOUNTER — APPOINTMENT (OUTPATIENT)
Dept: LAB | Facility: OTHER | Age: 44
End: 2024-11-25
Payer: MEDICARE

## 2024-11-25 ENCOUNTER — ANTICOAGULATION THERAPY VISIT (OUTPATIENT)
Dept: ANTICOAGULATION | Facility: OTHER | Age: 44
End: 2024-11-25
Attending: FAMILY MEDICINE
Payer: MEDICARE

## 2024-11-25 ENCOUNTER — HOSPITAL ENCOUNTER (INPATIENT)
Facility: HOSPITAL | Age: 44
LOS: 1 days | Discharge: HOME | DRG: 813 | End: 2024-11-27
Attending: STUDENT IN AN ORGANIZED HEALTH CARE EDUCATION/TRAINING PROGRAM | Admitting: INTERNAL MEDICINE
Payer: MEDICARE

## 2024-11-25 VITALS
SYSTOLIC BLOOD PRESSURE: 118 MMHG | OXYGEN SATURATION: 98 % | HEART RATE: 81 BPM | WEIGHT: 75.3 LBS | HEIGHT: 57 IN | TEMPERATURE: 97.6 F | DIASTOLIC BLOOD PRESSURE: 60 MMHG | BODY MASS INDEX: 16.25 KG/M2

## 2024-11-25 DIAGNOSIS — K59.04 CHRONIC IDIOPATHIC CONSTIPATION: ICD-10-CM

## 2024-11-25 DIAGNOSIS — Z51.81 SUBTHERAPEUTIC ANTICOAGULATION: ICD-10-CM

## 2024-11-25 DIAGNOSIS — Z95.2 S/P MVR (MITRAL VALVE REPLACEMENT): ICD-10-CM

## 2024-11-25 DIAGNOSIS — R79.1 SUBTHERAPEUTIC INTERNATIONAL NORMALIZED RATIO (INR): ICD-10-CM

## 2024-11-25 DIAGNOSIS — R79.82 ELEVATED C-REACTIVE PROTEIN (CRP): ICD-10-CM

## 2024-11-25 DIAGNOSIS — E87.1 HYPONATREMIA: ICD-10-CM

## 2024-11-25 DIAGNOSIS — Z95.2 S/P MVR (MITRAL VALVE REPLACEMENT): Primary | ICD-10-CM

## 2024-11-25 DIAGNOSIS — Z79.01 SUBTHERAPEUTIC ANTICOAGULATION: ICD-10-CM

## 2024-11-25 DIAGNOSIS — R61 NIGHT SWEATS: Primary | ICD-10-CM

## 2024-11-25 LAB
ALBUMIN SERPL BCG-MCNC: 4.1 G/DL (ref 3.5–5.2)
ALP SERPL-CCNC: 89 U/L (ref 40–150)
ALT SERPL W P-5'-P-CCNC: 14 U/L (ref 0–50)
ANION GAP SERPL CALCULATED.3IONS-SCNC: 10 MMOL/L (ref 7–15)
AST SERPL W P-5'-P-CCNC: 25 U/L (ref 0–45)
BASOPHILS # BLD AUTO: 0.1 10E3/UL (ref 0–0.2)
BASOPHILS NFR BLD AUTO: 1 %
BILIRUB SERPL-MCNC: 0.5 MG/DL
BUN SERPL-MCNC: 12.9 MG/DL (ref 6–20)
CALCIUM SERPL-MCNC: 9.4 MG/DL (ref 8.8–10.4)
CHLORIDE SERPL-SCNC: 98 MMOL/L (ref 98–107)
CREAT SERPL-MCNC: 0.59 MG/DL (ref 0.51–0.95)
CRP SERPL-MCNC: 17.89 MG/L
EGFRCR SERPLBLD CKD-EPI 2021: >90 ML/MIN/1.73M2
EOSINOPHIL # BLD AUTO: 0.2 10E3/UL (ref 0–0.7)
EOSINOPHIL NFR BLD AUTO: 2 %
ERYTHROCYTE [DISTWIDTH] IN BLOOD BY AUTOMATED COUNT: 14.6 % (ref 10–15)
ERYTHROCYTE [DISTWIDTH] IN BLOOD BY AUTOMATED COUNT: 14.7 % (ref 10–15)
GLUCOSE SERPL-MCNC: 83 MG/DL (ref 70–99)
HCO3 SERPL-SCNC: 24 MMOL/L (ref 22–29)
HCT VFR BLD AUTO: 31.6 % (ref 35–47)
HCT VFR BLD AUTO: 33.4 % (ref 35–47)
HGB BLD-MCNC: 10.1 G/DL (ref 11.7–15.7)
HGB BLD-MCNC: 10.6 G/DL (ref 11.7–15.7)
HOLD SPECIMEN: NORMAL
HOLD SPECIMEN: NORMAL
IMM GRANULOCYTES # BLD: 0.1 10E3/UL
IMM GRANULOCYTES NFR BLD: 1 %
INR PPP: 1.68 (ref 0.85–1.15)
INR PPP: 1.71 (ref 0.85–1.15)
LYMPHOCYTES # BLD AUTO: 0.6 10E3/UL (ref 0.8–5.3)
LYMPHOCYTES NFR BLD AUTO: 6 %
MCH RBC QN AUTO: 26.6 PG (ref 26.5–33)
MCH RBC QN AUTO: 26.7 PG (ref 26.5–33)
MCHC RBC AUTO-ENTMCNC: 31.7 G/DL (ref 31.5–36.5)
MCHC RBC AUTO-ENTMCNC: 32 G/DL (ref 31.5–36.5)
MCV RBC AUTO: 83 FL (ref 78–100)
MCV RBC AUTO: 84 FL (ref 78–100)
MONOCYTES # BLD AUTO: 1.2 10E3/UL (ref 0–1.3)
MONOCYTES NFR BLD AUTO: 12 %
NEUTROPHILS # BLD AUTO: 8.2 10E3/UL (ref 1.6–8.3)
NEUTROPHILS NFR BLD AUTO: 79 %
NRBC # BLD AUTO: 0 10E3/UL
NRBC BLD AUTO-RTO: 0 /100
PLATELET # BLD AUTO: 386 10E3/UL (ref 150–450)
PLATELET # BLD AUTO: 390 10E3/UL (ref 150–450)
POTASSIUM SERPL-SCNC: 4.9 MMOL/L (ref 3.4–5.3)
PROT SERPL-MCNC: 7 G/DL (ref 6.4–8.3)
RBC # BLD AUTO: 3.79 10E6/UL (ref 3.8–5.2)
RBC # BLD AUTO: 3.97 10E6/UL (ref 3.8–5.2)
SODIUM SERPL-SCNC: 132 MMOL/L (ref 135–145)
TSH SERPL DL<=0.005 MIU/L-ACNC: 3.19 UIU/ML (ref 0.3–4.2)
UFH PPP CHRO-ACNC: 0.21 IU/ML
WBC # BLD AUTO: 10.4 10E3/UL (ref 4–11)
WBC # BLD AUTO: 11.1 10E3/UL (ref 4–11)

## 2024-11-25 PROCEDURE — 84443 ASSAY THYROID STIM HORMONE: CPT | Mod: ZL | Performed by: STUDENT IN AN ORGANIZED HEALTH CARE EDUCATION/TRAINING PROGRAM

## 2024-11-25 PROCEDURE — 96365 THER/PROPH/DIAG IV INF INIT: CPT

## 2024-11-25 PROCEDURE — 82247 BILIRUBIN TOTAL: CPT | Mod: ZL | Performed by: STUDENT IN AN ORGANIZED HEALTH CARE EDUCATION/TRAINING PROGRAM

## 2024-11-25 PROCEDURE — 99285 EMERGENCY DEPT VISIT HI MDM: CPT | Mod: 25

## 2024-11-25 PROCEDURE — 36415 COLL VENOUS BLD VENIPUNCTURE: CPT | Performed by: STUDENT IN AN ORGANIZED HEALTH CARE EDUCATION/TRAINING PROGRAM

## 2024-11-25 PROCEDURE — 85610 PROTHROMBIN TIME: CPT | Performed by: STUDENT IN AN ORGANIZED HEALTH CARE EDUCATION/TRAINING PROGRAM

## 2024-11-25 PROCEDURE — 250N000013 HC RX MED GY IP 250 OP 250 PS 637: Performed by: INTERNAL MEDICINE

## 2024-11-25 PROCEDURE — 82040 ASSAY OF SERUM ALBUMIN: CPT | Mod: ZL | Performed by: STUDENT IN AN ORGANIZED HEALTH CARE EDUCATION/TRAINING PROGRAM

## 2024-11-25 PROCEDURE — 84132 ASSAY OF SERUM POTASSIUM: CPT | Mod: ZL | Performed by: STUDENT IN AN ORGANIZED HEALTH CARE EDUCATION/TRAINING PROGRAM

## 2024-11-25 PROCEDURE — G0463 HOSPITAL OUTPT CLINIC VISIT: HCPCS

## 2024-11-25 PROCEDURE — 86140 C-REACTIVE PROTEIN: CPT | Mod: ZL | Performed by: STUDENT IN AN ORGANIZED HEALTH CARE EDUCATION/TRAINING PROGRAM

## 2024-11-25 PROCEDURE — 85004 AUTOMATED DIFF WBC COUNT: CPT | Mod: ZL | Performed by: STUDENT IN AN ORGANIZED HEALTH CARE EDUCATION/TRAINING PROGRAM

## 2024-11-25 PROCEDURE — 85610 PROTHROMBIN TIME: CPT | Mod: ZL | Performed by: STUDENT IN AN ORGANIZED HEALTH CARE EDUCATION/TRAINING PROGRAM

## 2024-11-25 PROCEDURE — 99285 EMERGENCY DEPT VISIT HI MDM: CPT | Performed by: STUDENT IN AN ORGANIZED HEALTH CARE EDUCATION/TRAINING PROGRAM

## 2024-11-25 PROCEDURE — 85049 AUTOMATED PLATELET COUNT: CPT | Performed by: STUDENT IN AN ORGANIZED HEALTH CARE EDUCATION/TRAINING PROGRAM

## 2024-11-25 PROCEDURE — 85027 COMPLETE CBC AUTOMATED: CPT | Performed by: STUDENT IN AN ORGANIZED HEALTH CARE EDUCATION/TRAINING PROGRAM

## 2024-11-25 PROCEDURE — 85520 HEPARIN ASSAY: CPT | Performed by: INTERNAL MEDICINE

## 2024-11-25 PROCEDURE — 36415 COLL VENOUS BLD VENIPUNCTURE: CPT | Mod: ZL | Performed by: STUDENT IN AN ORGANIZED HEALTH CARE EDUCATION/TRAINING PROGRAM

## 2024-11-25 PROCEDURE — 85014 HEMATOCRIT: CPT | Mod: ZL | Performed by: STUDENT IN AN ORGANIZED HEALTH CARE EDUCATION/TRAINING PROGRAM

## 2024-11-25 PROCEDURE — 36415 COLL VENOUS BLD VENIPUNCTURE: CPT | Performed by: INTERNAL MEDICINE

## 2024-11-25 PROCEDURE — 99222 1ST HOSP IP/OBS MODERATE 55: CPT | Mod: AI | Performed by: INTERNAL MEDICINE

## 2024-11-25 PROCEDURE — 85041 AUTOMATED RBC COUNT: CPT | Mod: ZL | Performed by: STUDENT IN AN ORGANIZED HEALTH CARE EDUCATION/TRAINING PROGRAM

## 2024-11-25 PROCEDURE — G0378 HOSPITAL OBSERVATION PER HR: HCPCS

## 2024-11-25 PROCEDURE — 250N000011 HC RX IP 250 OP 636: Performed by: STUDENT IN AN ORGANIZED HEALTH CARE EDUCATION/TRAINING PROGRAM

## 2024-11-25 RX ORDER — PROCHLORPERAZINE MALEATE 10 MG
10 TABLET ORAL EVERY 6 HOURS PRN
Status: DISCONTINUED | OUTPATIENT
Start: 2024-11-25 | End: 2024-11-27 | Stop reason: HOSPADM

## 2024-11-25 RX ORDER — HEPARIN SODIUM 10000 [USP'U]/100ML
0-5000 INJECTION, SOLUTION INTRAVENOUS CONTINUOUS
Status: DISCONTINUED | OUTPATIENT
Start: 2024-11-25 | End: 2024-11-27

## 2024-11-25 RX ORDER — AMOXICILLIN 250 MG
1 CAPSULE ORAL 2 TIMES DAILY PRN
Status: DISCONTINUED | OUTPATIENT
Start: 2024-11-25 | End: 2024-11-27 | Stop reason: HOSPADM

## 2024-11-25 RX ORDER — METOPROLOL TARTRATE 50 MG
50 TABLET ORAL 2 TIMES DAILY
Status: DISCONTINUED | OUTPATIENT
Start: 2024-11-25 | End: 2024-11-27 | Stop reason: HOSPADM

## 2024-11-25 RX ORDER — LEVETIRACETAM 500 MG/1
500 TABLET ORAL 2 TIMES DAILY
Status: DISCONTINUED | OUTPATIENT
Start: 2024-11-25 | End: 2024-11-27 | Stop reason: HOSPADM

## 2024-11-25 RX ORDER — WARFARIN SODIUM 2.5 MG/1
7.5 TABLET ORAL
Status: COMPLETED | OUTPATIENT
Start: 2024-11-25 | End: 2024-11-25

## 2024-11-25 RX ORDER — ACETAMINOPHEN 325 MG/1
650 TABLET ORAL EVERY 6 HOURS PRN
Status: DISCONTINUED | OUTPATIENT
Start: 2024-11-25 | End: 2024-11-27 | Stop reason: HOSPADM

## 2024-11-25 RX ORDER — ONDANSETRON 2 MG/ML
4 INJECTION INTRAMUSCULAR; INTRAVENOUS EVERY 6 HOURS PRN
Status: DISCONTINUED | OUTPATIENT
Start: 2024-11-25 | End: 2024-11-27 | Stop reason: HOSPADM

## 2024-11-25 RX ORDER — KETOCONAZOLE 20 MG/G
CREAM TOPICAL DAILY PRN
Status: DISCONTINUED | OUTPATIENT
Start: 2024-11-25 | End: 2024-11-27 | Stop reason: HOSPADM

## 2024-11-25 RX ORDER — AMOXICILLIN 250 MG
2 CAPSULE ORAL 2 TIMES DAILY PRN
Status: DISCONTINUED | OUTPATIENT
Start: 2024-11-25 | End: 2024-11-27 | Stop reason: HOSPADM

## 2024-11-25 RX ORDER — ONDANSETRON 4 MG/1
4 TABLET, ORALLY DISINTEGRATING ORAL EVERY 6 HOURS PRN
Status: DISCONTINUED | OUTPATIENT
Start: 2024-11-25 | End: 2024-11-27 | Stop reason: HOSPADM

## 2024-11-25 RX ORDER — NORETHINDRONE ACETATE AND ETHINYL ESTRADIOL 1MG-20(21)
1 KIT ORAL DAILY
Status: DISCONTINUED | OUTPATIENT
Start: 2024-11-25 | End: 2024-11-27 | Stop reason: HOSPADM

## 2024-11-25 RX ORDER — ASPIRIN 81 MG/1
81 TABLET, CHEWABLE ORAL EVERY MORNING
Status: DISCONTINUED | OUTPATIENT
Start: 2024-11-26 | End: 2024-11-27 | Stop reason: HOSPADM

## 2024-11-25 RX ORDER — LIDOCAINE 40 MG/G
CREAM TOPICAL
Status: DISCONTINUED | OUTPATIENT
Start: 2024-11-25 | End: 2024-11-27 | Stop reason: HOSPADM

## 2024-11-25 RX ORDER — HEPARIN SODIUM 10000 [USP'U]/100ML
0-5000 INJECTION, SOLUTION INTRAVENOUS CONTINUOUS
Status: DISCONTINUED | OUTPATIENT
Start: 2024-11-25 | End: 2024-11-25

## 2024-11-25 RX ADMIN — WARFARIN SODIUM 7.5 MG: 2.5 TABLET ORAL at 18:24

## 2024-11-25 RX ADMIN — HEPARIN SODIUM 400 UNITS/HR: 10000 INJECTION, SOLUTION INTRAVENOUS at 16:09

## 2024-11-25 RX ADMIN — METOPROLOL TARTRATE 50 MG: 50 TABLET, FILM COATED ORAL at 20:33

## 2024-11-25 RX ADMIN — LEVETIRACETAM 500 MG: 500 TABLET, FILM COATED ORAL at 18:24

## 2024-11-25 RX ADMIN — ACETAMINOPHEN 650 MG: 325 TABLET, FILM COATED ORAL at 18:50

## 2024-11-25 ASSESSMENT — ACTIVITIES OF DAILY LIVING (ADL)
ADLS_ACUITY_SCORE: 40
ADLS_ACUITY_SCORE: 60
ADLS_ACUITY_SCORE: 41
ADLS_ACUITY_SCORE: 40
ADLS_ACUITY_SCORE: 40
ADLS_ACUITY_SCORE: 60
ADLS_ACUITY_SCORE: 40
ADLS_ACUITY_SCORE: 40

## 2024-11-25 NOTE — H&P
[facility]    History and Physical - Hospitalist Service       Date of Admission:  11/25/2024    Assessment & Plan      Efren Saavedra is a 44 year old female admitted on 11/25/2024.      1.  Subtherapeutic INR in fresh mechanical mitral valve replacement: Patient had her valve putting on October 23 [facility] for mitral stenosis.  She had had some difficulty maintaining a therapeutic INR.  Recently however that she was doing very well in the 3 range and all of a sudden she is down to 1.7.  At this point her cardiothoracic surgery team wishes her to be placed on IV heparin due to the fresh nature of this valve.  She is placed on low intensity heparin.  Her INR will be followed to daily.  She will get 7.5 mg of warfarin this evening with daily INR's and adjustment as necessary.  Not really clear why she became subtherapeutic her appetites been good.  She has been taking her medications appropriately.    2.  Status post mitral valve replacement: Still has a lot of tenderness on her anterior chest wall postoperatively.  Wound is healed nicely.  Do not believe this is has any do with ischemia.  She had a negative coronary angiography preoperatively.  She is hemodynamically stable.  I do not hear any rub.  She has a crisp mitral click.  No evidence of heart failure no fevers or evidence of infection.    3.  History of seizure disorder: No issues recently at all we will continue with her usual medications.        Diet:  Regular  DVT Prophylaxis: Warfarin and IV heparin  Roberts Catheter: Not present  Lines: None     Cardiac Monitoring: None  Code Status:  Full code    Clinically Significant Risk Factors Present on Admission         # Hyponatremia: Lowest Na = 132 mmol/L in last 2 days, will monitor as appropriate        # Drug Induced Coagulation Defect: home medication list includes an anticoagulant medication  # Drug Induced Platelet Defect: home medication list includes an antiplatelet medication   "  # Chronic heart failure with preserved ejection fraction: heart failure noted on problem list and last echo with EF >50%     # Anemia: based on hgb <11       # Cachexia: Estimated body mass index is 16.94 kg/m  as calculated from the following:    Height as of an earlier encounter on 11/25/24: 1.448 m (4' 9\").    Weight as of this encounter: 35.5 kg (78 lb 4.2 oz).       # Financial/Environmental Concerns:           Disposition Plan     Medically Ready for Discharge: Anticipated in 2-4 Days           Taj Ahumada MD  Hospitalist Service  [facility]  Securely message with [facility] (more info)  Text page via [facility] Paging/Directory     ______________________________________________________________________    Chief Complaint   Subtherapeutic INR    History is obtained from the patient, electronic health record, emergency department physician, and patient's mother    History of Present Illness   Efren Saavedra is a 44 year old female who  recently underwent a mitral valve replacement due to mitral stenosis. She had this performed on 10/23 done at the [facility]. Coronary angiogram showed no evidence of any coronary artery disease. It was an uncomplicated procedure. She was on heparin and started on warfarin. She was actually discharged on 10/28 from the [facility].  She has been doing well.  She was actually hospitalized here back on October 30 as she had a subtherapeutic INR 1.7.  She was placed on IV heparin until her INR came back up with warfarin.  She was discharged.  She has been doing well in fact her last INR on 11/18 was 3.3.  She came in today for routine follow-up was 1.7.  They contacted the cardio thoracic team at [facility] and they recommended that she be admitted for IV heparin until she becomes therapeutic.  They have chosen heparin because it is a fresh valve.  For the first 3 months they would prefer heparin rather than Lovenox.  In speaking with the " patient's mother she has been doing extremely well no shortness of breath no chest pains palpitations appetites been good no major changes in her diet at all especially over this last week.  No bleeding troubles at all.  Still little sore in the chest but nothing major.  She is supposed actually start cardiac rehab tomorrow.  No bleeding issues at all.  The Coumadin clinic is recommending 7.5 mg today which we will give.  She will be on the IV heparin.  And follow her INR closely.    No recent fevers chills cough purulent sputum no skin rashes no history of trauma at all.  Bowel and bladder been working fine without difficulty.  Been able to ambulate without any significant problem.      Past Medical History    Past Medical History:   Diagnosis Date    Seizures (H)        Past Surgical History   Past Surgical History:   Procedure Laterality Date    CV CORONARY ANGIOGRAM N/A 10/18/2024    Procedure: Coronary Angiogram;  Surgeon: Hernandez Rivera MD;  Location:  HEART CARDIAC CATH LAB    CV RIGHT HEART CATH MEASUREMENTS RECORDED N/A 10/18/2024    Procedure: Right Heart Catheterization;  Surgeon: Hernandez Rivera MD;  Location:  HEART CARDIAC CATH LAB    feeding tube      REPLACE VALVE MITRAL N/A 10/23/2024    Procedure: Median Sternotomy, Cardiopulmonary Bypass, Mitral Valve Replacement with [employer] [place] Mechanical Valve size 23mm, Interoperative Transesophageal Echocardiogram per Anesthesia;  Surgeon: Deangelo Craig MD;  Location: [facility] OR    TRANSESOPHAGEAL ECHOCARDIOGRAM INTRAOPERATIVE N/A 8/13/2024    Procedure: ECHOCARDIOGRAM, TRANSESOPHAGEAL, WITH ANESTHESIA;  Surgeon: GENERIC ANESTHESIA PROVIDER;  Location: [facility] OR    ventilation tubes, bilateral         Prior to Admission Medications   Prior to Admission Medications   Prescriptions Last Dose Informant Patient Reported? Taking?   acetaminophen (TYLENOL) 325 MG tablet   No No   Sig: Take 2-3 tablets (650-975 mg) by mouth every 6 hours as  needed for pain (For optimal non-opioid multimodal pain management to improve pain control.).   amoxicillin (AMOXIL) 500 MG capsule   No No   Sig: Take 2 g, 4 pills, 30-60 minutes prior to dental procedures/cleanings   aspirin (ASA) 81 MG chewable tablet   No No   Sig: Take 1 tablet (81 mg) by mouth every morning.   ketoconazole (NIZORAL) 2 % external cream   Yes No   Sig: Apply topically daily as needed for itching.   levETIRAcetam (KEPPRA) 250 MG tablet   Yes No   Sig: Take 500 mg by mouth 2 times daily. (Morning/6PM)   metoprolol tartrate (LOPRESSOR) 50 MG tablet   No No   Sig: Take 1 tablet (50 mg) by mouth 2 times daily.   norethindrone-ethinyl estradiol (JUNEL FE 1/20) 1-20 MG-MCG tablet   No No   Sig: Take 1 tablet by mouth daily.   warfarin ANTICOAGULANT (COUMADIN) 3 MG tablet   No No   Sig: Take 1 tablet (3 mg) by mouth daily.      Facility-Administered Medications: None        Review of Systems    The 10 point Review of Systems is negative other than noted in the HPI or here.      Social History   I have reviewed this patient's social history and updated it with pertinent information if needed.  Social History     Tobacco Use    Smoking status: Never     Passive exposure: Never    Smokeless tobacco: Never    Tobacco comments:     no passive exposure   Vaping Use    Vaping status: Never Used   Substance Use Topics    Alcohol use: No    Drug use: No         Family History   I have reviewed this patient's family history and updated it with pertinent information if needed.  Family History   Problem Relation Age of Onset    Cerebrovascular Disease Paternal Grandmother         CVA    Heart Disease Maternal Grandfather         disease    Hypertension Father     Other - See Comments Father         post polio    Parkinsonism Maternal Grandmother     Heart Disease Paternal Grandfather          Allergies   Allergies   Allergen Reactions    Azithromycin      Abdominal pain/ cramping     Clotrimazole Rash    Omnicef  [Cefdinir] Rash     Itchy and bad rash        Physical Exam   Vital Signs: Temp: 98  F (36.7  C) Temp src: Tympanic BP: 111/63 Pulse: 80   Resp: 22 SpO2: 99 % O2 Device: None (Room air)    Weight: 78 lbs 4.21 oz    Constitutional: awake, alert, cooperative, no apparent distress, and appears stated age  ENT: Normocephalic, without obvious abnormality, atraumatic, sinuses nontender on palpation, external ears without lesions, oral pharynx with moist mucous membranes, tonsils without erythema or exudates, gums normal and good dentition.  Respiratory: No increased work of breathing, good air exchange, clear to auscultation bilaterally, no crackles or wheezing  Cardiovascular: Regular rate and rhythm.  Very crisp valve click 1 out of 6 systolic murmur.  Neck veins are flat pulses are 2+ and equal.  GI: No scars, normal bowel sounds, soft, non-distended, non-tender, no masses palpated, no hepatosplenomegally  Musculoskeletal: There is no redness, warmth, or swelling of the joints.  Full range of motion noted.  Motor strength is 5 out of 5 all extremities bilaterally.  Tone is normal.    Medical Decision Making       60 MINUTES SPENT BY ME on the date of service doing chart review, history, exam, documentation & further activities per the note.      Data     I have personally reviewed the following data over the past 24 hrs:    11.1 (H)  \   10.1 (L)   / 386     132 (L) 98 12.9 /  83   4.9 24 0.59 \     ALT: 14 AST: 25 AP: 89 TBILI: 0.5   ALB: 4.1 TOT PROTEIN: 7.0 LIPASE: N/A     TSH: 3.19 T4: N/A A1C: N/A     Procal: N/A CRP: 17.89 (H) Lactic Acid: N/A       INR:  1.68 (H) PTT:  N/A   D-dimer:  N/A Fibrinogen:  N/A       Imaging results reviewed over the past 24 hrs:   No results found for this or any previous visit (from the past 24 hours).

## 2024-11-25 NOTE — Clinical Note
Hi Dr. Cloud, you have seen Ronna for her mechanical mitral valve, replaced 10/23/2024.  She has had 3 nights in the last 2 weeks with some night sweats.  2 she was just mildly damp, the other 1 mom did have to change the sheets.  Fine during the day, no fevers, no shortness of breath, no pulmonary edema or leg swelling.  Overall seems to be healing fine.  As many doctors have asked about night sweats, mom got worried.  I do have basic labs pending.  Would you recommend any further evaluation at this time with a blood culture or repeat echocardiogram?  I am leaning towards monitoring since she is well-appearing and no daytime symptoms and the night sweats are few and far between.  She also did recently resume her period after not having 1 while on the Depo shot.  Appreciate your input and recommendations!   Elena

## 2024-11-25 NOTE — PHARMACY-ANTICOAGULATION SERVICE
Clinical Pharmacy - Warfarin Dosing Consult     Pharmacy has been consulted to manage this patient s warfarin therapy.  Indication: Mechanical Mitral Valve Replacement  Therapy Goal: INR 2.5-3.5  OP Anticoag Clinic: [facility]  Warfarin Prior to Admission: Yes  Warfarin PTA Regimen: 4.5 mg daily  Significant drug interactions: ASA  Recent documented change in oral intake/nutrition: Unknown    INR   Date Value Ref Range Status   11/25/2024 1.68 (H) 0.85 - 1.15 Final   11/25/2024 1.71 (H) 0.85 - 1.15 Final       Recommend warfarin 7.5 mg today.  Pharmacy will monitor Efren Saavedra daily and order warfarin doses to achieve specified goal.      Please contact pharmacy as soon as possible if the warfarin needs to be held for a procedure or if the warfarin goals change.

## 2024-11-25 NOTE — PROGRESS NOTES
Assessment & Plan     Night sweats  Elevated crp   Hyponatremia   3 occurrences of night sweats sporadically over the last 2 weeks with 1 with wet bedsheets, others were minor and she felt damp to mom.  Not daily. No recent episode (in the last few days). No daytime fevers.  No other systemic symptoms.  Seems like her usual self.  Weight stable.  Vitals all normal today.  Exam benign.  Certainly warrants close monitoring with recent cardiac valve.  Most concerning would be something such as endocarditis or valve related infection.  However, would expect symptoms to be more persistent and include daytime fevers.  Did recently get her period last week after not having it for many months on Depo and was transition to combined OCP in September.  May be hormonal changes versus related to large amount of blankets at bedtime.  Mom also was concerned about facial swelling but reviewed I do not appreciate any of this today.  Her weight has been stable, she has no orthopnea or PND, edema, or increased work of breathing.  CBC, CMP, CRP and TSH overall stable.  A few labs we will monitor and recheck.  CRP is elevated but not overly significantly and could be related to recent surgery.  Will recheck at 1 week.  Will also update sodium level at that time and repeat CBC.  Did provide reassurance at this time to mom and [facility] cardiology who agreed with monitoring.  If worsening, would need to consider blood culture and updated transthoracic echocardiogram.  Concerning signs and symptoms reviewed that would indicate need for urgent re-evaluation. Patient stated understanding and agreement with the plan.   - CBC with Platelets & Differential; Future  - Comprehensive metabolic panel; Future  - CRP inflammation; Future  - TSH with free T4 reflex; Future  - CBC with Platelets & Differential  - Comprehensive metabolic panel  - CRP inflammation  - TSH with free T4 reflex    S/P MVR (mitral valve replacement)  - INR;  Future  - INR    Chronic idiopathic constipation  Hard bowel movements only every few days.  Lifelong history of this.  Discussed titration with MiraLAX to achieve 1 soft formed bowel movement daily.  Mom and patient will follow-up if not improving.      Subtherapeutic INR  Per [facility], Ranjan physician assistant, patient will need to be admitted with low-dose heparin and no bolus since mechanical valve is less than 3 months.  Ideally will get some increased education on foods that can affect the INR to prevent lability to help minimize risk for this in the future.          40 minutes spent by me on the date of the encounter doing chart review, history and exam, documentation and further activities per the note      Subjective   Marcia is a 44 year old, presenting for the following health issues:  Night Sweats (Face puffiness)        11/25/2024     8:40 AM   Additional Questions   Roomed by Yue pabon   Accompanied by Mother Christine         11/25/2024     8:40 AM   Patient Reported Additional Medications   Patient reports taking the following new medications None     History of Present Illness       Reason for visit:  Night sweats    She eats 0-1 servings of fruits and vegetables daily.She consumes 0 sweetened beverage(s) daily.She exercises with enough effort to increase her heart rate 9 or less minutes per day.  She exercises with enough effort to increase her heart rate 3 or less days per week.   She is taking medications regularly.       Concern - Night sweats, Face puffiness  Onset: A couple weeks ago  Description: Occurs at night  Intensity: mild  Progression of Symptoms:  same  Accompanying Signs & Symptoms: (see list, will discuss with provider)  Previous history of similar problem: No    Mechanical Mitral valve repair 10/23/2024 at [facility]    Patient with mom today. Wants to make sure it's all healing fine. Sometimes marcia says it hurts and holds at the area. Tylenol is only as needed, at bedtime. Not asking for  "pain meds.     Mom wondering if retaining fluid. Appetite has increased. Weight stable per mom. Stomach doesn't seem larger. Clothes are fitting fine.   Family friend had mentioned that her face looked more puffy, mom did not necessarily notice but got more worried when someone in the family said that.  Does not want to miss anything.    Had a few nights of night sweats. One night had to change sheets, was damp.  Last happened last week. Just changed sheets once. Not every night.   First happened two weeks ago. 3x.   Has been sleeping with a thick blanket, another blanket, stuffed animal.   No fevers. Acting like usual self.   No cough, no shortness of breath, weight stable. No orthopnea or leg swelling.   No trouble swallowing. No abdominal pain.   Appetite is good.   No dysuria, odor or frequency.   No chest pain. No pleuritic pain.   No congestion, sinus symptoms, or sick contacts.   History of depo shots. Switched to OCP.   First real period this last week - no period since coming off depo prior (night sweats just prior to this)   Sometimes heavier breathing prior to sleep, once sleeping though, seems fine.  Didn't mention anything to Dr Cloud on 11/18    Mom reports very large bowel movements. Wondering about stool softener.   Will strain to push them out.   Always has been like this.  Has BM every other day.   Type 2  Not good with fluid intake or activity.          Objective    /60 (BP Location: Right arm, Patient Position: Sitting, Cuff Size: Child)   Pulse 81   Temp 97.6  F (36.4  C) (Tympanic)   Ht 1.448 m (4' 9\")   Wt 34.2 kg (75 lb 4.8 oz)   LMP 11/20/2024   SpO2 98%   BMI 16.29 kg/m    Body mass index is 16.29 kg/m .    Physical Exam  Constitutional:       General: She is not in acute distress.     Appearance: Normal appearance. She is well-developed. She is not ill-appearing, toxic-appearing or diaphoretic.   HENT:      Head: Normocephalic and atraumatic.      Right Ear: Tympanic membrane " and external ear normal.      Left Ear: There is impacted cerumen.      Nose: Nose normal.      Mouth/Throat:      Mouth: Mucous membranes are moist.      Pharynx: No oropharyngeal exudate or posterior oropharyngeal erythema.   Eyes:      Extraocular Movements: Extraocular movements intact.      Conjunctiva/sclera: Conjunctivae normal.      Pupils: Pupils are equal, round, and reactive to light.   Neck:      Thyroid: No thyroid mass, thyromegaly or thyroid tenderness.      Comments: I do not appreciate any facial swelling or face fullness  Cardiovascular:      Rate and Rhythm: Normal rate and regular rhythm.      Pulses: Normal pulses.      Heart sounds: Normal heart sounds, S1 normal and S2 normal. No murmur heard.     Comments: Mechanical sounding S1 and S2  Pulmonary:      Effort: Pulmonary effort is normal. No respiratory distress.      Breath sounds: Normal breath sounds. No wheezing, rhonchi or rales.   Abdominal:      General: Bowel sounds are normal. There is no abdominal bruit.      Palpations: Abdomen is soft. There is no mass or pulsatile mass.      Tenderness: There is no abdominal tenderness. There is no guarding or rebound.   Musculoskeletal:         General: Normal range of motion.      Cervical back: Neck supple. No tenderness.      Right lower leg: No edema.      Left lower leg: No edema.   Lymphadenopathy:      Cervical: No cervical adenopathy.   Skin:     General: Skin is warm and dry.      Capillary Refill: Capillary refill takes less than 2 seconds.      Findings: No rash.      Comments: Sternotomy is well healed. No tenderness over incision site or spreading redness. No drainage    Neurological:      Mental Status: She is alert and oriented to person, place, and time. Mental status is at baseline.      Gait: Gait is intact.   Psychiatric:         Attention and Perception: Attention normal.         Mood and Affect: Mood normal.         Speech: Speech normal.         Behavior: Behavior normal.          Thought Content: Thought content normal.         Cognition and Memory: Cognition normal.         Judgment: Judgment normal.            Results for orders placed or performed in visit on 11/25/24   Comprehensive metabolic panel     Status: Abnormal   Result Value Ref Range    Sodium 132 (L) 135 - 145 mmol/L    Potassium 4.9 3.4 - 5.3 mmol/L    Carbon Dioxide (CO2) 24 22 - 29 mmol/L    Anion Gap 10 7 - 15 mmol/L    Urea Nitrogen 12.9 6.0 - 20.0 mg/dL    Creatinine 0.59 0.51 - 0.95 mg/dL    GFR Estimate >90 >60 mL/min/1.73m2    Calcium 9.4 8.8 - 10.4 mg/dL    Chloride 98 98 - 107 mmol/L    Glucose 83 70 - 99 mg/dL    Alkaline Phosphatase 89 40 - 150 U/L    AST 25 0 - 45 U/L    ALT 14 0 - 50 U/L    Protein Total 7.0 6.4 - 8.3 g/dL    Albumin 4.1 3.5 - 5.2 g/dL    Bilirubin Total 0.5 <=1.2 mg/dL   CRP inflammation     Status: Abnormal   Result Value Ref Range    CRP Inflammation 17.89 (H) <5.00 mg/L   TSH with free T4 reflex     Status: Normal   Result Value Ref Range    TSH 3.19 0.30 - 4.20 uIU/mL   INR     Status: Abnormal   Result Value Ref Range    INR 1.71 (H) 0.85 - 1.15   CBC with platelets and differential     Status: Abnormal   Result Value Ref Range    WBC Count 10.4 4.0 - 11.0 10e3/uL    RBC Count 3.97 3.80 - 5.20 10e6/uL    Hemoglobin 10.6 (L) 11.7 - 15.7 g/dL    Hematocrit 33.4 (L) 35.0 - 47.0 %    MCV 84 78 - 100 fL    MCH 26.7 26.5 - 33.0 pg    MCHC 31.7 31.5 - 36.5 g/dL    RDW 14.6 10.0 - 15.0 %    Platelet Count 390 150 - 450 10e3/uL    % Neutrophils 79 %    % Lymphocytes 6 %    % Monocytes 12 %    % Eosinophils 2 %    % Basophils 1 %    % Immature Granulocytes 1 %    NRBCs per 100 WBC 0 <1 /100    Absolute Neutrophils 8.2 1.6 - 8.3 10e3/uL    Absolute Lymphocytes 0.6 (L) 0.8 - 5.3 10e3/uL    Absolute Monocytes 1.2 0.0 - 1.3 10e3/uL    Absolute Eosinophils 0.2 0.0 - 0.7 10e3/uL    Absolute Basophils 0.1 0.0 - 0.2 10e3/uL    Absolute Immature Granulocytes 0.1 <=0.4 10e3/uL    Absolute NRBCs 0.0  10e3/uL   CBC with Platelets & Differential     Status: Abnormal    Narrative    The following orders were created for panel order CBC with Platelets & Differential.  Procedure                               Abnormality         Status                     ---------                               -----------         ------                     CBC with platelets and d...[[id number]]  Abnormal            Final result                 Please view results for these tests on the individual orders.             Signed Electronically by: Elena Viera MD

## 2024-11-25 NOTE — ED NOTES
Patient to room 2, changed into gown and call light with in reach.    States she is here due to her INR. States her INR clinic called and states she needed to come to ED and have a heparin drop started.

## 2024-11-25 NOTE — TELEPHONE ENCOUNTER
INR clinic called in regards to patients INR @ 1.7  Writer called [facility], Ranjan GALVEZ informed writer, since mechanical valve is less than 3 months, patient would be admitted with low dose heparin and no bolus.    Writer call clinic back and relay this information.

## 2024-11-25 NOTE — PATIENT INSTRUCTIONS
Start with 1 capful Miralax in 6-8oz liquid daily.  You should drink this quickly.  Mix the miralax in something such as juice, tea, or water, NOT milk. It s very important to mix the medicine with the full amount of liquid suggested.   You can increase or decrease the dose or frequency as needed to achieve soft daily and consistent stools (ex 1/2 capful to 2 caps).  If 1 capful daily is giving you too soft or liquid bowel movements, reduce to half capful daily.  Consider adding 200 mg magnesium citrate or magnesium oxide gummies or tablets.  Best to take these at bedtime as they can make you tired.  It can take 6 to 12 months for stools to regulate after being constipated.  Should continue MiraLAX for at least that duration.  Can consider transitioning to Metamucil, or fiber, after that.  Be sure to hydrate, should get at least 60 ounces of water daily.  Physical activity also helps keep stools moving.

## 2024-11-25 NOTE — ED PROVIDER NOTES
[facility]  ED Provider Note    Chief Complaint   Patient presents with    Abnormal Labs     History:  Efren Saavedra is a 44 year old female with mitral valve replacement 1 month ago on warfarin for anticoagulation had an INR of 1.7 today and was told to come into the hospital for heparin therapy by the PA from the CV surgery department at the [facility].  Patient has no complaints    Review of Systems   Performed; see HPI for pertinent positives and negatives.     Medical history, surgical history, and social history was reviewed.  Nursing documentation, triage note, and vitals were reviewed.    Vitals:  BP: 113/65  Pulse: 84  Temp: 98  F (36.7  C)  Resp: 18  Weight: 35.5 kg (78 lb 4.2 oz)  SpO2: 97 %    Physical Exam:  Constitutional: Alert and conversant. NAD   HENT: NCAT   Eyes: Normal pupils   Neck: supple   CV: No pallor  Pulmonary/Chest: Non-labored respirations  Abdominal: non-distended   MSK: JONES.   Neuro: Alert and appropriate   Skin: Warm and dry. No diaphoresis. No rashes on exposed skin, well-healed vertical incision scar on the chest without signs of infection or dehiscence  Psych: Appropriate mood and affect       MDM:      ED Course as of 11/25/24 1715 Mon Nov 25, 2024   1624 [facility].  Recommended heparin drip due to INR being < 1.7, but will discuss further with other colleagues and call us back with final recommendations and to determine if heparin drip needed.    1639 [facility] ([facility]. Hodge) call this back with directions after discussing with her colleague Dr. Craig, recommendation to be on a heparin drip until INR is greater than 2.0.   1714 Cased discussed with hospitalist who accepts admission       Procedures:  Procedures        Impression:  Final diagnoses:   Subtherapeutic international normalized ratio (INR)            Ean Pillai MD  11/25/24 1715       Ean Pillai MD  11/25/24 1715

## 2024-11-25 NOTE — ED TRIAGE NOTES
Had INR and was low, was called from clinic and the cardiac team from the  [facility] pt admitted on a heparin drip

## 2024-11-25 NOTE — PROGRESS NOTES
Chart reviewed with [facility] RN at time of encounter.    Increased warfarin requirements expected during first 12 weeks post valve surgery (Thrombosis Research 125 [phone]) [phone]). Larger warfarin dose adjustment advised,     ~ 60%+  maintenance dose increase anticipated  (1.5 mg; once a week 3 mg rest of week -->  ~ 4.5 mg daily) if no temporary reasons for low INR (missed doses, etc)     Booster dose of 7.5 mg x 1 recommended.    CV surgery contacted by [facility] Matthews regarding low INR    Pratibha Zamora, [facility]  Anticoagulation Clinic

## 2024-11-25 NOTE — PROGRESS NOTES
ANTICOAGULATION MANAGEMENT     Efren Saavedra 44 year old female is on warfarin with subtherapeutic INR result. (Goal INR 2.5-3.5)    Recent labs: (last 7 days)     11/25/24  0920   INR 1.71*       ASSESSMENT     Source(s): Chart Review and Patient/Caregiver Call     Warfarin doses taken: Warfarin taken as instructed  Diet: No new diet changes identified  Medication/supplement changes:  finished with pantoprazole on Fri 11/22 last dose  New illness, injury, or hospitalization: No  Signs or symptoms of bleeding or clotting: No  Previous result: Therapeutic last visit; previously outside of goal range  Additional findings: Recent heart valve replacement in last 10 weeks       PLAN     Recommended plan for no diet, medication or health factor changes and recent heart valve replacement last 10 weeks affecting INR     Dosing Instructions: booster dose then Increase your warfarin dose (60% change) with next INR in 2 days patient will be admitted to hospital for heparin drip and warfarin dose adjustment to get INR back in 2.5-3.5 goal range per CVTS group at [facility]    Summary  As of 11/25/2024      Full warfarin instructions:  11/25: 7.5 mg; Otherwise 4.5 mg every day   Next INR check:  11/27/2024               Telephone call with Christine Saavedra (GUARDIAN) who verbalizes understanding and agrees to plan    Lab visit scheduled    Education provided: None required    Plan made with [facility] Pharmacist Pratibha Coelho, [facility]  11/25/2024  Anticoagulation Clinic  [employer]) for routing messages: bill BASURTO  [facility] patient phone line: [phone]        _______________________________________________________________________     Anticoagulation Episode Summary       Current INR goal:  2.5-3.5   TTR:  53.7% (2.4 wk)   Target end date:  Indefinite   Send INR reminders to:  NIKKI BASURTO    Indications    S/P MVR (mitral valve replacement) [Z95.2]  S/P mitral valve replacement (Resolved) [Z95.2]              Comments:  MVR 10/23/24 12 weeks is Dominic 15             Anticoagulation Care Providers       Provider Role Specialty Phone number    Cee Velez MD [facility] [phone]

## 2024-11-26 LAB
HOLD SPECIMEN: NORMAL
HOLD SPECIMEN: NORMAL
INR PPP: 1.87 (ref 0.85–1.15)
UFH PPP CHRO-ACNC: 0.27 IU/ML
UFH PPP CHRO-ACNC: 0.35 IU/ML

## 2024-11-26 PROCEDURE — 85610 PROTHROMBIN TIME: CPT | Performed by: INTERNAL MEDICINE

## 2024-11-26 PROCEDURE — 99232 SBSQ HOSP IP/OBS MODERATE 35: CPT | Performed by: INTERNAL MEDICINE

## 2024-11-26 PROCEDURE — G0378 HOSPITAL OBSERVATION PER HR: HCPCS

## 2024-11-26 PROCEDURE — 120N000001 HC R&B MED SURG/OB

## 2024-11-26 PROCEDURE — 85520 HEPARIN ASSAY: CPT | Performed by: INTERNAL MEDICINE

## 2024-11-26 PROCEDURE — 36415 COLL VENOUS BLD VENIPUNCTURE: CPT | Performed by: INTERNAL MEDICINE

## 2024-11-26 PROCEDURE — 250N000013 HC RX MED GY IP 250 OP 250 PS 637: Performed by: INTERNAL MEDICINE

## 2024-11-26 RX ORDER — WARFARIN SODIUM 2.5 MG/1
5 TABLET ORAL
Status: DISCONTINUED | OUTPATIENT
Start: 2024-11-26 | End: 2024-11-26

## 2024-11-26 RX ORDER — WARFARIN SODIUM 6 MG/1
TABLET ORAL
Status: ON HOLD | COMMUNITY
End: 2024-11-27

## 2024-11-26 RX ORDER — POLYETHYLENE GLYCOL 3350 17 G/17G
1 POWDER, FOR SOLUTION ORAL DAILY
Status: ON HOLD | COMMUNITY
End: 2024-12-05

## 2024-11-26 RX ORDER — ACETAMINOPHEN 325 MG/1
650 TABLET ORAL AT BEDTIME
COMMUNITY

## 2024-11-26 RX ADMIN — ACETAMINOPHEN 650 MG: 325 TABLET, FILM COATED ORAL at 18:00

## 2024-11-26 RX ADMIN — METOPROLOL TARTRATE 50 MG: 50 TABLET, FILM COATED ORAL at 09:35

## 2024-11-26 RX ADMIN — METOPROLOL TARTRATE 50 MG: 50 TABLET, FILM COATED ORAL at 18:00

## 2024-11-26 RX ADMIN — WARFARIN 4.5 MG: 3 TABLET ORAL at 18:00

## 2024-11-26 RX ADMIN — LEVETIRACETAM 500 MG: 500 TABLET, FILM COATED ORAL at 18:00

## 2024-11-26 RX ADMIN — LEVETIRACETAM 500 MG: 500 TABLET, FILM COATED ORAL at 09:35

## 2024-11-26 RX ADMIN — ASPIRIN 81 MG CHEWABLE TABLET 81 MG: 81 TABLET CHEWABLE at 09:35

## 2024-11-26 RX ADMIN — NORETHINDRONE ACETATE AND ETHINYL ESTRADIOL 1 TABLET: KIT at 12:13

## 2024-11-26 ASSESSMENT — ACTIVITIES OF DAILY LIVING (ADL)
ADLS_ACUITY_SCORE: 43
ADLS_ACUITY_SCORE: 41
ADLS_ACUITY_SCORE: 43
ADLS_ACUITY_SCORE: 41
ADLS_ACUITY_SCORE: 41
ADLS_ACUITY_SCORE: 43
ADLS_ACUITY_SCORE: 41
ADLS_ACUITY_SCORE: 43
ADLS_ACUITY_SCORE: 41
ADLS_ACUITY_SCORE: 43
ADLS_ACUITY_SCORE: 41
ADLS_ACUITY_SCORE: 43
ADLS_ACUITY_SCORE: 43
ADLS_ACUITY_SCORE: 41
ADLS_ACUITY_SCORE: 43
ADLS_ACUITY_SCORE: 41
ADLS_ACUITY_SCORE: 41
ADLS_ACUITY_SCORE: 43
ADLS_ACUITY_SCORE: 43

## 2024-11-26 NOTE — PLAN OF CARE
ER started heparin at 400 units per hour dose was not increased to 450 units per hour per admission order. Initial increase done per infusion adjustment table based off 400 units which what was running.

## 2024-11-26 NOTE — PROGRESS NOTES
Scheduled lab only recheck in one week.  Answers submitted by the patient for this visit:  General Questionnaire (Submitted on 11/25/2024)  Chief Complaint: Chronic problems general questions HPI Form  What is the reason for your visit today? : night sweats  How many servings of fruits and vegetables do you eat daily?: 0-1  On average, how many sweetened beverages do you drink each day (Examples: soda, juice, sweet tea, etc.  Do NOT count diet or artificially sweetened beverages)?: 0  How many minutes a day do you exercise enough to make your heart beat faster?: 9 or less  How many days a week do you exercise enough to make your heart beat faster?: 3 or less  How many days per week do you miss taking your medication?: 0  Questionnaire about: Chronic problems general questions HPI Form (Submitted on 11/25/2024)  Chief Complaint: Chronic problems general questions HPI Form

## 2024-11-26 NOTE — PHARMACY-ANTICOAGULATION SERVICE
Pharmacy Consult-Warfarin Assessment Day #2    Efren Saavedra is a 44 year old female admitted on 11/25/2024 with Subtherapeutic international normalized ratio (INR)    Primary Indication(s) for Anticoagulation: Mechanical Mitral Valve Replacement    Goal INR: 2.5-3.5    FYI, patient is followed by the Anticoagulation/Protime Clinic at: [facility]    Patient previously anticoagulated on Coumadin at a dose of 1.5 mg Mondays and 3 mg all other days. However, dose was increased to 4.5 mg daily on 11/25 due to subtherapeutic level (hasn't started this new dosing yet).     Home tablet strength(s): 6 mg    Factors that may increase patient's bleeding risk and/or sensitivity to warfarin (Coumadin) include: heparin drip    Factors that may decrease patient's bleeding risk and/or sensitivity to warfarin (Coumadin) include: none    Anticoagulation Dose History  More data exists         11/25/2024 11/26/2024   Recent Dosing and Labs   warfarin ANTICOAGULANT (COUMADIN) 2.5 MG tablet 7.5 mg, $Given -   INR 1.68  1.71  1.87        CBC RESULTS:   Recent Labs   Lab Test 11/25/24  1550   HGB 10.1*          Assessment/Plan: INR still subtherapeutic. Patient received booster dose yesterday, and is still on heparin drip. Will give warfarin 4.5 mg tonight, as recommended by Coumadin Clinic. Will continue to monitor.     Thank You for the Consult. Will continue to follow.    Cecelia Wilhelm [facility] ....................  11/26/2024   11:42 AM

## 2024-11-26 NOTE — PROGRESS NOTES
[facility]    Hospitalist Progress Note       Assessment & Plan  Efren Saavedra is a 44 year old female admitted on 11/25/2024.       1.  Subtherapeutic INR in fresh mechanical mitral valve replacement: Patient had her valve putting on October 23 [facility] for mitral stenosis.  She had had some difficulty maintaining a therapeutic INR.  Recently however that she was doing very well in the 3 range and all of a sudden she is down to 1.7.  At this point her cardiothoracic surgery team wishes her to be placed on IV heparin due to the fresh nature of this valve.  She is placed on low intensity heparin.  Her INR will be followed to daily.  She will get 7.5 mg of warfarin this evening with daily INR's and adjustment as necessary.  Not really clear why she became subtherapeutic her appetites been good.  She has been taking her medications appropriately.  -11/26: INR is 1.87 today with anticoagulation recommendations they wanted to get 4.5 mg today and likely that will be the dose.  Been tolerating the IV heparin without problem.  No bleeding no fevers no issues.  Therapeutic prior to discharge     2.  Status post mitral valve replacement: Still has a lot of tenderness on her anterior chest wall postoperatively.  Wound is healed nicely.  Do not believe this is has any do with ischemia.  She had a negative coronary angiography preoperatively.  She is hemodynamically stable.  I do not hear any rub.  She has a crisp mitral click.  No evidence of heart failure no fevers or evidence of infection.  -11/26: stble no issues     3.  History of seizure disorder: No issues recently at all we will continue with her usual medications.  -11/26: Stable    Diet: Regular Diet Adult  Fluids: None    DVT Prophylaxis: Warfarin and heparin  Code Status: Full Code    Disposition: Expected discharge in 1-2 days once therapeutic INR.    Taj Ahumada MD    Interval History   No issues overnight denies any shortness of  breath chest pain nausea vomiting no bleeding issues.  INR is 1.87.  Continue with the IV heparin until week becomes therapeutic.    -Data reviewed today: I reviewed all new labs and imaging results over the last 24 hours. I personally reviewed no images or EKG's today.    Physical Exam   Temp: 98.5  F (36.9  C) Temp src: Tympanic BP: 117/69 Pulse: 74   Resp: 18 SpO2: 99 % O2 Device: None (Room air)    Vitals:    11/25/24 1520 11/25/24 1743   Weight: 35.5 kg (78 lb 4.2 oz) 33.5 kg (73 lb 13.7 oz)     Vital Signs with Ranges  Temp:  [97.6  F (36.4  C)-98.9  F (37.2  C)] 98.5  F (36.9  C)  Pulse:  [70-84] 74  Resp:  [18-25] 18  BP: ()/(57-71) 117/69  SpO2:  [97 %-99 %] 99 %  I/O last 3 completed shifts:  In: 480 [P.O.:480]  Out: -     Peripheral IV 11/25/24 Anterior;Left Upper forearm (Active)   Site Assessment WDL 11/25/24 2029   Line Status Infusing 11/25/24 2029   Dressing Transparent 11/25/24 2029   Dressing Status clean;dry;intact 11/25/24 2029   Line Intervention Flushed 11/25/24 1800   Phlebitis Scale 0-->no symptoms 11/25/24 2029   Infiltration? no 11/25/24 2029   Number of days: 1       Incision/Surgical Site 10/23/24 Medial Chest (Active)   Number of days: 34       Incision/Surgical Site 10/25/24 Upper Epigastrum (Active)   Number of days: 32     No line/device    Constitutional: Alert and oriented x3. No distress    HEENT: Normocephalic/atraumatic, PERRL, EOMI, mouth moist, neck supple, no LN.     Cardiovascular: RRR.  [place]ston mitral valve click 1 out of 6 to 2 out of 6 systolic murmur  , no  rubs, or gallops.  JVD no.   .  Pulses 2+    Respiratory: Anteriorly clear to auscultation bilaterally    Abdomen: Soft, nontender, nondistended, no organomegaly. Bowel sounds present    Extremities: Warm/dry.  No edema    Neuro:   Non focal, cranial nerves intact, Moves all extremities.  Medications   Current Facility-Administered Medications   Medication Dose Route Frequency Provider Last Rate Last Admin     heparin 25,000 units in 0.45% NaCl 250 mL ANTICOAGULANT infusion  0-5,000 Units/hr Intravenous Continuous Taj Ahumada MD 5.5 mL/hr at 11/25/24 2239 550 Units/hr at 11/25/24 2239    Patient is already receiving anticoagulation with heparin, enoxaparin (LOVENOX), warfarin (COUMADIN)  or other anticoagulant medication   Does not apply Continuous PRN Taj Ahumada MD         Current Facility-Administered Medications   Medication Dose Route Frequency Provider Last Rate Last Admin    aspirin (ASA) chewable tablet 81 mg  81 mg Oral QAM Taj Ahumada MD        levETIRAcetam (KEPPRA) tablet 500 mg  500 mg Oral BID Taj Ahumada MD   500 mg at 11/25/24 1824    metoprolol tartrate (LOPRESSOR) tablet 50 mg  50 mg Oral BID Taj Ahumada MD   50 mg at 11/25/24 2033    norethindrone-ethinyl estradiol (JUNEL FE 1/20) 1-20 MG-MCG per tablet 1 tablet  1 tablet Oral Daily Taj Ahumada MD        sodium chloride (PF) 0.9% PF flush 3 mL  3 mL Intracatheter Q8H Taj Ahumada MD        Warfarin Dose Required Daily - Pharmacist Managed  1 each Oral See Admin Instructions Taj Ahumada MD           Data   Recent Labs   Lab 11/26/24  0450 11/25/24  1550 11/25/24  0920   WBC  --  11.1* 10.4   HGB  --  10.1* 10.6*   MCV  --  83 84   PLT  --  386 390   INR 1.87* 1.68* 1.71*   NA  --   --  132*   POTASSIUM  --   --  4.9   CHLORIDE  --   --  98   CO2  --   --  24   BUN  --   --  12.9   CR  --   --  0.59   ANIONGAP  --   --  10   YUE  --   --  9.4   GLC  --   --  83   ALBUMIN  --   --  4.1   PROTTOTAL  --   --  7.0   BILITOTAL  --   --  0.5   ALKPHOS  --   --  89   ALT  --   --  14   AST  --   --  25       No results found for this or any previous visit (from the past 24 hours).

## 2024-11-26 NOTE — PLAN OF CARE
[facility] Inpatient Admission Note:    Patient admitted to 3228/3228-1 at approximately 1730 via cart accompanied by mother from emergency room . Report received from Daniel RN in SBAR format at 1700 via telephone. Patient transferred to bed via self.. Patient is alert and oriented X 3, pain; moderate via faces scale. Patient oriented to room, unit, hourly rounding, and plan of care. Explained admission packet and patient handbook with patient bill of rights brochure. Will continue to monitor and document as needed.     Inpatient Nursing criteria listed below was met:    Health care directives status obtained and documented: Yes    Patient identifies a surrogate decision maker: Yes mom at bedside       Clergy visit ordered if patient requests: No    Skin issues/needs documented: Yes    Isolation Patient: no Education given, correct sign in place and documentation row added to PCS:  No    Fall Prevention Yes: Care plan updated, education given and documented, sticker and magnet in place: Yes    Care Plan initiated: Yes    Education Documented (including assessment): Yes    Patient has discharge needs : Yes If yes, please explain:TBD       VSS and afebrile, rating pain as moderate via faces scale in chest from surgical incision, provider notified and aware. Rest of assessments as charted. Mother at bedside. Remains free of injury and alarms on. Face to face report given with opportunity to observe patient.    Report given to Ce Cash RN   11/25/2024  7:00 PM

## 2024-11-26 NOTE — PLAN OF CARE
Pt remains on heparin gtt. No change to rate at 1130 as hep 10a was within target range. That was 2nd result within range so next 10a level is tomorrow morning. Pt up in room with assistance of mother. Pt with minimal discomfort to old healing incision. Denies need for tylenol. Continue with coumadin and recheck INR in AM.    Face to face report given with opportunity to observe patient.    Report given to Katherine Phillips RN   11/26/2024  3:46 PM

## 2024-11-26 NOTE — PLAN OF CARE
Plan of Care Reviewed With: patient and mother    Overall Patient Progress: progressing    Pt is alert and orientated. VS and assessments as charted. Mother remains at bedside this shift. Xa level on last draw was 0.27. Next blood draw schedule for 1132.    Face to face report given with opportunity to observe patient.    Report given to ISIS Parker RN   11/26/2024

## 2024-11-26 NOTE — MEDICATION SCRIBE - ADMISSION MEDICATION HISTORY
Medication Scribe Admission Medication History    Admission medication history is complete. The information provided in this note is only as accurate as the sources available at the time of the update.    Information Source(s): Family member and CareEverywhere/SureScripts via in-person    Pertinent Information:   Patient's mother is the patient's caregiver and she manages patient's medications.   Patient was recommended to start Miralax yesterday, but has not started PTA. Added to PTA med list as caregiver is planning on starting after patient is discharged.   Warfarin 6 mg tablets: Last dose was 3 mg (1/2 tab) on 11/24/24 at 6:00 PM. PTA, patient was taking 1.5 mg (1/4 tab) on Mondays and 3 mg (1/2 tab) all other days. Per INR on 11/25/24, patient was increased to a booster dose of 7.5 mg on 11/25 then 4.5 mg every day. However, patient was then sent to the ED due to INR level and did not start the new dosing scheduled PTA.           Changes made to PTA medication list:  Added: APAP (scheduled), Miralax (not started PTA)  Deleted: None  Changed: Updated  Warfarin: from 3 mg tabs (3 mg daily) to 6 mg tabs (1.5 mg on Mon and 3 mg ROW).     Allergies reviewed with patient and updates made in EHR: yes    Medication History Completed By: Ani Santiago 11/26/2024 9:21 AM    PTA Med List   Medication Sig Note Last Dose/Taking    acetaminophen (TYLENOL) 325 MG tablet Take 650 mg by mouth at bedtime. Patient also has as needed dosing.  11/24/2024 at  6:00 PM    acetaminophen (TYLENOL) 325 MG tablet Take 2-3 tablets (650-975 mg) by mouth every 6 hours as needed for pain (For optimal non-opioid multimodal pain management to improve pain control.).  Past Week    aspirin (ASA) 81 MG chewable tablet Take 1 tablet (81 mg) by mouth every morning.  11/25/2024 at  7:00 AM    ketoconazole (NIZORAL) 2 % external cream Apply topically daily as needed for itching.  11/24/2024    levETIRAcetam (KEPPRA) 250 MG tablet Take 500 mg by mouth  2 times daily. (Morning/6PM)  11/25/2024 at  7:00 AM    metoprolol tartrate (LOPRESSOR) 50 MG tablet Take 1 tablet (50 mg) by mouth 2 times daily.  11/25/2024 at  7:00 AM    norethindrone-ethinyl estradiol (JUNEL FE 1/20) 1-20 MG-MCG tablet Take 1 tablet by mouth daily.  11/25/2024    warfarin ANTICOAGULANT (COUMADIN) 6 MG tablet Take 1.5 mg (0.25 tabs) by mouth in the evening on Monday, then 3 mg (0.5 tabs) the rest of the week or as directed by the anticoagulation clinic. 11/26/2024: Last dose = 3 mg 11/24/2024 at  6:00 PM

## 2024-11-27 VITALS
DIASTOLIC BLOOD PRESSURE: 64 MMHG | RESPIRATION RATE: 18 BRPM | TEMPERATURE: 98.4 F | HEART RATE: 79 BPM | SYSTOLIC BLOOD PRESSURE: 110 MMHG | BODY MASS INDEX: 15.93 KG/M2 | OXYGEN SATURATION: 98 % | WEIGHT: 73.85 LBS | HEIGHT: 57 IN

## 2024-11-27 LAB
HOLD SPECIMEN: NORMAL
INR PPP: 3.17 (ref 0.85–1.15)
UFH PPP CHRO-ACNC: 0.21 IU/ML

## 2024-11-27 PROCEDURE — 99239 HOSP IP/OBS DSCHRG MGMT >30: CPT | Performed by: INTERNAL MEDICINE

## 2024-11-27 PROCEDURE — 250N000013 HC RX MED GY IP 250 OP 250 PS 637: Performed by: INTERNAL MEDICINE

## 2024-11-27 PROCEDURE — 85520 HEPARIN ASSAY: CPT | Performed by: INTERNAL MEDICINE

## 2024-11-27 PROCEDURE — 85610 PROTHROMBIN TIME: CPT | Performed by: INTERNAL MEDICINE

## 2024-11-27 PROCEDURE — 36415 COLL VENOUS BLD VENIPUNCTURE: CPT | Performed by: INTERNAL MEDICINE

## 2024-11-27 RX ORDER — WARFARIN SODIUM 6 MG/1
TABLET ORAL
Status: ON HOLD | COMMUNITY
Start: 2024-11-27 | End: 2024-12-04

## 2024-11-27 RX ADMIN — NORETHINDRONE ACETATE AND ETHINYL ESTRADIOL 1 TABLET: KIT at 09:22

## 2024-11-27 RX ADMIN — METOPROLOL TARTRATE 50 MG: 50 TABLET, FILM COATED ORAL at 06:26

## 2024-11-27 RX ADMIN — ASPIRIN 81 MG CHEWABLE TABLET 81 MG: 81 TABLET CHEWABLE at 09:22

## 2024-11-27 RX ADMIN — LEVETIRACETAM 500 MG: 500 TABLET, FILM COATED ORAL at 09:22

## 2024-11-27 ASSESSMENT — ACTIVITIES OF DAILY LIVING (ADL)
ADLS_ACUITY_SCORE: 43

## 2024-11-27 NOTE — PLAN OF CARE
Goal Outcome Evaluation:      Plan of Care Reviewed With: patient, parent    Overall Patient Progress: improving      Pt alert and orientated. Assessments as charted. VSS. Denies any pain. Mother at bedside during night. Pt remains on heparin gtt. AM INR below target range, rate increased to 700 per protocol.     Face to face report given with opportunity to observe patient.    Report given to Sean MARINELLI and Bebe Kay RN   11/26/2024

## 2024-11-27 NOTE — DISCHARGE SUMMARY
"[facility]  Hospitalist Discharge Summary      Date of Admission:  11/25/2024  Date of Discharge:  11/27/2024  Discharging Provider: Taj Ahumada MD  Discharge Service: Hospitalist Service    Discharge Diagnoses     Subtherapeutic INR  Mechanical mitral valve replacement    Clinically Significant Risk Factors     # Cachexia: Estimated body mass index is 15.98 kg/m  as calculated from the following:    Height as of this encounter: 1.448 m (4' 9\").    Weight as of this encounter: 33.5 kg (73 lb 13.7 oz).       Follow-ups Needed After Discharge   Follow-up Appointments       Follow-up and recommended labs and tests       Follow up with primary care provider, Cee Velez, within 7 days for hospital follow- up.  The following labs/tests are recommended: INRs.            None    Unresulted Labs Ordered in the Past 30 Days of this Admission       No orders found from 10/26/2024 to 11/26/2024.        These results will be followed up by not needed    Discharge Disposition   Discharged to home  Condition at discharge: Stable    Hospital Course     Efren Saavedra is a 44 year old female who  recently underwent a mitral valve replacement due to mitral stenosis. She had this performed on 10/23 done at the [facility]. Coronary angiogram showed no evidence of any coronary artery disease. It was an uncomplicated procedure. She was on heparin and started on warfarin. She was actually discharged on 10/28 from the [facility].  She has been doing well.  She was actually hospitalized here back on October 30 as she had a subtherapeutic INR 1.7.  She was placed on IV heparin until her INR came back up with warfarin.  She was discharged.  She has been doing well in fact her last INR on 11/18 was 3.3.  She came in today for routine follow-up was 1.7.  They contacted the cardio thoracic team at [facility] and they recommended that she be admitted for IV heparin until she becomes " therapeutic.  They have chosen heparin because it is a fresh valve.  For the first 3 months they would prefer heparin rather than Lovenox.  In speaking with the patient's mother she has been doing extremely well no shortness of breath no chest pains palpitations appetites been good no major changes in her diet at all especially over this last week.  No bleeding troubles at all.  Still little sore in the chest but nothing major.  She is supposed actually start cardiac rehab tomorrow.  No bleeding issues at all.  The Coumadin clinic is recommending 7.5 mg today which we will give.  She will be on the IV heparin.  And follow her INR closely.     No recent fevers chills cough purulent sputum no skin rashes no history of trauma at all.  Bowel and bladder been working fine without difficulty.  Been able to ambulate without any significant problem.  Assessment & Plan  Efren Saavedra is a 44 year old female admitted on 11/25/2024.       1.  Subtherapeutic INR in fresh mechanical mitral valve replacement: Patient had her valve putting on October 23 [facility] for mitral stenosis.  She had had some difficulty maintaining a therapeutic INR.  Recently however that she was doing very well in the 3 range and all of a sudden she is down to 1.7.  At this point her cardiothoracic surgery team wishes her to be placed on IV heparin due to the fresh nature of this valve.  She is placed on low intensity heparin.  Her INR will be followed to daily.  She will get 7.5 mg of warfarin this evening with daily INR's and adjustment as necessary.  Not really clear why she became subtherapeutic her appetites been good.  She has been taking her medications appropriately.  -11/26: INR is 1.87 today with anticoagulation recommendations they wanted to get 4.5 mg today and likely that will be the dose.  Been tolerating the IV heparin without problem.  No bleeding no fevers no issues.  Therapeutic prior to discharge  -11/27: INR today was  3.17.  Heparin stopped.  She will be discharged home.  Outpatient follow-up will be needed in the next couple of days.  INR dosing per pharmacy.     2.  Status post mitral valve replacement: Still has a lot of tenderness on her anterior chest wall postoperatively.  Wound is healed nicely.  Do not believe this is has any do with ischemia.  She had a negative coronary angiography preoperatively.  She is hemodynamically stable.  I do not hear any rub.  She has a crisp mitral click.  No evidence of heart failure no fevers or evidence of infection.  -11/26: stble no issues     3.  History of seizure disorder: No issues recently at all we will continue with her usual medications.  -11/26: Stable       Consultations This Hospital Stay   PHARMACY IP CONSULT  PHARMACY IP CONSULT  PHARMACY TO DOSE WARFARIN  CARE MANAGEMENT / SOCIAL WORK IP CONSULT    Code Status   Full Code    Time Spent on this Encounter   I, Taj Ahumada MD, personally saw the patient today and spent greater than 30 minutes discharging this patient.       Taj Ahumada MD  [facility]  [facility] [address] [place] [id number]  Phone: [phone]  Fax: [phone]  ______________________________________________________________________    Physical Exam   Vital Signs: Temp: 98.4  F (36.9  C) Temp src: Tympanic BP: 110/64 Pulse: 79   Resp: 18 SpO2: 98 % O2 Device: None (Room air)    Weight: 73 lbs 13.67 oz  Constitutional: awake, alert, cooperative, no apparent distress, and appears stated age  ENT: Normocephalic, without obvious abnormality, atraumatic, sinuses nontender on palpation, external ears without lesions, oral pharynx with moist mucous membranes, tonsils without erythema or exudates, gums normal and good dentition.  Respiratory: No increased work of breathing, good air exchange, clear to auscultation bilaterally, no crackles or wheezing  Cardiovascular: Very crisp mitral click 1 out of 6 systolic murmur pulses 2+ equal  GI: No scars,  normal bowel sounds, soft, non-distended, non-tender, no masses palpated, no hepatosplenomegally  Musculoskeletal: There is no redness, warmth, or swelling of the joints.  Full range of motion noted.  Motor strength is 5 out of 5 all extremities bilaterally.  Tone is normal.       Primary Care Physician   Cee Velez    Discharge Orders      Reason for your hospital stay    Patient with recent mechanical mitral valve replacement.  She has been on warfarin therapy.  Her INR was found to be 1.7 subtherapeutic.  Due to the fresh nature of her mechanical valve the cardiothoracic surgery team wanted her admitted for IV heparin.  This was performed.  Her Coumadin was adjusted she is now 3.17 today and ready for discharge home.  Should have close follow-up with anticoagulation clinic for monitoring of her INR and dosing of Coumadin.     Follow-up and recommended labs and tests     Follow up with primary care provider, Cee Velez, within 7 days for hospital follow- up.  The following labs/tests are recommended: INRs.     Activity    Your activity upon discharge: activity as tolerated     Brief Discharge Instructions    Warfarin: take 4.5 mg on 11/27 and 11/28, then follow-up with Coumadin Clinic on 11/29 for INR check and further dosing instructions. Please pay close attention to what size tablets you have at home in order to administer the correct dose.     Full Code     Diet    Follow this diet upon discharge: Current Diet:Orders Placed This Encounter      Regular Diet Adult       Significant Results and Procedures   Most Recent 3 CBC's:  Recent Labs   Lab Test 11/25/24  1550 11/25/24  0920 11/02/24  0343   WBC 11.1* 10.4 11.7*   HGB 10.1* 10.6* 8.8*   MCV 83 84 91    390 240     Most Recent 3 BMP's:  Recent Labs   Lab Test 11/25/24  0920 10/31/24  0702 10/30/24  1714   * 135 134*   POTASSIUM 4.9 4.9 4.4   CHLORIDE 98 99 96*   CO2 24 23 24   BUN 12.9 10.7 10.4   CR 0.59 0.68 0.64   ANIONGAP 10 13  14   YUE 9.4 8.4* 9.1   GLC 83 78 85     Most Recent 2 LFT's:  Recent Labs   Lab Test 11/25/24  0920 10/31/24  0702   AST 25 22   ALT 14 20   ALKPHOS 89 74   BILITOTAL 0.5 0.5     Most Recent 3 INR's:  Recent Labs   Lab Test 11/27/24  0543 11/26/24  0450 11/25/24  1550   INR 3.17* 1.87* 1.68*   ,   Results for orders placed or performed during the hospital encounter of 10/30/24   CT Head w/o Contrast    Narrative    PROCEDURE: CT HEAD W/O CONTRAST     HISTORY: headache; Headache; Bleeding risk; New or worsening HA;  Currently taking an anticoagulant.    COMPARISON: None.    TECHNIQUE:  Helical images of the head from the foramen magnum to the  vertex were obtained without contrast.    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 There are no  masses or ventricular shifts or extracerebral collections. The  brainstem appears normal. There is elongation of the right cerebellar  tonsil extending below foramen magnum.  There are opacified air cells  bilaterally. The remainder of the paranasal sinuses are clear. No  acute skull fracture is seen.      Impression    IMPRESSION: Agenesis of the corpus callosum. No acute brain  abnormality.      ANA BURGOS MD         SYSTEM ID:  [id number]       Discharge Medications   Current Discharge Medication List        CONTINUE these medications which have CHANGED    Details   warfarin ANTICOAGULANT (COUMADIN) 6 MG tablet Take 4.5 mg on 11/27 and 11/28, then follow-up with Coumadin Clinic on 11/29 for INR check and further dosing instructions.           CONTINUE these medications which have NOT CHANGED    Details   !! acetaminophen (TYLENOL) 325 MG tablet Take 650 mg by mouth at bedtime. Patient also has as needed dosing.      !! acetaminophen (TYLENOL) 325 MG tablet Take 2-3 tablets (650-975 mg) by mouth every 6 hours as needed for pain (For optimal non-opioid multimodal pain management to improve pain control.).    Associated Diagnoses: S/P MVR (mitral valve replacement)       aspirin (ASA) 81 MG chewable tablet Take 1 tablet (81 mg) by mouth every morning.  Qty: 90 tablet, Refills: 3    Associated Diagnoses: Mitral valve stenosis, unspecified etiology; Mitral valve disease; Rheumatic mitral stenosis; S/P MVR (mitral valve replacement)      ketoconazole (NIZORAL) 2 % external cream Apply topically daily as needed for itching.    Associated Diagnoses: Tinea versicolor      levETIRAcetam (KEPPRA) 250 MG tablet Take 500 mg by mouth 2 times daily. (Morning/6PM)      metoprolol tartrate (LOPRESSOR) 50 MG tablet Take 1 tablet (50 mg) by mouth 2 times daily.  Qty: 180 tablet, Refills: 3    Associated Diagnoses: Rheumatic mitral stenosis      norethindrone-ethinyl estradiol (JUNEL FE 1/20) 1-20 MG-MCG tablet Take 1 tablet by mouth daily.  Qty: 90 tablet, Refills: 3    Associated Diagnoses: Encounter for contraceptive management, unspecified type      amoxicillin (AMOXIL) 500 MG capsule Take 2 g, 4 pills, 30-60 minutes prior to dental procedures/cleanings  Qty: 4 capsule, Refills: 3    Associated Diagnoses: Mitral valve stenosis, unspecified etiology; Mitral valve disease; Rheumatic mitral stenosis; S/P MVR (mitral valve replacement)      polyethylene glycol (MIRALAX) 17 GM/Dose powder Take 1 Capful by mouth daily. Mix in 6 to 8 ounces of juice, tea, or water (NOT milk). May increase or decrease dose/frequency as needed to achieve soft daily and consistent stools (ex. 1/2 capful to 2 capfuls). If 1 capful daily is causing bowel movements that are too soft or liquid, reduce to one-half capful daily.       !! - Potential duplicate medications found. Please discuss with provider.        Allergies   Allergies   Allergen Reactions    Azithromycin      Abdominal pain/ cramping     Clotrimazole Rash    Omnicef [Cefdinir] Rash     Itchy and bad rash

## 2024-11-27 NOTE — PHARMACY-ANTICOAGULATION SERVICE
Pharmacy Consult-Warfarin Assessment Day #3    Efren Saavedra is a 44 year old female admitted on 11/25/2024 with Subtherapeutic international normalized ratio (INR)    Primary Indication(s) for Anticoagulation: Mechanical Mitral Valve Replacement     Goal INR: 2.5-3.5    FYI, patient is followed by the Anticoagulation/Protime Clinic at: [facility]    Patient previously anticoagulated on Coumadin at a dose of 1.5 mg Mondays and 3 mg all other days. However, dose was increased to 4.5 mg daily on 11/25 due to subtherapeutic level (hasn't started this new dosing yet).      Home tablet strength(s): 6 mg    Factors that may increase patient's bleeding risk and/or sensitivity to warfarin (Coumadin) include: heparin drip,     Factors that may decrease patient's bleeding risk and/or sensitivity to warfarin (Coumadin) include: none    Anticoagulation Dose History  More data exists         11/25/2024 11/26/2024 11/27/2024   Recent Dosing and Labs   warfarin ANTICOAGULANT (COUMADIN) 1 MG tablet - 4.5 mg, $Given -   warfarin ANTICOAGULANT (COUMADIN) 2.5 MG tablet 7.5 mg, $Given - -   INR 1.68  1.71  1.87  3.17      Assessment/Plan: INR is therapeutic today. Heparin drip stopped this morning. Patient discharging home today. Recommend taking 4.5 mg today and tomorrow. Talked to Cassie from Coumadin clinic who agrees with this plan too.   Follow-up with Coumadin Clinic on Friday 11/29 for INR check and further dosing instructions.     Thank You for the Consult.     Cecelia Wilhelm [facility] ....................  11/27/2024   10:55 AM

## 2024-11-27 NOTE — PLAN OF CARE
"Reason for hospital stay:  Subtherapeutic INR  Living situation PTA: Home with mother    Goal outcome evaluation:     Plan of Care Reviewed With:  Patient and patient's mother      Overall Patient Progress:  Progressing    Neuro:  A&O x 4  CV:  Apical pulse regular, valve click noted  Respiratory:  Maintaining sats on room air. Lung sounds clear and equal bilaterally  Lines/Drains:  IV infusing Heparin infusing at 550 units/hr  Nutrition: Regular diet  Mobility: Stand by assist     Safety:  Call light within reach, family at bedside, lighting adjusted, nonskid footwear in use, bed in lowest position, wheels locked, room near unit station.    Pain Management:  Patient reported 2/10 chest pain, administered PRN tylenol with adequate relief reported upon reassessment.     Most recent vitals: /66 (BP Location: Right arm, Patient Position: Sitting, Cuff Size: Adult Small)   Pulse 82   Temp 99.1  F (37.3  C) (Tympanic)   Resp 16   Ht 1.448 m (4' 9\")   Wt 33.5 kg (73 lb 13.7 oz)   LMP 11/20/2024   SpO2 98%   BMI 15.98 kg/m          Face to face report given with opportunity to observe patient.    Report given to Ayesha HENDERSON RN and ISIS Reyes RN   11/26/2024  7:45 PM  "

## 2024-11-28 NOTE — PLAN OF CARE
"Reason for hospital stay:  Subtherapeutic INR    Goal outcome evaluation:     Plan of Care Reviewed With:  Patient and mom     Overall Patient Progress:  Improving    Significant events in the last 24 hours:  INR up to 3.17 this morning. Heparin drip discontinued.     Patient A+O x4 - denied any pain. Mother remains at bedside. Tolerating regular diet without difficulty. Up in room with mother's assistance.     Rest of VS and assessment as charted in flowsheets.     Most recent vitals: /64 (BP Location: Right arm)   Pulse 79   Temp 98.4  F (36.9  C) (Tympanic)   Resp 18   Ht 1.448 m (4' 9\")   Wt 33.5 kg (73 lb 13.7 oz)   LMP 11/20/2024   SpO2 98%   BMI 15.98 kg/m        Patient discharged at 12:45 PM via wheel chair accompanied by mother and staff. Prescriptions sent to patients preferred pharmacy. All belongings sent with patient.     Discharge instructions reviewed with patient and mother. Listed belongings gathered and returned to patient.     Patient discharged to home.     [facility]  Follow up appointment made:  Yes  Home medications returned to patient: Yes  Patient reports pain was well managed at discharge: Yes                "

## 2024-11-29 ENCOUNTER — LAB (OUTPATIENT)
Dept: LAB | Facility: OTHER | Age: 44
End: 2024-11-29
Payer: MEDICARE

## 2024-11-29 DIAGNOSIS — Z95.2 S/P MVR (MITRAL VALVE REPLACEMENT): ICD-10-CM

## 2024-11-29 DIAGNOSIS — I05.9 MITRAL VALVE DISEASE: ICD-10-CM

## 2024-11-29 LAB — INR BLD: 5.3 (ref 0.9–1.1)

## 2024-11-29 PROCEDURE — 85610 PROTHROMBIN TIME: CPT | Mod: ZL

## 2024-11-29 PROCEDURE — 36416 COLLJ CAPILLARY BLOOD SPEC: CPT | Mod: ZL

## 2024-12-02 ENCOUNTER — ANTICOAGULATION THERAPY VISIT (OUTPATIENT)
Dept: ANTICOAGULATION | Facility: OTHER | Age: 44
End: 2024-12-02
Attending: FAMILY MEDICINE
Payer: MEDICARE

## 2024-12-02 ENCOUNTER — APPOINTMENT (OUTPATIENT)
Dept: GENERAL RADIOLOGY | Facility: HOSPITAL | Age: 44
DRG: 291 | End: 2024-12-02
Attending: EMERGENCY MEDICINE
Payer: MEDICARE

## 2024-12-02 ENCOUNTER — HOSPITAL ENCOUNTER (EMERGENCY)
Facility: HOSPITAL | Age: 44
Discharge: HOME | DRG: 291 | End: 2024-12-02
Attending: NURSE PRACTITIONER
Payer: MEDICARE

## 2024-12-02 VITALS
RESPIRATION RATE: 21 BRPM | WEIGHT: 74.1 LBS | BODY MASS INDEX: 16.04 KG/M2 | DIASTOLIC BLOOD PRESSURE: 81 MMHG | SYSTOLIC BLOOD PRESSURE: 133 MMHG | TEMPERATURE: 98.3 F | OXYGEN SATURATION: 96 % | HEART RATE: 99 BPM

## 2024-12-02 DIAGNOSIS — Z95.2 S/P MVR (MITRAL VALVE REPLACEMENT): Primary | ICD-10-CM

## 2024-12-02 DIAGNOSIS — J81.0 ACUTE PULMONARY EDEMA (H): ICD-10-CM

## 2024-12-02 LAB
ALBUMIN SERPL BCG-MCNC: 3.9 G/DL (ref 3.5–5.2)
ALP SERPL-CCNC: 93 U/L (ref 40–150)
ALT SERPL W P-5'-P-CCNC: 13 U/L (ref 0–50)
ANION GAP SERPL CALCULATED.3IONS-SCNC: 13 MMOL/L (ref 7–15)
AST SERPL W P-5'-P-CCNC: 27 U/L (ref 0–45)
BASOPHILS # BLD AUTO: 0 10E3/UL (ref 0–0.2)
BASOPHILS NFR BLD AUTO: 0 %
BILIRUB SERPL-MCNC: 0.5 MG/DL
BUN SERPL-MCNC: 13.3 MG/DL (ref 6–20)
CALCIUM SERPL-MCNC: 9.1 MG/DL (ref 8.8–10.4)
CHLORIDE SERPL-SCNC: 102 MMOL/L (ref 98–107)
CREAT SERPL-MCNC: 0.59 MG/DL (ref 0.51–0.95)
EGFRCR SERPLBLD CKD-EPI 2021: >90 ML/MIN/1.73M2
EOSINOPHIL # BLD AUTO: 0.1 10E3/UL (ref 0–0.7)
EOSINOPHIL NFR BLD AUTO: 1 %
ERYTHROCYTE [DISTWIDTH] IN BLOOD BY AUTOMATED COUNT: 14.8 % (ref 10–15)
FLUAV RNA SPEC QL NAA+PROBE: NEGATIVE
FLUBV RNA RESP QL NAA+PROBE: NEGATIVE
GLUCOSE SERPL-MCNC: 75 MG/DL (ref 70–99)
HCO3 SERPL-SCNC: 21 MMOL/L (ref 22–29)
HCT VFR BLD AUTO: 33.2 % (ref 35–47)
HGB BLD-MCNC: 10.3 G/DL (ref 11.7–15.7)
HOLD SPECIMEN: NORMAL
IMM GRANULOCYTES # BLD: 0.1 10E3/UL
IMM GRANULOCYTES NFR BLD: 1 %
INR PPP: 4.22 (ref 0.85–1.15)
LYMPHOCYTES # BLD AUTO: 0.4 10E3/UL (ref 0.8–5.3)
LYMPHOCYTES NFR BLD AUTO: 4 %
MAGNESIUM SERPL-MCNC: 2 MG/DL (ref 1.7–2.3)
MCH RBC QN AUTO: 25.9 PG (ref 26.5–33)
MCHC RBC AUTO-ENTMCNC: 31 G/DL (ref 31.5–36.5)
MCV RBC AUTO: 83 FL (ref 78–100)
MONOCYTES # BLD AUTO: 1.5 10E3/UL (ref 0–1.3)
MONOCYTES NFR BLD AUTO: 15 %
NEUTROPHILS # BLD AUTO: 7.9 10E3/UL (ref 1.6–8.3)
NEUTROPHILS NFR BLD AUTO: 79 %
NRBC # BLD AUTO: 0 10E3/UL
NRBC BLD AUTO-RTO: 0 /100
NT-PROBNP SERPL-MCNC: 3793 PG/ML (ref 0–450)
PLATELET # BLD AUTO: 345 10E3/UL (ref 150–450)
POTASSIUM SERPL-SCNC: 5 MMOL/L (ref 3.4–5.3)
PROT SERPL-MCNC: 7.4 G/DL (ref 6.4–8.3)
RBC # BLD AUTO: 3.98 10E6/UL (ref 3.8–5.2)
RSV RNA SPEC NAA+PROBE: NEGATIVE
SARS-COV-2 RNA RESP QL NAA+PROBE: NEGATIVE
SODIUM SERPL-SCNC: 136 MMOL/L (ref 135–145)
WBC # BLD AUTO: 10 10E3/UL (ref 4–11)

## 2024-12-02 PROCEDURE — 99284 EMERGENCY DEPT VISIT MOD MDM: CPT | Performed by: NURSE PRACTITIONER

## 2024-12-02 PROCEDURE — 36415 COLL VENOUS BLD VENIPUNCTURE: CPT | Performed by: EMERGENCY MEDICINE

## 2024-12-02 PROCEDURE — 85610 PROTHROMBIN TIME: CPT | Performed by: NURSE PRACTITIONER

## 2024-12-02 PROCEDURE — 83735 ASSAY OF MAGNESIUM: CPT | Performed by: NURSE PRACTITIONER

## 2024-12-02 PROCEDURE — 85025 COMPLETE CBC W/AUTO DIFF WBC: CPT | Performed by: NURSE PRACTITIONER

## 2024-12-02 PROCEDURE — 250N000013 HC RX MED GY IP 250 OP 250 PS 637: Performed by: NURSE PRACTITIONER

## 2024-12-02 PROCEDURE — 85004 AUTOMATED DIFF WBC COUNT: CPT | Performed by: EMERGENCY MEDICINE

## 2024-12-02 PROCEDURE — 87637 SARSCOV2&INF A&B&RSV AMP PRB: CPT | Performed by: NURSE PRACTITIONER

## 2024-12-02 PROCEDURE — 87633 RESP VIRUS 12-25 TARGETS: CPT | Performed by: NURSE PRACTITIONER

## 2024-12-02 PROCEDURE — 71046 X-RAY EXAM CHEST 2 VIEWS: CPT

## 2024-12-02 PROCEDURE — 83880 ASSAY OF NATRIURETIC PEPTIDE: CPT | Performed by: NURSE PRACTITIONER

## 2024-12-02 PROCEDURE — 84460 ALANINE AMINO (ALT) (SGPT): CPT | Performed by: NURSE PRACTITIONER

## 2024-12-02 PROCEDURE — 84155 ASSAY OF PROTEIN SERUM: CPT | Performed by: NURSE PRACTITIONER

## 2024-12-02 PROCEDURE — 99284 EMERGENCY DEPT VISIT MOD MDM: CPT | Mod: 25

## 2024-12-02 RX ORDER — FUROSEMIDE 20 MG/1
40 TABLET ORAL ONCE
Status: COMPLETED | OUTPATIENT
Start: 2024-12-02 | End: 2024-12-02

## 2024-12-02 RX ADMIN — FUROSEMIDE 40 MG: 20 TABLET ORAL at 18:02

## 2024-12-02 ASSESSMENT — ENCOUNTER SYMPTOMS
HEMATOLOGIC/LYMPHATIC NEGATIVE: 1
GASTROINTESTINAL NEGATIVE: 1
CONSTITUTIONAL NEGATIVE: 1
EYES NEGATIVE: 1
NEUROLOGICAL NEGATIVE: 1
SHORTNESS OF BREATH: 1
ENDOCRINE NEGATIVE: 1
ALLERGIC/IMMUNOLOGIC NEGATIVE: 1
COUGH: 0
PSYCHIATRIC NEGATIVE: 1
MUSCULOSKELETAL NEGATIVE: 1

## 2024-12-02 ASSESSMENT — COLUMBIA-SUICIDE SEVERITY RATING SCALE - C-SSRS
1. IN THE PAST MONTH, HAVE YOU WISHED YOU WERE DEAD OR WISHED YOU COULD GO TO SLEEP AND NOT WAKE UP?: NO
6. HAVE YOU EVER DONE ANYTHING, STARTED TO DO ANYTHING, OR PREPARED TO DO ANYTHING TO END YOUR LIFE?: NO
2. HAVE YOU ACTUALLY HAD ANY THOUGHTS OF KILLING YOURSELF IN THE PAST MONTH?: NO

## 2024-12-02 ASSESSMENT — ACTIVITIES OF DAILY LIVING (ADL)
ADLS_ACUITY_SCORE: 63

## 2024-12-02 NOTE — ED PROVIDER NOTES
History     Chief Complaint   Patient presents with    Shortness of Breath     HPI  Efren Saavedra is a 44 year old individual with history of seizures, congestive heart failure, cardiomegaly, congenital stenosis of mitral valve with subsequent mitral valve replacement, comes in for shortness of breath.  Patient had mitral valve replacement done at [facility] on 10/23/2024.  Has been on warfarin since.  Today woke up with difficulty breathing and speaking due to shortness of breath.  Comes in for this reason.  Patient currently denying shortness of breath.  No cough reported.  Does have incisional chest pain reported that is unchanged.    Allergies:  Allergies   Allergen Reactions    Azithromycin      Abdominal pain/ cramping     Clotrimazole Rash    Omnicef [Cefdinir] Rash     Itchy and bad rash       Problem List:    Patient Active Problem List    Diagnosis Date Noted    Chronic idiopathic constipation 11/25/2024     Priority: Medium    Congenital stenosis of mitral valve 11/18/2024     Priority: Medium    Cardiomegaly 11/18/2024     Priority: Medium    Subtherapeutic international normalized ratio (INR) 10/30/2024     Priority: Medium    S/P MVR (mitral valve replacement) 10/29/2024     Priority: Medium    Mitral valve stenosis, unspecified etiology 10/23/2024     Priority: Medium    Acute congestive heart failure, unspecified heart failure type (H) 10/15/2024     Priority: Medium    Mitral stenosis 10/15/2024     Priority: Medium    Seizure (H) 02/27/2015     Priority: Medium     Overview:   At least two events (maybe 3), mom opted no treatment or mri unless further events      Osteoporosis 11/23/2013     Priority: Medium     Overview:   IMO Update      Congenital hearing loss 05/14/2013     Priority: Medium    Impacted cerumen 05/14/2013     Priority: Medium    Mixed conductive and sensorineural hearing loss, 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 Echo/doppler 4/19/05.   IMO Update 10/11      Other kyphoscoliosis and scoliosis 03/30/2005     Priority: Medium     Overview:   Refer to Dr. Michel. Scoliosis x-ray 4/19/05.      Unspecified intellectual disabilities 03/30/2005     Priority: Medium     Overview:   IMO Update 10/11      Dysmenorrhea 08/11/2004     Priority: Medium     Overview:   Will switch from Alesse to Loestrin      Agenesis of corpus callosum (H) 04/08/2004     Priority: Medium     Overview:       Other psoriasis 01/08/2004     Priority: Medium     Overview:   Scalp psoriasis. Under the care of Dr. Raines, Dermatology      Irregular menstrual cycle 04/15/2002     Priority: Medium        Past Medical History:    Past Medical History:   Diagnosis Date    Seizures (H)        Past Surgical History:    Past Surgical History:   Procedure Laterality Date    CV CORONARY ANGIOGRAM N/A 10/18/2024    Procedure: Coronary Angiogram;  Surgeon: Hernandez Rivera MD;  Location:  HEART CARDIAC CATH LAB    CV RIGHT HEART CATH MEASUREMENTS RECORDED N/A 10/18/2024    Procedure: Right Heart Catheterization;  Surgeon: Hernandez Rivera MD;  Location: [facility] HEART CARDIAC CATH LAB    feeding tube      REPLACE VALVE MITRAL N/A 10/23/2024    Procedure: Median Sternotomy, Cardiopulmonary Bypass, Mitral Valve Replacement with [place] Mechanical Valve size 23mm, Interoperative Transesophageal Echocardiogram per Anesthesia;  Surgeon: Deangelo Craig MD;  Location: [facility] OR    TRANSESOPHAGEAL ECHOCARDIOGRAM INTRAOPERATIVE N/A 8/13/2024    Procedure: ECHOCARDIOGRAM, TRANSESOPHAGEAL, WITH ANESTHESIA;  Surgeon: GENERIC ANESTHESIA PROVIDER;  Location: [facility] OR    ventilation tubes, bilateral         Family History:    Family History   Problem Relation Age of Onset    Cerebrovascular Disease  Paternal Grandmother         CVA    Heart Disease Maternal Grandfather         disease    Hypertension Father     Other - See Comments Father         post polio    Parkinsonism Maternal Grandmother     Heart Disease Paternal Grandfather        Social History:  Marital Status:  Single [1]  Social History     Tobacco Use    Smoking status: Never     Passive exposure: Never    Smokeless tobacco: Never    Tobacco comments:     no passive exposure   Vaping Use    Vaping status: Never Used   Substance Use Topics    Alcohol use: No    Drug use: No        Medications:    acetaminophen (TYLENOL) 325 MG tablet  acetaminophen (TYLENOL) 325 MG tablet  amoxicillin (AMOXIL) 500 MG capsule  aspirin (ASA) 81 MG chewable tablet  ketoconazole (NIZORAL) 2 % external cream  levETIRAcetam (KEPPRA) 250 MG tablet  metoprolol tartrate (LOPRESSOR) 50 MG tablet  norethindrone-ethinyl estradiol (JUNEL FE 1/20) 1-20 MG-MCG tablet  polyethylene glycol (MIRALAX) 17 GM/Dose powder  warfarin ANTICOAGULANT (COUMADIN) 6 MG tablet          Review of Systems   Constitutional: Negative.    HENT: Negative.     Eyes: Negative.    Respiratory:  Positive for shortness of breath. Negative for cough.    Cardiovascular:  Positive for chest pain.   Gastrointestinal: Negative.    Endocrine: Negative.    Genitourinary: Negative.    Musculoskeletal: Negative.    Skin: Negative.    Allergic/Immunologic: Negative.    Neurological: Negative.    Hematological: Negative.    Psychiatric/Behavioral: Negative.         Physical Exam   BP: 121/77  Pulse: 93  Temp: 98.3  F (36.8  C)  Resp: 18  Weight: 33.6 kg (74 lb 1.6 oz)  SpO2: 99 %      GENERAL APPEARANCE:  The patient is a 44 year old individual that appears as stated age.  NECK:  Supple.  Trachea is midline.    CHEST:  Symmetric.  Non-tender to palpation.  No crepitus or deformity.  LUNGS:  Breathing is easy.  Breath sounds are equal and clear bilaterally.  No wheezes, rhonchi, or rales.  HEART:  Regular rate and  rhythm with normal S1 and S2.  No murmurs, gallops, or rubs.  Mechanical click noted.  EXTREMITIES:  No cyanosis, clubbing, or edema.  PSYCHIATRIC:  The patient is awake, alert.  Recent and remote memory is intact.  Appropriate mood and affect.  Calm and cooperative with history and physical exam.  SKIN:  Warm, dry, and well perfused.  Good turgor.  Incision healed  to chest dry and intact with no wound dehiscence, toxic striations, or drainage.      ED Course     ED Course as of 12/02/24 1806   Mon Dec 02, 2024   1228 Labs and x-ray ordered while inpatient in [facility].   1451 In to see patient and history/physical completed.    1652 Discussed case with cardiologist Dr. Briseida Berger from [facility].  Further lab work recommended.  If BNP is elevated recommends only 1 dose of furosemide and follow-up with cardiology.  Recommends infectious respiratory workup.    1801 BNP is elevated.  It is improved from previous but is still elevated.  Influenza, COVID, RSV is negative.  Respiratory panel pending.  Furosemide 40 mg p.o. ordered.  Will discharge home has oxygenation normal.  Follow-up with PCP and cardiology.  Return precautions given.               Results for orders placed or performed during the hospital encounter of 12/02/24 (from the past 24 hours)   [place] Draw *Canceled*    Narrative    The following orders were created for panel order [place] Draw.  Procedure                               Abnormality         Status                     ---------                               -----------         ------                       Please view results for these tests on the individual orders.   [place] Draw *Canceled*    Narrative    The following orders were created for panel order [place] Draw.  Procedure                               Abnormality         Status                     ---------                               -----------         ------                     Extra Blue Top Tube[[id number]]                                                          Extra Red Top Tube[[id number]]                                                          Extra Green Top (Lithium...[[id number]]                                                 Extra Purple Top Tube[[id number]]                                                         Please view results for these tests on the individual orders.   CBC with platelets differential    Narrative    The following orders were created for panel order CBC with platelets differential.  Procedure                               Abnormality         Status                     ---------                               -----------         ------                     CBC with platelets and d...[[id number]]  Abnormal            Final result                 Please view results for these tests on the individual orders.   CBC with platelets and differential   Result Value Ref Range    WBC Count 10.0 4.0 - 11.0 10e3/uL    RBC Count 3.98 3.80 - 5.20 10e6/uL    Hemoglobin 10.3 (L) 11.7 - 15.7 g/dL    Hematocrit 33.2 (L) 35.0 - 47.0 %    MCV 83 78 - 100 fL    MCH 25.9 (L) 26.5 - 33.0 pg    MCHC 31.0 (L) 31.5 - 36.5 g/dL    RDW 14.8 10.0 - 15.0 %    Platelet Count 345 150 - 450 10e3/uL    % Neutrophils 79 %    % Lymphocytes 4 %    % Monocytes 15 %    % Eosinophils 1 %    % Basophils 0 %    % Immature Granulocytes 1 %    NRBCs per 100 WBC 0 <1 /100    Absolute Neutrophils 7.9 1.6 - 8.3 10e3/uL    Absolute Lymphocytes 0.4 (L) 0.8 - 5.3 10e3/uL    Absolute Monocytes 1.5 (H) 0.0 - 1.3 10e3/uL    Absolute Eosinophils 0.1 0.0 - 0.7 10e3/uL    Absolute Basophils 0.0 0.0 - 0.2 10e3/uL    Absolute Immature Granulocytes 0.1 <=0.4 10e3/uL    Absolute NRBCs 0.0 10e3/uL   Extra Tube    Narrative    The following orders were created for panel order Extra Tube.  Procedure                               Abnormality         Status                     ---------                               -----------         ------                     Extra  Green Top (Lithium...[[id number]]                      Final result                 Please view results for these tests on the individual orders.   Extra Green Top (Lithium Heparin) Tube   Result Value Ref Range    Hold Specimen JIC    INR   Result Value Ref Range    INR 4.22 (H) 0.85 - 1.15   Comprehensive metabolic panel   Result Value Ref Range    Sodium 136 135 - 145 mmol/L    Potassium 5.0 3.4 - 5.3 mmol/L    Carbon Dioxide (CO2) 21 (L) 22 - 29 mmol/L    Anion Gap 13 7 - 15 mmol/L    Urea Nitrogen 13.3 6.0 - 20.0 mg/dL    Creatinine 0.59 0.51 - 0.95 mg/dL    GFR Estimate >90 >60 mL/min/1.73m2    Calcium 9.1 8.8 - 10.4 mg/dL    Chloride 102 98 - 107 mmol/L    Glucose 75 70 - 99 mg/dL    Alkaline Phosphatase 93 40 - 150 U/L    AST 27 0 - 45 U/L    ALT 13 0 - 50 U/L    Protein Total 7.4 6.4 - 8.3 g/dL    Albumin 3.9 3.5 - 5.2 g/dL    Bilirubin Total 0.5 <=1.2 mg/dL   Magnesium   Result Value Ref Range    Magnesium 2.0 1.7 - 2.3 mg/dL   NT pro BNP   Result Value Ref Range    N terminal Pro BNP Inpatient 3,793 (H) 0 - 450 pg/mL   XR Chest 2 Views    Narrative    XR CHEST 2 VIEWS    HISTORY: 44 years Female shortness of breath    COMPARISON: 10/26/2024    TECHNIQUE: 2 views of the chest were obtained.    FINDINGS: Pulmonary vascularity is prominent. Vascular margins are  indistinct. Kandace B lines are present. Findings are similar to the  prior study.    There is cardiomegaly. Again seen are changes of median sternotomy and  cardiac valve replacement.        Impression    IMPRESSION: Cardiomegaly and pulmonary edema. Findings are similar to  the prior study.    OPAL MCGINNIS MD         SYSTEM ID:  [id number]   Influenza A/B, RSV and SARS-CoV2 PCR (COVID-19) Nasopharyngeal    Specimen: Nasopharyngeal; Swab   Result Value Ref Range    Influenza A PCR Negative Negative    Influenza B PCR Negative Negative    RSV PCR Negative Negative    SARS CoV2 PCR Negative Negative    Narrative    Testing was performed using the  Xpert Xpress CoV2/Flu/RSV Assay on the [employer] GeneXpert Instrument. This test should be ordered for the detection of SARS-CoV2, influenza, and RSV viruses in individuals with signs and symptoms of respiratory tract infection. This test is for in vitro diagnostic use under the US FDA for laboratories certified under CLIA to perform high or moderate complexity testing. This test has been US FDA cleared. A negative result does not rule out the presence of PCR inhibitors in the specimen or target RNA in concentration below the limit of detection for the assay. If only one viral target is positive but coinfection with multiple targets is suspected, the sample should be re-tested with another FDA cleared, approved, or authorized test, if coninfection would change clinical management. This test was validated by the [facility] [employer]. These laboratories are certified under the Clinical Laboratory Improvement Amendments of 1988 (CLIA-88) as qualified to perfom high complexity laboratory testing.       Medications   furosemide (LASIX) tablet 40 mg (40 mg Oral $Given 12/2/24 1802)       Assessments & Plan (with Medical Decision Making)     I have reviewed the nursing notes.    I have reviewed the findings, diagnosis, plan and need for follow up with the patient.    Summary:  Patient presents to the ER today for shortness of breath.  Potential diagnosis which have been considered and evaluated include pneumonia, pneumothorax, arrhythmia, CHF, , as well as others. Many of these have been excluded using the various modalities and assessment as noted on the chart. At the present time, the diagnosis given seems to be the most likely pulmonary edema.  Upon arrival, vitals signs are normal.  The patient is alert and in no distress.  Respirations easy and clear.  Does have normal cardiac rate and rhythm but noted mechanical valve click.  No lower extremity edema noted.  Lab work obtained showing WBC of 10.0 with  hemoglobin of 10.3 which is at patient baseline looking at old lab work.  Electrolytes, renal, hepatic functions normal.  INR hypertherapeutic at 4.22.  BNP 3793.  Influenza, COVID, RSV negative.   Chest x-ray personally reviewed showing curly B-lines consistent with pulmonary edema.  No pleural effusions or pneumothorax present.  Discussed case with cardiology as patient had pulmonary edema while in hospital for mitral valve replacement.  Was discharged home on 5 doses of furosemide.  As patient in no distress with clear lung sounds did discuss case with cardiologist Dr. Briseida Berger at [facility].  Did recommend add-on lab work which was conducted.  BNP is elevated at 3793 but this has improved from 1 month prior was 7347.  Influenza, COVID, RSV was negative.  Respiratory panel pending.  Cardiology recommended only 1 time dose of furosemide and follow-up with cardiology.  Will discharge home after 40 mg furosemide given in the ER.  Return to ER if new or worsening symptoms.  Patient and mother verbalized understand agree with plan of care.  Patient discharged home with mother.      Critical Care Time: None    Impression and plan discussed with patient. Questions answered, concerns addressed, indications for urgent re-evaluation reviewed, and  given. Patient/Parent/Caregiver agree with treatment plan and have no further questions at this time.  AVS provided at discharge.    This document was prepared using a combination of typing and voice generated software.  While every attempt was made for accuracy, spelling and grammatical errors may exist.              New Prescriptions    No medications on file       Final diagnoses:   Acute pulmonary edema (H)       12/2/2024   [facility]       Sidney Carlos APRN CNP  12/02/24 5151

## 2024-12-02 NOTE — PROGRESS NOTES
"  Assessment & Plan     Hospital Discharge Follow-up    Heart murmur  - Echocardiogram Complete; Future    Dyspnea, unspecified type  - Echocardiogram Complete; Future    S/P MVR (mitral valve replacement)  - Echocardiogram Complete; Future        On my exam, valve click as expected, but also a very LOUD multi-pitch murmur heard mainly left sided but heard diffusely.    This murmur was not noted on her post-op cardiac note dated 11/18/24.  Her recent discharge summary stated no murmur, just crisp valve click.  I spoke with the ER provider she saw yesterday, he states no murmur was heard on exam at that time.        Clinically she appears to be improving, subjective dyspnea, but no evidence of volume overload on exam, lungs clear, normal vitals, physically well appearing.  Will arrange cardiology follow-up.    GETTING ECHO DONE ASAP DUE TO NEW MURMUR.  Discussed if she feels she is worsening in any way, needs to go to ER.        MED REC REQUIRED  Post Medication Reconciliation Status:  Discharge medications reconciled, continue medications without change            Subjective   Ronna is a 44 year old, presenting for the following health issues:  [facility] F/U        12/3/2024    10:14 AM   Additional Questions   Roomed by Chris Larsen lpn   Accompanied by Mother     HPI       \"Ronna\" is a 44 year old female patient of Dr. Velez whom I am seeing for a hospital discharge follow-up for subtherapeutic INR.  She was admitted to [facility] from 11/25/24-11/27/24 for IV Heparin until her warfarin became therapeutic.  This was done reportedly at the recommendation of her Cardiology team of [facility].  She is s/p Mitral Valve Replacement on 10/23/24, and they prefer Heparin over Lovenox for the first 3 months post-op.      She was then seen in the ER yesterday for dyspnea, mother reported she was having speaking due to breathing issues.  CXR showed cardiomegaly and pulmonary edema similar to previous " study.  Flu/COVID/RSV negative.  Respiratory panel pending.  CBC no leukocytosis, Hgb 10.3 (stable from week prior)  CMP okay  NT-proBNP 3,793 - was 7,347 a month prior  INR 4.22 (Warfarin clinic already adjusted dose to compensate)    Per ER note, they discussed with cardiology and recommendation was for single dose Lasix and follow-up with cardiology.  Mother reports that is not yet scheduled.    Reportedly she is still having dyspnea, but improved compared to yesterday, able to speak more.  Trouble with full sentences still.    No fever, cough, edema, chest pain, ST, sinus/ear, abd pain, or illness concerns.        Hospital Follow-up Visit:    Hospital/Nursing Home/IP Rehab Facility: [facility]  Date of Admission: 11/25   Date of Discharge: 11/27  Reason(s) for Admission: Subtherapeutic INR  Was the patient in the ICU or did the patient experience delirium during hospitalization?  No  Do you have any other stressors you would like to discuss with your provider? No    Problems taking medications regularly:  None  Medication changes since discharge: None  Problems adhering to non-medication therapy:  None    Summary of hospitalization:  [facility] discharge summary reviewed  Diagnostic Tests/Treatments reviewed.  Follow up needed: cardio  Other Healthcare Providers Involved in Patient s Care:          Cardio  Update since discharge: stable.         Plan of care communicated with patient and family                     Objective    /58 (BP Location: Left arm, Patient Position: Sitting, Cuff Size: Adult Regular)   Pulse 87   Temp 98.5  F (36.9  C)   Resp 16   Wt 33.4 kg (73 lb 9.6 oz)   LMP 11/20/2024   SpO2 99%   BMI 15.93 kg/m    Body mass index is 15.93 kg/m .  Physical Exam  Constitutional:       General: She is not in acute distress.     Appearance: Normal appearance.   Eyes:      Conjunctiva/sclera: Conjunctivae normal.   Cardiovascular:      Comments: RRR.  Very LOUD  multi-pitch murmur heard mainly left sided but heard diffusely, also valve click  Pulmonary:      Effort: Pulmonary effort is normal.      Breath sounds: Normal breath sounds. No wheezing, rhonchi or rales.   Abdominal:      General: Bowel sounds are normal. There is no distension.      Palpations: Abdomen is soft.      Tenderness: There is no abdominal tenderness. There is no guarding.   Musculoskeletal:      Right lower leg: No edema.      Left lower leg: No edema.   Lymphadenopathy:      Cervical: No cervical adenopathy.   Skin:     Coloration: Skin is not jaundiced.   Neurological:      Mental Status: She is alert.            Time spent on date of service: 42 minutes including F2F H&P, chart review, discussion with ER provider she saw yesterday, documentation, activities as per note.        Signed Electronically by: XIMENA LIZAMA DO

## 2024-12-02 NOTE — ED TRIAGE NOTES
ERIN Kemp CNP assessed patient in triage and determined patient not Urgent Care appropriate. Will be seen in Emergency room.    Patient here with mom who states patient was just discharged from hospital for a high INR. States the last 2 days she has been short of breath. States it is hard for her to get words out.      Did have follow up labs drawn while here.

## 2024-12-02 NOTE — PROGRESS NOTES
ANTICOAGULATION MANAGEMENT     Efren Saavedra 44 year old female is on warfarin with supratherapeutic INR result. (Goal INR 2.5-3.5)    Recent labs: (last 7 days)     12/02/24  1229   INR 4.22*       ASSESSMENT     Source(s): Chart Review and Patient/Caregiver Call     Warfarin doses taken: Warfarin taken as instructed  Diet: No new diet changes identified  Medication/supplement changes: None noted  New illness, injury, or hospitalization: Yes: Ed visit today for SOB  Signs or symptoms of bleeding or clotting: No  Previous result: Supratherapeutic  Additional findings: Recent heart valve replacement in last 10 weeks       PLAN     Recommended plan for no diet, medication or health factor changes affecting INR     Dosing Instructions: decrease your warfarin dose (9.5% change) with next INR in 4 days       Summary  As of 12/2/2024      Full warfarin instructions:  3 mg every Mon, Fri; 4.5 mg all other days   Next INR check:  12/5/2024               Telephone call with Christine Saavedra (GUARDIAN) who verbalizes understanding and agrees to plan    Lab visit scheduled    Education provided: None required    Plan made with [facility] Pharmacist Pratibha Coelho RN  12/2/2024  Anticoagulation Clinic  [place] for routing messages: p NIKKI BASURTO  [facility] patient phone line: [phone]        _______________________________________________________________________     Anticoagulation Episode Summary       Current INR goal:  2.5-3.5   TTR:  44.2% (3 wk)   Target end date:  Indefinite   Send INR reminders to:  NIKKI BASURTO    Indications    S/P MVR (mitral valve replacement) [Z95.2]  S/P mitral valve replacement (Resolved) [Z95.2]             Comments:  MVR 10/23/24 12 weeks is Dominic 15             Anticoagulation Care Providers       Provider Role Specialty Phone number    Cee Velez MD [employer] [phone]

## 2024-12-03 ENCOUNTER — OFFICE VISIT (OUTPATIENT)
Dept: FAMILY MEDICINE | Facility: OTHER | Age: 44
End: 2024-12-03
Attending: FAMILY MEDICINE
Payer: MEDICARE

## 2024-12-03 ENCOUNTER — APPOINTMENT (OUTPATIENT)
Dept: GENERAL RADIOLOGY | Facility: HOSPITAL | Age: 44
DRG: 291 | End: 2024-12-03
Attending: PHYSICIAN ASSISTANT
Payer: MEDICARE

## 2024-12-03 ENCOUNTER — PATIENT OUTREACH (OUTPATIENT)
Dept: CARE COORDINATION | Facility: OTHER | Age: 44
End: 2024-12-03

## 2024-12-03 ENCOUNTER — TELEPHONE (OUTPATIENT)
Dept: FAMILY MEDICINE | Facility: OTHER | Age: 44
End: 2024-12-03

## 2024-12-03 ENCOUNTER — HOSPITAL ENCOUNTER (INPATIENT)
Facility: HOSPITAL | Age: 44
LOS: 2 days | Discharge: HOME | DRG: 291 | End: 2024-12-05
Attending: PHYSICIAN ASSISTANT | Admitting: INTERNAL MEDICINE
Payer: MEDICARE

## 2024-12-03 VITALS
SYSTOLIC BLOOD PRESSURE: 108 MMHG | WEIGHT: 72 LBS | BODY MASS INDEX: 15.58 KG/M2 | DIASTOLIC BLOOD PRESSURE: 58 MMHG | OXYGEN SATURATION: 99 % | HEART RATE: 87 BPM | RESPIRATION RATE: 16 BRPM | TEMPERATURE: 98.5 F

## 2024-12-03 DIAGNOSIS — Z95.2 S/P MVR (MITRAL VALVE REPLACEMENT): ICD-10-CM

## 2024-12-03 DIAGNOSIS — R06.00 DYSPNEA, UNSPECIFIED TYPE: ICD-10-CM

## 2024-12-03 DIAGNOSIS — D64.9 ANEMIA: ICD-10-CM

## 2024-12-03 DIAGNOSIS — Z09 HOSPITAL DISCHARGE FOLLOW-UP: ICD-10-CM

## 2024-12-03 DIAGNOSIS — I50.30 DIASTOLIC CONGESTIVE HEART FAILURE, UNSPECIFIED HF CHRONICITY (H): Primary | ICD-10-CM

## 2024-12-03 DIAGNOSIS — D50.9 IRON DEFICIENCY ANEMIA, UNSPECIFIED IRON DEFICIENCY ANEMIA TYPE: ICD-10-CM

## 2024-12-03 DIAGNOSIS — Z95.2 H/O MITRAL VALVE REPLACEMENT: ICD-10-CM

## 2024-12-03 DIAGNOSIS — R01.1 HEART MURMUR: Primary | ICD-10-CM

## 2024-12-03 DIAGNOSIS — R01.1 HEART MURMUR: ICD-10-CM

## 2024-12-03 DIAGNOSIS — I50.9 ACUTE EXACERBATION OF CHF (CONGESTIVE HEART FAILURE) (H): ICD-10-CM

## 2024-12-03 DIAGNOSIS — Z95.2 S/P MVR (MITRAL VALVE REPLACEMENT): Primary | ICD-10-CM

## 2024-12-03 DIAGNOSIS — Z79.01 ANTICOAGULATED ON COUMADIN: ICD-10-CM

## 2024-12-03 DIAGNOSIS — I50.1 PULMONARY EDEMA CARDIAC CAUSE (H): ICD-10-CM

## 2024-12-03 LAB
ALBUMIN SERPL BCG-MCNC: 3.8 G/DL (ref 3.5–5.2)
ALP SERPL-CCNC: 104 U/L (ref 40–150)
ALT SERPL W P-5'-P-CCNC: 14 U/L (ref 0–50)
ANION GAP SERPL CALCULATED.3IONS-SCNC: 11 MMOL/L (ref 7–15)
AST SERPL W P-5'-P-CCNC: 27 U/L (ref 0–45)
BASOPHILS # BLD AUTO: 0 10E3/UL (ref 0–0.2)
BASOPHILS NFR BLD AUTO: 0 %
BILIRUB SERPL-MCNC: 0.4 MG/DL
BUN SERPL-MCNC: 16.8 MG/DL (ref 6–20)
C PNEUM DNA SPEC QL NAA+PROBE: NOT DETECTED
CALCIUM SERPL-MCNC: 8.7 MG/DL (ref 8.8–10.4)
CHLORIDE SERPL-SCNC: 104 MMOL/L (ref 98–107)
CREAT SERPL-MCNC: 0.68 MG/DL (ref 0.51–0.95)
EGFRCR SERPLBLD CKD-EPI 2021: >90 ML/MIN/1.73M2
EOSINOPHIL # BLD AUTO: 0 10E3/UL (ref 0–0.7)
EOSINOPHIL NFR BLD AUTO: 0 %
ERYTHROCYTE [DISTWIDTH] IN BLOOD BY AUTOMATED COUNT: 14.8 % (ref 10–15)
FLUAV H1 2009 PAND RNA SPEC QL NAA+PROBE: NOT DETECTED
FLUAV H1 RNA SPEC QL NAA+PROBE: NOT DETECTED
FLUAV H3 RNA SPEC QL NAA+PROBE: NOT DETECTED
FLUAV RNA SPEC QL NAA+PROBE: NOT DETECTED
FLUBV RNA SPEC QL NAA+PROBE: NOT DETECTED
GLUCOSE SERPL-MCNC: 114 MG/DL (ref 70–99)
HADV DNA SPEC QL NAA+PROBE: NOT DETECTED
HCO3 SERPL-SCNC: 23 MMOL/L (ref 22–29)
HCOV PNL SPEC NAA+PROBE: NOT DETECTED
HCT VFR BLD AUTO: 28 % (ref 35–47)
HGB BLD-MCNC: 8.7 G/DL (ref 11.7–15.7)
HMPV RNA SPEC QL NAA+PROBE: NOT DETECTED
HOLD SPECIMEN: NORMAL
HOLD SPECIMEN: NORMAL
HPIV1 RNA SPEC QL NAA+PROBE: NOT DETECTED
HPIV2 RNA SPEC QL NAA+PROBE: NOT DETECTED
HPIV3 RNA SPEC QL NAA+PROBE: NOT DETECTED
HPIV4 RNA SPEC QL NAA+PROBE: NOT DETECTED
IMM GRANULOCYTES # BLD: 0 10E3/UL
IMM GRANULOCYTES NFR BLD: 0 %
INR PPP: 5.32 (ref 0.85–1.15)
LYMPHOCYTES # BLD AUTO: 0.3 10E3/UL (ref 0.8–5.3)
LYMPHOCYTES NFR BLD AUTO: 3 %
M PNEUMO DNA SPEC QL NAA+PROBE: NOT DETECTED
MCH RBC QN AUTO: 25.8 PG (ref 26.5–33)
MCHC RBC AUTO-ENTMCNC: 31.1 G/DL (ref 31.5–36.5)
MCV RBC AUTO: 83 FL (ref 78–100)
MONOCYTES # BLD AUTO: 1.6 10E3/UL (ref 0–1.3)
MONOCYTES NFR BLD AUTO: 16 %
NEUTROPHILS # BLD AUTO: 7.9 10E3/UL (ref 1.6–8.3)
NEUTROPHILS NFR BLD AUTO: 80 %
NRBC # BLD AUTO: 0 10E3/UL
NRBC BLD AUTO-RTO: 0 /100
NT-PROBNP SERPL-MCNC: 5249 PG/ML (ref 0–450)
PLAT MORPH BLD: NORMAL
PLATELET # BLD AUTO: 368 10E3/UL (ref 150–450)
POTASSIUM SERPL-SCNC: 4.6 MMOL/L (ref 3.4–5.3)
PROT SERPL-MCNC: 7 G/DL (ref 6.4–8.3)
RBC # BLD AUTO: 3.37 10E6/UL (ref 3.8–5.2)
RBC MORPH BLD: NORMAL
RSV RNA SPEC QL NAA+PROBE: NOT DETECTED
RSV RNA SPEC QL NAA+PROBE: NOT DETECTED
RV+EV RNA SPEC QL NAA+PROBE: NOT DETECTED
SODIUM SERPL-SCNC: 138 MMOL/L (ref 135–145)
WBC # BLD AUTO: 9.8 10E3/UL (ref 4–11)

## 2024-12-03 PROCEDURE — 93005 ELECTROCARDIOGRAM TRACING: CPT

## 2024-12-03 PROCEDURE — 99285 EMERGENCY DEPT VISIT HI MDM: CPT | Performed by: PHYSICIAN ASSISTANT

## 2024-12-03 PROCEDURE — 71045 X-RAY EXAM CHEST 1 VIEW: CPT

## 2024-12-03 PROCEDURE — 85004 AUTOMATED DIFF WBC COUNT: CPT | Performed by: PHYSICIAN ASSISTANT

## 2024-12-03 PROCEDURE — 93010 ELECTROCARDIOGRAM REPORT: CPT | Performed by: INTERNAL MEDICINE

## 2024-12-03 PROCEDURE — 120N000001 HC R&B MED SURG/OB

## 2024-12-03 PROCEDURE — 36415 COLL VENOUS BLD VENIPUNCTURE: CPT | Performed by: PHYSICIAN ASSISTANT

## 2024-12-03 PROCEDURE — 80053 COMPREHEN METABOLIC PANEL: CPT | Performed by: PHYSICIAN ASSISTANT

## 2024-12-03 PROCEDURE — 83880 ASSAY OF NATRIURETIC PEPTIDE: CPT | Performed by: PHYSICIAN ASSISTANT

## 2024-12-03 PROCEDURE — 36415 COLL VENOUS BLD VENIPUNCTURE: CPT | Performed by: INTERNAL MEDICINE

## 2024-12-03 PROCEDURE — G0463 HOSPITAL OUTPT CLINIC VISIT: HCPCS

## 2024-12-03 PROCEDURE — 85610 PROTHROMBIN TIME: CPT | Performed by: INTERNAL MEDICINE

## 2024-12-03 PROCEDURE — 85041 AUTOMATED RBC COUNT: CPT | Performed by: PHYSICIAN ASSISTANT

## 2024-12-03 PROCEDURE — 99285 EMERGENCY DEPT VISIT HI MDM: CPT

## 2024-12-03 RX ORDER — ASPIRIN 81 MG/1
81 TABLET, CHEWABLE ORAL EVERY MORNING
Status: DISCONTINUED | OUTPATIENT
Start: 2024-12-04 | End: 2024-12-05 | Stop reason: HOSPADM

## 2024-12-03 RX ORDER — LEVETIRACETAM 500 MG/1
500 TABLET ORAL 2 TIMES DAILY
Status: DISCONTINUED | OUTPATIENT
Start: 2024-12-04 | End: 2024-12-05 | Stop reason: HOSPADM

## 2024-12-03 RX ORDER — ACETAMINOPHEN 325 MG/1
650 TABLET ORAL EVERY 6 HOURS PRN
Status: DISCONTINUED | OUTPATIENT
Start: 2024-12-03 | End: 2024-12-05 | Stop reason: HOSPADM

## 2024-12-03 RX ORDER — LIDOCAINE 40 MG/G
CREAM TOPICAL
Status: DISCONTINUED | OUTPATIENT
Start: 2024-12-03 | End: 2024-12-05 | Stop reason: HOSPADM

## 2024-12-03 RX ORDER — MORPHINE SULFATE 2 MG/ML
1 INJECTION, SOLUTION INTRAMUSCULAR; INTRAVENOUS
Status: COMPLETED | OUTPATIENT
Start: 2024-12-03 | End: 2024-12-04

## 2024-12-03 RX ORDER — FUROSEMIDE 10 MG/ML
40 INJECTION INTRAMUSCULAR; INTRAVENOUS EVERY 12 HOURS
Status: DISCONTINUED | OUTPATIENT
Start: 2024-12-04 | End: 2024-12-05 | Stop reason: HOSPADM

## 2024-12-03 RX ORDER — AMOXICILLIN 250 MG
2 CAPSULE ORAL 2 TIMES DAILY PRN
Status: DISCONTINUED | OUTPATIENT
Start: 2024-12-03 | End: 2024-12-05 | Stop reason: HOSPADM

## 2024-12-03 RX ORDER — CALCIUM CARBONATE 500 MG/1
1000 TABLET, CHEWABLE ORAL 4 TIMES DAILY PRN
Status: DISCONTINUED | OUTPATIENT
Start: 2024-12-03 | End: 2024-12-05 | Stop reason: HOSPADM

## 2024-12-03 RX ORDER — AMOXICILLIN 250 MG
1 CAPSULE ORAL 2 TIMES DAILY PRN
Status: DISCONTINUED | OUTPATIENT
Start: 2024-12-03 | End: 2024-12-05 | Stop reason: HOSPADM

## 2024-12-03 RX ORDER — METOPROLOL TARTRATE 50 MG
50 TABLET ORAL 2 TIMES DAILY
Status: DISCONTINUED | OUTPATIENT
Start: 2024-12-04 | End: 2024-12-05 | Stop reason: HOSPADM

## 2024-12-03 ASSESSMENT — COLUMBIA-SUICIDE SEVERITY RATING SCALE - C-SSRS: IS THE PATIENT NOT ABLE TO COMPLETE C-SSRS: UNABLE TO VERBALIZE

## 2024-12-03 ASSESSMENT — ENCOUNTER SYMPTOMS
COUGH: 0
FEVER: 0
SHORTNESS OF BREATH: 1
BRUISES/BLEEDS EASILY: 1
ABDOMINAL PAIN: 0

## 2024-12-03 ASSESSMENT — ACTIVITIES OF DAILY LIVING (ADL)
ADLS_ACUITY_SCORE: 63

## 2024-12-03 NOTE — TELEPHONE ENCOUNTER
Symptom or reason needing to speak to RN: er follow     Best number to return call: [phone]      Best time to return call: soon

## 2024-12-03 NOTE — TELEPHONE ENCOUNTER
Please call patient/mother.    Needs to have blood cultures and repeat CRP drawn when she comes in for her echo.    -----------------------------------------------------        Spoke with Dr. Viera who saw patient recently, patient had been having some night sweats which prompted the CRP which was elevated.  (Reason for cultures and CRP repeat).

## 2024-12-03 NOTE — ED NOTES
Patient discharged from ED with her mother. AVS reviewed and discussed with patient and mother who have no additional questions. Ambulatory. VSS and charted. Denies pain.

## 2024-12-03 NOTE — PROGRESS NOTES
Patient into clinic to see Dr. Mandujano for ER/hospital follow up prior to RN CC calling patient.  No outreach provided.  Gabbi Christine, [facility]  Care Coordination  
GYN questionnaire completed by patient.

## 2024-12-03 NOTE — PROGRESS NOTES
RN CC called patient per PCP to request they stop in lab prior to Echo.  Sophia, Mom, states they will do this and lab appointment scheduled and appointment time of Echo reminded.  She verbalized understanding.  Gabbi Christine RN  Care Coordination

## 2024-12-04 ENCOUNTER — APPOINTMENT (OUTPATIENT)
Dept: CARDIOLOGY | Facility: HOSPITAL | Age: 44
DRG: 291 | End: 2024-12-04
Attending: INTERNAL MEDICINE
Payer: MEDICARE

## 2024-12-04 ENCOUNTER — APPOINTMENT (OUTPATIENT)
Dept: CT IMAGING | Facility: HOSPITAL | Age: 44
DRG: 291 | End: 2024-12-04
Attending: INTERNAL MEDICINE
Payer: MEDICARE

## 2024-12-04 LAB
ANION GAP SERPL CALCULATED.3IONS-SCNC: 13 MMOL/L (ref 7–15)
ATRIAL RATE - MUSE: 87 BPM
BUN SERPL-MCNC: 13.4 MG/DL (ref 6–20)
CALCIUM SERPL-MCNC: 8.7 MG/DL (ref 8.8–10.4)
CHLORIDE SERPL-SCNC: 100 MMOL/L (ref 98–107)
CREAT SERPL-MCNC: 0.64 MG/DL (ref 0.51–0.95)
CRP SERPL-MCNC: 172.21 MG/L
DIASTOLIC BLOOD PRESSURE - MUSE: NORMAL MMHG
EGFRCR SERPLBLD CKD-EPI 2021: >90 ML/MIN/1.73M2
ERYTHROCYTE [DISTWIDTH] IN BLOOD BY AUTOMATED COUNT: 15.3 % (ref 10–15)
GLUCOSE SERPL-MCNC: 85 MG/DL (ref 70–99)
HCO3 SERPL-SCNC: 23 MMOL/L (ref 22–29)
HCT VFR BLD AUTO: 27.5 % (ref 35–47)
HGB BLD-MCNC: 8.8 G/DL (ref 11.7–15.7)
HOLD SPECIMEN: NORMAL
INR PPP: 4.94 (ref 0.85–1.15)
INTERPRETATION ECG - MUSE: NORMAL
LVEF ECHO: NORMAL
MCH RBC QN AUTO: 25.9 PG (ref 26.5–33)
MCHC RBC AUTO-ENTMCNC: 32 G/DL (ref 31.5–36.5)
MCV RBC AUTO: 81 FL (ref 78–100)
P AXIS - MUSE: 77 DEGREES
PLATELET # BLD AUTO: 345 10E3/UL (ref 150–450)
POTASSIUM SERPL-SCNC: 3.6 MMOL/L (ref 3.4–5.3)
PR INTERVAL - MUSE: 150 MS
PROCALCITONIN SERPL IA-MCNC: 0.11 NG/ML
QRS DURATION - MUSE: 66 MS
QT - MUSE: 372 MS
QTC - MUSE: 447 MS
R AXIS - MUSE: 155 DEGREES
RBC # BLD AUTO: 3.4 10E6/UL (ref 3.8–5.2)
SODIUM SERPL-SCNC: 136 MMOL/L (ref 135–145)
SYSTOLIC BLOOD PRESSURE - MUSE: NORMAL MMHG
T AXIS - MUSE: 97 DEGREES
VENTRICULAR RATE- MUSE: 87 BPM
WBC # BLD AUTO: 8.7 10E3/UL (ref 4–11)

## 2024-12-04 PROCEDURE — 250N000011 HC RX IP 250 OP 636: Performed by: RADIOLOGY

## 2024-12-04 PROCEDURE — 85041 AUTOMATED RBC COUNT: CPT | Performed by: INTERNAL MEDICINE

## 2024-12-04 PROCEDURE — 84145 PROCALCITONIN (PCT): CPT | Performed by: INTERNAL MEDICINE

## 2024-12-04 PROCEDURE — 99222 1ST HOSP IP/OBS MODERATE 55: CPT | Mod: AI | Performed by: INTERNAL MEDICINE

## 2024-12-04 PROCEDURE — 93306 TTE W/DOPPLER COMPLETE: CPT | Mod: 26 | Performed by: INTERNAL MEDICINE

## 2024-12-04 PROCEDURE — 250N000013 HC RX MED GY IP 250 OP 250 PS 637: Performed by: INTERNAL MEDICINE

## 2024-12-04 PROCEDURE — 71260 CT THORAX DX C+: CPT | Mod: MA

## 2024-12-04 PROCEDURE — 87040 BLOOD CULTURE FOR BACTERIA: CPT | Performed by: INTERNAL MEDICINE

## 2024-12-04 PROCEDURE — 120N000001 HC R&B MED SURG/OB

## 2024-12-04 PROCEDURE — 999N000111 HC STATISTIC OT IP EVAL DEFER

## 2024-12-04 PROCEDURE — 36415 COLL VENOUS BLD VENIPUNCTURE: CPT | Performed by: INTERNAL MEDICINE

## 2024-12-04 PROCEDURE — 93306 TTE W/DOPPLER COMPLETE: CPT

## 2024-12-04 PROCEDURE — 85014 HEMATOCRIT: CPT | Performed by: INTERNAL MEDICINE

## 2024-12-04 PROCEDURE — 80048 BASIC METABOLIC PNL TOTAL CA: CPT | Performed by: INTERNAL MEDICINE

## 2024-12-04 PROCEDURE — 250N000011 HC RX IP 250 OP 636: Mod: JZ | Performed by: INTERNAL MEDICINE

## 2024-12-04 PROCEDURE — 85610 PROTHROMBIN TIME: CPT | Performed by: INTERNAL MEDICINE

## 2024-12-04 PROCEDURE — 86140 C-REACTIVE PROTEIN: CPT | Performed by: INTERNAL MEDICINE

## 2024-12-04 RX ORDER — FUROSEMIDE 10 MG/ML
40 INJECTION INTRAMUSCULAR; INTRAVENOUS EVERY 12 HOURS
Status: DISCONTINUED | OUTPATIENT
Start: 2024-12-04 | End: 2024-12-04

## 2024-12-04 RX ORDER — IOPAMIDOL 755 MG/ML
70 INJECTION, SOLUTION INTRAVASCULAR ONCE
Status: COMPLETED | OUTPATIENT
Start: 2024-12-04 | End: 2024-12-04

## 2024-12-04 RX ORDER — WARFARIN SODIUM 3 MG/1
TABLET ORAL
Status: ON HOLD | COMMUNITY
Start: 2024-11-27 | End: 2024-12-05

## 2024-12-04 RX ADMIN — IOPAMIDOL 70 ML: 755 INJECTION, SOLUTION INTRAVENOUS at 11:39

## 2024-12-04 RX ADMIN — MORPHINE SULFATE 1 MG: 2 INJECTION, SOLUTION INTRAMUSCULAR; INTRAVENOUS at 00:16

## 2024-12-04 RX ADMIN — FUROSEMIDE 40 MG: 10 INJECTION, SOLUTION INTRAVENOUS at 00:05

## 2024-12-04 RX ADMIN — LEVETIRACETAM 500 MG: 500 TABLET, FILM COATED ORAL at 08:52

## 2024-12-04 RX ADMIN — ASPIRIN 81 MG CHEWABLE TABLET 81 MG: 81 TABLET CHEWABLE at 08:53

## 2024-12-04 RX ADMIN — LEVETIRACETAM 500 MG: 500 TABLET, FILM COATED ORAL at 18:18

## 2024-12-04 RX ADMIN — METOPROLOL TARTRATE 50 MG: 50 TABLET, FILM COATED ORAL at 08:52

## 2024-12-04 RX ADMIN — ACETAMINOPHEN 650 MG: 325 TABLET, FILM COATED ORAL at 06:07

## 2024-12-04 RX ADMIN — METOPROLOL TARTRATE 50 MG: 50 TABLET, FILM COATED ORAL at 20:24

## 2024-12-04 ASSESSMENT — ACTIVITIES OF DAILY LIVING (ADL)
ADLS_ACUITY_SCORE: 40
ADLS_ACUITY_SCORE: 39
ADLS_ACUITY_SCORE: 40
ADLS_ACUITY_SCORE: 39
ADLS_ACUITY_SCORE: 40
ADLS_ACUITY_SCORE: 43
ADLS_ACUITY_SCORE: 40
ADLS_ACUITY_SCORE: 43
ADLS_ACUITY_SCORE: 40
ADLS_ACUITY_SCORE: 40
ADLS_ACUITY_SCORE: 39
ADLS_ACUITY_SCORE: 40
ADLS_ACUITY_SCORE: 40
ADLS_ACUITY_SCORE: 39
ADLS_ACUITY_SCORE: 43
ADLS_ACUITY_SCORE: 40
ADLS_ACUITY_SCORE: 40

## 2024-12-04 NOTE — ED NOTES
Pts mother reported daughter is having chest pain, pt showed pain is mid sternum, pt denied SOB. MD notified and EKG ordered.

## 2024-12-04 NOTE — PLAN OF CARE
Goal Outcome Evaluation:  Face to face report given with opportunity to observe patient.    Report given to Bebe MARINELLI and Elena Emerson RN   12/4/2024  3:24 PM

## 2024-12-04 NOTE — PLAN OF CARE
Occupational Therapy: Orders received. Chart reviewed and discussed with care team.? Occupational Therapy not indicated per hospitalist.? Will complete orders.

## 2024-12-04 NOTE — MEDICATION SCRIBE - ADMISSION MEDICATION HISTORY
Medication Scribe Admission Medication History    Admission medication history is complete. The information provided in this note is only as accurate as the sources available at the time of the update.    Coumadin information:  INR date: 12/02/2024  INR result: 4.22  Next INR date: 12/05/2024  Range: 2.5 - 3.5  Indication: S/P MVR (mitral valve replacement)   Coumadin strength/instructions: 3 mg every Mon, Fri; 4.5 mg all other days   Tablet strength: 3 mg  Time of day patient takes coumadin at home: Evening  Exact last dose/time patient took PTA: 4.5 mg at 6 PM yesterday (12/03/2024)  Patient's coumadin clinic facility and contact: [facility]el[facility] ([phone])  Fax number for discharge instructions (if applicable):     Information Source(s): Family member and CareEverywhere/SureScripts via phone    Pertinent Information:   Patient's mom (Christine) manages medications and is a good historian.     Changes made to PTA medication list:  Added: None  Deleted: None  Changed: Updated  Warfarin (Jantoven): FROM 4.5 mg on 11/27 and 11/28, then follow-up with Coumadin Clinic on 11/29 for INR check and further dosing instructions TO 3 mg every Mon, Fri; 4.5 mg all other days (also changed from 6 mg tabs to 3 mg tabs)    Allergies reviewed with patient and updates made in EHR: yes    Medication History Completed By: Ani Santiago 12/4/2024 9:41 AM    PTA Med List   Medication Sig Note Last Dose/Taking    acetaminophen (TYLENOL) 325 MG tablet Take 650 mg by mouth at bedtime. Takes most nights. Patient also has as needed dosing.  12/1/2024 Bedtime    acetaminophen (TYLENOL) 325 MG tablet Take 2-3 tablets (650-975 mg) by mouth every 6 hours as needed for pain (For optimal non-opioid multimodal pain management to improve pain control.).  12/1/2024    aspirin (ASA) 81 MG chewable tablet Take 1 tablet (81 mg) by mouth every morning.  12/3/2024 at  7:00 AM    JANTOVEN ANTICOAGULANT 3 MG tablet Take 3 mg (1 tab) by mouth on  Mon and Fri, then 4.5 mg (1.5 tabs) all other days or as directed by the anticoagulation clinic. 12/4/2024: Last dose: 4.5 mg (1.5 tabs) at 6 PM yesterday (12/03/2024) 12/3/2024 at  6:00 PM    ketoconazole (NIZORAL) 2 % external cream Apply topically daily as needed for itching.  Past Week    levETIRAcetam (KEPPRA) 250 MG tablet Take 500 mg by mouth 2 times daily. (Morning/6PM)  12/3/2024 at  6:00 PM    metoprolol tartrate (LOPRESSOR) 50 MG tablet Take 1 tablet (50 mg) by mouth 2 times daily.  12/3/2024 at  6:00 PM    norethindrone-ethinyl estradiol (JUNEL FE 1/20) 1-20 MG-MCG tablet Take 1 tablet by mouth daily.  12/3/2024 at  7:00 AM

## 2024-12-04 NOTE — ED TRIAGE NOTES
Pt brought in by EMS, pt is special needs and mother reported patient was having difficult breathing. Pt was seen 1 day ago here and was dx with pulmonary edema and sent home with lasix per mother. Mother gave patient lasix today with no relief. Pt reports she does not have SOB but has chest pain at her surgical site where she had a mitral valve replacement. EMS gave albuterol nebulizer in route.

## 2024-12-04 NOTE — H&P
"[facility]    History and Physical - Hospitalist Service       Date of Admission:  12/3/2024    Assessment & Plan    CHF  Rheumatic mitral stenosis s/p mechanical MVR 10/23/24  -Lasix 40mg IV q12h  -Echo in AM  -strict I and O, daily weight  -monitor electrolytes  -INR goal 2.5-3.5; >5 today; hold warfarin; INR daily  -continue metoprolol    Seizure disorder  -continue Keppra BID    Agenesis of the corpus collosum  Cognitive impairment    Anemia  -baseline Hgb 10.1-10.3; 8.7 today  -no obvious bleeding  -CBC in AM     Diet: Low Saturated Fat Na <2400 mg    DVT Prophylaxis: warfarin  Roberts Catheter: Not present  Lines: None     Code Status: Full Code      Clinically Significant Risk Factors Present on Admission     # Drug Induced Coagulation Defect: home medication list includes an anticoagulant medication  # Drug Induced Platelet Defect: home medication list includes an antiplatelet medication    # Acute heart failure with preserved ejection fraction: heart failure noted on problem list, last echo with EF >50%, and receiving IV diuretics     # Anemia: based on hgb <11       # Cachexia: Estimated body mass index is 14.93 kg/m  as calculated from the following:    Height as of this encounter: 1.473 m (4' 10\").    Weight as of this encounter: 32.4 kg (71 lb 6.9 oz).       # Financial/Environmental Concerns:           Disposition Plan      Expected Discharge Date: 12/05/2024                The patient's care was discussed with the mother.        TRACIE MALLORY MD  [facility]  Securely message with the Vocera Web Console (learn more here)  Text page via [facility] Paging/Directory      Visit/Communication Style   Virtual (Video) communication was used to evaluate Efren.  Efren consented to the use of video communication: n/a  Video START time: , 12/4/2024  Video STOP time: , 12/4/2024   Patient's location: [facility]   Provider's location during the visit: [facility] " site        ______________________________________________________________________    Chief Complaint   SOB    History of Present Illness   44yoF with agenesis of the corpus collosum, rheumatic mitral stenosis s/p mechanical MVR on 10/23/24 on warfarin, cognitive impairment and seizure disorder presented to the ED with SOB for the last three days.  She was seen in the ED yesterday for the same.  Cardiology recommended Lasix and to return if not improving.  She received some Lasix in the ED and then had another dose this evening with no improvement.  The history is obtained from her mother as she is not able to provide additional information.  She denies fever.  She has had a mild dry cough today.  She denies LE edema. She has complained of pain around her incision site.    Review of Systems    Cannot obtain    Past Medical History    I have reviewed this patient's medical history and updated it with pertinent information if needed.   Past Medical History:   Diagnosis Date    Seizures (H)        Past Surgical History   I have reviewed this patient's surgical history and updated it with pertinent information if needed.  Past Surgical History:   Procedure Laterality Date    CV CORONARY ANGIOGRAM N/A 10/18/2024    Procedure: Coronary Angiogram;  Surgeon: Hernandez Rivera MD;  Location:  [facility]ART CARD[facility]ATH LAB    CV RIGHT HEART CATH MEASUREMENTS RECORDED N/A 10/18/2024    Procedure: Right Heart Catheterization;  Surgeon: Hernandez Rivera MD;  Location:  HEART CARDIAC CATH LAB    feeding tube      REPLACE VALVE MITRAL N/A 10/23/2024    Procedure: Median Sternotomy, Cardiopulmonary Bypass, Mitral Valve Replacement with [employer] [place] Mechanical Valve size 23mm, Interoperative Transesophageal Echocardiogram per Anesthesia;  Surgeon: Deangelo Craig MD;  Location:  OR    TRANSESOPHAGEAL ECHOCARDIOGRAM INTRAOPERATIVE N/A 8/13/2024    Procedure: ECHOCARDIOGRAM, TRANSESOPHAGEAL, WITH ANESTHESIA;   Surgeon: GENERIC ANESTHESIA PROVIDER;  Location: [facility] OR    ventilation tubes, bilateral         Social History   I have reviewed this patient's social history and updated it with pertinent information if needed.  Social History     Tobacco Use    Smoking status: Never     Passive exposure: Never    Smokeless tobacco: Never    Tobacco comments:     no passive exposure   Vaping Use    Vaping status: Never Used   Substance Use Topics    Alcohol use: No    Drug use: No       Family History   I have reviewed this patient's family history and updated it with pertinent information if needed.  Family History   Problem Relation Age of Onset    Cerebrovascular Disease Paternal Grandmother         CVA    Heart Disease Maternal Grandfather         disease    Hypertension Father     Other - See Comments Father         post polio    Parkinsonism Maternal Grandmother     Heart Disease Paternal Grandfather        Prior to Admission Medications   Prior to Admission Medications   Prescriptions Last Dose Informant Patient Reported? Taking?   acetaminophen (TYLENOL) 325 MG tablet   No No   Sig: Take 2-3 tablets (650-975 mg) by mouth every 6 hours as needed for pain (For optimal non-opioid multimodal pain management to improve pain control.).   acetaminophen (TYLENOL) 325 MG tablet 12/4/2024 Morning  Yes Yes   Sig: Take 650 mg by mouth at bedtime. Patient also has as needed dosing.   amoxicillin (AMOXIL) 500 MG capsule   No No   Sig: Take 2 g, 4 pills, 30-60 minutes prior to dental procedures/cleanings   Patient not taking: Reported on 12/3/2024   aspirin (ASA) 81 MG chewable tablet 12/4/2024 Morning  No Yes   Sig: Take 1 tablet (81 mg) by mouth every morning.   ketoconazole (NIZORAL) 2 % external cream   Yes No   Sig: Apply topically daily as needed for itching.   levETIRAcetam (KEPPRA) 250 MG tablet 12/4/2024 Morning  Yes Yes   Sig: Take 500 mg by mouth 2 times daily. (Morning/6PM)   metoprolol tartrate (LOPRESSOR) 50 MG tablet  12/4/2024 Morning  No Yes   Sig: Take 1 tablet (50 mg) by mouth 2 times daily.   norethindrone-ethinyl estradiol (JUNEL FE 1/20) 1-20 MG-MCG tablet 12/4/2024 Morning  No Yes   Sig: Take 1 tablet by mouth daily.   polyethylene glycol (MIRALAX) 17 GM/Dose powder   Yes No   Sig: Take 1 Capful by mouth daily. Mix in 6 to 8 ounces of juice, tea, or water (NOT milk). May increase or decrease dose/frequency as needed to achieve soft daily and consistent stools (ex. 1/2 capful to 2 capfuls). If 1 capful daily is causing bowel movements that are too soft or liquid, reduce to one-half capful daily.   Patient not taking: Reported on 12/3/2024   warfarin ANTICOAGULANT (COUMADIN) 6 MG tablet 12/4/2024 Morning  Yes Yes   Sig: Take 4.5 mg on 11/27 and 11/28, then follow-up with Coumadin Clinic on 11/29 for INR check and further dosing instructions.      Facility-Administered Medications: None     Allergies   Allergies   Allergen Reactions    Azithromycin      Abdominal pain/ cramping     Clotrimazole Rash    Omnicef [Cefdinir] Rash     Itchy and bad rash       Physical Exam   Vital Signs: Temp: 99.5  F (37.5  C) Temp src: Tympanic BP: 113/56 Pulse: 91   Resp: 22 SpO2: (!) 91 % O2 Device: None (Room air)    Weight: 71 lbs 6.86 oz    Gen:   in no acute distress, lying semi-supine in hospital stretcher  HEENT:  Anicteric sclera, PER, hearing intact to voice  Resp:  No accessory muscle use, breath sounds clear; no wheezes no rales no rhonchi  Card:  murmur noted;   Abd:  Soft per RN exam, no TTP, non-distended, normoactive bowel sounds are present  Musc:  Normal strength and movement of the major muscle groups without obvious deformity  Psych:  not anxious, not agitated    Data     Recent Labs   Lab 12/03/24  2237 12/03/24 2052 12/02/24  1234 12/02/24  1229 11/29/24  1401   WBC  --  9.8  --  10.0  --    HGB  --  8.7*  --  10.3*  --    MCV  --  83  --  83  --    PLT  --  368  --  345  --    INR 5.32*  --   --  4.22* 5.3*   NA  --  138  136 136  --    POTASSIUM  --  4.6  --  5.0  --    CHLORIDE  --  104  --  102  --    CO2  --  23  --  21*  --    BUN  --  16.8  --  13.3  --    CR  --  0.68  --  0.59  --    ANIONGAP  --  11  --  13  --    YUE  --  8.7*  --  9.1  --    GLC  --  114*  --  75  --    ALBUMIN  --  3.8  --  3.9  --    PROTTOTAL  --  7.0  --  7.4  --    BILITOTAL  --  0.4  --  0.5  --    ALKPHOS  --  104  --  93  --    ALT  --  14  --  13  --    AST  --  27  --  27  --          No results found for this or any previous visit (from the past 24 hours).

## 2024-12-04 NOTE — PLAN OF CARE
Goal Outcome Evaluation:patient did go down for her CT scan, and lasix was re scheduled, however, blood pressure was only 103/60 and heart rate was 75, MD was updated before administering the lasix due to the low blood pressure. Awaiting orders.

## 2024-12-04 NOTE — PLAN OF CARE
Goal Outcome Evaluation:upon general assessment, patient complains of incisional pain from her surgery she had in October, patient denies SOB today, and patient is able to make her needs known well, patient did have her ECHO, and CT scan, MD aware of the results, we did not give lasix this afternoon as patient is not SOB, is not wheezy and is tolerating activity well, patient has had a good appetite, has been able to urinate with no difficulty, and has had a loose stool, patient otherwise denied pain, pressure or difficulty breathing, patient adequately makes her needs known, vitals as charted.

## 2024-12-04 NOTE — PROGRESS NOTES
Consult for elevated risk score acknowledged.   Medical record and Lauri Score reviewed. Participated in interdisciplinary rounds.  No apparent needs noted at this time. Care Transitions will remain available if needs arise.

## 2024-12-04 NOTE — PROGRESS NOTES
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  She has needed to be hospitalized on several occasions since that time due to a subtherapeutic INR.  Otherwise had been doing very well in her recovery.  Has had some chest discomfort from her sternal wound.  But no fevers at all.  On 12/2 she presented to the ER for evaluation because of some increasing shortness of breath.  Her vital signs were normal blood pressure was 122 heart rate 90 afebrile sats are 99% on room air.  Lab work was basically unremarkable with a hemoglobin of 10 white count of 10,000.  Her BNP was 3007 and 93.  Renal function normal.  Her lungs were clear.  Showed maybe some curly B-lines may be some pulmonary edema.  They contact [facility] who recommended a dose of Lasix and some oral dosing and see if that did not improve things.  Yesterday on 12 3, was seen in the clinic she was able to breathe easier but still having some dyspnea.  No cough fever swelling nausea vomiting at all.  They did notice perhaps an increased murmur which was not documented before.  They ordered an outpatient echo.  And was sent home.  She continued to have shortness of breath despite getting some oral Lasix at home and was brought to the ER via ambulance.  When she arrived her sats are 96% on room air blood pressure 124/81 heart rate was 88 and she was afebrile 97.7.  Lab work showed a BNP of 5249, hemoglobin 8.7, potassium 4.6 creatinine was normal at 0.68.  White count was 9800.  Chest x-ray did show significant cardiac enlargement but that has been persistent.  Did have moderate diffuse bilateral pulmonary vascular prominence.  Because of her persistent shortness of breath and  questionable new murmur she was admitted to the hospital.  For echocardiogram and further evaluation.    Assessment/plan:    1.  Status post mechanical mitral valve replacement/question CHF: Patient with recent mitral valve replacement roughly about 5 to 6 weeks ago.  Has been doing relatively well.  Has some issues with subtherapeutic INRs requiring IV heparin.  But just recently over the last couple of days started to notice increasing shortness of breath.  Has been evaluated normal vitals normal sats questionable pulmonary vascular congestion on her chest x-ray.  No fevers no elevated white count.  Did get some Lasix just within the last 48 hours maybe helped a little bit but still had persistent shortness of breath.  Chest x-ray shows enlarged cardiac silhouette.  She did have an elevated CRP done couple of days ago and some question of a new murmur.  So she is being admitted for further workup of this.  She will get an echocardiogram today.  Will have to watch how much we diurese her as she is a very small patient.  She did not have any previous blood cultures performed.  I did get 1 here today.  Her CRP back in November was 17 was 122 2 days ago and now today is 172.  Her procalcitonin is only 0.11.  White count was normal.  She is not any fevers.  Will need to work this up a little closer to the echo will be helpful and is a 7 probable CT scan of chest also.  Also question pericardial effusion etc.  But echo will hopefully sort this out for us.  May need to consider CT of chest also.  She is with a low-grade fever 99.1 blood pressure stable 120/76, 99% sat on 1 L heart rate is 96 and regular.  White count is 8700 hemoglobin was 8.8.  INR today is 4.94.  So blood culture was drawn.  She is feeling significantly better today.  Her mother states that she just appeared very uncomfortable last couple days with difficulty breathing and speaking full sentences.  But today she is doing much better.  Her sats are 100%  on room air.  Lungs are basically clear to auscultation.  Do not hear any audible murmurs maybe 1 out of 6 systolic murmur.  Crisp click.    -12/4: Echo shows no major abnormalities.  EF is 60 to 65%.  Small pericardial effusion no hemodynamic significance.  The mitral valve appears to be well-seated with normal gradients.  The CT of the chest does show pericardial effusion think over estimating it compared to the echocardiogram.  No obvious focal abscesses or major pneumonia there is some minimal parenchymal findings.    2.  Anticoagulation: INR is 4.94.  Pharmacy will dose the warfarin.      Diet: Low Saturated Fat Na <2400 mg  Fluids: None    DVT Prophylaxis: Warfarin  Code Status: Full Code    Disposition: Expected discharge in 1-2 days once respiratory status stable and workup of mitral valve is finished.    Taj Ahumada MD    Interval History   Currently this morning she is feeling a lot better.  Her mom states that she looks definitely back to normal.  No evidence that she is short of breath at all.  She was rather concerned the other day because she had a hard time even speaking full sentences.  It was like before she had the mitral valve placed.  No fevers.  Did get a couple doses of Lasix.  Renal function stable.  At this point we will check the echo as noted before.  Watch for further diuresis.  Consider CT chest.  Will have cardiology look at her echo and give us their evaluation.    -Data reviewed today: I reviewed all new labs and imaging results over the last 24 hours. I personally reviewed no images or EKG's today.    Physical Exam   Temp: 98.9  F (37.2  C) Temp src: Tympanic BP: 103/60 Pulse: 75   Resp: 20 SpO2: 93 % O2 Device: None (Room air) Oxygen Delivery: 1/2 LPM  Vitals:    12/03/24 2305   Weight: 32.4 kg (71 lb 6.9 oz)     Vital Signs with Ranges  Temp:  [97.7  F (36.5  C)-99.5  F (37.5  C)] 98.9  F (37.2  C)  Pulse:  [75-96] 75  Resp:  [18-26] 20  BP: ()/(56-81) 103/60  SpO2:  [91  %-100 %] 93 %  I/O last 3 completed shifts:  In: 120 [P.O.:120]  Out: 900 [Urine:900]    Peripheral IV 12/03/24 Anterior;Right Lower forearm (Active)   Site Assessment WDL 12/04/24 0843   Line Status Saline locked 12/04/24 0843   Dressing Transparent 12/04/24 0843   Dressing Status clean;dry;intact 12/04/24 0843   Line Intervention Flushed 12/03/24 2428   Phlebitis Scale 0-->no symptoms 12/04/24 0843   Infiltration? no 12/04/24 0843   Number of days: 1       Incision/Surgical Site 10/23/24 Medial Chest (Active)   Number of days: 42       Incision/Surgical Site 10/25/24 Upper Epigastrum (Active)   Number of days: 40     No line/device    Constitutional: Alert and oriented x3. No distress    HEENT: Normocephalic/atraumatic, PERRL, EOMI, mouth moist, neck supple, no LN.     Cardiovascular: RRR.  1 out of 6 murmur, crisp mitral valve click.  No  rubs, or gallops.  JVD flat.     Pulses 2    Respiratory: Clear to auscultation bilaterally    Abdomen: Soft, nontender, nondistended, no organomegaly. Bowel sounds present    Extremities: Warm/dry.  No edema    Neuro:   Non focal, cranial nerves intact, Moves all extremities.  Medications   Current Facility-Administered Medications   Medication Dose Route Frequency Provider Last Rate Last Admin    Continuing beta blocker from home medication list OR beta blocker order already placed during this visit   Does not apply DOES NOT GO TO Mariana Mathis MD        Patient is already receiving anticoagulation with heparin, enoxaparin (LOVENOX), warfarin (COUMADIN)  or other anticoagulant medication   Does not apply Continuous PRN Mariana Cheney MD        Reason ACE/ARB/ARNI order not selected   Other DOES NOT GO TO Mariana Mathis MD         Current Facility-Administered Medications   Medication Dose Route Frequency Provider Last Rate Last Admin    aspirin (ASA) chewable tablet 81 mg  81 mg Oral QAMariana Duran MD   81 mg at 12/04/24 0853    [Held by  provider] furosemide (LASIX) injection 40 mg  40 mg Intravenous Q12H Mariana Cheney MD   40 mg at 12/04/24 0005    levETIRAcetam (KEPPRA) tablet 500 mg  500 mg Oral BID Mariana Cheney MD   500 mg at 12/04/24 0852    metoprolol tartrate (LOPRESSOR) tablet 50 mg  50 mg Oral BID Mariana Cheney MD   50 mg at 12/04/24 0852    sodium chloride (PF) 0.9% PF flush 3 mL  3 mL Intracatheter Q8H Mariana Cheney MD   3 mL at 12/04/24 1324    Warfarin Dose Required Daily - Pharmacist Managed  1 each Oral See Admin Instructions Mariana Cheney MD        warfarin-No DOSE today  1 each Does not apply no dose today (warfarin) Taj Ahumada MD           Data   Recent Labs   Lab 12/04/24  0556 12/03/24  2237 12/03/24  2052 12/02/24  1234 12/02/24  1229   WBC 8.7  --  9.8  --  10.0   HGB 8.8*  --  8.7*  --  10.3*   MCV 81  --  83  --  83     --  368  --  345   INR 4.94* 5.32*  --   --  4.22*     --  138 136 136   POTASSIUM 3.6  --  4.6  --  5.0   CHLORIDE 100  --  104  --  102   CO2 23  --  23  --  21*   BUN 13.4  --  16.8  --  13.3   CR 0.64  --  0.68  --  0.59   ANIONGAP 13  --  11  --  13   YUE 8.7*  --  8.7*  --  9.1   GLC 85  --  114*  --  75   ALBUMIN  --   --  3.8  --  3.9   PROTTOTAL  --   --  7.0  --  7.4   BILITOTAL  --   --  0.4  --  0.5   ALKPHOS  --   --  104  --  93   ALT  --   --  14  --  13   AST  --   --  27  --  27       Recent Results (from the past 24 hours)   XR Chest Port 1 View    Narrative    Exam:  XR CHEST PORT 1 VIEW    HISTORY: Shortness of breath.    COMPARISON:  12/2/2024, 10/26/2024    FINDINGS:     The cardiomediastinal contours are enlarged.      Diffuse interstitial prominence. There are streaky opacities in the  lower lungs. No pleural effusion or pneumothorax.      No acute osseous abnormality.       Impression    IMPRESSION:      Findings compatible with CHF/fluid overload.    There are streaky bibasilar opacities, likely atelectasis or possibly  edema.       ALICIA NGUYEN MD         SYSTEM ID:  [id number]   Echocardiogram Complete   Result Value    LVEF  60-65%    [facility]    [id number]  [id number]  [id number]  [id number]^[email or web address]^[email or web address]     [facility]  Echocardiography Laboratory  [address] [place] [id number]     Name: KATE BYRNES  MRN: [id number]  : 1980  Study Date: 2024 10:29 AM  Age: 44 yrs  Gender: Female  Patient Location: [facility]  Reason For Study: Mitral Valve Replacement  History: MV replacement, Dyspnea, Murmur  Ordering Physician: TRACIE MALLORY  Performed By: Mary Richards RDCS     BSA: 1.2 m2  Height: 58 in  Weight: 71 lb  ______________________________________________________________________________  Procedure  Complete Portable Echo Adult.  ______________________________________________________________________________  Interpretation Summary  S/P Mitral valve replacement with a 23 mm St Durga mechanical valve. Removal of  left atrial appendage clot on 10/23/2024.  Global and regional left ventricular function is normal with an EF of 60-65%.  Right ventricular function, chamber size, wall motion, and thickness are  normal.  The mean gradient across the mitral valve is 4 mmHg.Heart rate 100BPM.  Pulmonary artery systolic pressure cannot be assessed.  The inferior vena cava is normal.  Small circumferential pericardial effusion is present without any hemodynamic  significance.  Minimal increase in effusion size. Otherwise no significant change.  ______________________________________________________________________________  Left Ventricle  S/P Mitral valve replacement with a 23 mm St Durga mechanical valve. Removal of  left atrial appendage clot on 10/23/2024. Global and regional left ventricular  function is normal with an EF of 60-65%. Left ventricular wall thickness is  normal. Left ventricular size is normal. Diastolic function not assessed due  to presence of prosthetic mitral valve. No regional  wall motion abnormalities  are seen.     Right Ventricle  Right ventricular function, chamber size, wall motion, and thickness are  normal.     Atria  The atria cannot be assessed.     Mitral Valve  The mean gradient across the mitral valve is 4 mmHg.     Aortic Valve  The valve leaflets are not well visualized. On Doppler interrogation, there is  no significant stenosis or regurgitation. The mean AoV pressure gradient is  3.7 mmHg.     Tricuspid Valve  The tricuspid valve is normal. Mild tricuspid insufficiency is present.  Pulmonary artery systolic pressure cannot be assessed.     Pulmonic Valve  The pulmonic valve is normal. Trace pulmonic insufficiency is present.     Vessels  The aorta root is normal. The pulmonary artery cannot be assessed. The  inferior vena cava is normal.     Pericardium  Small circumferential pericardial effusion is present without any hemodynamic  significance.     Compared to Previous Study  Minimal increase in effusion size. Otherwise no significant change.     ______________________________________________________________________________  MMode/2D Measurements & Calculations  IVSd: 1.1 cm  LVIDd: 3.2 cm  LVIDs: 1.7 cm  LVPWd: 1.1 cm     FS: 46.1 %  LV mass(C)d: 108.7 grams  LV mass(C)dI: 92.7 grams/m2  Ao root diam: 2.6 cm  asc Aorta Diam: 3.0 cm  LVOT diam: 1.8 cm  LVOT area: 2.5 cm2  Ao root diam index Ht(cm/m): 1.8  Ao root diam index BSA (cm/m2): 2.2  Asc Ao diam index BSA (cm/m2): 2.5  Asc Ao diam index Ht(cm/m): 2.0  EF Biplane: 61.6 %  RWT: 0.69  TAPSE: 1.4 cm     Doppler Measurements & Calculations  MV E max adwoa: 149.0 cm/sec  MV A max adwoa: 74.0 cm/sec  MV E/A: 2.0  MV max PG: 10.4 mmHg  MV mean PG: 3.6 mmHg     MV dec slope: 770.0 cm/sec2  MV dec time: 0.19 sec  Ao V2 max: 134.0 cm/sec  Ao max P.2 mmHg  Ao V2 mean: 90.0 cm/sec  Ao mean PG: 3.7 mmHg  Ao V2 VTI: 25.3 cm  PERCY(I,D): 1.5 cm2  PERCY(V,D): 1.4 cm2  LV V1 max P.4 mmHg  LV V1 max: 77.0 cm/sec  LV V1 VTI: 14.9  cm  SV(LVOT): 37.5 ml  SI(LVOT): 31.9 ml/m2  AV Luc Ratio (DI): 0.57  PERCY Index (cm2/m2): 1.3     ______________________________________________________________________________  Report approved by: AQUILINO Mirza MD on 12/04/2024 11:25 AM         CT Chest w Contrast    Narrative    CT CHEST W CONTRAST  12/4/2024 11:53 AM    CLINICAL HISTORY: Female, age 44 years,  Patient with elevated CRP  with recent mitral valve replacement.  Evaluate for any chest abscess  etc.;    Comparison:  Cardiac CTA 9/24/2024 and 10/16/2024.    TECHNIQUE:  CT scan was performed of the chest with IV contrast.  Axial, sagittal and coronal images were reviewed. 3-D MIP images were  used to optimize lung nodule conspicuity.   This facility minimizes radiation dose by adjusting the mA and/or kV  according to each patient size.  This CT scan was performed using one or more the following dose  reduction techniques:  -Automated exposure control,  -Adjustment of the mA and/or kV according to patient's size, and/or,  -Use of iterative reconstruction technique.      FINDINGS:   Marked enlargement of the heart is again seen. The patient has  developed a moderate-sized pericardial effusion when compared to the  earlier studies. The effusion measures approximately 1.5 cm in depth  along the posterior aspect of the heart.    The lungs demonstrate peripheral areas of atelectasis. There is no  evidence of well-defined pneumonia. Interstitial thickening in mild  groundglass opacification are again seen in the periphery of the lower  lobes.    There are number of normal-sized and mildly enlarged lymph nodes  throughout the mediastinum and axillary regions. Visualized portions  of the upper abdomen demonstrated 15 mm low dense lesion posterior  within the spleen that was likely present on the prior studies.     Bony structures: No acute abnormality. Postoperative changes of the  sternum.        Impression    IMPRESSION:   Postoperative changes with a moderate  to large pericardial effusion,  measuring up to 1.5 cm in depth. While there is no apparent gas within  this fluid, cannot exclude the possibility that this fluid is  infected.    Peripheral areas of atelectasis and chronic changes within the lungs  are similar in appearance without evidence of well-defined pneumonia.    CLARKE SINGLETON MD         SYSTEM ID:  [id number]

## 2024-12-04 NOTE — ED PROVIDER NOTES
History     Chief Complaint   Patient presents with    Shortness of Breath     The history is provided by the patient and a parent.     Efren Saavedra is a 44 year old female who presented to the emergency department via EMS for evaluation of shortness of breath.  The patient carries a significant and complicated past medical history that can be reviewed below.  Most notably she recently had a mechanical mitral valve replacement at [facility] on October 23.  She was seen here yesterday for evaluation and treatment of exacerbation of underlying CHF.  Cardiology was consulted at the time.  The patient did receive a single dose of oral Lasix by her mother prior to arrival to the emergency room tonight.  Mother reports that she seems to be more short of breath subjectively and objectively as well as having difficulty lying supine.  No cough or fevers.  Recent laboratory evaluation shows elevated BNP as well as previous elevated CRP.  She is scheduled for an outpatient echocardiogram tomorrow.    Allergies:  Allergies   Allergen Reactions    Azithromycin      Abdominal pain/ cramping     Clotrimazole Rash    Omnicef [Cefdinir] Rash     Itchy and bad rash       Problem List:    Patient Active Problem List    Diagnosis Date Noted    CHF (congestive heart failure) (H) 12/03/2024     Priority: Medium    Anemia 12/03/2024     Priority: Medium    Acute exacerbation of CHF (congestive heart failure) (H) 12/03/2024     Priority: Medium    H/O mitral valve replacement 12/03/2024     Priority: Medium    Anticoagulated on Coumadin 12/03/2024     Priority: Medium    Chronic idiopathic constipation 11/25/2024     Priority: Medium    Congenital stenosis of mitral valve 11/18/2024     Priority: Medium    Cardiomegaly 11/18/2024     Priority: Medium    Subtherapeutic international normalized ratio (INR) 10/30/2024     Priority: Medium    S/P MVR (mitral valve replacement) 10/29/2024     Priority: Medium    Mitral valve  stenosis, unspecified etiology 10/23/2024     Priority: Medium    Acute congestive heart failure, unspecified heart failure type (H) 10/15/2024     Priority: Medium    Mitral stenosis 10/15/2024     Priority: Medium    Seizure (H) 02/27/2015     Priority: Medium     Overview:   At least two events (maybe 3), mom opted no treatment or mri unless further events      Osteoporosis 11/23/2013     Priority: Medium     Overview:   IMO Update      Congenital hearing loss 05/14/2013     Priority: Medium    Impacted cerumen 05/14/2013     Priority: Medium    Mixed conductive and sensorineural hearing loss, 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 Echo/doppler 4/19/05.   IMO Update 10/11      Other kyphoscoliosis and scoliosis 03/30/2005     Priority: Medium     Overview:   Refer to Dr. Micehl. Scoliosis x-ray 4/19/05.      Unspecified intellectual disabilities 03/30/2005     Priority: Medium     Overview:   IMO Update 10/11      Dysmenorrhea 08/11/2004     Priority: Medium     Overview:   Will switch from Alesse to Loestrin      Agenesis of corpus callosum (H) 04/08/2004     Priority: Medium     Overview:       Other psoriasis 01/08/2004     Priority: Medium     Overview:   Scalp psoriasis. Under the care of Dr. Raines, Dermatology      Irregular menstrual cycle 04/15/2002     Priority: Medium        Past Medical History:    Past Medical History:   Diagnosis Date    Seizures (H)        Past Surgical History:    Past Surgical History:   Procedure Laterality Date    CV CORONARY ANGIOGRAM N/A 10/18/2024    Procedure: Coronary Angiogram;  Surgeon: Hernandez Rivera MD;  Location:  HEART CARDIAC CATH LAB    CV RIGHT HEART CATH MEASUREMENTS RECORDED N/A 10/18/2024    Procedure: Right Heart Catheterization;   Surgeon: Hernandez Rivera MD;  Location: [facility] HEART CARDIAC CATH LAB    feeding tube      REPLACE VALVE MITRAL N/A 10/23/2024    Procedure: Median Sternotomy, Cardiopulmonary Bypass, Mitral Valve Replacement with [place] Mechanical Valve size 23mm, Interoperative Transesophageal Echocardiogram per Anesthesia;  Surgeon: Deangelo Craig MD;  Location: [facility] OR    TRANSESOPHAGEAL ECHOCARDIOGRAM INTRAOPERATIVE N/A 8/13/2024    Procedure: ECHOCARDIOGRAM, TRANSESOPHAGEAL, WITH ANESTHESIA;  Surgeon: GENERIC ANESTHESIA PROVIDER;  Location: [facility] OR    ventilation tubes, bilateral         Family History:    Family History   Problem Relation Age of Onset    Cerebrovascular Disease Paternal Grandmother         CVA    Heart Disease Maternal Grandfather         disease    Hypertension Father     Other - See Comments Father         post polio    Parkinsonism Maternal Grandmother     Heart Disease Paternal Grandfather        Social History:  Marital Status:  Single [1]  Social History     Tobacco Use    Smoking status: Never     Passive exposure: Never    Smokeless tobacco: Never    Tobacco comments:     no passive exposure   Vaping Use    Vaping status: Never Used   Substance Use Topics    Alcohol use: No    Drug use: No        Medications:    acetaminophen (TYLENOL) 325 MG tablet  acetaminophen (TYLENOL) 325 MG tablet  amoxicillin (AMOXIL) 500 MG capsule  aspirin (ASA) 81 MG chewable tablet  ketoconazole (NIZORAL) 2 % external cream  levETIRAcetam (KEPPRA) 250 MG tablet  metoprolol tartrate (LOPRESSOR) 50 MG tablet  norethindrone-ethinyl estradiol (JUNEL FE 1/20) 1-20 MG-MCG tablet  polyethylene glycol (MIRALAX) 17 GM/Dose powder  warfarin ANTICOAGULANT (COUMADIN) 6 MG tablet          Review of Systems   Constitutional:  Negative for fever.   Respiratory:  Positive for shortness of breath. Negative for cough.    Gastrointestinal:  Negative for abdominal pain.   Hematological:  Bruises/bleeds easily.       Physical Exam   BP:  124/81  Pulse: 88  Temp: 97.7  F (36.5  C)  Resp: 18  SpO2: 96 %      Physical Exam  Vitals and nursing note reviewed.   Constitutional:       General: She is not in acute distress.     Appearance: She is not ill-appearing, toxic-appearing or diaphoretic.   Cardiovascular:      Rate and Rhythm: Normal rate and regular rhythm.   Pulmonary:      Effort: Pulmonary effort is normal.      Comments: Patient has mild tachypnea with no significant increased work of breathing.  Her lung sounds are diminished bilaterally with basilar rales.  She does have a mechanical click with harsh systolic murmur.  Skin:     General: Skin is warm and dry.      Capillary Refill: Capillary refill takes less than 2 seconds.   Neurological:      General: No focal deficit present.      Mental Status: She is alert and oriented to person, place, and time.   Psychiatric:         Mood and Affect: Mood normal.         ED Course     ED Course as of 12/03/24 2249   Tue Dec 03, 2024   2118 Creatinine: 0.68   2135 N-Terminal Pro BNP Inpatient(!): 5,249   2214 Hemoglobin(!): 8.7   2214 Potassium: 4.6   2214 My independent review of the chest x-ray shows what appears to be worsening consolidative process on the left as well as cardiomegaly and worsening congestion.  Official review is pending.   2227 My independent review of the EKG shows a normal sinus rhythm at a rate of 87.  Normal VA interval.  Normal QRS duration.  Normal QTc.  Right axis deviation.     Procedures              Critical Care time:  none              Results for orders placed or performed during the hospital encounter of 12/03/24 (from the past 24 hours)   CBC with platelets differential    Narrative    The following orders were created for panel order CBC with platelets differential.  Procedure                               Abnormality         Status                     ---------                               -----------         ------                     CBC with platelets and  d...[[id number]]  Abnormal            Final result               RBC and Platelet Morphology[[id number]]                      Final result                 Please view results for these tests on the individual orders.   Comprehensive metabolic panel   Result Value Ref Range    Sodium 138 135 - 145 mmol/L    Potassium 4.6 3.4 - 5.3 mmol/L    Carbon Dioxide (CO2) 23 22 - 29 mmol/L    Anion Gap 11 7 - 15 mmol/L    Urea Nitrogen 16.8 6.0 - 20.0 mg/dL    Creatinine 0.68 0.51 - 0.95 mg/dL    GFR Estimate >90 >60 mL/min/1.73m2    Calcium 8.7 (L) 8.8 - 10.4 mg/dL    Chloride 104 98 - 107 mmol/L    Glucose 114 (H) 70 - 99 mg/dL    Alkaline Phosphatase 104 40 - 150 U/L    AST 27 0 - 45 U/L    ALT 14 0 - 50 U/L    Protein Total 7.0 6.4 - 8.3 g/dL    Albumin 3.8 3.5 - 5.2 g/dL    Bilirubin Total 0.4 <=1.2 mg/dL   Nt probnp inpatient (BNP)   Result Value Ref Range    N terminal Pro BNP Inpatient 5,249 (H) 0 - 450 pg/mL   CBC with platelets and differential   Result Value Ref Range    WBC Count 9.8 4.0 - 11.0 10e3/uL    RBC Count 3.37 (L) 3.80 - 5.20 10e6/uL    Hemoglobin 8.7 (L) 11.7 - 15.7 g/dL    Hematocrit 28.0 (L) 35.0 - 47.0 %    MCV 83 78 - 100 fL    MCH 25.8 (L) 26.5 - 33.0 pg    MCHC 31.1 (L) 31.5 - 36.5 g/dL    RDW 14.8 10.0 - 15.0 %    Platelet Count 368 150 - 450 10e3/uL    % Neutrophils 80 %    % Lymphocytes 3 %    % Monocytes 16 %    % Eosinophils 0 %    % Basophils 0 %    % Immature Granulocytes 0 %    NRBCs per 100 WBC 0 <1 /100    Absolute Neutrophils 7.9 1.6 - 8.3 10e3/uL    Absolute Lymphocytes 0.3 (L) 0.8 - 5.3 10e3/uL    Absolute Monocytes 1.6 (H) 0.0 - 1.3 10e3/uL    Absolute Eosinophils 0.0 0.0 - 0.7 10e3/uL    Absolute Basophils 0.0 0.0 - 0.2 10e3/uL    Absolute Immature Granulocytes 0.0 <=0.4 10e3/uL    Absolute NRBCs 0.0 10e3/uL   [place] Draw    Narrative    The following orders were created for panel order [place] Draw.  Procedure                               Abnormality         Status                      ---------                               -----------         ------                     Extra Blue Top Tube[[id number]]                              Final result               Extra Heparinized Syringe[[id number]]                        Final result                 Please view results for these tests on the individual orders.   Extra Blue Top Tube   Result Value Ref Range    Hold Specimen JIC    Extra Heparinized Syringe   Result Value Ref Range    Hold Specimen JIC    RBC and Platelet Morphology   Result Value Ref Range    RBC Morphology Confirmed RBC Indices     Platelet Assessment  Automated Count Confirmed. Platelet morphology is normal.     Automated Count Confirmed. Platelet morphology is normal.   EKG 12 lead   Result Value Ref Range    Systolic Blood Pressure  mmHg    Diastolic Blood Pressure  mmHg    Ventricular Rate 87 BPM    Atrial Rate 87 BPM    MS Interval 150 ms    QRS Duration 66 ms     ms    QTc 447 ms    P Axis 77 degrees    R AXIS 155 degrees    T Axis 97 degrees    Interpretation ECG       Sinus rhythm  Biatrial enlargement  Right ventricular hypertrophy  Anterolateral infarct (cited on or before 15-Oct-2024)  Abnormal ECG  When compared with ECG of 24-Oct-2024 18:29,  ST no longer depressed in Lateral leads  T wave amplitude has increased in Inferior leads  Nonspecific T wave abnormality, improved in Lateral leads         Medications   lidocaine 1 % 0.1-1 mL (has no administration in time range)   lidocaine (LMX4) cream (has no administration in time range)   sodium chloride (PF) 0.9% PF flush 3 mL (has no administration in time range)   sodium chloride (PF) 0.9% PF flush 3 mL (has no administration in time range)   senna-docusate (SENOKOT-S/PERICOLACE) 8.6-50 MG per tablet 1 tablet (has no administration in time range)     Or   senna-docusate (SENOKOT-S/PERICOLACE) 8.6-50 MG per tablet 2 tablet (has no administration in time range)   calcium carbonate (TUMS) chewable tablet 1,000 mg (has no  administration in time range)   Patient is already receiving anticoagulation with heparin, enoxaparin (LOVENOX), warfarin (COUMADIN)  or other anticoagulant medication (has no administration in time range)   Warfarin Dose Required Daily - Pharmacist Managed (has no administration in time range)       Assessments & Plan (with Medical Decision Making)   This is a 44-year-old female with a complicated past medical history mostly from congenital disorders as well as a recent mechanical mitral valve replacement who presents with what appears to be worsening of her underlying congestive heart failure as well as elevation of her CRP.  Given the above findings I believe that it is reasonable to admit Ronna to the hospital for further evaluation to include a likely echocardiogram as an inpatient and possible advanced imaging such as cross-sectional imaging of the chest to rule out underlying infectious process etc.  She has no leukocytosis however.  She has no fevers or significant tachycardia.  However, I do not believe that it is in her best medical interest to be discharged from this emergency department as I believe her status will likely continue to deteriorate without further investigation.  She was graciously accepted by Dr. Cheney.  Given that the patient was just recently treated with 40 mg of oral Lasix prior to her arrival to the emergency department I do not believe that she requires any intravenous Lasix at this time.    This document was prepared using a combination of typing and voice generated software.  While every attempt was made for accuracy, spelling and grammatical errors may exist.     I have reviewed the nursing notes.    I have reviewed the findings, diagnosis, plan and need for follow up with the patient.           Medical Decision Making  The patient's presentation was of moderate complexity (an undiagnosed new problem with uncertain prognosis).    The patient's evaluation involved:  review of 3+  test result(s) ordered prior to this encounter (recent labs)  strong consideration of a test (CT chest) that was ultimately deferred  ordering and/or review of 3+ test(s) in this encounter (multiple labs, EKG, chest x-ray)    The patient's management necessitated high risk (a decision regarding hospitalization).        New Prescriptions    No medications on file       Final diagnoses:   Acute exacerbation of CHF (congestive heart failure) (H)   H/O mitral valve replacement   Anemia   Anticoagulated on Coumadin       12/3/2024   [facility]GENCY [facility]       Ca Roberts PA-C  12/03/24 2223       Ca Roberts PA-C  12/03/24 2246

## 2024-12-04 NOTE — PLAN OF CARE
[facility] Inpatient Admission Note:    Patient admitted to 3228/3228-1 at approximately 2301 via wheel chair accompanied by transport tech and mother from emergency room . Report received from ISIS Maddox in SBAR format at 2248 via telephone. Patient ambulated to bed via self.. Patient is alert and oriented X 3, reports pain; rates at 7 on rFLACC scale. Patient oriented to room, unit, hourly rounding, and plan of care. Explained admission packet and patient handbook with patient bill of rights brochure. Will continue to monitor and document as needed.     Inpatient Nursing criteria listed below was met:    Health care directives status obtained and documented: Yes    Patient identifies a surrogate decision maker: Yes If yes, who:MomSophia Contact Information:See face sheet     If initial lactic acid greater than 2.0, repeat lactic acid drawn within one hour of arrival to unit: NA     Clergy visit ordered if patient requests: NA    Skin issues/needs documented: Yes    Isolation Patient: NA     Fall Prevention Yes: Care plan updated, education given and documented, sticker and magnet in place: Yes    Care Plan initiated: Yes    Education Documented (including assessment): Yes    Patient has discharge needs : No

## 2024-12-04 NOTE — PLAN OF CARE
Goal Outcome Evaluation:      Plan of Care Reviewed With: patient    Overall Patient Progress: no changeOverall Patient Progress: no change    Pt is alert and oriented. Reported pain x1 on admission, none since. Pt had labored, shallow, increase resp rate, and abdominal breathing and grunting on admission. Low 90's on RA Provider notified, along with INR of 5.32. Received order for one time PRN morphine for SOB. Medication given was effective but pt did not last pt was remaining labored, increase resp rate and abdominal breathing sometime after. Updated provider and received order for oxygen. Placed on NC at 1L, sats were at 100%, abdominal muscle still being used with increase resp rate. Work of breath did appear more relaxed. Turned down to 1/2L. Sats at 99%. Pt is now seeing more chest rise then abdominal use. VSS. Assessment as charted. Call light in reach, mom remains in room.     Face to face report given with opportunity to observe patient.    Report given to ISIS Goyal RN   12/4/2024  7:40 AM

## 2024-12-04 NOTE — PHARMACY-ANTICOAGULATION SERVICE
Pharmacy Consult-Warfarin Assessment Day #1    Efren Saavedra is a 44 year old female admitted on 12/3/2024 with <principal problem not specified>    Primary Indication(s) for Anticoagulation: mechanical mitral valve replacement    Goal INR: 2.5-3.5    FYI, patient is followed by the Anticoagulation/Protime Clinic at: [facility]    Patient previously anticoagulated on Coumadin at a dose of 3 mg every Mon, Fri; 4.5 mg all other days     **Per anticoagulation clinic**  12/2: dose decreased to 3 mg every Mon, Fri; 4.5 mg all other days  11/30: dose continued at 4.5 mg daily  11/29: dose held  11/25: dose increased to 4.5mg daily    Home tablet strength(s): 3 mg    Factors that may increase patient's bleeding risk and/or sensitivity to warfarin (Coumadin) include: shortness of breath, low BMI    Factors that may decrease patient's bleeding risk and/or sensitivity to warfarin (Coumadin) include: none    Anticoagulation Dose History  More data exists         11/25/2024 11/26/2024 11/27/2024 11/29/2024 12/2/2024 12/3/2024 12/4/2024   Recent Dosing and Labs   warfarin ANTICOAGULANT (COUMADIN) 1 MG tablet - 4.5 mg, $Given - - - - -   warfarin ANTICOAGULANT (COUMADIN) 2.5 MG tablet 7.5 mg, $Given - 4.5 mg given at home HELD 3 mg given at home 4.5 mg given at home -   INR 1.68  1.71  1.87  3.17  5.3  4.22  5.32  4.94      CBC RESULTS:   Recent Labs   Lab Test 12/04/24  0556   HGB 8.8*        Assessment/Plan: Patient recently admitted here for subtherapeutic INR (11/25-11/27). Dosing has not been stable. INR is now supratherapeutic. Will hold today's dose.     Thank You for the Consult. Will continue to follow.    Cecelia Wilhelm [facility] ....................  12/4/2024   8:57 AM

## 2024-12-05 ENCOUNTER — ANTICOAGULATION THERAPY VISIT (OUTPATIENT)
Dept: ANTICOAGULATION | Facility: OTHER | Age: 44
End: 2024-12-05
Attending: FAMILY MEDICINE
Payer: MEDICARE

## 2024-12-05 ENCOUNTER — TELEPHONE (OUTPATIENT)
Dept: CARDIOLOGY | Facility: CLINIC | Age: 44
End: 2024-12-05
Payer: MEDICARE

## 2024-12-05 VITALS
HEIGHT: 58 IN | SYSTOLIC BLOOD PRESSURE: 118 MMHG | HEART RATE: 86 BPM | TEMPERATURE: 98.7 F | RESPIRATION RATE: 19 BRPM | WEIGHT: 73.8 LBS | DIASTOLIC BLOOD PRESSURE: 68 MMHG | BODY MASS INDEX: 15.49 KG/M2 | OXYGEN SATURATION: 94 %

## 2024-12-05 DIAGNOSIS — Z95.2 S/P MVR (MITRAL VALVE REPLACEMENT): Primary | ICD-10-CM

## 2024-12-05 PROBLEM — R79.82 CRP ELEVATED: Status: ACTIVE | Noted: 2024-12-05

## 2024-12-05 LAB
ALBUMIN SERPL BCG-MCNC: 3.4 G/DL (ref 3.5–5.2)
ALP SERPL-CCNC: 85 U/L (ref 40–150)
ALT SERPL W P-5'-P-CCNC: 11 U/L (ref 0–50)
ANION GAP SERPL CALCULATED.3IONS-SCNC: 10 MMOL/L (ref 7–15)
AST SERPL W P-5'-P-CCNC: 18 U/L (ref 0–45)
BACTERIA BLD CULT: NORMAL
BILIRUB SERPL-MCNC: 0.3 MG/DL
BUN SERPL-MCNC: 12 MG/DL (ref 6–20)
CALCIUM SERPL-MCNC: 8.5 MG/DL (ref 8.8–10.4)
CHLORIDE SERPL-SCNC: 103 MMOL/L (ref 98–107)
CREAT SERPL-MCNC: 0.54 MG/DL (ref 0.51–0.95)
CRP SERPL-MCNC: 122.46 MG/L
EGFRCR SERPLBLD CKD-EPI 2021: >90 ML/MIN/1.73M2
ERYTHROCYTE [DISTWIDTH] IN BLOOD BY AUTOMATED COUNT: 15 % (ref 10–15)
GLUCOSE SERPL-MCNC: 86 MG/DL (ref 70–99)
HCO3 SERPL-SCNC: 21 MMOL/L (ref 22–29)
HCT VFR BLD AUTO: 28.1 % (ref 35–47)
HGB BLD-MCNC: 8.8 G/DL (ref 11.7–15.7)
INR PPP: 4.04 (ref 0.85–1.15)
MCH RBC QN AUTO: 25.6 PG (ref 26.5–33)
MCHC RBC AUTO-ENTMCNC: 31.3 G/DL (ref 31.5–36.5)
MCV RBC AUTO: 82 FL (ref 78–100)
PLATELET # BLD AUTO: 374 10E3/UL (ref 150–450)
POTASSIUM SERPL-SCNC: 4.3 MMOL/L (ref 3.4–5.3)
PROCALCITONIN SERPL IA-MCNC: 0.08 NG/ML
PROT SERPL-MCNC: 6.7 G/DL (ref 6.4–8.3)
RBC # BLD AUTO: 3.44 10E6/UL (ref 3.8–5.2)
SODIUM SERPL-SCNC: 134 MMOL/L (ref 135–145)
WBC # BLD AUTO: 7.4 10E3/UL (ref 4–11)

## 2024-12-05 PROCEDURE — 85027 COMPLETE CBC AUTOMATED: CPT | Performed by: INTERNAL MEDICINE

## 2024-12-05 PROCEDURE — 250N000013 HC RX MED GY IP 250 OP 250 PS 637: Performed by: INTERNAL MEDICINE

## 2024-12-05 PROCEDURE — 86140 C-REACTIVE PROTEIN: CPT | Performed by: INTERNAL MEDICINE

## 2024-12-05 PROCEDURE — 85610 PROTHROMBIN TIME: CPT | Performed by: INTERNAL MEDICINE

## 2024-12-05 PROCEDURE — 80053 COMPREHEN METABOLIC PANEL: CPT | Performed by: INTERNAL MEDICINE

## 2024-12-05 PROCEDURE — 83550 IRON BINDING TEST: CPT

## 2024-12-05 PROCEDURE — 36415 COLL VENOUS BLD VENIPUNCTURE: CPT | Performed by: INTERNAL MEDICINE

## 2024-12-05 PROCEDURE — 99239 HOSP IP/OBS DSCHRG MGMT >30: CPT | Performed by: INTERNAL MEDICINE

## 2024-12-05 PROCEDURE — 84145 PROCALCITONIN (PCT): CPT | Performed by: INTERNAL MEDICINE

## 2024-12-05 RX ORDER — FUROSEMIDE 20 MG/1
20 TABLET ORAL DAILY
Qty: 30 TABLET | Refills: 1 | Status: SHIPPED | OUTPATIENT
Start: 2024-12-05

## 2024-12-05 RX ORDER — FUROSEMIDE 20 MG/1
20 TABLET ORAL DAILY
Status: DISCONTINUED | OUTPATIENT
Start: 2024-12-05 | End: 2024-12-05 | Stop reason: HOSPADM

## 2024-12-05 RX ORDER — WARFARIN SODIUM 2 MG/1
2 TABLET ORAL
Status: DISCONTINUED | OUTPATIENT
Start: 2024-12-05 | End: 2024-12-05 | Stop reason: HOSPADM

## 2024-12-05 RX ORDER — WARFARIN SODIUM 3 MG/1
TABLET ORAL
COMMUNITY
Start: 2024-12-05 | End: 2024-12-16

## 2024-12-05 RX ADMIN — LEVETIRACETAM 500 MG: 500 TABLET, FILM COATED ORAL at 08:52

## 2024-12-05 RX ADMIN — FUROSEMIDE 20 MG: 20 TABLET ORAL at 12:56

## 2024-12-05 RX ADMIN — METOPROLOL TARTRATE 50 MG: 50 TABLET, FILM COATED ORAL at 08:52

## 2024-12-05 ASSESSMENT — ACTIVITIES OF DAILY LIVING (ADL)
ADLS_ACUITY_SCORE: 38
ADLS_ACUITY_SCORE: 40
ADLS_ACUITY_SCORE: 40
ADLS_ACUITY_SCORE: 38
ADLS_ACUITY_SCORE: 40
ADLS_ACUITY_SCORE: 40
ADLS_ACUITY_SCORE: 38
ADLS_ACUITY_SCORE: 40
ADLS_ACUITY_SCORE: 38
ADLS_ACUITY_SCORE: 38
ADLS_ACUITY_SCORE: 40

## 2024-12-05 NOTE — PLAN OF CARE
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  Assessment as charted, continuous pulse ox in place, tele in place/ SR per ICU report, audible valve click noted from mitral valve replacement.  Good appetite, voiding, had stool this shift.  Pt up and ambulated w/ SBA from her mother who will remain at bedside, tolerated well, no SOB.  Takes po medication well, holding coumadin dose for this date due to INR.  Lasix held on day shift due to low BP per ISIS Goyal.  IV is SL, flushes well.  Fall precautions in place, call light within reach, pt & mother make needs known.    Face to face report given with opportunity to observe patient.    Report given to ISIS Willingham RN   12/4/2024  7:20 PM              "

## 2024-12-05 NOTE — PROGRESS NOTES
ANTICOAGULATION MANAGEMENT     Efren Saavedra 44 year old female is on warfarin with supratherapeutic INR result. (Goal INR 2.5-3.5)    Recent labs: (last 7 days)     12/05/24  0537   INR 4.04*       ASSESSMENT     Source(s): Chart Review and Patient/Caregiver Call     Warfarin doses taken: While hospitalized on 12/3-12/5: anticoagulation calendar updated with inpatient dosing.  Discharged on: Next INR 12/6  Diet: No new diet changes identified  Medication/supplement changes:  new lasix dose  New illness, injury, or hospitalization: Yes: CHF   Signs or symptoms of bleeding or clotting: No  Previous result: Supratherapeutic  Additional findings: None       PLAN     Recommended plan for ongoing change(s) affecting INR     Dosing Instructions: decrease your warfarin dose (10.5% change) with next INR in 1 day       Summary  As of 12/5/2024      Full warfarin instructions:  4.5 mg every Mon, Thu, Sat; 3 mg all other days   Next INR check:  12/6/2024               Telephone call with Christine Keri (GUARDIAN) who verbalizes understanding and agrees to plan    Lab visit scheduled    Education provided: None required    Plan made with [facility] Pharmacist Pratibha Coelho RN  12/5/2024  Anticoagulation Clinic  [employer] for routing messages: p NIKKI BASURTO  [facility] patient phone line: [phone]        _______________________________________________________________________     Anticoagulation Episode Summary       Current INR goal:  2.5-3.5   TTR:  44.2% (3 wk)   Target end date:  Indefinite   Send INR reminders to:  NIKKI BASURTO    Indications    S/P MVR (mitral valve replacement) [Z95.2]  S/P mitral valve replacement (Resolved) [Z95.2]             Comments:  MVR 10/23/24 12 weeks is Dominic 15             Anticoagulation Care Providers       Provider Role Specialty Phone number    Cee Velez MD [facility] [phone]

## 2024-12-05 NOTE — PHARMACY-ANTICOAGULATION SERVICE
Pharmacy Consult-Warfarin Assessment Day #2    Efren Saavedra is a 44 year old female admitted on 12/3/2024 with <principal problem not specified>    Primary Indication(s) for Anticoagulation: mechanical mitral valve replacement     Goal INR: 2.5-3.5    FYI, patient is followed by the Anticoagulation/Protime Clinic at: [facility]    Patient previously anticoagulated on Coumadin at a dose of 3 mg every Mon, Fri; 4.5 mg all other days     **Per anticoagulation clinic**  12/2: dose decreased to 3 mg every Mon, Fri; 4.5 mg all other days  11/30: dose continued at 4.5 mg daily  11/29: dose held  11/25: dose increased to 4.5mg daily    Home tablet strength(s): 3 mg    Factors that may increase patient's bleeding risk and/or sensitivity to warfarin (Coumadin) include: low BMI, CHF exac, shortness of breath    Factors that may decrease patient's bleeding risk and/or sensitivity to warfarin (Coumadin) include: none    Anticoagulation Dose History  More data exists         11/25/2024 11/26/2024 11/27/2024 11/29/2024 12/2/2024 12/3/2024 12/4/2024 12/5/2024   Recent Dosing and Labs   warfarin ANTICOAGULANT (COUMADIN) 1 MG tablet 7.5 mg,  $Given 4.5 mg, $Given 4.5 mg given at home HELD 3 mg given at home 4.5 mg given at home HELD -   INR 1.68  1.71 1.87  3.17  5.3  4.22  5.32  4.94  4.04      CBC RESULTS:   Recent Labs   Lab Test 12/05/24  0537   HGB 8.8*        Assessment/Plan: INR is still supratherapeutic, however has decreased quite significantly in the last couple days. Per the [place] Warfarin Dosing policy, could hold or decrease dose. Patient is discharging home today. Recommend 3mg today then follow-up with Coumadin Clinic tomorrow for INR check and further dosing instructions.    Thank You for the Consult. Will continue to follow.    Cecelia Wilhelm [facility] ....................  12/5/2024   8:21 AM

## 2024-12-05 NOTE — DISCHARGE SUMMARY
"[facility]  Hospitalist Discharge Summary      Date of Admission:  12/3/2024  Date of Discharge:  12/5/2024  Discharging Provider: Taj Ahumada MD  Discharge Service: Hospitalist Service    Discharge Diagnoses     Acute heart failure with preserved ejection fraction  Status post mechanical mitral valve replacement  Dyspnea  Elevated CRP    Clinically Significant Risk Factors     # Cachexia: Estimated body mass index is 14.93 kg/m  as calculated from the following:    Height as of this encounter: 1.473 m (4' 10\").    Weight as of this encounter: 32.4 kg (71 lb 6.9 oz).       Follow-ups Needed After Discharge   Follow-up Appointments       Follow-up and recommended labs and tests       Follow up with primary care provider, Cee Velez, within 7 days for hospital follow- up.  The following labs/tests are recommended: BMP.            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  She has needed to be hospitalized on several occasions since that time due to a subtherapeutic INR.  Otherwise had been doing very well in her recovery.  Has had some chest discomfort from her sternal wound.  But no fevers at all.  On 12/2 she presented to the ER for evaluation because of some increasing shortness of breath.  Her vital signs were normal blood pressure was 122 heart rate 90 afebrile sats are 99% on room air.  Lab work was basically unremarkable with a " hemoglobin of 10 white count of 10,000.  Her BNP was 3007 and 93.  Renal function normal.  Her lungs were clear.  Showed maybe some curly B-lines may be some pulmonary edema.  They contact [facility] who recommended a dose of Lasix and some oral dosing and see if that did not improve things.  Yesterday on 12 3, was seen in the clinic she was able to breathe easier but still having some dyspnea.  No cough fever swelling nausea vomiting at all.  They did notice perhaps an increased murmur which was not documented before.  They ordered an outpatient echo.  And was sent home.  She continued to have shortness of breath despite getting some oral Lasix at home and was brought to the ER via ambulance.  When she arrived her sats are 96% on room air blood pressure 124/81 heart rate was 88 and she was afebrile 97.7.  Lab work showed a BNP of 5249, hemoglobin 8.7, potassium 4.6 creatinine was normal at 0.68.  White count was 9800.  Chest x-ray did show significant cardiac enlargement but that has been persistent.  Did have moderate diffuse bilateral pulmonary vascular prominence.  Because of her persistent shortness of breath and questionable new murmur she was admitted to the hospital.  For echocardiogram and further evaluation.     Assessment/plan:     1.  Status post mechanical mitral valve replacement/question CHF:   -12/3:Patient with recent mitral valve replacement roughly about 5 to 6 weeks ago.  Has been doing relatively well.  Has some issues with subtherapeutic INRs requiring IV heparin.  But just recently over the last couple of days started to notice increasing shortness of breath.  Has been evaluated normal vitals normal sats questionable pulmonary vascular congestion on her chest x-ray.  No fevers no elevated white count.  Did get some Lasix just within the last 48 hours maybe helped a little bit but still had persistent shortness of breath.  Chest x-ray shows enlarged cardiac silhouette.  She did have  an elevated CRP done couple of days ago and some question of a new murmur.  So she is being admitted for further workup of this.  She will get an echocardiogram today.  Will have to watch how much we diurese her as she is a very small patient.  She did not have any previous blood cultures performed.  I did get 1 here today.  Her CRP back in November was 17 ,  was 122 two days ago and now today is 172.  Her procalcitonin is only 0.11.  White count was normal.  She has not had any fevers.  Will need to work this up a little closer to the echo will be helpful and a probable CT scan of chest also.  Also question pericardial effusion etc.  But echo will hopefully sort this out for us.  May need to consider CT of chest also.  She is with a low-grade fever 99.1 blood pressure stable 120/76, 99% sat on 1 L heart rate is 96 and regular.  White count is 8700 hemoglobin was 8.8.  INR today is 4.94.  So blood culture was drawn.  She is feeling significantly better today.  Her mother states that she just appeared very uncomfortable last couple days with difficulty breathing and speaking full sentences.  But today she is doing much better.  Her sats are 100% on room air.  Lungs are basically clear to auscultation.  Do not hear any audible murmurs maybe 1 out of 6 systolic murmur.  Crisp click.     -12/4: Echo shows no major abnormalities.  EF is 60 to 65%.  Small pericardial effusion no hemodynamic significance.  The mitral valve appears to be well-seated with normal gradients.  The CT of the chest does show pericardial effusion think over estimating it compared to the echocardiogram.  No obvious focal abscesses or major pneumonia there is some minimal parenchymal findings.  -12/5: The patient yesterday.  She has received no further Lasix last evening.  Did get 1 dose of IV yesterday morning.  She continues to do well her room air sats are fine her lungs are clear she is moving around without any difficulty no complaints of  shortness of breath.  Her mother states she looks back to her baseline.  I think at this point she is good need just a daily dose of Lasix will come and make it on the low side 20 mg daily and she does have to have follow-up with this she may need more may need Lasix depends on how she does.  Should have her potassium checked in about a week or so.  I will have her mom cut back on her salt intake which she does have a lot of salty foods.  Weigh herself daily same time every day, keep track of her weights.  If she does become obviously more dyspneic probably should double up on the Lasix to 40 mg.  If that does not help then she will likely need to seek evaluation.    I did talk with her cardio thoracic surgery team.  Went over everything with them and they do not have a good answer for why the CRP is elevated we will just kind of watch this and see how she does she has a blood culture pending.  She has not had any fevers I cannot find any other good reason for it her urine is clear her abdominal exam is benign she has no peripheral edema.  No skin rashes at all.  CT chest did not show anything exciting.  Her CRP today was down to 122 procalcitonin remains normal.  Her white count is normal also and no fevers.    2.  Anticoagulation: INR is 4.94.  Pharmacy will dose the warfarin.  -12/5: INR is 4.04 today.  Pharmacy will be dosing her warfarin.          Consultations This Hospital Stay   PHARMACY TO DOSE WARFARIN  OCCUPATIONAL THERAPY ADULT IP CONSULT  CARE MANAGEMENT / SOCIAL WORK IP CONSULT    Code Status   Full Code    Time Spent on this Encounter   I, Taj Ahumada MD, personally saw the patient today and spent greater than 30 minutes discharging this patient.       Taj Ahumada MD  [facility]  [address] [place] [id number]  Phone: [phone]  Fax: [phone]  ______________________________________________________________________    Physical Exam   Vital Signs: Temp: 98.7  F (37.1   C) Temp src: Tympanic BP: 118/68 Pulse: 86   Resp: 19 SpO2: 94 % O2 Device: None (Room air)    Weight: 71 lbs 6.86 oz  Constitutional: awake, alert, cooperative, no apparent distress, and appears stated age  ENT: Normocephalic, without obvious abnormality, atraumatic, sinuses nontender on palpation, external ears without lesions, oral pharynx with moist mucous membranes, tonsils without erythema or exudates, gums normal and good dentition.  Respiratory: No increased work of breathing, good air exchange, clear to auscultation bilaterally, no crackles or wheezing  Cardiovascular: Regular rate and rhythm.  She has a crisp mitral click.  1 out of 6 to 2 out of 6 systolic murmur.  GI: No scars, normal bowel sounds, soft, non-distended, non-tender, no masses palpated, no hepatosplenomegally  Musculoskeletal: There is no redness, warmth, or swelling of the joints.  Full range of motion noted.  Motor strength is 5 out of 5 all extremities bilaterally.  Tone is normal.       Primary Care Physician   Cee Velez    Discharge Orders      Reason for your hospital stay    Patient is a 44-year-old female who underwent a mitral valve replacement back in the end of October due to mitral stenosis.  Has been doing relatively well.  Had some issues with subtherapeutic INR's but otherwise doing well.  The last week or so she has been having some increasing dyspnea.  Came in had evaluation in the ER and got some IV Lasix but came back still somewhat dyspneic.  Sats remained fine.  She had no peripheral edema.  Chest x-ray with questionable infiltrates.  She was admitted and given some IV Lasix and markedly improved her symptoms in fact after a couple doses she was talking normal had no difficulty speaking at all.  Sats are main stable on room air.  Along with this we did repeat her echocardiogram which looks unchanged per cardiology evaluation.  Does have a small pericardial effusion but not hemodynamically significant.  Chest CT  was done because we did check an CRP was 120 going up to 170s back down to 120.  No fevers no elevated white count.  Chest CT did not show anything significant.  I did run it by her cardio thoracic surgery team they agree with the current plan of daily dose of Lasix.  They are not sure what to make of the CRP elevation either.  But we will monitor this.  So at this point can be discharged home on 20 mg of Lasix daily.  Follow-up within the next week.     Follow-up and recommended labs and tests     Follow up with primary care provider, Cee Velez, within 7 days for hospital follow- up.  The following labs/tests are recommended: BMP.     Activity    Your activity upon discharge: activity as tolerated     Monitor and record    Weight: every day.     Diet    Follow this diet upon discharge: Current Diet:Orders Placed This Encounter  Should try to stick to a 2 g sodium diet       Significant Results and Procedures   Most Recent 3 CBC's:  Recent Labs   Lab Test 12/05/24 0537 12/04/24 0556 12/03/24 2052   WBC 7.4 8.7 9.8   HGB 8.8* 8.8* 8.7*   MCV 82 81 83    345 368     Most Recent 3 BMP's:  Recent Labs   Lab Test 12/05/24 0537 12/04/24  0556 12/03/24 2052   * 136 138   POTASSIUM 4.3 3.6 4.6   CHLORIDE 103 100 104   CO2 21* 23 23   BUN 12.0 13.4 16.8   CR 0.54 0.64 0.68   ANIONGAP 10 13 11   YUE 8.5* 8.7* 8.7*   GLC 86 85 114*     Most Recent 2 LFT's:  Recent Labs   Lab Test 12/05/24  0537 12/03/24 2052   AST 18 27   ALT 11 14   ALKPHOS 85 104   BILITOTAL 0.3 0.4     Most Recent 3 INR's:  Recent Labs   Lab Test 12/05/24 0537 12/04/24  0556 12/03/24 2237   INR 4.04* 4.94* 5.32*     Most Recent 3 Troponin's:No lab results found.  Most Recent 3 BNP's:  Recent Labs   Lab Test 12/03/24 2052 12/02/24  1229 10/14/24  2156   NTBNPI 5,249* 3,793* 7,347*     7-Day Micro Results       Collected Updated Procedure Result Status      12/04/2024 1009 12/05/2024 0620 Blood Culture Arm, Right [[id number]]    Blood from Arm, Right    Preliminary result Component Value   Culture No growth after 12 hours  [P]                12/02/2024 1649 12/02/2024 1738 Influenza A/B, RSV and SARS-CoV2 PCR (COVID-19) Nasopharyngeal [[id number]]    Swab from Nasopharyngeal    Final result Component Value   Influenza A PCR Negative   Influenza B PCR Negative   RSV PCR Negative   SARS CoV2 PCR Negative   NEGATIVE: SARS-CoV-2 (COVID-19) RNA not detected, presumed negative.            12/02/2024 1648 12/03/2024 1728 Respiratory Panel PCR [[id number]]    Swab from Nasopharyngeal    Final result Component Value   Adenovirus Not Detected   Coronavirus Not Detected   This test detects Coronavirus 229E, HKU1, NL63 and OC43 but does not distinguish between them. It does not detect MERS ( Respiratory Syndrome), SARS (Severe Acute Respiratory Syndrome) or 2019-nCoV (Novel 2019) Coronavirus.   Human Metapneumovirus Not Detected   Human Rhin/Enterovirus Not Detected   Influenza A Not Detected   Influenza A, H1 Not Detected   Influenza A 2009 H1N1 Not Detected   Influenza A, H3 Not Detected   Influenza B Not Detected   Parainfluenza Virus 1 Not Detected   Parainfluenza Virus 2 Not Detected   Parainfluenza Virus 3 Not Detected   Parainfluenza Virus 4 Not Detected   Respiratory Syncytial Virus A Not Detected   Respiratory Syncytial Virus B Not Detected   Chlamydia Pneumoniae Not Detected   Mycoplasma Pneumoniae Not Detected                  Most Recent TSH and T4:  Recent Labs   Lab Test 11/25/24  0920   TSH 3.19     Most Recent ESR & CRP:  Recent Labs   Lab Test 12/05/24  0537 11/25/24  0920 09/09/22  0345   CRP  --   --  <2.9   CRPI 122.46*   < >  --     < > = values in this interval not displayed.   ,   Results for orders placed or performed during the hospital encounter of 12/03/24   XR Chest Port 1 View    Narrative    Exam:  XR CHEST PORT 1 VIEW    HISTORY: Shortness of breath.    COMPARISON:  12/2/2024, 10/26/2024    FINDINGS:      The cardiomediastinal contours are enlarged.      Diffuse interstitial prominence. There are streaky opacities in the  lower lungs. No pleural effusion or pneumothorax.      No acute osseous abnormality.       Impression    IMPRESSION:      Findings compatible with CHF/fluid overload.    There are streaky bibasilar opacities, likely atelectasis or possibly  edema.      ALICIA NGUYEN MD         SYSTEM ID:  [id number]   CT Chest w Contrast    Narrative    CT CHEST W CONTRAST  12/4/2024 11:53 AM    CLINICAL HISTORY: Female, age 44 years,  Patient with elevated CRP  with recent mitral valve replacement.  Evaluate for any chest abscess  etc.;    Comparison:  Cardiac CTA 9/24/2024 and 10/16/2024.    TECHNIQUE:  CT scan was performed of the chest with IV contrast.  Axial, sagittal and coronal images were reviewed. 3-D MIP images were  used to optimize lung nodule conspicuity.   This facility minimizes radiation dose by adjusting the mA and/or kV  according to each patient size.  This CT scan was performed using one or more the following dose  reduction techniques:  -Automated exposure control,  -Adjustment of the mA and/or kV according to patient's size, and/or,  -Use of iterative reconstruction technique.      FINDINGS:   Marked enlargement of the heart is again seen. The patient has  developed a moderate-sized pericardial effusion when compared to the  earlier studies. The effusion measures approximately 1.5 cm in depth  along the posterior aspect of the heart.    The lungs demonstrate peripheral areas of atelectasis. There is no  evidence of well-defined pneumonia. Interstitial thickening in mild  groundglass opacification are again seen in the periphery of the lower  lobes.    There are number of normal-sized and mildly enlarged lymph nodes  throughout the mediastinum and axillary regions. Visualized portions  of the upper abdomen demonstrated 15 mm low dense lesion posterior  within the spleen that was likely  present on the prior studies.     Bony structures: No acute abnormality. Postoperative changes of the  sternum.        Impression    IMPRESSION:   Postoperative changes with a moderate to large pericardial effusion,  measuring up to 1.5 cm in depth. While there is no apparent gas within  this fluid, cannot exclude the possibility that this fluid is  infected.    Peripheral areas of atelectasis and chronic changes within the lungs  are similar in appearance without evidence of well-defined pneumonia.    CLARKE SINGLETON MD         SYSTEM ID:  [id number]   Echocardiogram Complete     Value    LVEF  60-65%    [facility]    [id number]  [id number]  [id number]  [id number]^[email or web address]     [facility]  Echocardiography Laboratory  [address] [place] [address]     Name: KATE BYRNES  MRN: [id number]  : 1980  Study Date: 2024 10:29 AM  Age: 44 yrs  Gender: Female  Patient Location: [facility]  Reason For Study: Mitral Valve Replacement  History: MV replacement, Dyspnea, Murmur  Ordering Physician: TRACIE MALLORY  Performed By: Mary Richards RDCS     BSA: 1.2 m2  Height: 58 in  Weight: 71 lb  ______________________________________________________________________________  Procedure  Complete Portable Echo Adult.  ______________________________________________________________________________  Interpretation Summary  S/P Mitral valve replacement with a 23 mm St Durga mechanical valve. Removal of  left atrial appendage clot on 10/23/2024.  Global and regional left ventricular function is normal with an EF of 60-65%.  Right ventricular function, chamber size, wall motion, and thickness are  normal.  The mean gradient across the mitral valve is 4 mmHg.Heart rate 100BPM.  Pulmonary artery systolic pressure cannot be assessed.  The inferior vena cava is normal.  Small circumferential pericardial effusion is present without any hemodynamic  significance.  Minimal increase in effusion  size. Otherwise no significant change.  ______________________________________________________________________________  Left Ventricle  S/P Mitral valve replacement with a 23 mm St Durga mechanical valve. Removal of  left atrial appendage clot on 10/23/2024. Global and regional left ventricular  function is normal with an EF of 60-65%. Left ventricular wall thickness is  normal. Left ventricular size is normal. Diastolic function not assessed due  to presence of prosthetic mitral valve. No regional wall motion abnormalities  are seen.     Right Ventricle  Right ventricular function, chamber size, wall motion, and thickness are  normal.     Atria  The atria cannot be assessed.     Mitral Valve  The mean gradient across the mitral valve is 4 mmHg.     Aortic Valve  The valve leaflets are not well visualized. On Doppler interrogation, there is  no significant stenosis or regurgitation. The mean AoV pressure gradient is  3.7 mmHg.     Tricuspid Valve  The tricuspid valve is normal. Mild tricuspid insufficiency is present.  Pulmonary artery systolic pressure cannot be assessed.     Pulmonic Valve  The pulmonic valve is normal. Trace pulmonic insufficiency is present.     Vessels  The aorta root is normal. The pulmonary artery cannot be assessed. The  inferior vena cava is normal.     Pericardium  Small circumferential pericardial effusion is present without any hemodynamic  significance.     Compared to Previous Study  Minimal increase in effusion size. Otherwise no significant change.     ______________________________________________________________________________  MMode/2D Measurements & Calculations  IVSd: 1.1 cm  LVIDd: 3.2 cm  LVIDs: 1.7 cm  LVPWd: 1.1 cm     FS: 46.1 %  LV mass(C)d: 108.7 grams  LV mass(C)dI: 92.7 grams/m2  Ao root diam: 2.6 cm  asc Aorta Diam: 3.0 cm  LVOT diam: 1.8 cm  LVOT area: 2.5 cm2  Ao root diam index Ht(cm/m): 1.8  Ao root diam index BSA (cm/m2): 2.2  Asc Ao diam index BSA (cm/m2):  2.5  Asc Ao diam index Ht(cm/m): 2.0  EF Biplane: 61.6 %  RWT: 0.69  TAPSE: 1.4 cm     Doppler Measurements & Calculations  MV E max luc: 149.0 cm/sec  MV A max luc: 74.0 cm/sec  MV E/A: 2.0  MV max PG: 10.4 mmHg  MV mean PG: 3.6 mmHg     MV dec slope: 770.0 cm/sec2  MV dec time: 0.19 sec  Ao V2 max: 134.0 cm/sec  Ao max P.2 mmHg  Ao V2 mean: 90.0 cm/sec  Ao mean PG: 3.7 mmHg  Ao V2 VTI: 25.3 cm  PERCY(I,D): 1.5 cm2  PERCY(V,D): 1.4 cm2  LV V1 max P.4 mmHg  LV V1 max: 77.0 cm/sec  LV V1 VTI: 14.9 cm  SV(LVOT): 37.5 ml  SI(LVOT): 31.9 ml/m2  AV Luc Ratio (DI): 0.57  PERCY Index (cm2/m2): 1.3     ______________________________________________________________________________  Report approved by: AQUILINO Mirza MD on 2024 11:25 AM             Discharge Medications   Current Discharge Medication List        START taking these medications    Details   furosemide (LASIX) 20 MG tablet Take 1 tablet (20 mg) by mouth daily.  Qty: 30 tablet, Refills: 1    Associated Diagnoses: Diastolic congestive heart failure, unspecified HF chronicity (H)           CONTINUE these medications which have NOT CHANGED    Details   !! acetaminophen (TYLENOL) 325 MG tablet Take 650 mg by mouth at bedtime. Takes most nights. Patient also has as needed dosing.      !! acetaminophen (TYLENOL) 325 MG tablet Take 2-3 tablets (650-975 mg) by mouth every 6 hours as needed for pain (For optimal non-opioid multimodal pain management to improve pain control.).    Associated Diagnoses: S/P MVR (mitral valve replacement)      aspirin (ASA) 81 MG chewable tablet Take 1 tablet (81 mg) by mouth every morning.  Qty: 90 tablet, Refills: 3    Associated Diagnoses: Mitral valve stenosis, unspecified etiology; Mitral valve disease; Rheumatic mitral stenosis; S/P MVR (mitral valve replacement)      ketoconazole (NIZORAL) 2 % external cream Apply topically daily as needed for itching.    Associated Diagnoses: Tinea versicolor      levETIRAcetam (KEPPRA) 250 MG  tablet Take 500 mg by mouth 2 times daily. (Morning/6PM)      metoprolol tartrate (LOPRESSOR) 50 MG tablet Take 1 tablet (50 mg) by mouth 2 times daily.  Qty: 180 tablet, Refills: 3    Associated Diagnoses: Rheumatic mitral stenosis      norethindrone-ethinyl estradiol (JUNEL FE 1/20) 1-20 MG-MCG tablet Take 1 tablet by mouth daily.  Qty: 90 tablet, Refills: 3    Associated Diagnoses: Encounter for contraceptive management, unspecified type      amoxicillin (AMOXIL) 500 MG capsule Take 2 g, 4 pills, 30-60 minutes prior to dental procedures/cleanings  Qty: 4 capsule, Refills: 3    Associated Diagnoses: Mitral valve stenosis, unspecified etiology; Mitral valve disease; Rheumatic mitral stenosis; S/P MVR (mitral valve replacement)       !! - Potential duplicate medications found. Please discuss with provider.        STOP taking these medications       JANTOVEN ANTICOAGULANT 3 MG tablet Comments:   Reason for Stopping:         polyethylene glycol (MIRALAX) 17 GM/Dose powder Comments:   Reason for Stopping:             Allergies   Allergies   Allergen Reactions    Azithromycin      Abdominal pain/ cramping     Clotrimazole Rash    Omnicef [Cefdinir] Rash     Itchy and bad rash

## 2024-12-05 NOTE — PLAN OF CARE
Goal Outcome Evaluation:      Plan of Care Reviewed With: patient, parent, caregiver    Overall Patient Progress: improving - Adequate for care transition

## 2024-12-05 NOTE — PLAN OF CARE
Goal Outcome Evaluation:      Plan of Care Reviewed With: patient, parent, caregiver    Overall Patient Progress: improving

## 2024-12-05 NOTE — PLAN OF CARE
Pt A&O, answers questions approprietly. Speech garbled at times. VS as charted. No SOB reported. Remains on RA w/ SPO2 greater than 92% throughout the night. Uneventful night. Mom at bedside. Free from falls. Call light within reach, makes needs known.     Face to face report given with opportunity to observe patient.    Report given to Ayaka Thomas RN   12/5/2024  7:22 AM

## 2024-12-05 NOTE — TELEPHONE ENCOUNTER
Dr Taj Ahumada over seeing patient in the hospital for SOB, called in regards to elevated CRP. Per Dr Ahumada, patient blood cultures negative, echo WNL, and wanted to double check if he is overlooking any information. Rerouted conversation to CVTS team.

## 2024-12-06 ENCOUNTER — LAB (OUTPATIENT)
Dept: LAB | Facility: OTHER | Age: 44
End: 2024-12-06
Payer: MEDICARE

## 2024-12-06 DIAGNOSIS — Z95.2 S/P MVR (MITRAL VALVE REPLACEMENT): ICD-10-CM

## 2024-12-06 DIAGNOSIS — I05.9 MITRAL VALVE DISEASE: ICD-10-CM

## 2024-12-06 LAB — INR BLD: 3.9 (ref 0.9–1.1)

## 2024-12-06 PROCEDURE — 85610 PROTHROMBIN TIME: CPT | Mod: ZL

## 2024-12-06 PROCEDURE — 36416 COLLJ CAPILLARY BLOOD SPEC: CPT | Mod: ZL

## 2024-12-09 ENCOUNTER — LAB (OUTPATIENT)
Dept: LAB | Facility: OTHER | Age: 44
End: 2024-12-09
Attending: FAMILY MEDICINE
Payer: MEDICARE

## 2024-12-09 ENCOUNTER — OFFICE VISIT (OUTPATIENT)
Dept: CARDIOLOGY | Facility: OTHER | Age: 44
End: 2024-12-09
Attending: INTERNAL MEDICINE
Payer: MEDICARE

## 2024-12-09 ENCOUNTER — ANTICOAGULATION THERAPY VISIT (OUTPATIENT)
Dept: ANTICOAGULATION | Facility: OTHER | Age: 44
End: 2024-12-09
Attending: FAMILY MEDICINE
Payer: MEDICARE

## 2024-12-09 VITALS
WEIGHT: 72.2 LBS | BODY MASS INDEX: 15.16 KG/M2 | HEART RATE: 83 BPM | HEIGHT: 58 IN | RESPIRATION RATE: 18 BRPM | DIASTOLIC BLOOD PRESSURE: 67 MMHG | OXYGEN SATURATION: 98 % | SYSTOLIC BLOOD PRESSURE: 109 MMHG

## 2024-12-09 DIAGNOSIS — D64.9 ANEMIA, UNSPECIFIED TYPE: Primary | ICD-10-CM

## 2024-12-09 DIAGNOSIS — Z95.2 S/P MVR (MITRAL VALVE REPLACEMENT): Primary | ICD-10-CM

## 2024-12-09 DIAGNOSIS — D50.9 IRON DEFICIENCY ANEMIA, UNSPECIFIED IRON DEFICIENCY ANEMIA TYPE: Primary | ICD-10-CM

## 2024-12-09 DIAGNOSIS — I05.9 MITRAL VALVE DISEASE: ICD-10-CM

## 2024-12-09 DIAGNOSIS — Z95.2 S/P MVR (MITRAL VALVE REPLACEMENT): ICD-10-CM

## 2024-12-09 LAB
INR BLD: 4.7 (ref 0.9–1.1)
IRON BINDING CAPACITY (ROCHE): 278 UG/DL (ref 240–430)
IRON SATN MFR SERPL: 9 % (ref 15–46)
IRON SERPL-MCNC: 24 UG/DL (ref 37–145)

## 2024-12-09 PROCEDURE — 36416 COLLJ CAPILLARY BLOOD SPEC: CPT | Mod: ZL

## 2024-12-09 PROCEDURE — 99214 OFFICE O/P EST MOD 30 MIN: CPT | Performed by: INTERNAL MEDICINE

## 2024-12-09 PROCEDURE — 85610 PROTHROMBIN TIME: CPT | Mod: ZL

## 2024-12-09 RX ORDER — FERROUS SULFATE 325(65) MG
325 TABLET ORAL
Qty: 90 TABLET | Refills: 0 | Status: SHIPPED | OUTPATIENT
Start: 2024-12-09

## 2024-12-09 ASSESSMENT — PAIN SCALES - GENERAL: PAINLEVEL_OUTOF10: MILD PAIN (2)

## 2024-12-09 NOTE — PATIENT INSTRUCTIONS
Thank you for allowing Dr JANELLE Cloud and our  team to participate in your care. Please call our office at [phone] with scheduling questions or if you need to cancel or change your appointment. With any other questions or concerns you may call cardiology nurse at  [phone].       If you experience chest pain, chest pressure, chest tightness, shortness of breath, fainting, lightheadedness, nausea, vomiting, or other concerning symptoms, please report to the Emergency Department or call 911. These symptoms may be emergent, and best treated in the Emergency Department.

## 2024-12-09 NOTE — PROGRESS NOTES
ANTICOAGULATION MANAGEMENT     Efren Saavedra 44 year old female is on warfarin with supratherapeutic INR result. (Goal INR 2.5-3.5)    Recent labs: (last 7 days)     12/09/24  1456   INR 4.7*       ASSESSMENT     Source(s): Chart Review and Patient/Caregiver Call     Warfarin doses taken: Warfarin taken as instructed  Diet: No new diet changes identified  Medication/supplement changes: None noted  New illness, injury, or hospitalization: No  Signs or symptoms of bleeding or clotting: No  Previous result: Supratherapeutic  Additional findings: Recent heart valve replacement in last 10 weeks       PLAN     Recommended plan for no diet, medication or health factor changes affecting INR     Dosing Instructions: decrease your warfarin dose (11.8% change) with next INR in 5 days       Summary  As of 12/9/2024      Full warfarin instructions:  4.5 mg every Sat; 3 mg all other days   Next INR check:  12/13/2024               Telephone call with Christine Saavedra (GUARDIAN) who agrees to plan and repeated back plan correctly    Lab visit scheduled    Education provided: None required    Plan made with [facility] Pharmacist Pratibha Coelho RN  12/9/2024  Anticoagulation Clinic  [employer] for routing messages: p NIKKI BASURTO  [facility] patient phone line: [phone]        _______________________________________________________________________     Anticoagulation Episode Summary       Current INR goal:  2.5-3.5   TTR:  38.3% (3.4 wk)   Target end date:  Indefinite   Send INR reminders to:  NIKKI BASURTO    Indications    S/P MVR (mitral valve replacement) [Z95.2]  S/P mitral valve replacement (Resolved) [Z95.2]             Comments:  MVR 10/23/24 12 weeks is Dominic 15             Anticoagulation Care Providers       Provider Role Specialty Phone number    Cee Velez MD [facility] [phone]

## 2024-12-09 NOTE — PROGRESS NOTES
[facility] HEART CARE   CARDIOLOGY PROGRESS NOTE     Chief Complaint   Patient presents with    Follow Up     Post hospital          Diagnosis:  MS-severe.  -SMVR on 10/23/24, [facility].    - 23 mm St Durga mechanical valve.  -Severe on 10/15/2024.  -Severe on 7/9/2024, [facility].    -MG 10 mmHg at 95 bpm, [facility].  -Rheumatic.  2.  HFPF.   -BNP of 7347, 10/14/24.  3.  Anemia.  -Acute blood loss from valve replacement.  4.  Elevated troponin.   -Peak 68 on 10/15/2024.  5.  Seizures.  6.  Agenesis of corpus collosum.  7.  Intellectual disability.  8.  Cardiac cath.   -On 10/15/2024, [facility].   -No disease in all vessels.    Assessment/Plan:    1.  CHF: Diastolic in nature in conjunction with anemia.  Reviewed her hospitalization.  She is mentally delayed and chooses poor foods to eat.  Her breakfast starts with tomato soup, she eats pickles chips and other salty foods.  Her mom is trying to change these things but the patient is resistant.  Discussed the importance of proper diet which includes salt restriction, liquid restriction, and daily weights.  She does not over consume on liquids.  She has little to no peripheral edema today.  If she starts having fluid retention, shortness of breath, and swelling, she was told to cautiously give an additional dose of Lasix for total 40 mg.  Otherwise, presently she is stable.  No changes.  2.  Anemia: Concerned she is iron deficient anemic.  Hemoglobin 8.8.  Will add on iron and iron binding capacity and flow, will per by had a short course of iron.  3.  MS.  History of severe mitral stenosis.  Had been to [facility] ER for cough with upper respiratory tract infection.  She had been short of breath.  Symptoms did not improve with inhaler.  Was to have an echocardiogram.  Echocardiogram completed on 7/9/2024 through [facility] showed severe mitral stenosis.  Given the option of [place] or going to the [facility].  [facility]  SVEN on 8/15/2024 suggested moderate mitral  stenosis.  Repeat transthoracic echocardiogram on 10/15/2024 suggested severe mitral stenosis with a mean gradient of 12 mmHg at a heart rate of 77 bpm.  She had mild pulmonary hypertension at 50 mmHg.  Had a cardiac cath on 10/15/2024 without appreciable disease.  Underwent valve replacement on 10/23/2024 at the [facility] with a mechanical mitral valve.  Had a 23 mm Saint Durga valve replacement.  Repeat echocardiogram following the procedure showed normal functioning valve.  The valve was well-seated.  Discussed the importance of an 81 mg aspirin a day and amoxicillin, 2 g 30-60 minutes prior to dental procedures/cleaning.  She does not drive. She is following with the Coumadin clinic through [place].  Stable echocardiogram with valve replacement 10/23/2024.  4.  Follow-up in the future as planned.      Interval history:  See above.         Relevant testing:  Echocardiogram on 12/3/2024:  S/P Mitral valve replacement with a 23 mm St Durga mechanical valve. Removal of  left atrial appendage clot on 10/23/2024.  Global and regional left ventricular function is normal with an EF of 60-65%.  Right ventricular function, chamber size, wall motion, and thickness are  normal.  The mean gradient across the mitral valve is 4 mmHg.Heart rate 100BPM.  Pulmonary artery systolic pressure cannot be assessed.  The inferior vena cava is normal.  Small circumferential pericardial effusion is present without any hemodynamic  significance.  Minimal increase in effusion size. Otherwise no significant change.    ECHO on 10/23/24:  MVR w/ St. Durga mechanical (10/23/24)  The prosthetic mitral valve is well-seated.  Doppler interrogation of the mitral valve is normal.  Trivial pericardial effusion is present.  Global and regional left ventricular function is hyperkinetic with an EF >70%.  Right ventricular function, chamber size, wall motion, and thickness are  normal.  This study was compared with the study from 10/16/2024 .  MVR is  new.    ECHO on 10/15/24:  Global and regional left ventricular function is normal with an EF of 60-65%.  Right ventricular function, chamber size, wall motion, and thickness are  normal.  Rheumatic mitral valve disease is present with severe calcification.  Mild mitral insufficiency is present.  Severe mitral stenosis is present.  The mean gradient across the mitral valve is 12 mmHg. Heart rate 77BPM.  Mild (pulmonary artery systolic pressure<50mmHg) pulmonary hypertension is  present.  IVC diameter and respiratory changes fall into an intermediate range  suggesting an RA pressure of 8 mmHg.  No pericardial effusion is present.    - Non contrast CT 10/16/24:   1. Severe dilation of the left atrium and extensive calcifications along the mitral annulus, unchanged when compared to prior CT 9/24/2024, consistent with mitral stenosis.  2. Enlarged main pulmonary trunk likely sequelae of pulmonary hypertension  3. Persistent interlobular septal thickening consistent with pulmonary edema. Scattered bibasilar predominant groundglass opacities are unchanged, may represent infectious/inflammatory process. Can consider follow-up chest CT in 4-6 weeks to reassess.  4. Mediastinal lymphadenopathy favored to be reactive    - RHC and Cors 10/18:   nonobstructive CAD, mildly elevated right and left sided filling pressures, reduced cardiac output     SVEN on 8/13/24:  SVEN ordered to evaluate mitral valve.  Technically difficult study. Extremely poor acoustic windows.  Moderate mitral stenosis is present. The etiology of the mitral stenosis is  rheumatic. No parachute mitral valve or mitral valve arcade.  The mean gradient across the mitral valve is 7 mmHg at a heart rate of 93 bpm.  Mitral valve pressure half time is 193 ms, which estimates a mitral valve size  of 1.1 cm^2.  Bhatt score = 7 (2 for mobility, 1 for thickening, 3 for calcification, 1  for subvalvular thickening).              No diagnosis found.      Past Medical  History:   Diagnosis Date    Seizures (H)        Past Surgical History:   Procedure Laterality Date    CV CORONARY ANGIOGRAM N/A 10/18/2024    Procedure: Coronary Angiogram;  Surgeon: Hernandez Rivera MD;  Location:  HEART CARDIAC CATH LAB    CV RIGHT HEART CATH MEASUREMENTS RECORDED N/A 10/18/2024    Procedure: Right Heart Catheterization;  Surgeon: Hernandez Rivera MD;  Location:  HEART CARDIAC CATH LAB    feeding tube      REPLACE VALVE MITRAL N/A 10/23/2024    Procedure: Median Sternotomy, Cardiopulmonary Bypass, Mitral Valve Replacement with St Durga Mechanical Valve size 23mm, Interoperative Transesophageal Echocardiogram per Anesthesia;  Surgeon: Deangelo Craig MD;  Location: [facility] OR    TRANSESOPHAGEAL ECHOCARDIOGRAM INTRAOPERATIVE N/A 8/13/2024    Procedure: ECHOCARDIOGRAM, TRANSESOPHAGEAL, WITH ANESTHESIA;  Surgeon: GENERIC ANESTHESIA PROVIDER;  Location: [facility] OR    ventilation tubes, bilateral         Allergies   Allergen Reactions    Azithromycin      Abdominal pain/ cramping     Clotrimazole Rash    Omnicef [Cefdinir] Rash     Itchy and bad rash       Current Outpatient Medications   Medication Sig Dispense Refill    acetaminophen (TYLENOL) 325 MG tablet Take 650 mg by mouth at bedtime. Takes most nights. Patient also has as needed dosing.      acetaminophen (TYLENOL) 325 MG tablet Take 2-3 tablets (650-975 mg) by mouth every 6 hours as needed for pain (For optimal non-opioid multimodal pain management to improve pain control.).      aspirin (ASA) 81 MG chewable tablet Take 1 tablet (81 mg) by mouth every morning. 90 tablet 3    furosemide (LASIX) 20 MG tablet Take 1 tablet (20 mg) by mouth daily. 30 tablet 1    ketoconazole (NIZORAL) 2 % external cream Apply topically daily as needed for itching.      levETIRAcetam (KEPPRA) 250 MG tablet Take 500 mg by mouth 2 times daily. (Morning/6PM)      metoprolol tartrate (LOPRESSOR) 50 MG tablet Take 1 tablet (50 mg) by mouth 2 times  daily. 180 tablet 3    norethindrone-ethinyl estradiol (JUNEL FE 1/20) 1-20 MG-MCG tablet Take 1 tablet by mouth daily. 90 tablet 3    warfarin ANTICOAGULANT (COUMADIN) 3 MG tablet Take 3mg today 12/5, then follow-up with Coumadin Clinic tomorrow 12/6 for INR check and further dosing instructions.      amoxicillin (AMOXIL) 500 MG capsule Take 2 g, 4 pills, 30-60 minutes prior to dental procedures/cleanings (Patient not taking: Reported on 12/9/2024) 4 capsule 3       Social History     Socioeconomic History    Marital status: Single     Spouse name: Not on file    Number of children: Not on file    Years of education: Not on file    Highest education level: Not on file   Occupational History    Not on file   Tobacco Use    Smoking status: Never     Passive exposure: Never    Smokeless tobacco: Never    Tobacco comments:     no passive exposure   Vaping Use    Vaping status: Never Used   Substance and Sexual Activity    Alcohol use: No    Drug use: No    Sexual activity: Not on file   Other Topics Concern     Service Not Asked    Blood Transfusions Not Asked    Caffeine Concern No    Occupational Exposure Not Asked    Hobby Hazards Not Asked    Sleep Concern Not Asked    Stress Concern Not Asked    Weight Concern Not Asked    Special Diet Not Asked    Back Care Not Asked    Exercise Not Asked    Bike Helmet Not Asked    Seat Belt Not Asked    Self-Exams Not Asked    Parent/sibling w/ CABG, MI or angioplasty before 65F 55M? No   Social History Narrative    Not on file     Social Drivers of Health     Financial Resource Strain: Low Risk  (12/3/2024)    Financial Resource Strain     Within the past 12 months, have you or your family members you live with been unable to get utilities (heat, electricity) when it was really needed?: No   Food Insecurity: Low Risk  (12/3/2024)    Food Insecurity     Within the past 12 months, did you worry that your food would run out before you got money to buy more?: No     Within  the past 12 months, did the food you bought just not last and you didn t have money to get more?: No   Transportation Needs: Low Risk  (12/3/2024)    Transportation Needs     Within the past 12 months, has lack of transportation kept you from medical appointments, getting your medicines, non-medical meetings or appointments, work, or from getting things that you need?: No   Physical Activity: Not on file   Stress: Not on file   Social Connections: Not on file   Interpersonal Safety: Low Risk  (12/3/2024)    Interpersonal Safety     Do you feel physically and emotionally safe where you currently live?: Yes     Within the past 12 months, have you been hit, slapped, kicked or otherwise physically hurt by someone?: No     Within the past 12 months, have you been humiliated or emotionally abused in other ways by your partner or ex-partner?: No   Housing Stability: Low Risk  (12/3/2024)    Housing Stability     Do you have housing? : Yes     Are you worried about losing your housing?: No   Recent Concern: Housing Stability - High Risk (11/26/2024)    Housing Stability     Do you have housing? : No     Are you worried about losing your housing?: No       LAB RESULTS:   Lab on 11/14/2024   Component Date Value Ref Range Status    INR 11/14/2024 3.3 (H)  0.9 - 1.1 Final   Lab on 11/11/2024   Component Date Value Ref Range Status    INR 11/11/2024 4.2 (H)  0.9 - 1.1 Final   Lab on 11/07/2024   Component Date Value Ref Range Status    INR 11/07/2024 3.0 (H)  0.9 - 1.1 Final   Office Visit on 11/05/2024   Component Date Value Ref Range Status    INR 11/05/2024 2.4 (H)  0.9 - 1.1 Final   Lab on 11/04/2024   Component Date Value Ref Range Status    INR 11/04/2024 1.91 (H)  0.85 - 1.15 Final   Office Visit on 10/30/2024   Component Date Value Ref Range Status    INR 10/30/2024 1.71 (H)  0.85 - 1.15 Final   Lab on 10/29/2024   Component Date Value Ref Range Status    INR 10/29/2024 1.51 (H)  0.85 - 1.15 Final   Lab on 09/26/2024  "  Component Date Value Ref Range Status    Iron 09/26/2024 112  37 - 145 ug/dL Final    Iron Binding Capacity 09/26/2024 321  240 - 430 ug/dL Final    Iron Sat Index 09/26/2024 35  15 - 46 % Final    Vitamin B12 09/26/2024 1,317 (H)  232 - 1,245 pg/mL Final    WBC Count 09/26/2024 9.3  4.0 - 11.0 10e3/uL Final    RBC Count 09/26/2024 5.14  3.80 - 5.20 10e6/uL Final    Hemoglobin 09/26/2024 15.0  11.7 - 15.7 g/dL Final    Hematocrit 09/26/2024 44.3  35.0 - 47.0 % Final    MCV 09/26/2024 86  78 - 100 fL Final    MCH 09/26/2024 29.2  26.5 - 33.0 pg Final    MCHC 09/26/2024 33.9  31.5 - 36.5 g/dL Final    RDW 09/26/2024 13.3  10.0 - 15.0 % Final    Platelet Count 09/26/2024 197  150 - 450 10e3/uL Final    % Neutrophils 09/26/2024 77  % Final    % Lymphocytes 09/26/2024 9  % Final    % Monocytes 09/26/2024 13  % Final    % Eosinophils 09/26/2024 1  % Final    % Basophils 09/26/2024 1  % Final    % Immature Granulocytes 09/26/2024 0  % Final    NRBCs per 100 WBC 09/26/2024 0  <1 /100 Final    Absolute Neutrophils 09/26/2024 7.2  1.6 - 8.3 10e3/uL Final    Absolute Lymphocytes 09/26/2024 0.8  0.8 - 5.3 10e3/uL Final    Absolute Monocytes 09/26/2024 1.2  0.0 - 1.3 10e3/uL Final    Absolute Eosinophils 09/26/2024 0.1  0.0 - 0.7 10e3/uL Final    Absolute Basophils 09/26/2024 0.1  0.0 - 0.2 10e3/uL Final    Absolute Immature Granulocytes 09/26/2024 0.0  <=0.4 10e3/uL Final    Absolute NRBCs 09/26/2024 0.0  10e3/uL Final        Review of systems: Negative except that which was noted in the HPI.    Physical examination:  /67   Pulse 83   Resp 18   Ht 1.473 m (4' 10\")   Wt 32.7 kg (72 lb 3.2 oz)   LMP 11/20/2024   SpO2 98%   BMI 15.09 kg/m      GENERAL APPEARANCE: healthy, alert and no distress  CHEST: lungs clear to auscultation - no rales, rhonchi or wheezes, no use of accessory muscles, no retractions, respirations are unlabored, normal respiratory rate  CARDIOVASCULAR: regular rhythm, normal S1 with physiologic " split S2, no S3 or S4 and no murmur, click or rub  EXTREMITIES: no clubbing, cyanosis or edema.    Total time spent on day of visit, including review of tests, obtaining/reviewing separately obtained history, ordering medications/tests/procedures, communicating with PCP/consultants, and documenting in electronic medical record: 30 minutes.              Thank you for allowing me to participate in the care of your patient. Please do not hesitate to contact me if you have any questions.     Beto Cloud, DO

## 2024-12-10 LAB — BACTERIA BLD CULT: NO GROWTH

## 2024-12-13 ENCOUNTER — APPOINTMENT (OUTPATIENT)
Dept: LAB | Facility: OTHER | Age: 44
End: 2024-12-13
Attending: FAMILY MEDICINE
Payer: MEDICARE

## 2024-12-16 DIAGNOSIS — Z95.2 S/P MVR (MITRAL VALVE REPLACEMENT): Primary | ICD-10-CM

## 2024-12-16 RX ORDER — WARFARIN SODIUM 3 MG/1
TABLET ORAL
Qty: 150 TABLET | Refills: 1 | Status: SHIPPED | OUTPATIENT
Start: 2024-12-16

## 2024-12-16 NOTE — TELEPHONE ENCOUNTER
ANTICOAGULATION MANAGEMENT:  Medication Refill    Anticoagulation Summary  As of 12/13/2024      Warfarin maintenance plan:  4.5 mg (3 mg x 1.5) every Sat; 3 mg (3 mg x 1) all other days   Next INR check:  12/17/2024   Target end date:  Indefinite    Indications    S/P MVR (mitral valve replacement) [Z95.2]  S/P mitral valve replacement (Resolved) [Z95.2]                 Anticoagulation Care Providers       Provider Role Specialty Phone number    Cee Velez MD [facility] [phone]            Refill Criteria    Visit with referring provider/group: Meets criteria: visit within referring provider group in the last 15 months on 12/13/24    [facility] referral last signed: 11/03/2024; within last year:  Yes    Lab monitoring not exceeding 2 weeks overdue: Yes    Efren meets all criteria for refill. Rx instructions and quantity supplied updated to match patient's current dosing plan. Warfarin 90 day supply with 1 refill granted per [facility] protocol     Cassie Coelho RN  Anticoagulation Clinic

## 2024-12-16 NOTE — PROGRESS NOTES
Chief Complaint   Patient presents with    Ear Problem     Ear Cleaning         She has been doing well with her ears since her last visit. She did have a MVR repair and had been followed with Dr. Velez and Cardiology.   She does have rehab in future. Sophia states breathing/ congestion has been improving. They feel she has been making improvements.     Denies otalgia, otorrhea.   No concerns with tinnitus, noises in ears.     She does have hearing loss which has been overall stable and recommended use of hearing aids.  Efren does not tolerate use and declines a to use on a daily basis.  We reviewed consideration for repeat imaging and at this time.  Have declined.  They have declined further options of possible consideration for [place] referral to otology      Distant hx of BTT with placement of t-tubes. Tubes have been in place for 30 years.    her seizures have been doing well w/ her medications.   She has been on medications w/o concerns.        Past Medical History:   Diagnosis Date    Seizures (H)           Allergies   Allergen Reactions    Azithromycin      Abdominal pain/ cramping     Clotrimazole Rash    Omnicef [Cefdinir] Rash     Itchy and bad rash       Current Outpatient Medications   Medication Sig Dispense Refill    acetaminophen (TYLENOL) 325 MG tablet Take 650 mg by mouth at bedtime. Takes most nights. Patient also has as needed dosing.      acetaminophen (TYLENOL) 325 MG tablet Take 2-3 tablets (650-975 mg) by mouth every 6 hours as needed for pain (For optimal non-opioid multimodal pain management to improve pain control.).      amoxicillin (AMOXIL) 500 MG capsule Take 2 g, 4 pills, 30-60 minutes prior to dental procedures/cleanings 4 capsule 3    aspirin (ASA) 81 MG chewable tablet Take 1 tablet (81 mg) by mouth every morning. 90 tablet 3    ferrous sulfate (FEROSUL) 325 (65 Fe) MG tablet Take 1 tablet (325 mg) by mouth daily (with breakfast). 90 tablet 0    furosemide (LASIX) 20 MG tablet Take 1  "tablet (20 mg) by mouth daily. 30 tablet 1    ketoconazole (NIZORAL) 2 % external cream Apply topically daily as needed for itching.      levETIRAcetam (KEPPRA) 250 MG tablet Take 500 mg by mouth 2 times daily. (Morning/6PM)      metoprolol tartrate (LOPRESSOR) 50 MG tablet Take 1 tablet (50 mg) by mouth 2 times daily. 180 tablet 3    norethindrone-ethinyl estradiol (JUNEL FE 1/20) 1-20 MG-MCG tablet Take 1 tablet by mouth daily. 90 tablet 3    warfarin ANTICOAGULANT (COUMADIN) 3 MG tablet Take 4.5 mg daily or as directed by protime clinic 150 tablet 1     No current facility-administered medications for this visit.     ROS- SEE HPI  /77 (BP Location: Right arm, Patient Position: Sitting, Cuff Size: Adult Small)   Pulse 78   Temp 98  F (36.7  C) (Tympanic)   Ht 1.473 m (4' 9.99\")   Wt 32.9 kg (72 lb 8.5 oz)   LMP 11/20/2024   SpO2 99%   BMI 15.16 kg/m        General: Craniofacial abnormality.   Eyes: conjuctiva clear  Ears: Canals overall with debris bilaterally. Narrow canals. . Low set auricles with poor antihelical folds defined. t-tube bilaterally. Gila bowl bilateral overall look well.   Nose: Nares normal   Mouth: crowded.   Neck: Supple, no cervical adenopathy, no thyromegaly      The ear canals were examined underneath the operating microscope and with an otologic speculum. Canals, stenotic   Bilateral canals-left EAC with scant cerumen.  I was able to visualize tympanostomy tube which appears patent.  Canal was cleaned.  Miconazole powder was applied.  Again tympanostomy tube appears patent.  Tympanic membrane appears thickened sclerosis and prominent vasculature.  Right EAC has crusting and purulent otorrhea.  Tube is with active otorrhea.  Ear culture was obtained.  Canal cleaned with #5 Blanco #3 Blanco through lumen of tube.  TM appears thickened.  Lumen partially obstructed with otorrhea despite suctioning.    Narrow canals which limits examination.  Tympanic membranes appear with " tubes. TMs are thickened and appear with sclerosis, prominent vasculature.           ASSESSMENT/ PLAN:    ICD-10-CM    1. Otorrhea of both ears  H92.13 miconazole (MICATIN) 2 % powder     ofloxacin (FLOXIN) 0.3 % otic solution     Anaerobic Bacterial Culture Routine     Fungal or Yeast Culture Routine     Respiratory Aerobic Bacterial Culture with Gram Stain     Respiratory Aerobic Bacterial Culture with Gram Stain     Anaerobic Bacterial Culture Routine     Fungal or Yeast Culture Routine      2. Chronic otitis media after insertion of tympanic ventilation tube, bilateral  H66.93     Z96.22       3. Mixed conductive and sensorineural hearing loss, bilateral  H90.6       4. Congenital hearing loss  H90.5             Left EAC overall appears stable.  Powder was applied.  Tube appears in good position and patent.    Right EAC was cleaned and ear culture is pending.  Start Floxin 5 drops twice daily for 7 days to right ear.  Follow-up in 2 to 3 weeks for repeat debridement.  Hopefully will allow for powder application at that time and return every 2 to 3 months for ear exams.    Will follow culture and adjust accordingly.        Marcelina Oneal PA-C  ENT  [facility]

## 2024-12-17 ENCOUNTER — LAB (OUTPATIENT)
Dept: LAB | Facility: OTHER | Age: 44
End: 2024-12-17
Attending: PHYSICIAN ASSISTANT
Payer: MEDICARE

## 2024-12-17 ENCOUNTER — ANTICOAGULATION THERAPY VISIT (OUTPATIENT)
Dept: ANTICOAGULATION | Facility: OTHER | Age: 44
End: 2024-12-17
Attending: FAMILY MEDICINE
Payer: MEDICARE

## 2024-12-17 ENCOUNTER — OFFICE VISIT (OUTPATIENT)
Dept: OTOLARYNGOLOGY | Facility: OTHER | Age: 44
End: 2024-12-17
Attending: PHYSICIAN ASSISTANT
Payer: MEDICARE

## 2024-12-17 VITALS
HEIGHT: 58 IN | TEMPERATURE: 98 F | WEIGHT: 72.53 LBS | OXYGEN SATURATION: 99 % | SYSTOLIC BLOOD PRESSURE: 122 MMHG | BODY MASS INDEX: 15.23 KG/M2 | DIASTOLIC BLOOD PRESSURE: 77 MMHG | HEART RATE: 78 BPM

## 2024-12-17 DIAGNOSIS — Z95.2 S/P MVR (MITRAL VALVE REPLACEMENT): ICD-10-CM

## 2024-12-17 DIAGNOSIS — H66.93 CHRONIC OTITIS MEDIA AFTER INSERTION OF TYMPANIC VENTILATION TUBE, BILATERAL: ICD-10-CM

## 2024-12-17 DIAGNOSIS — R79.82 ELEVATED C-REACTIVE PROTEIN (CRP): ICD-10-CM

## 2024-12-17 DIAGNOSIS — Z96.22 CHRONIC OTITIS MEDIA AFTER INSERTION OF TYMPANIC VENTILATION TUBE, BILATERAL: ICD-10-CM

## 2024-12-17 DIAGNOSIS — H90.6 MIXED CONDUCTIVE AND SENSORINEURAL HEARING LOSS, BILATERAL: ICD-10-CM

## 2024-12-17 DIAGNOSIS — D64.9 ANEMIA, UNSPECIFIED TYPE: ICD-10-CM

## 2024-12-17 DIAGNOSIS — H90.5 CONGENITAL HEARING LOSS: ICD-10-CM

## 2024-12-17 DIAGNOSIS — I05.9 MITRAL VALVE DISEASE: ICD-10-CM

## 2024-12-17 DIAGNOSIS — Z95.2 S/P MVR (MITRAL VALVE REPLACEMENT): Primary | ICD-10-CM

## 2024-12-17 DIAGNOSIS — H92.13 OTORRHEA OF BOTH EARS: Primary | ICD-10-CM

## 2024-12-17 LAB
BASOPHILS # BLD AUTO: 0.1 10E3/UL (ref 0–0.2)
BASOPHILS NFR BLD AUTO: 1 %
CRP SERPL-MCNC: 8.29 MG/L
EOSINOPHIL # BLD AUTO: 0.3 10E3/UL (ref 0–0.7)
EOSINOPHIL NFR BLD AUTO: 3 %
ERYTHROCYTE [DISTWIDTH] IN BLOOD BY AUTOMATED COUNT: 15.3 % (ref 10–15)
HCT VFR BLD AUTO: 43.4 % (ref 35–47)
HGB BLD-MCNC: 13.5 G/DL (ref 11.7–15.7)
IMM GRANULOCYTES # BLD: 0.1 10E3/UL
IMM GRANULOCYTES NFR BLD: 1 %
INR BLD: 3 (ref 0.9–1.1)
IRON BINDING CAPACITY (ROCHE): 461 UG/DL (ref 240–430)
IRON SATN MFR SERPL: 56 % (ref 15–46)
IRON SERPL-MCNC: 257 UG/DL (ref 37–145)
LYMPHOCYTES # BLD AUTO: 0.9 10E3/UL (ref 0.8–5.3)
LYMPHOCYTES NFR BLD AUTO: 9 %
MCH RBC QN AUTO: 25 PG (ref 26.5–33)
MCHC RBC AUTO-ENTMCNC: 31.1 G/DL (ref 31.5–36.5)
MCV RBC AUTO: 81 FL (ref 78–100)
MONOCYTES # BLD AUTO: 1.1 10E3/UL (ref 0–1.3)
MONOCYTES NFR BLD AUTO: 11 %
NEUTROPHILS # BLD AUTO: 8.1 10E3/UL (ref 1.6–8.3)
NEUTROPHILS NFR BLD AUTO: 76 %
NRBC # BLD AUTO: 0 10E3/UL
NRBC BLD AUTO-RTO: 0 /100
PLATELET # BLD AUTO: 613 10E3/UL (ref 150–450)
RBC # BLD AUTO: 5.39 10E6/UL (ref 3.8–5.2)
WBC # BLD AUTO: 10.6 10E3/UL (ref 4–11)

## 2024-12-17 PROCEDURE — 36415 COLL VENOUS BLD VENIPUNCTURE: CPT | Mod: ZL

## 2024-12-17 PROCEDURE — 83540 ASSAY OF IRON: CPT | Mod: ZL

## 2024-12-17 PROCEDURE — G0463 HOSPITAL OUTPT CLINIC VISIT: HCPCS

## 2024-12-17 PROCEDURE — 83550 IRON BINDING TEST: CPT | Mod: ZL

## 2024-12-17 PROCEDURE — 85025 COMPLETE CBC W/AUTO DIFF WBC: CPT | Mod: ZL

## 2024-12-17 PROCEDURE — 87102 FUNGUS ISOLATION CULTURE: CPT | Mod: ZL | Performed by: PHYSICIAN ASSISTANT

## 2024-12-17 PROCEDURE — 87205 SMEAR GRAM STAIN: CPT | Mod: ZL | Performed by: PHYSICIAN ASSISTANT

## 2024-12-17 PROCEDURE — 85610 PROTHROMBIN TIME: CPT | Mod: ZL

## 2024-12-17 PROCEDURE — 87075 CULTR BACTERIA EXCEPT BLOOD: CPT | Mod: ZL | Performed by: PHYSICIAN ASSISTANT

## 2024-12-17 PROCEDURE — 86140 C-REACTIVE PROTEIN: CPT | Mod: ZL

## 2024-12-17 RX ORDER — OFLOXACIN 3 MG/ML
5 SOLUTION AURICULAR (OTIC) 2 TIMES DAILY
Qty: 5 ML | Refills: 0 | Status: SHIPPED | OUTPATIENT
Start: 2024-12-17 | End: 2024-12-24

## 2024-12-17 ASSESSMENT — PAIN SCALES - GENERAL: PAINLEVEL_OUTOF10: NO PAIN (0)

## 2024-12-17 NOTE — PROGRESS NOTES
ANTICOAGULATION MANAGEMENT     Efren Saavedra 44 year old female is on warfarin with therapeutic INR result. (Goal INR 2.5-3.5)    Recent labs: (last 7 days)     12/17/24  1006   INR 3.0*       ASSESSMENT     Source(s): Chart Review and Patient/Caregiver Call     Warfarin doses taken: Warfarin taken as instructed  Diet: No new diet changes identified  Medication/supplement changes:  Floxin 7 day course (dates: 12/17-12/24) subsequent INRs may be increased. Closer INR monitoring recommended.  New illness, injury, or hospitalization: No  Signs or symptoms of bleeding or clotting: No  Previous result: Therapeutic last visit; previously outside of goal range  Additional findings: Recent heart valve replacement in last 10 weeks       PLAN     Recommended plan for temporary change(s) affecting INR     Dosing Instructions: Continue your current warfarin dose with next INR in 4 days       Summary  As of 12/17/2024      Full warfarin instructions:  4.5 mg every Sat; 3 mg all other days   Next INR check:  12/20/2024               Telephone call with Christine Keri (GUARDIAN) who verbalizes understanding and agrees to plan    Lab visit scheduled    Education provided: Interaction IS anticipated between warfarin and floxin    Plan made with [facility] Pharmacist Pratibha Coelho RN  12/17/2024  Anticoagulation Clinic  [employer] for routing messages: p NIKKI BASURTO  [facility] patient phone line: [phone]        _______________________________________________________________________     Anticoagulation Episode Summary       Current INR goal:  2.5-3.5   TTR:  44.5% (1.1 mo)   Target end date:  Indefinite   Send INR reminders to:  NIKKI BASURTO    Indications    S/P MVR (mitral valve replacement) [Z95.2]  S/P mitral valve replacement (Resolved) [Z95.2]             Comments:  MVR 10/23/24 12 weeks is Dominic 15             Anticoagulation Care Providers       Provider Role Specialty Phone number    Cee Velez,  MD [facility] [phone]

## 2024-12-17 NOTE — PATIENT INSTRUCTIONS
Start Floxin drops 5 drops twice a day for 7 days to right ear  Left ear was overall clear, powder applied  Follow up in 2-3 weeks  Ear culture is pending. Will call with results.     Thank you for allowing LEONOR Avitia and our ENT team to participate in your care.  If your medications are too expensive, please give the nurse a call.  We can possibly change this medication.  If you have a scheduling or an appointment question please contact our Health Unit Coordinator at their direct line [phone].   ALL nursing questions or concerns can be directed to your ENT nurse, [place] at: [phone].     Stable

## 2024-12-17 NOTE — LETTER
12/17/2024      Efren Saavedra  [address] [place] [address]      Dear Colleague,    Thank you for referring your patient, Efren Saavedra, to the [facility]. Please see a copy of my visit note below.    Chief Complaint   Patient presents with     Ear Problem     Ear Cleaning         She has been doing well with her ears since her last visit. She did have a MVR repair and had been followed with Dr. Velez and Cardiology.   She does have rehab in future. Sophia states breathing/ congestion has been improving. They feel she has been making improvements.     Denies otalgia, otorrhea.   No concerns with tinnitus, noises in ears.     She does have hearing loss which has been overall stable and recommended use of hearing aids.  Efren does not tolerate use and declines a to use on a daily basis.  We reviewed consideration for repeat imaging and at this time.  Have declined.  They have declined further options of possible consideration for [facility] referral to otology      Distant hx of BTT with placement of t-tubes. Tubes have been in place for 30 years.    her seizures have been doing well w/ her medications.   She has been on medications w/o concerns.        Past Medical History:   Diagnosis Date     Seizures (H)           Allergies   Allergen Reactions     Azithromycin      Abdominal pain/ cramping      Clotrimazole Rash     Omnicef [Cefdinir] Rash     Itchy and bad rash       Current Outpatient Medications   Medication Sig Dispense Refill     acetaminophen (TYLENOL) 325 MG tablet Take 650 mg by mouth at bedtime. Takes most nights. Patient also has as needed dosing.       acetaminophen (TYLENOL) 325 MG tablet Take 2-3 tablets (650-975 mg) by mouth every 6 hours as needed for pain (For optimal non-opioid multimodal pain management to improve pain control.).       amoxicillin (AMOXIL) 500 MG capsule Take 2 g, 4 pills, 30-60 minutes prior to dental procedures/cleanings 4 capsule 3     aspirin  "(ASA) 81 MG chewable tablet Take 1 tablet (81 mg) by mouth every morning. 90 tablet 3     ferrous sulfate (FEROSUL) 325 (65 Fe) MG tablet Take 1 tablet (325 mg) by mouth daily (with breakfast). 90 tablet 0     furosemide (LASIX) 20 MG tablet Take 1 tablet (20 mg) by mouth daily. 30 tablet 1     ketoconazole (NIZORAL) 2 % external cream Apply topically daily as needed for itching.       levETIRAcetam (KEPPRA) 250 MG tablet Take 500 mg by mouth 2 times daily. (Morning/6PM)       metoprolol tartrate (LOPRESSOR) 50 MG tablet Take 1 tablet (50 mg) by mouth 2 times daily. 180 tablet 3     norethindrone-ethinyl estradiol (JUNEL FE 1/20) 1-20 MG-MCG tablet Take 1 tablet by mouth daily. 90 tablet 3     warfarin ANTICOAGULANT (COUMADIN) 3 MG tablet Take 4.5 mg daily or as directed by [facility] 150 tablet 1     No current facility-administered medications for this visit.     ROS- SEE HPI  /77 (BP Location: Right arm, Patient Position: Sitting, Cuff Size: Adult Small)   Pulse 78   Temp 98  F (36.7  C) (Tympanic)   Ht 1.473 m (4' 9.99\")   Wt 32.9 kg (72 lb 8.5 oz)   LMP 11/20/2024   SpO2 99%   BMI 15.16 kg/m        General: Craniofacial abnormality.   Eyes: conjuctiva clear  Ears: Canals overall with debris bilaterally. Narrow canals. . Low set auricles with poor antihelical folds defined. t-tube bilaterally. Gila bowl bilateral overall look well.   Nose: Nares normal   Mouth: crowded.   Neck: Supple, no cervical adenopathy, no thyromegaly      The ear canals were examined underneath the operating microscope and with an otologic speculum. Canals, stenotic   Bilateral canals-left EAC with scant cerumen.  I was able to visualize tympanostomy tube which appears patent.  Canal was cleaned.  Miconazole powder was applied.  Again tympanostomy tube appears patent.  Tympanic membrane appears thickened sclerosis and prominent vasculature.  Right EAC has crusting and purulent otorrhea.  Tube is with active otorrhea.  " Ear culture was obtained.  Canal cleaned with #5 Blanco #3 Blanco through lumen of tube.  TM appears thickened.  Lumen partially obstructed with otorrhea despite suctioning.    Narrow canals which limits examination.  Tympanic membranes appear with tubes. TMs are thickened and appear with sclerosis, prominent vasculature.           ASSESSMENT/ PLAN:    ICD-10-CM    1. Otorrhea of both ears  H92.13 miconazole (MICATIN) 2 % powder     ofloxacin (FLOXIN) 0.3 % otic solution     Anaerobic Bacterial Culture Routine     Fungal or Yeast Culture Routine     Respiratory Aerobic Bacterial Culture with Gram Stain     Respiratory Aerobic Bacterial Culture with Gram Stain     Anaerobic Bacterial Culture Routine     Fungal or Yeast Culture Routine      2. Chronic otitis media after insertion of tympanic ventilation tube, bilateral  H66.93     Z96.22       3. Mixed conductive and sensorineural hearing loss, bilateral  H90.6       4. Congenital hearing loss  H90.5             Left EAC overall appears stable.  Powder was applied.  Tube appears in good position and patent.    Right EAC was cleaned and ear culture is pending.  Start Floxin 5 drops twice daily for 7 days to right ear.  Follow-up in 2 to 3 weeks for repeat debridement.  Hopefully will allow for powder application at that time and return every 2 to 3 months for ear exams.    Will follow culture and adjust accordingly.        Marcelina Oneal PA-C  ENT  [facility] [place]          Again, thank you for allowing me to participate in the care of your patient.        Sincerely,        Marcelina Oneal PA-C

## 2024-12-19 LAB
BACTERIA SPEC CULT: ABNORMAL
BACTERIA SPEC CULT: NORMAL
BACTERIA SPEC CULT: NORMAL
GRAM STAIN RESULT: ABNORMAL

## 2024-12-20 ENCOUNTER — LAB (OUTPATIENT)
Dept: LAB | Facility: OTHER | Age: 44
End: 2024-12-20
Payer: MEDICARE

## 2024-12-20 DIAGNOSIS — I05.9 MITRAL VALVE DISEASE: ICD-10-CM

## 2024-12-20 DIAGNOSIS — Z95.2 S/P MVR (MITRAL VALVE REPLACEMENT): ICD-10-CM

## 2024-12-20 LAB — INR BLD: 2 (ref 0.9–1.1)

## 2024-12-20 PROCEDURE — 36416 COLLJ CAPILLARY BLOOD SPEC: CPT | Mod: ZL

## 2024-12-20 PROCEDURE — 85610 PROTHROMBIN TIME: CPT | Mod: ZL

## 2024-12-24 ENCOUNTER — LAB (OUTPATIENT)
Dept: LAB | Facility: OTHER | Age: 44
End: 2024-12-24
Attending: FAMILY MEDICINE
Payer: MEDICARE

## 2024-12-24 ENCOUNTER — ANTICOAGULATION THERAPY VISIT (OUTPATIENT)
Dept: ANTICOAGULATION | Facility: OTHER | Age: 44
End: 2024-12-24
Attending: FAMILY MEDICINE
Payer: MEDICARE

## 2024-12-24 DIAGNOSIS — Z95.2 S/P MVR (MITRAL VALVE REPLACEMENT): Primary | ICD-10-CM

## 2024-12-24 DIAGNOSIS — Z95.2 S/P MVR (MITRAL VALVE REPLACEMENT): ICD-10-CM

## 2024-12-24 DIAGNOSIS — I05.9 MITRAL VALVE DISEASE: ICD-10-CM

## 2024-12-24 LAB
BACTERIA SPEC CULT: NORMAL
INR BLD: 3 (ref 0.9–1.1)

## 2024-12-24 PROCEDURE — 36416 COLLJ CAPILLARY BLOOD SPEC: CPT | Mod: ZL

## 2024-12-24 PROCEDURE — 85610 PROTHROMBIN TIME: CPT | Mod: ZL

## 2024-12-24 NOTE — PROGRESS NOTES
ANTICOAGULATION MANAGEMENT     Efren Saavedra 44 year old female is on warfarin with therapeutic INR result. (Goal INR 2.5-3.5)    Recent labs: (last 7 days)     12/24/24  0957   INR 3.0*       ASSESSMENT     Source(s): Chart Review and Patient/Caregiver Call     Warfarin doses taken: Warfarin taken as instructed  Diet: No new diet changes identified  Medication/supplement changes: None noted  New illness, injury, or hospitalization: No  Signs or symptoms of bleeding or clotting: No  Previous result: Subtherapeutic  Additional findings: None       PLAN     Recommended plan for no diet, medication or health factor changes affecting INR     Dosing Instructions: Increase your warfarin dose (5.6% change) with next INR in 4 days       Summary  As of 12/24/2024      Full warfarin instructions:  3 mg every Mon, Fri; 4.5 mg all other days   Next INR check:  12/27/2024               Telephone call with Christine Saavedra (GUARDIAN) who verbalizes understanding and agrees to plan    Lab visit scheduled    Education provided: None required    Plan made with [facility] Pharmacist Kat Coelho RN  12/24/2024  Anticoagulation Clinic  [employer] for routing messages: bill BASURTO  [facility] patient phone line: [phone]        _______________________________________________________________________     Anticoagulation Episode Summary       Current INR goal:  2.5-3.5   TTR:  45.4% (1.3 mo)   Target end date:  Indefinite   Send INR reminders to:  NIKKI BASURTO    Indications    S/P MVR (mitral valve replacement) [Z95.2]  S/P mitral valve replacement (Resolved) [Z95.2]             Comments:  MVR 10/23/24 12 weeks is Dominic 15             Anticoagulation Care Providers       Provider Role Specialty Phone number    Cee Velez MD [facility] [phone]

## 2024-12-26 LAB — BACTERIA SPEC CULT: NORMAL

## 2024-12-27 ENCOUNTER — LAB (OUTPATIENT)
Dept: LAB | Facility: OTHER | Age: 44
End: 2024-12-27
Payer: MEDICARE

## 2024-12-27 DIAGNOSIS — Z95.2 S/P MVR (MITRAL VALVE REPLACEMENT): ICD-10-CM

## 2024-12-27 DIAGNOSIS — I05.9 MITRAL VALVE DISEASE: ICD-10-CM

## 2024-12-27 LAB — INR BLD: 3.3 (ref 0.9–1.1)

## 2024-12-27 PROCEDURE — 85610 PROTHROMBIN TIME: CPT | Mod: ZL

## 2024-12-27 PROCEDURE — 36416 COLLJ CAPILLARY BLOOD SPEC: CPT | Mod: ZL

## 2024-12-30 ENCOUNTER — HOSPITAL ENCOUNTER (OUTPATIENT)
Dept: CARDIAC REHAB | Facility: HOSPITAL | Age: 44
Discharge: HOME | End: 2024-12-30
Attending: SURGERY
Payer: MEDICARE

## 2024-12-30 DIAGNOSIS — Z95.2 S/P MVR (MITRAL VALVE REPLACEMENT): ICD-10-CM

## 2024-12-30 PROCEDURE — 93798 PHYS/QHP OP CAR RHAB W/ECG: CPT

## 2024-12-30 PROCEDURE — 999N000109 HC STATISTIC OP CR VISIT

## 2025-01-02 LAB — BACTERIA SPEC CULT: NORMAL

## 2025-01-03 ENCOUNTER — LAB (OUTPATIENT)
Dept: LAB | Facility: OTHER | Age: 45
End: 2025-01-03
Attending: PHYSICIAN ASSISTANT
Payer: MEDICARE

## 2025-01-03 DIAGNOSIS — Z95.2 S/P MVR (MITRAL VALVE REPLACEMENT): ICD-10-CM

## 2025-01-03 DIAGNOSIS — I05.9 MITRAL VALVE DISEASE: ICD-10-CM

## 2025-01-03 LAB — INR BLD: 3.6 (ref 0.9–1.1)

## 2025-01-03 PROCEDURE — 85610 PROTHROMBIN TIME: CPT | Mod: ZL

## 2025-01-03 PROCEDURE — 36416 COLLJ CAPILLARY BLOOD SPEC: CPT | Mod: ZL

## 2025-01-06 ENCOUNTER — HOSPITAL ENCOUNTER (OUTPATIENT)
Dept: CARDIAC REHAB | Facility: HOSPITAL | Age: 45
Discharge: HOME | End: 2025-01-06
Attending: SURGERY
Payer: MEDICARE

## 2025-01-06 PROCEDURE — 999N000109 HC STATISTIC OP CR VISIT

## 2025-01-06 PROCEDURE — 93798 PHYS/QHP OP CAR RHAB W/ECG: CPT

## 2025-01-08 ENCOUNTER — HOSPITAL ENCOUNTER (OUTPATIENT)
Dept: CARDIAC REHAB | Facility: HOSPITAL | Age: 45
Discharge: HOME | End: 2025-01-08
Attending: SURGERY
Payer: MEDICARE

## 2025-01-08 PROCEDURE — 93798 PHYS/QHP OP CAR RHAB W/ECG: CPT

## 2025-01-08 PROCEDURE — 999N000109 HC STATISTIC OP CR VISIT

## 2025-01-09 LAB — BACTERIA SPEC CULT: NORMAL

## 2025-01-13 ENCOUNTER — LAB (OUTPATIENT)
Dept: LAB | Facility: OTHER | Age: 45
End: 2025-01-13
Payer: MEDICARE

## 2025-01-13 ENCOUNTER — ANTICOAGULATION THERAPY VISIT (OUTPATIENT)
Dept: ANTICOAGULATION | Facility: OTHER | Age: 45
End: 2025-01-13
Attending: FAMILY MEDICINE
Payer: MEDICARE

## 2025-01-13 DIAGNOSIS — I05.9 MITRAL VALVE DISEASE: ICD-10-CM

## 2025-01-13 DIAGNOSIS — Z95.2 S/P MVR (MITRAL VALVE REPLACEMENT): Primary | ICD-10-CM

## 2025-01-13 DIAGNOSIS — Z95.2 S/P MVR (MITRAL VALVE REPLACEMENT): ICD-10-CM

## 2025-01-13 LAB — INR BLD: 3.1 (ref 0.9–1.1)

## 2025-01-13 PROCEDURE — 36416 COLLJ CAPILLARY BLOOD SPEC: CPT | Mod: ZL

## 2025-01-13 PROCEDURE — 85610 PROTHROMBIN TIME: CPT | Mod: ZL

## 2025-01-13 NOTE — PROGRESS NOTES
ANTICOAGULATION MANAGEMENT     Efren Saavedra 45 year old female is on warfarin with therapeutic INR result. (Goal INR 2.5-3.5)    Recent labs: (last 7 days)     01/13/25  0909   INR 3.1*       ASSESSMENT     Source(s): Chart Review and Patient/Caregiver Call     Warfarin doses taken: Warfarin taken as instructed  Diet: No new diet changes identified  Medication/supplement changes: None noted  New illness, injury, or hospitalization: No  Signs or symptoms of bleeding or clotting: No  Previous result: Supratherapeutic  Additional findings: None       PLAN     Recommended plan for no diet, medication or health factor changes affecting INR     Dosing Instructions: Continue your current warfarin dose with next INR in 2 weeks       Summary  As of 1/13/2025      Full warfarin instructions:  3 mg every Mon, Fri; 4.5 mg all other days   Next INR check:  1/27/2025               Telephone call with Christine Saavedra (GUARDIAN) who verbalizes understanding and agrees to plan    Lab visit scheduled    Education provided: None required    Plan made per [facility] anticoagulation protocol    Cassie Coelho RN  1/13/2025  Anticoagulation Clinic  [employer] for routing messages: bill BASURTO  [facility] patient phone line: [phone]        _______________________________________________________________________     Anticoagulation Episode Summary       Current INR goal:  2.5-3.5   TTR:  56.5% (2 mo)   Target end date:  Indefinite   Send INR reminders to:  NIKKI BASURTO    Indications    S/P MVR (mitral valve replacement) [Z95.2]  S/P mitral valve replacement (Resolved) [Z95.2]             Comments:  MVR 10/23/24 12 weeks is Dominic 15             Anticoagulation Care Providers       Provider Role Specialty Phone number    Cee Velez MD [facility] [phone]

## 2025-01-15 ENCOUNTER — HOSPITAL ENCOUNTER (OUTPATIENT)
Dept: CARDIAC REHAB | Facility: HOSPITAL | Age: 45
Discharge: HOME | End: 2025-01-15
Attending: SURGERY
Payer: MEDICARE

## 2025-01-15 ENCOUNTER — OFFICE VISIT (OUTPATIENT)
Dept: FAMILY MEDICINE | Facility: OTHER | Age: 45
End: 2025-01-15
Attending: FAMILY MEDICINE
Payer: MEDICARE

## 2025-01-15 VITALS
OXYGEN SATURATION: 100 % | HEART RATE: 78 BPM | TEMPERATURE: 97.9 F | WEIGHT: 74 LBS | BODY MASS INDEX: 15.54 KG/M2 | RESPIRATION RATE: 16 BRPM | SYSTOLIC BLOOD PRESSURE: 106 MMHG | DIASTOLIC BLOOD PRESSURE: 68 MMHG | HEIGHT: 58 IN

## 2025-01-15 DIAGNOSIS — Z95.2 S/P MVR (MITRAL VALVE REPLACEMENT): ICD-10-CM

## 2025-01-15 DIAGNOSIS — Q23.2 CONGENITAL STENOSIS OF MITRAL VALVE: ICD-10-CM

## 2025-01-15 DIAGNOSIS — D64.9 ANEMIA, UNSPECIFIED TYPE: Primary | ICD-10-CM

## 2025-01-15 DIAGNOSIS — I50.30 DIASTOLIC CONGESTIVE HEART FAILURE, UNSPECIFIED HF CHRONICITY (H): ICD-10-CM

## 2025-01-15 LAB
BACTERIA SPEC CULT: NO GROWTH
BASOPHILS # BLD AUTO: 0.1 10E3/UL (ref 0–0.2)
BASOPHILS NFR BLD AUTO: 1 %
EOSINOPHIL # BLD AUTO: 0.1 10E3/UL (ref 0–0.7)
EOSINOPHIL NFR BLD AUTO: 2 %
ERYTHROCYTE [DISTWIDTH] IN BLOOD BY AUTOMATED COUNT: 16.1 % (ref 10–15)
HCT VFR BLD AUTO: 42.5 % (ref 35–47)
HGB BLD-MCNC: 13.4 G/DL (ref 11.7–15.7)
IMM GRANULOCYTES # BLD: 0 10E3/UL
IMM GRANULOCYTES NFR BLD: 0 %
IRON BINDING CAPACITY (ROCHE): 424 UG/DL (ref 240–430)
IRON SATN MFR SERPL: 55 % (ref 15–46)
IRON SERPL-MCNC: 232 UG/DL (ref 37–145)
LYMPHOCYTES # BLD AUTO: 0.8 10E3/UL (ref 0.8–5.3)
LYMPHOCYTES NFR BLD AUTO: 10 %
MCH RBC QN AUTO: 25 PG (ref 26.5–33)
MCHC RBC AUTO-ENTMCNC: 31.5 G/DL (ref 31.5–36.5)
MCV RBC AUTO: 79 FL (ref 78–100)
MONOCYTES # BLD AUTO: 1.2 10E3/UL (ref 0–1.3)
MONOCYTES NFR BLD AUTO: 15 %
NEUTROPHILS # BLD AUTO: 5.7 10E3/UL (ref 1.6–8.3)
NEUTROPHILS NFR BLD AUTO: 72 %
NRBC # BLD AUTO: 0 10E3/UL
NRBC BLD AUTO-RTO: 0 /100
PLATELET # BLD AUTO: 372 10E3/UL (ref 150–450)
RBC # BLD AUTO: 5.37 10E6/UL (ref 3.8–5.2)
WBC # BLD AUTO: 7.9 10E3/UL (ref 4–11)

## 2025-01-15 PROCEDURE — 85041 AUTOMATED RBC COUNT: CPT | Mod: ZL | Performed by: FAMILY MEDICINE

## 2025-01-15 PROCEDURE — 83550 IRON BINDING TEST: CPT | Mod: ZL | Performed by: FAMILY MEDICINE

## 2025-01-15 PROCEDURE — G2211 COMPLEX E/M VISIT ADD ON: HCPCS | Performed by: FAMILY MEDICINE

## 2025-01-15 PROCEDURE — G0463 HOSPITAL OUTPT CLINIC VISIT: HCPCS | Mod: 25

## 2025-01-15 PROCEDURE — G0463 HOSPITAL OUTPT CLINIC VISIT: HCPCS

## 2025-01-15 PROCEDURE — 36415 COLL VENOUS BLD VENIPUNCTURE: CPT | Mod: ZL | Performed by: FAMILY MEDICINE

## 2025-01-15 PROCEDURE — 999N000109 HC STATISTIC OP CR VISIT

## 2025-01-15 PROCEDURE — 80048 BASIC METABOLIC PNL TOTAL CA: CPT | Mod: ZL | Performed by: FAMILY MEDICINE

## 2025-01-15 PROCEDURE — 99214 OFFICE O/P EST MOD 30 MIN: CPT | Performed by: FAMILY MEDICINE

## 2025-01-15 PROCEDURE — 82374 ASSAY BLOOD CARBON DIOXIDE: CPT | Mod: ZL | Performed by: FAMILY MEDICINE

## 2025-01-15 PROCEDURE — 85004 AUTOMATED DIFF WBC COUNT: CPT | Mod: ZL | Performed by: FAMILY MEDICINE

## 2025-01-15 PROCEDURE — 93798 PHYS/QHP OP CAR RHAB W/ECG: CPT

## 2025-01-15 RX ORDER — FUROSEMIDE 20 MG/1
20 TABLET ORAL DAILY
Qty: 30 TABLET | Refills: 1 | Status: SHIPPED | OUTPATIENT
Start: 2025-01-15

## 2025-01-15 ASSESSMENT — PAIN SCALES - GENERAL: PAINLEVEL_OUTOF10: MILD PAIN (2)

## 2025-01-15 NOTE — PROGRESS NOTES
{PROVIDER CHARTING PREFERENCE:749497}    Subjective   Ronna is a 45 year old, presenting for the following health issues:  No chief complaint on file.        1/15/2025    10:12 AM   Additional Questions   Roomed by Elisha Solomon   Accompanied by self         1/15/2025    10:12 AM   Patient Reported Additional Medications   Patient reports taking the following new medications none     History of Present Illness       Vascular Disease:  She presents for follow up of vascular disease.     She never takes nitroglycerin. She takes daily aspirin.    She eats 0-1 servings of fruits and vegetables daily.She consumes 2 sweetened beverage(s) daily.She exercises with enough effort to increase her heart rate 10 to 19 minutes per day.  She exercises with enough effort to increase her heart rate 3 or less days per week.   She is taking medications regularly.       {SUPERLIST (Optional):425398}  {additonal problems for provider to add (Optional):603409}    {ROS Picklists (Optional):669277}      Objective    LMP 11/20/2024   There is no height or weight on file to calculate BMI.  Physical Exam   {Exam List (Optional):852714}    {Diagnostic Test Results (Optional):724720}        Signed Electronically by: Cee Velez MD  {Email feedback regarding this note to [email or web address]   :036764}   SpO2 100%   BMI 15.52 kg/m    Body mass index is 15.52 kg/m .  Physical Exam   GENERAL: alert and no distress  NECK: no adenopathy, no asymmetry, masses, or scars  RESP: lungs clear to auscultation - no rales, rhonchi or wheezes  CV: regular rates and rhythm and no peripheral edema  ABDOMEN: soft, nontender, no hepatosplenomegaly, no masses and bowel sounds normal  MS: no gross musculoskeletal defects noted, no edema  SKIN: Incisions healing well.   PSYCH: affect normal/bright and appearance well groomed    Results for orders placed or performed in visit on 01/15/25   Iron and iron binding capacity     Status: Abnormal   Result Value Ref Range    Iron 232 (H) 37 - 145 ug/dL    Iron Binding Capacity 424 240 - 430 ug/dL    Iron Sat Index 55 (H) 15 - 46 %   CBC with platelets and differential     Status: Abnormal   Result Value Ref Range    WBC Count 7.9 4.0 - 11.0 10e3/uL    RBC Count 5.37 (H) 3.80 - 5.20 10e6/uL    Hemoglobin 13.4 11.7 - 15.7 g/dL    Hematocrit 42.5 35.0 - 47.0 %    MCV 79 78 - 100 fL    MCH 25.0 (L) 26.5 - 33.0 pg    MCHC 31.5 31.5 - 36.5 g/dL    RDW 16.1 (H) 10.0 - 15.0 %    Platelet Count 372 150 - 450 10e3/uL    % Neutrophils 72 %    % Lymphocytes 10 %    % Monocytes 15 %    % Eosinophils 2 %    % Basophils 1 %    % Immature Granulocytes 0 %    NRBCs per 100 WBC 0 <1 /100    Absolute Neutrophils 5.7 1.6 - 8.3 10e3/uL    Absolute Lymphocytes 0.8 0.8 - 5.3 10e3/uL    Absolute Monocytes 1.2 0.0 - 1.3 10e3/uL    Absolute Eosinophils 0.1 0.0 - 0.7 10e3/uL    Absolute Basophils 0.1 0.0 - 0.2 10e3/uL    Absolute Immature Granulocytes 0.0 <=0.4 10e3/uL    Absolute NRBCs 0.0 10e3/uL   CBC with platelets and differential     Status: Abnormal    Narrative    The following orders were created for panel order CBC with platelets and differential.  Procedure                               Abnormality         Status                     ---------                               -----------         ------                      CBC with platelets and d...[[id number]]  Abnormal            Final result                 Please view results for these tests on the individual orders.           Signed Electronically by: Cee Velez MD    The longitudinal plan of care for the diagnosis(es)/condition(s) as documented were addressed during this visit. Due to the added complexity in care, I will continue to support Ronna in the subsequent management and with ongoing continuity of care.

## 2025-01-20 PROBLEM — I50.9 ACUTE EXACERBATION OF CHF (CONGESTIVE HEART FAILURE) (H): Status: RESOLVED | Noted: 2024-12-03 | Resolved: 2025-01-20

## 2025-01-20 PROBLEM — I50.9 ACUTE CONGESTIVE HEART FAILURE, UNSPECIFIED HEART FAILURE TYPE (H): Status: RESOLVED | Noted: 2024-10-15 | Resolved: 2025-01-20

## 2025-01-20 LAB
ANION GAP SERPL CALCULATED.3IONS-SCNC: 9 MMOL/L (ref 7–15)
BUN SERPL-MCNC: 16.9 MG/DL (ref 6–20)
CALCIUM SERPL-MCNC: 9.3 MG/DL (ref 8.8–10.4)
CHLORIDE SERPL-SCNC: 101 MMOL/L (ref 98–107)
CREAT SERPL-MCNC: 0.74 MG/DL (ref 0.51–0.95)
EGFRCR SERPLBLD CKD-EPI 2021: >90 ML/MIN/1.73M2
GLUCOSE SERPL-MCNC: 68 MG/DL (ref 70–99)
HCO3 SERPL-SCNC: 26 MMOL/L (ref 22–29)
POTASSIUM SERPL-SCNC: 5.1 MMOL/L (ref 3.4–5.3)
SODIUM SERPL-SCNC: 136 MMOL/L (ref 135–145)

## 2025-01-22 ENCOUNTER — HOSPITAL ENCOUNTER (OUTPATIENT)
Dept: CARDIAC REHAB | Facility: HOSPITAL | Age: 45
Discharge: HOME | End: 2025-01-22
Attending: SURGERY
Payer: MEDICARE

## 2025-01-22 PROCEDURE — 93798 PHYS/QHP OP CAR RHAB W/ECG: CPT

## 2025-01-22 PROCEDURE — 999N000109 HC STATISTIC OP CR VISIT

## 2025-01-27 ENCOUNTER — ANTICOAGULATION THERAPY VISIT (OUTPATIENT)
Dept: ANTICOAGULATION | Facility: OTHER | Age: 45
End: 2025-01-27
Attending: FAMILY MEDICINE
Payer: MEDICARE

## 2025-01-27 ENCOUNTER — LAB (OUTPATIENT)
Dept: LAB | Facility: OTHER | Age: 45
End: 2025-01-27
Attending: FAMILY MEDICINE
Payer: MEDICARE

## 2025-01-27 ENCOUNTER — HOSPITAL ENCOUNTER (OUTPATIENT)
Dept: CARDIAC REHAB | Facility: HOSPITAL | Age: 45
Discharge: HOME | End: 2025-01-27
Attending: SURGERY
Payer: MEDICARE

## 2025-01-27 DIAGNOSIS — Z95.2 S/P MVR (MITRAL VALVE REPLACEMENT): ICD-10-CM

## 2025-01-27 DIAGNOSIS — I05.9 MITRAL VALVE DISEASE: ICD-10-CM

## 2025-01-27 DIAGNOSIS — Z95.2 S/P MVR (MITRAL VALVE REPLACEMENT): Primary | ICD-10-CM

## 2025-01-27 LAB — INR BLD: 2.5 (ref 0.9–1.1)

## 2025-01-27 PROCEDURE — 999N000109 HC STATISTIC OP CR VISIT

## 2025-01-27 PROCEDURE — 93798 PHYS/QHP OP CAR RHAB W/ECG: CPT

## 2025-01-27 PROCEDURE — 85610 PROTHROMBIN TIME: CPT | Mod: ZL

## 2025-01-27 PROCEDURE — 36416 COLLJ CAPILLARY BLOOD SPEC: CPT | Mod: ZL

## 2025-01-27 NOTE — PROGRESS NOTES
ANTICOAGULATION MANAGEMENT     Efren Saavedra 45 year old female is on warfarin with therapeutic INR result. (Goal INR 2.5-3.5)    Recent labs: (last 7 days)     01/27/25  0930   INR 2.5*       ASSESSMENT     Source(s): Chart Review and Patient/Caregiver Call     Warfarin doses taken: Warfarin taken as instructed  Diet: No new diet changes identified  Medication/supplement changes: None noted  New illness, injury, or hospitalization: No  Signs or symptoms of bleeding or clotting: No  Previous result: Therapeutic last visit; previously outside of goal range  Additional findings: None       PLAN     Recommended plan for no diet, medication or health factor changes affecting INR     Dosing Instructions: Increase your warfarin dose (5.3% change) with next INR in 2 weeks       Summary  As of 1/27/2025      Full warfarin instructions:  3 mg every Fri; 4.5 mg all other days   Next INR check:  2/10/2025               Telephone call with Christine Saavedra (GUARDIAN) who verbalizes understanding and agrees to plan    Lab visit scheduled    Education provided: None required    Plan made per [facility] anticoagulation protocol    Cassie Coelho RN  1/27/2025  Anticoagulation Clinic  [employer] for routing messages: bill BASURTO  [facility] patient phone line: [phone]        _______________________________________________________________________     Anticoagulation Episode Summary       Current INR goal:  2.5-3.5   TTR:  64.8% (2.4 mo)   Target end date:  Indefinite   Send INR reminders to:  NIKKI BASURTO    Indications    S/P MVR (mitral valve replacement) [Z95.2]  S/P mitral valve replacement (Resolved) [Z95.2]             Comments:  MVR 10/23/24 12 weeks is Dominic 15             Anticoagulation Care Providers       Provider Role Specialty Phone number    Cee Velez MD [facility] [phone]

## 2025-02-05 ENCOUNTER — HOSPITAL ENCOUNTER (OUTPATIENT)
Dept: CARDIAC REHAB | Facility: HOSPITAL | Age: 45
Discharge: HOME | End: 2025-02-05
Attending: SURGERY
Payer: MEDICARE

## 2025-02-05 PROCEDURE — 999N000109 HC STATISTIC OP CR VISIT

## 2025-02-05 PROCEDURE — 93798 PHYS/QHP OP CAR RHAB W/ECG: CPT

## 2025-02-10 ENCOUNTER — ANTICOAGULATION THERAPY VISIT (OUTPATIENT)
Dept: ANTICOAGULATION | Facility: OTHER | Age: 45
End: 2025-02-10
Attending: FAMILY MEDICINE
Payer: MEDICARE

## 2025-02-10 ENCOUNTER — LAB (OUTPATIENT)
Dept: LAB | Facility: OTHER | Age: 45
End: 2025-02-10
Attending: FAMILY MEDICINE
Payer: MEDICARE

## 2025-02-10 ENCOUNTER — HOSPITAL ENCOUNTER (OUTPATIENT)
Dept: CARDIAC REHAB | Facility: HOSPITAL | Age: 45
Discharge: HOME | End: 2025-02-10
Attending: SURGERY
Payer: MEDICARE

## 2025-02-10 DIAGNOSIS — Z95.2 S/P MVR (MITRAL VALVE REPLACEMENT): ICD-10-CM

## 2025-02-10 DIAGNOSIS — I05.9 MITRAL VALVE DISEASE: ICD-10-CM

## 2025-02-10 DIAGNOSIS — Z95.2 S/P MVR (MITRAL VALVE REPLACEMENT): Primary | ICD-10-CM

## 2025-02-10 LAB — INR BLD: 3.2 (ref 0.9–1.1)

## 2025-02-10 PROCEDURE — 85610 PROTHROMBIN TIME: CPT | Mod: ZL

## 2025-02-10 PROCEDURE — 93798 PHYS/QHP OP CAR RHAB W/ECG: CPT

## 2025-02-10 PROCEDURE — 999N000109 HC STATISTIC OP CR VISIT

## 2025-02-10 PROCEDURE — 36416 COLLJ CAPILLARY BLOOD SPEC: CPT | Mod: ZL

## 2025-02-10 NOTE — PROGRESS NOTES
ANTICOAGULATION MANAGEMENT     Efren Saavedra 45 year old female is on warfarin with therapeutic INR result. (Goal INR 2.5-3.5)    Recent labs: (last 7 days)     02/10/25  0923   INR 3.2*       ASSESSMENT     Source(s): Chart Review  Previous INR was Therapeutic last 2(+) visits  Medication, diet, health changes since last INR chart reviewed; none identified         PLAN     Recommended plan for no diet, medication or health factor changes affecting INR     Dosing Instructions: Continue your current warfarin dose with next INR in 2 weeks       Summary  As of 2/10/2025      Full warfarin instructions:  3 mg every Fri; 4.5 mg all other days   Next INR check:  2/24/2025               Detailed voice message left for Christine Saavedra (GUARDIAN) with dosing instructions and follow up date.   Sent [employer] message with dosing and follow up instructions    Contact [phone] to schedule and with any changes, questions or concerns.     Education provided: Please call back if any changes to your diet, medications or how you've been taking warfarin    Plan made per [facility] anticoagulation protocol    Cassie Coelho RN  2/10/2025  Anticoagulation Clinic  [facility] for routing messages: bill BASURTO  [facility] patient phone line: [phone]        _______________________________________________________________________     Anticoagulation Episode Summary       Current INR goal:  2.5-3.5   TTR:  70.5% (2.9 mo)   Target end date:  Indefinite   Send INR reminders to:  NIKKI BASURTO    Indications    S/P MVR (mitral valve replacement) [Z95.2]  S/P mitral valve replacement (Resolved) [Z95.2]             Comments:  MVR 10/23/24 12 weeks is Dominic 15             Anticoagulation Care Providers       Provider Role Specialty Phone number    Cee Velez MD [facility] [phone]

## 2025-02-12 ENCOUNTER — HOSPITAL ENCOUNTER (OUTPATIENT)
Dept: CARDIAC REHAB | Facility: HOSPITAL | Age: 45
Discharge: HOME | End: 2025-02-12
Attending: SURGERY
Payer: MEDICARE

## 2025-02-12 PROCEDURE — 93798 PHYS/QHP OP CAR RHAB W/ECG: CPT

## 2025-02-12 PROCEDURE — 999N000109 HC STATISTIC OP CR VISIT

## 2025-02-19 ENCOUNTER — HOSPITAL ENCOUNTER (OUTPATIENT)
Dept: CARDIAC REHAB | Facility: HOSPITAL | Age: 45
Discharge: HOME | End: 2025-02-19
Attending: SURGERY
Payer: MEDICARE

## 2025-02-19 PROCEDURE — 999N000109 HC STATISTIC OP CR VISIT

## 2025-02-19 PROCEDURE — 93798 PHYS/QHP OP CAR RHAB W/ECG: CPT

## 2025-02-24 ENCOUNTER — ANTICOAGULATION THERAPY VISIT (OUTPATIENT)
Dept: ANTICOAGULATION | Facility: OTHER | Age: 45
End: 2025-02-24
Attending: FAMILY MEDICINE
Payer: MEDICARE

## 2025-02-24 ENCOUNTER — LAB (OUTPATIENT)
Dept: LAB | Facility: OTHER | Age: 45
End: 2025-02-24
Attending: FAMILY MEDICINE
Payer: MEDICARE

## 2025-02-24 DIAGNOSIS — I05.9 MITRAL VALVE DISEASE: ICD-10-CM

## 2025-02-24 DIAGNOSIS — Z95.2 S/P MVR (MITRAL VALVE REPLACEMENT): ICD-10-CM

## 2025-02-24 DIAGNOSIS — Z95.2 S/P MVR (MITRAL VALVE REPLACEMENT): Primary | ICD-10-CM

## 2025-02-24 LAB — INR BLD: 3.1 (ref 0.9–1.1)

## 2025-02-24 PROCEDURE — 36416 COLLJ CAPILLARY BLOOD SPEC: CPT | Mod: ZL

## 2025-02-24 PROCEDURE — 85610 PROTHROMBIN TIME: CPT | Mod: ZL

## 2025-02-24 NOTE — PROGRESS NOTES
ANTICOAGULATION MANAGEMENT     Efren Saavedra 45 year old female is on warfarin with therapeutic INR result. (Goal INR 2.5-3.5)    Recent labs: (last 7 days)     02/24/25  0927   INR 3.1*       ASSESSMENT     Source(s): Chart Review and Patient/Caregiver Call     Warfarin doses taken: Warfarin taken as instructed  Diet: No new diet changes identified  Medication/supplement changes: None noted  New illness, injury, or hospitalization: No  Signs or symptoms of bleeding or clotting: No  Previous result: Therapeutic last 2(+) visits  Additional findings: None       PLAN     Recommended plan for no diet, medication or health factor changes affecting INR     Dosing Instructions: Continue your current warfarin dose with next INR in 3 weeks       Summary  As of 2/24/2025      Full warfarin instructions:  3 mg every Fri; 4.5 mg all other days   Next INR check:  3/17/2025               Telephone call with Christine Saavedra (GUARDIAN) who verbalizes understanding and agrees to plan    Lab visit scheduled    Education provided: None required    Plan made per [facility] anticoagulation protocol    Cassie Coelho RN  2/24/2025  Anticoagulation Clinic  [employer] for routing messages: bill BASURTO  [facility] patient phone line: [phone]        _______________________________________________________________________     Anticoagulation Episode Summary       Current INR goal:  2.5-3.5   TTR:  74.6% (3.4 mo)   Target end date:  Indefinite   Send INR reminders to:  NIKKI BASURTO    Indications    S/P MVR (mitral valve replacement) [Z95.2]  S/P mitral valve replacement (Resolved) [Z95.2]             Comments:  MVR 10/23/24 12 weeks is Dominic 15             Anticoagulation Care Providers       Provider Role Specialty Phone number    Cee Velez MD [facility] [phone]

## 2025-03-04 ENCOUNTER — OFFICE VISIT (OUTPATIENT)
Dept: OTOLARYNGOLOGY | Facility: OTHER | Age: 45
End: 2025-03-04
Attending: PHYSICIAN ASSISTANT
Payer: MEDICARE

## 2025-03-04 VITALS
TEMPERATURE: 98 F | BODY MASS INDEX: 15.54 KG/M2 | HEIGHT: 58 IN | HEART RATE: 85 BPM | WEIGHT: 74 LBS | RESPIRATION RATE: 16 BRPM | OXYGEN SATURATION: 98 % | SYSTOLIC BLOOD PRESSURE: 106 MMHG | DIASTOLIC BLOOD PRESSURE: 65 MMHG

## 2025-03-04 DIAGNOSIS — H92.13 OTORRHEA OF BOTH EARS: ICD-10-CM

## 2025-03-04 DIAGNOSIS — H90.5 CONGENITAL HEARING LOSS: ICD-10-CM

## 2025-03-04 DIAGNOSIS — Z45.89 TYMPANOSTOMY TUBE CHECK: Primary | ICD-10-CM

## 2025-03-04 DIAGNOSIS — H61.23 EXCESSIVE CERUMEN IN BOTH EAR CANALS: ICD-10-CM

## 2025-03-04 PROCEDURE — 92504 EAR MICROSCOPY EXAMINATION: CPT | Performed by: PHYSICIAN ASSISTANT

## 2025-03-04 PROCEDURE — G0463 HOSPITAL OUTPT CLINIC VISIT: HCPCS

## 2025-03-04 ASSESSMENT — PAIN SCALES - GENERAL: PAINLEVEL_OUTOF10: NO PAIN (0)

## 2025-03-04 NOTE — PROGRESS NOTES
"Chief Complaint   Patient presents with    Minor Procedure     Ear Cleaning       Patient returns to ENT   Last visit on 1/3/25. She has felt her hearing has gradually worsened. She did feel like the last few weeks muffled hearing.   She has no recent otorrhea.   She does have hearing loss which has been overall stable and recommended use of hearing aids.  Efren does not tolerate use and declines a to use on a daily basis.  We reviewed consideration for repeat imaging and at this time.  Have declined.  They have declined further options of possible consideration for [facility] referral to otology     Distant hx of BTT with placement of t-tubes. Tubes have been in place for 30 years.    her seizures have been doing well w/ her medications.   She has been on medications w/o concerns.           Past Medical History:   Diagnosis Date    Seizures (H)         Allergies   Allergen Reactions    Azithromycin      Abdominal pain/ cramping     Clotrimazole Rash    Omnicef [Cefdinir] Rash     Itchy and bad rash     ROS- SEE HPI  /65 (BP Location: Right arm, Patient Position: Sitting, Cuff Size: Adult Small)   Pulse 85   Temp 98  F (36.7  C) (Tympanic)   Resp 16   Ht 1.471 m (4' 9.91\")   Wt 33.6 kg (74 lb)   SpO2 98%   BMI 15.51 kg/m      General: Craniofacial abnormality.   Eyes: conjuctiva clear  Ears: Canals overall with debris bilaterally. Narrow canals. . Low set auricles with poor antihelical folds defined. t-tube bilaterally. Gila bowl bilateral overall look well.   Nose: Nares normal   Mouth: crowded.   Neck: Supple, no cervical adenopathy, no thyromegaly      The ear canals were examined underneath the operating microscope and with an otologic speculum. Canals, stenotic/ narrow.   Bilateral canals-left EAC improved. No otorrhea. Bilateral EAC clear.  Bilateral external auditory canals are patent.  There is no active otorrhea.  Tympanostomy tubes appear in good position and patent.    Narrow canals which limits " examination.  Tympanic membranes appear with tubes. TMs are thickened and appear with sclerosis, prominent vasculature.   Powder applied.         ASSESSMENT/ PLAN:    ICD-10-CM    1. Tympanostomy tube check  Z45.89       2. Otorrhea of both ears  H92.13 miconazole (MICATIN) 2 % powder      3. Congenital hearing loss  H90.5       4. Excessive cerumen in both ear canals  H61.23           Ears were cleaned  Powder applied  Follow up in 2 months with audiogram  Consider HAC/ HA use. She had declined in past.         Marcelina Oneal PA-C  ENT  [facility], [place]

## 2025-03-04 NOTE — LETTER
"3/4/2025      Efren Saavedra  [address] [place] [address]      Dear Colleague,    Thank you for referring your patient, Efren Saavedra, to the [facility]. Please see a copy of my visit note below.    Chief Complaint   Patient presents with     Minor Procedure     Ear Cleaning       Patient returns to ENT   Last visit on 1/3/25. She has felt her hearing has gradually worsened. She did feel like the last few weeks muffled hearing.   She has no recent otorrhea.   She does have hearing loss which has been overall stable and recommended use of hearing aids.  Efren does not tolerate use and declines a to use on a daily basis.  We reviewed consideration for repeat imaging and at this time.  Have declined.  They have declined further options of possible consideration for [facility] referral to otology     Distant hx of BTT with placement of t-tubes. Tubes have been in place for 30 years.    her seizures have been doing well w/ her medications.   She has been on medications w/o concerns.           Past Medical History:   Diagnosis Date     Seizures (H)         Allergies   Allergen Reactions     Azithromycin      Abdominal pain/ cramping      Clotrimazole Rash     Omnicef [Cefdinir] Rash     Itchy and bad rash     ROS- SEE HPI  /65 (BP Location: Right arm, Patient Position: Sitting, Cuff Size: Adult Small)   Pulse 85   Temp 98  F (36.7  C) (Tympanic)   Resp 16   Ht 1.471 m (4' 9.91\")   Wt 33.6 kg (74 lb)   SpO2 98%   BMI 15.51 kg/m      General: Craniofacial abnormality.   Eyes: conjuctiva clear  Ears: Canals overall with debris bilaterally. Narrow canals. . Low set auricles with poor antihelical folds defined. t-tube bilaterally. Gila bowl bilateral overall look well.   Nose: Nares normal   Mouth: crowded.   Neck: Supple, no cervical adenopathy, no thyromegaly      The ear canals were examined underneath the operating microscope and with an otologic speculum. Canals, stenotic/ " narrow.   Bilateral canals-left EAC improved. No otorrhea. Bilateral EAC clear.  Bilateral external auditory canals are patent.  There is no active otorrhea.  Tympanostomy tubes appear in good position and patent.    Narrow canals which limits examination.  Tympanic membranes appear with tubes. TMs are thickened and appear with sclerosis, prominent vasculature.   Powder applied.         ASSESSMENT/ PLAN:    ICD-10-CM    1. Tympanostomy tube check  Z45.89       2. Otorrhea of both ears  H92.13 miconazole (MICATIN) 2 % powder      3. Congenital hearing loss  H90.5       4. Excessive cerumen in both ear canals  H61.23           Ears were cleaned  Powder applied  Follow up in 2 months with audiogram  Consider HAC/ HA use. She had declined in past.         Marcelina Oneal PA-C  ENT  [facility], [place]           Again, thank you for allowing me to participate in the care of your patient.        Sincerely,        Marcelina Oneal PA-C    Electronically signed

## 2025-03-04 NOTE — PATIENT INSTRUCTIONS
Ears were cleaned  Stable ear exam  Tubes are in good position   Complete audiogram.   Follow up in 2 months      Thank you for allowing [facility] and our ENT team to participate in your care.  If your medications are too expensive, please give the nurse a call.  We can possibly change this medication.  If you have a scheduling or an appointment question please contact our Health Unit Coordinator at their direct line [phone].   ALL nursing questions or concerns can be directed to your ENT nurseMecca at: [phone].

## 2025-03-17 ENCOUNTER — LAB (OUTPATIENT)
Dept: LAB | Facility: OTHER | Age: 45
End: 2025-03-17
Payer: MEDICARE

## 2025-03-17 ENCOUNTER — ANTICOAGULATION THERAPY VISIT (OUTPATIENT)
Dept: ANTICOAGULATION | Facility: OTHER | Age: 45
End: 2025-03-17
Attending: FAMILY MEDICINE
Payer: MEDICARE

## 2025-03-17 DIAGNOSIS — I05.9 MITRAL VALVE DISEASE: ICD-10-CM

## 2025-03-17 DIAGNOSIS — Z95.2 S/P MVR (MITRAL VALVE REPLACEMENT): ICD-10-CM

## 2025-03-17 DIAGNOSIS — Z95.2 S/P MVR (MITRAL VALVE REPLACEMENT): Primary | ICD-10-CM

## 2025-03-17 LAB — INR BLD: 2.6 (ref 0.9–1.1)

## 2025-03-17 PROCEDURE — 85610 PROTHROMBIN TIME: CPT | Mod: ZL

## 2025-03-17 PROCEDURE — 36416 COLLJ CAPILLARY BLOOD SPEC: CPT | Mod: ZL

## 2025-03-17 NOTE — PROGRESS NOTES
ANTICOAGULATION MANAGEMENT     Efren Saavedra 45 year old female is on warfarin with therapeutic INR result. (Goal INR 2.5-3.5)    Recent labs: (last 7 days)     03/17/25  0932   INR 2.6*       ASSESSMENT     Source(s): Chart Review and Patient/Caregiver Call     Warfarin doses taken: Warfarin taken as instructed  Diet: No new diet changes identified  New illness, injury, or hospitalization: No  Medication/supplement changes: None noted  Signs or symptoms of bleeding or clotting: No  Previous INR: Therapeutic last 2(+) visits  Additional findings: None       PLAN     Recommended plan for no diet, medication or health factor changes affecting INR     Dosing Instructions: Continue your current warfarin dose with next INR in 3 weeks       Summary  As of 3/17/2025      Full warfarin instructions:  3 mg every Fri; 4.5 mg all other days   Next INR check:  4/7/2025               Telephone call with Mother Christine who verbalizes understanding and agrees to plan    Lab visit scheduled    Education provided: Please call back if any changes to your diet, medications or how you've been taking warfarin    Plan made per [facility] anticoagulation protocol    Kendra Ortez RN  Anticoagulation Clinic  3/17/2025    _______________________________________________________________________     Anticoagulation Episode Summary       Current INR goal:  2.5-3.5   TTR:  78.9% (4.1 mo)   Target end date:  Indefinite   Send INR reminders to:  [facility]ER[facility]    Indications    S/P MVR (mitral valve replacement) [Z95.2]  S/P mitral valve replacement (Resolved) [Z95.2]             Comments:  MVR 10/23/24 12 weeks is Dominic 15             Anticoagulation Care Providers       Provider Role Specialty Phone number    Cee Velez MD [facility] [phone]

## 2025-03-24 ENCOUNTER — HOSPITAL ENCOUNTER (OUTPATIENT)
Dept: BONE DENSITY | Facility: HOSPITAL | Age: 45
Discharge: HOME | End: 2025-03-24
Attending: FAMILY MEDICINE | Admitting: FAMILY MEDICINE
Payer: MEDICARE

## 2025-03-24 DIAGNOSIS — E28.39 ESTROGEN DEFICIENCY: ICD-10-CM

## 2025-03-24 PROCEDURE — 77080 DXA BONE DENSITY AXIAL: CPT

## 2025-04-07 ENCOUNTER — ANTICOAGULATION THERAPY VISIT (OUTPATIENT)
Dept: ANTICOAGULATION | Facility: OTHER | Age: 45
End: 2025-04-07
Attending: FAMILY MEDICINE
Payer: MEDICARE

## 2025-04-07 ENCOUNTER — LAB (OUTPATIENT)
Dept: LAB | Facility: OTHER | Age: 45
End: 2025-04-07
Attending: FAMILY MEDICINE
Payer: MEDICARE

## 2025-04-07 DIAGNOSIS — Z95.2 S/P MVR (MITRAL VALVE REPLACEMENT): ICD-10-CM

## 2025-04-07 DIAGNOSIS — Z95.2 S/P MVR (MITRAL VALVE REPLACEMENT): Primary | ICD-10-CM

## 2025-04-07 DIAGNOSIS — I05.9 MITRAL VALVE DISEASE: ICD-10-CM

## 2025-04-07 LAB — INR BLD: 3 (ref 0.9–1.1)

## 2025-04-07 PROCEDURE — 36416 COLLJ CAPILLARY BLOOD SPEC: CPT | Mod: ZL

## 2025-04-07 PROCEDURE — 85610 PROTHROMBIN TIME: CPT | Mod: ZL

## 2025-04-07 NOTE — PROGRESS NOTES
ANTICOAGULATION MANAGEMENT     Efren Saavedra 45 year old female is on warfarin with therapeutic INR result. (Goal INR 2.5-3.5)    Recent labs: (last 7 days)     04/07/25  0942   INR 3.0*       ASSESSMENT     Source(s): Chart Review and Patient/Caregiver Call     Warfarin doses taken: Warfarin taken as instructed  Diet: No new diet changes identified  Medication/supplement changes: None noted  New illness, injury, or hospitalization: No  Signs or symptoms of bleeding or clotting: No  Previous result: Therapeutic last 2(+) visits  Additional findings: None       PLAN     Recommended plan for no diet, medication or health factor changes and previous 2 INR results within goal affecting INR     Dosing Instructions: Continue your current warfarin dose with next INR in 4 weeks       Summary  As of 4/7/2025      Full warfarin instructions:  3 mg every Fri; 4.5 mg all other days   Next INR check:  5/5/2025               Telephone call with Christine Saavedra (GUARDIAN) who verbalizes understanding and agrees to plan    Lab visit scheduled    Education provided: None required    Plan made per [facility] anticoagulation protocol    Cassie Coelho RN  4/7/2025  Anticoagulation Clinic  [employer] for routing messages: bill BASURTO  [facility] patient phone line: [phone]        _______________________________________________________________________     Anticoagulation Episode Summary       Current INR goal:  2.5-3.5   TTR:  82.0% (4.8 mo)   Target end date:  Indefinite   Send INR reminders to:  NIKKI BASURTO    Indications    S/P MVR (mitral valve replacement) [Z95.2]  S/P mitral valve replacement (Resolved) [Z95.2]             Comments:  MVR 10/23/24 12 weeks is Dominic 15             Anticoagulation Care Providers       Provider Role Specialty Phone number    Cee Velez MD [facility] [phone]

## 2025-05-05 ENCOUNTER — ANTICOAGULATION THERAPY VISIT (OUTPATIENT)
Dept: ANTICOAGULATION | Facility: OTHER | Age: 45
End: 2025-05-05
Attending: FAMILY MEDICINE
Payer: MEDICARE

## 2025-05-05 ENCOUNTER — LAB (OUTPATIENT)
Dept: LAB | Facility: OTHER | Age: 45
End: 2025-05-05
Attending: FAMILY MEDICINE
Payer: MEDICARE

## 2025-05-05 DIAGNOSIS — I05.9 MITRAL VALVE DISEASE: ICD-10-CM

## 2025-05-05 DIAGNOSIS — Z95.2 S/P MVR (MITRAL VALVE REPLACEMENT): ICD-10-CM

## 2025-05-05 DIAGNOSIS — Z95.2 S/P MVR (MITRAL VALVE REPLACEMENT): Primary | ICD-10-CM

## 2025-05-05 LAB — INR BLD: 4.1 (ref 0.9–1.1)

## 2025-05-05 PROCEDURE — 36416 COLLJ CAPILLARY BLOOD SPEC: CPT | Mod: ZL

## 2025-05-05 PROCEDURE — 85610 PROTHROMBIN TIME: CPT | Mod: ZL

## 2025-05-05 NOTE — PROGRESS NOTES
ANTICOAGULATION MANAGEMENT     Efren Saavedra 45 year old female is on warfarin with supratherapeutic INR result. (Goal INR 2.5-3.5)    Recent labs: (last 7 days)     05/05/25  0932   INR 4.1*       ASSESSMENT     Source(s): Chart Review and Patient/Caregiver Call     Warfarin doses taken: Warfarin taken as instructed  Diet: No new diet changes identified  Medication/supplement changes: None noted  New illness, injury, or hospitalization: No  Signs or symptoms of bleeding or clotting: No  Previous result: Therapeutic last 2(+) visits  Additional findings: None       PLAN     Recommended plan for no diet, medication or health factor changes and previous 2 INR results within goal affecting INR     Dosing Instructions: decrease your warfarin dose (10% change) with next INR in 1 week       Summary  As of 5/5/2025      Full warfarin instructions:  3 mg every Mon, Wed, Fri; 4.5 mg all other days   Next INR check:  5/12/2025               Telephone call with Christine Saavedra (GUARDIAN) who verbalizes understanding and agrees to plan    Lab visit scheduled    Education provided: None required    Plan made per [facility] anticoagulation protocol    Cassie Coelho, RN  5/5/2025  Anticoagulation Clinic  [employer] for routing messages: p NIKKI BASURTO  [facility] patient phone line: [phone]        _______________________________________________________________________     Anticoagulation Episode Summary       Current INR goal:  2.5-3.5   TTR:  76.1% (5.7 mo)   Target end date:  Indefinite   Send INR reminders to:  NIKKI BASURTO    Indications    S/P MVR (mitral valve replacement) [Z95.2]  S/P mitral valve replacement (Resolved) [Z95.2]             Comments:  MVR 10/23/24 12 weeks is Dominic 15             Anticoagulation Care Providers       Provider Role Specialty Phone number    Cee Velez MD [facility] [phone]

## 2025-05-12 ENCOUNTER — ANTICOAGULATION THERAPY VISIT (OUTPATIENT)
Dept: ANTICOAGULATION | Facility: OTHER | Age: 45
End: 2025-05-12
Attending: FAMILY MEDICINE
Payer: MEDICARE

## 2025-05-12 ENCOUNTER — LAB (OUTPATIENT)
Dept: LAB | Facility: OTHER | Age: 45
End: 2025-05-12
Attending: FAMILY MEDICINE
Payer: MEDICARE

## 2025-05-12 DIAGNOSIS — I05.9 MITRAL VALVE DISEASE: ICD-10-CM

## 2025-05-12 DIAGNOSIS — Z95.2 S/P MVR (MITRAL VALVE REPLACEMENT): ICD-10-CM

## 2025-05-12 DIAGNOSIS — Z95.2 S/P MVR (MITRAL VALVE REPLACEMENT): Primary | ICD-10-CM

## 2025-05-12 LAB — INR BLD: 3.5 (ref 0.9–1.1)

## 2025-05-12 PROCEDURE — 36416 COLLJ CAPILLARY BLOOD SPEC: CPT | Mod: ZL

## 2025-05-12 PROCEDURE — 85610 PROTHROMBIN TIME: CPT | Mod: ZL

## 2025-05-12 NOTE — PROGRESS NOTES
ANTICOAGULATION MANAGEMENT     Efren Saavedra 45 year old female is on warfarin with therapeutic INR result. (Goal INR 2.5-3.5)    Recent labs: (last 7 days)     05/12/25  0921   INR 3.5*       ASSESSMENT     Source(s): Chart Review and Patient/Caregiver Call     Warfarin doses taken: Warfarin taken as instructed  Diet: No new diet changes identified  Medication/supplement changes: None noted  New illness, injury, or hospitalization: No  Signs or symptoms of bleeding or clotting: No  Previous result: Supratherapeutic  Additional findings: None       PLAN     Recommended plan for no diet, medication or health factor changes affecting INR     Dosing Instructions: Continue your current warfarin dose with next INR in 1 week       Summary  As of 5/12/2025      Full warfarin instructions:  3 mg every Mon, Wed, Fri; 4.5 mg all other days   Next INR check:  5/19/2025               Telephone call with Christine Keri (GUARDIAN) who verbalizes understanding and agrees to plan    Lab visit scheduled    Education provided: Please call back if any changes to your diet, medications or how you've been taking warfarin    Plan made per [facility] anticoagulation protocol    Ila Tabares RN  5/12/2025  Anticoagulation Clinic  [employer] for routing messages: bill BASURTO  [facility] patient phone line: [phone]        _______________________________________________________________________     Anticoagulation Episode Summary       Current INR goal:  2.5-3.5   TTR:  73.1% (5.9 mo)   Target end date:  Indefinite   Send INR reminders to:  NIKKI BASURTO    Indications    S/P MVR (mitral valve replacement) [Z95.2]  S/P mitral valve replacement (Resolved) [Z95.2]             Comments:  MVR 10/23/24 12 weeks is Dominic 15             Anticoagulation Care Providers       Provider Role Specialty Phone number    Cee Velez MD [facility] [phone]          '  
A positive

## 2025-05-15 DIAGNOSIS — H91.93 DECREASED HEARING OF BOTH EARS: Primary | ICD-10-CM

## 2025-05-19 ENCOUNTER — ANTICOAGULATION THERAPY VISIT (OUTPATIENT)
Dept: ANTICOAGULATION | Facility: OTHER | Age: 45
End: 2025-05-19
Payer: MEDICARE

## 2025-05-19 ENCOUNTER — LAB (OUTPATIENT)
Dept: LAB | Facility: OTHER | Age: 45
End: 2025-05-19
Payer: MEDICARE

## 2025-05-19 DIAGNOSIS — Z95.2 S/P MVR (MITRAL VALVE REPLACEMENT): Primary | ICD-10-CM

## 2025-05-19 DIAGNOSIS — Z95.2 S/P MVR (MITRAL VALVE REPLACEMENT): ICD-10-CM

## 2025-05-19 DIAGNOSIS — I05.9 MITRAL VALVE DISEASE: ICD-10-CM

## 2025-05-19 LAB — INR BLD: 3.1 (ref 0.9–1.1)

## 2025-05-19 PROCEDURE — 36416 COLLJ CAPILLARY BLOOD SPEC: CPT | Mod: ZL

## 2025-05-19 PROCEDURE — 85610 PROTHROMBIN TIME: CPT | Mod: ZL

## 2025-05-19 NOTE — PROGRESS NOTES
ANTICOAGULATION MANAGEMENT     Efren Saavedra 45 year old female is on warfarin with therapeutic INR result. (Goal INR 2.5-3.5)    Recent labs: (last 7 days)     05/19/25  0929   INR 3.1*       ASSESSMENT     Source(s): Chart Review and Patient/Caregiver Call     Warfarin doses taken: Warfarin taken as instructed  Diet: No new diet changes identified  Medication/supplement changes: None noted  New illness, injury, or hospitalization: No  Signs or symptoms of bleeding or clotting: No  Previous result: Therapeutic last visit; previously outside of goal range  Additional findings: None       PLAN     Recommended plan for no diet, medication or health factor changes affecting INR     Dosing Instructions: Continue your current warfarin dose with next INR in 2 weeks       Summary  As of 5/19/2025      Full warfarin instructions:  3 mg every Mon, Wed, Fri; 4.5 mg all other days   Next INR check:  6/2/2025               Telephone call with Guardian Bobby who verbalizes understanding and agrees to plan and who agrees to plan and repeated back plan correctly    Lab visit scheduled    Education provided: Please call back if any changes to your diet, medications or how you've been taking warfarin    Plan made per [facility] anticoagulation protocol    Tj Castillo RN  5/19/2025  Anticoagulation Clinic  [employer] for routing messages: p NIKKI BASURTO        _______________________________________________________________________     Anticoagulation Episode Summary       Current INR goal:  2.5-3.5   TTR:  74.1% (6.2 mo)   Target end date:  Indefinite   Send INR reminders to:  NIKKI BASURTO    Indications    S/P MVR (mitral valve replacement) [Z95.2]  S/P mitral valve replacement (Resolved) [Z95.2]             Comments:  MVR 10/23/24 12 weeks is Dominic 15             Anticoagulation Care Providers       Provider Role Specialty Phone number    Cee Velez MD [facility] [phone]

## 2025-05-22 ENCOUNTER — OFFICE VISIT (OUTPATIENT)
Dept: AUDIOLOGY | Facility: OTHER | Age: 45
End: 2025-05-22
Attending: AUDIOLOGIST
Payer: MEDICARE

## 2025-05-22 ENCOUNTER — OFFICE VISIT (OUTPATIENT)
Dept: OTOLARYNGOLOGY | Facility: OTHER | Age: 45
End: 2025-05-22
Attending: PHYSICIAN ASSISTANT
Payer: MEDICARE

## 2025-05-22 VITALS
BODY MASS INDEX: 15.95 KG/M2 | SYSTOLIC BLOOD PRESSURE: 103 MMHG | HEART RATE: 76 BPM | RESPIRATION RATE: 16 BRPM | HEIGHT: 58 IN | TEMPERATURE: 97.8 F | OXYGEN SATURATION: 99 % | WEIGHT: 76 LBS | DIASTOLIC BLOOD PRESSURE: 65 MMHG

## 2025-05-22 DIAGNOSIS — H69.93 DYSFUNCTION OF EUSTACHIAN TUBE, BILATERAL: Primary | ICD-10-CM

## 2025-05-22 DIAGNOSIS — H92.13 OTORRHEA OF BOTH EARS: ICD-10-CM

## 2025-05-22 DIAGNOSIS — H90.6 MIXED CONDUCTIVE AND SENSORINEURAL HEARING LOSS OF BOTH EARS: ICD-10-CM

## 2025-05-22 DIAGNOSIS — H91.93 DECREASED HEARING OF BOTH EARS: ICD-10-CM

## 2025-05-22 DIAGNOSIS — H90.5 CONGENITAL HEARING LOSS: ICD-10-CM

## 2025-05-22 DIAGNOSIS — Z45.89 TYMPANOSTOMY TUBE CHECK: Primary | ICD-10-CM

## 2025-05-22 DIAGNOSIS — H90.6 MIXED CONDUCTIVE AND SENSORINEURAL HEARING LOSS, BILATERAL: ICD-10-CM

## 2025-05-22 PROCEDURE — 92567 TYMPANOMETRY: CPT | Performed by: AUDIOLOGIST

## 2025-05-22 PROCEDURE — G0463 HOSPITAL OUTPT CLINIC VISIT: HCPCS

## 2025-05-22 PROCEDURE — 92557 COMPREHENSIVE HEARING TEST: CPT | Performed by: AUDIOLOGIST

## 2025-05-22 ASSESSMENT — PAIN SCALES - GENERAL: PAINLEVEL_OUTOF10: NO PAIN (0)

## 2025-05-22 NOTE — PROGRESS NOTES
"Chief Complaint   Patient presents with    Minor Procedure     Ear Cleaning     Patient returns to ENT   Last visit on 3/4/2025. She has felt her hearing has gradually worsened. She did feel like the last few weeks muffled hearing.   She has no recent otorrhea.   She does have hearing loss which has been overall stable and recommended use of hearing aids.  Efren does not tolerate use and declines a to use on a daily basis.  We reviewed consideration for repeat imaging and at this time.  Have declined.  They have declined further options of possible consideration for [facility] referral to otology   Distant hx of BTT with placement of t-tubes. Tubes have been in place for 30 years.    her seizures have been doing well w/ her medications.   She has been on medications w/o concerns.   Past Medical History:   Diagnosis Date    Seizures (H)         Allergies   Allergen Reactions    Azithromycin      Abdominal pain/ cramping     Clotrimazole Rash    Omnicef [Cefdinir] Rash     Itchy and bad rash     ROS- SEE HPI    /65 (BP Location: Right arm, Patient Position: Sitting, Cuff Size: Adult Small)   Pulse 76   Temp 97.8  F (36.6  C) (Tympanic)   Resp 16   Ht 1.471 m (4' 9.91\")   Wt 34.5 kg (76 lb)   SpO2 99%   BMI 15.93 kg/m    General: Craniofacial abnormality.   Eyes: conjuctiva clear  Ears: Canals overall with debris bilaterally. Narrow canals. . Low set auricles with poor antihelical folds defined. t-tube bilaterally. Gila bowl bilateral overall look well.   Nose: Nares normal   Mouth: crowded.   Neck: Supple, no cervical adenopathy, no thyromegaly      The ear canals were examined underneath the operating microscope and with an otologic speculum. Canals, stenotic/ narrow.   Bilateral canals-left EAC improved. No otorrhea. Bilateral EAC clear.  Bilateral external auditory canals are patent.  There is no active otorrhea.  Tympanostomy tubes appear in good position and patent.    Narrow canals which limits " examination.  Tympanic membranes appear with tubes. TMs are thickened and appear with sclerosis, prominent vasculature.   Powder applied.            ASSESSMENT/ PLAN:    ICD-10-CM    1. Tympanostomy tube check  Z45.89       2. Otorrhea of both ears  H92.13 miconazole (MICATIN) 2 % powder     DISCONTINUED: miconazole (MICATIN) 2 % powder      3. Congenital hearing loss  H90.5       4. 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 She had declined in past.       Marcelina Oneal PA-C  ENT  [facility], [place]

## 2025-05-22 NOTE — PATIENT INSTRUCTIONS
Ears were cleaned  Stable ear exam. Tubes are in position and clear  Powder applied   Follow up in 2-2.5 months        Thank you for allowing [facility] and our ENT team to participate in your care.  If your medications are too expensive, please give the nurse a call.  We can possibly change this medication.  If you have a scheduling or an appointment question please contact our Health Unit Coordinator at their direct line [phone].   ALL nursing questions or concerns can be directed to your ENT nurseMecca at: [phone].

## 2025-05-22 NOTE — PROGRESS NOTES
Audiology Evaluation Completed. Please refer SCANNED AUDIOGRAM and/or TYMPANOGRAM for BACKGROUND, RESULTS, RECOMMENDATIONS.      Santa CONWAY, [facility]-A  Audiologist #[id number]    Audiology Evaluation Completed. Please refer SCANNED AUDIOGRAM and/or TYMPANOGRAM for BACKGROUND, RESULTS, RECOMMENDATIONS.      Santa CONWAY, CCC-A  Audiologist #[id number]

## 2025-05-22 NOTE — LETTER
"5/22/2025      Efren Saavedra  [address] [place] [address]      Dear Colleague,    Thank you for referring your patient, Efren Saavedra, to the [facility]. Please see a copy of my visit note below.    Chief Complaint   Patient presents with     Minor Procedure     Ear Cleaning     Patient returns to ENT   Last visit on 3/4/2025. She has felt her hearing has gradually worsened. She did feel like the last few weeks muffled hearing.   She has no recent otorrhea.   She does have hearing loss which has been overall stable and recommended use of hearing aids.  Efren does not tolerate use and declines a to use on a daily basis.  We reviewed consideration for repeat imaging and at this time.  Have declined.  They have declined further options of possible consideration for [place] referral to otology   Distant hx of BTT with placement of t-tubes. Tubes have been in place for 30 years.    her seizures have been doing well w/ her medications.   She has been on medications w/o concerns.   Past Medical History:   Diagnosis Date     Seizures (H)         Allergies   Allergen Reactions     Azithromycin      Abdominal pain/ cramping      Clotrimazole Rash     Omnicef [Cefdinir] Rash     Itchy and bad rash     ROS- SEE HPI    /65 (BP Location: Right arm, Patient Position: Sitting, Cuff Size: Adult Small)   Pulse 76   Temp 97.8  F (36.6  C) (Tympanic)   Resp 16   Ht 1.471 m (4' 9.91\")   Wt 34.5 kg (76 lb)   SpO2 99%   BMI 15.93 kg/m    General: Craniofacial abnormality.   Eyes: conjuctiva clear  Ears: Canals overall with debris bilaterally. Narrow canals. . Low set auricles with poor antihelical folds defined. t-tube bilaterally. Gila bowl bilateral overall look well.   Nose: Nares normal   Mouth: crowded.   Neck: Supple, no cervical adenopathy, no thyromegaly      The ear canals were examined underneath the operating microscope and with an otologic speculum. Canals, stenotic/ narrow. "   Bilateral canals-left EAC improved. No otorrhea. Bilateral EAC clear.  Bilateral external auditory canals are patent.  There is no active otorrhea.  Tympanostomy tubes appear in good position and patent.    Narrow canals which limits examination.  Tympanic membranes appear with tubes. TMs are thickened and appear with sclerosis, prominent vasculature.   Powder applied.            ASSESSMENT/ PLAN:    ICD-10-CM    1. Tympanostomy tube check  Z45.89       2. Otorrhea of both ears  H92.13 miconazole (MICATIN) 2 % powder     DISCONTINUED: miconazole (MICATIN) 2 % powder      3. Congenital hearing loss  H90.5       4. 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 She had declined in past.       Marcelina Oneal PA-C  ENT  [facility], [place]    Again, thank you for allowing me to participate in the care of your patient.        Sincerely,        Marcelina Oneal PA-C    Electronically signed

## 2025-06-02 ENCOUNTER — ANTICOAGULATION THERAPY VISIT (OUTPATIENT)
Dept: ANTICOAGULATION | Facility: OTHER | Age: 45
End: 2025-06-02
Attending: FAMILY MEDICINE
Payer: MEDICARE

## 2025-06-02 ENCOUNTER — LAB (OUTPATIENT)
Dept: LAB | Facility: OTHER | Age: 45
End: 2025-06-02
Attending: FAMILY MEDICINE
Payer: MEDICARE

## 2025-06-02 DIAGNOSIS — Z95.2 S/P MVR (MITRAL VALVE REPLACEMENT): ICD-10-CM

## 2025-06-02 DIAGNOSIS — I05.9 MITRAL VALVE DISEASE: ICD-10-CM

## 2025-06-02 DIAGNOSIS — Z95.2 S/P MVR (MITRAL VALVE REPLACEMENT): Primary | ICD-10-CM

## 2025-06-02 LAB — INR BLD: 3.6 (ref 0.9–1.1)

## 2025-06-02 PROCEDURE — 36416 COLLJ CAPILLARY BLOOD SPEC: CPT | Mod: ZL

## 2025-06-02 PROCEDURE — 85610 PROTHROMBIN TIME: CPT | Mod: ZL

## 2025-06-02 NOTE — PROGRESS NOTES
ANTICOAGULATION MANAGEMENT     Efren Saavedra 45 year old female is on warfarin with supratherapeutic INR result. (Goal INR 2.5-3.5)    Recent labs: (last 7 days)     06/02/25  0920   INR 3.6*       ASSESSMENT     Source(s): Chart Review and Patient/Caregiver Call     Warfarin doses taken: Warfarin taken as instructed  Diet: No new diet changes identified  Medication/supplement changes: miconazole powder on 5/22 1 x dose  New illness, injury, or hospitalization: No  Signs or symptoms of bleeding or clotting: No  Previous result: Therapeutic last visit; previously outside of goal range  Additional findings: None       PLAN     Recommended plan for temporary change(s) affecting INR     Dosing Instructions: Continue your current warfarin dose with next INR in 2 weeks       Summary  As of 6/2/2025      Full warfarin instructions:  3 mg every Mon, Wed, Fri; 4.5 mg all other days   Next INR check:  6/16/2025               Telephone call with Christine Saavedra (GUARDIAN) who verbalizes understanding and agrees to plan    Lab visit scheduled    Education provided: None required    Plan made per [facility] anticoagulation protocol    Cassie Coelho RN  6/2/2025  Anticoagulation Clinic  [employer] for routing messages: bill BASURTO  [facility] patient phone line: [phone]        _______________________________________________________________________     Anticoagulation Episode Summary       Current INR goal:  2.5-3.5   TTR:  74.5% (6.6 mo)   Target end date:  Indefinite   Send INR reminders to:  NIKKI BASURTO    Indications    S/P MVR (mitral valve replacement) [Z95.2]  S/P mitral valve replacement (Resolved) [Z95.2]             Comments:  --             Anticoagulation Care Providers       Provider Role Specialty Phone number    Cee Velez MD [facility] [phone]

## 2025-06-16 ENCOUNTER — ANTICOAGULATION THERAPY VISIT (OUTPATIENT)
Dept: ANTICOAGULATION | Facility: OTHER | Age: 45
End: 2025-06-16
Attending: FAMILY MEDICINE
Payer: MEDICARE

## 2025-06-16 ENCOUNTER — LAB (OUTPATIENT)
Dept: LAB | Facility: OTHER | Age: 45
End: 2025-06-16
Payer: MEDICARE

## 2025-06-16 DIAGNOSIS — I05.9 MITRAL VALVE DISEASE: ICD-10-CM

## 2025-06-16 DIAGNOSIS — Z95.2 S/P MVR (MITRAL VALVE REPLACEMENT): Primary | ICD-10-CM

## 2025-06-16 DIAGNOSIS — Z95.2 S/P MVR (MITRAL VALVE REPLACEMENT): ICD-10-CM

## 2025-06-16 LAB — INR BLD: 3.5 (ref 0.9–1.1)

## 2025-06-16 PROCEDURE — 85610 PROTHROMBIN TIME: CPT | Mod: ZL

## 2025-06-16 PROCEDURE — 36416 COLLJ CAPILLARY BLOOD SPEC: CPT | Mod: ZL

## 2025-06-16 NOTE — PROGRESS NOTES
ANTICOAGULATION MANAGEMENT     Efren Saavedra 45 year old female is on warfarin with therapeutic INR result. (Goal INR 2.5-3.5)    Recent labs: (last 7 days)     06/16/25  0925   INR 3.5*       ASSESSMENT     Source(s): Chart Review and Patient/Caregiver Call     Warfarin doses taken: Warfarin taken as instructed  Diet: No new diet changes identified  New illness, injury, or hospitalization: No  Medication/supplement changes: None noted  Signs or symptoms of bleeding or clotting: No  Previous INR: Supratherapeutic  Additional findings: None       PLAN     Recommended plan for no diet, medication or health factor changes affecting INR     Dosing Instructions: Continue your current warfarin dose with next INR in 2 weeks       Summary  As of 6/16/2025      Full warfarin instructions:  3 mg every Mon, Wed, Fri; 4.5 mg all other days   Next INR check:  6/30/2025               Telephone call with Christine Hubbardauley (GUARDIAN)  who verbalizes understanding and agrees to plan    Lab visit scheduled    Education provided: Please call back if any changes to your diet, medications or how you've been taking warfarin    Plan made per [facility] anticoagulation protocol    Delores Germain RN  Anticoagulation Clinic  6/16/2025    _______________________________________________________________________     Anticoagulation Episode Summary       Current INR goal:  2.5-3.5   TTR:  69.6% (7.1 mo)   Target end date:  Indefinite   Send INR reminders to:  [facility] [place]    Indications    S/P MVR (mitral valve replacement) [Z95.2]  S/P mitral valve replacement (Resolved) [Z95.2]             Comments:  --             Anticoagulation Care Providers       Provider Role Specialty Phone number    Cee Velez MD [facility] [phone]

## 2025-06-30 ENCOUNTER — LAB (OUTPATIENT)
Dept: LAB | Facility: OTHER | Age: 45
End: 2025-06-30
Attending: FAMILY MEDICINE
Payer: MEDICARE

## 2025-06-30 ENCOUNTER — ANTICOAGULATION THERAPY VISIT (OUTPATIENT)
Dept: ANTICOAGULATION | Facility: OTHER | Age: 45
End: 2025-06-30
Attending: FAMILY MEDICINE
Payer: MEDICARE

## 2025-06-30 DIAGNOSIS — I05.9 MITRAL VALVE DISEASE: ICD-10-CM

## 2025-06-30 DIAGNOSIS — Z95.2 S/P MVR (MITRAL VALVE REPLACEMENT): ICD-10-CM

## 2025-06-30 DIAGNOSIS — Z95.2 S/P MVR (MITRAL VALVE REPLACEMENT): Primary | ICD-10-CM

## 2025-06-30 LAB — INR BLD: 3.6 (ref 0.9–1.1)

## 2025-06-30 PROCEDURE — 85610 PROTHROMBIN TIME: CPT | Mod: ZL

## 2025-06-30 PROCEDURE — 36416 COLLJ CAPILLARY BLOOD SPEC: CPT | Mod: ZL

## 2025-06-30 NOTE — PROGRESS NOTES
ANTICOAGULATION MANAGEMENT     Efren Saavedra 45 year old female is on warfarin with supratherapeutic INR result. (Goal INR 2.5-3.5)    Recent labs: (last 7 days)     06/30/25  0924   INR 3.6*       ASSESSMENT     Source(s): Chart Review and Patient/Caregiver Call     Warfarin doses taken: Warfarin taken as instructed  Diet: No new diet changes identified  Medication/supplement changes: None noted  New illness, injury, or hospitalization: No  Signs or symptoms of bleeding or clotting: No  Previous result: Therapeutic last visit; previously outside of goal range  Additional findings: None       PLAN     Recommended plan for no diet, medication or health factor changes affecting INR     Dosing Instructions: decrease your warfarin dose (5.6% change) with next INR in 2 weeks       Summary  As of 6/30/2025      Full warfarin instructions:  3 mg every Mon, Wed, Fri; 4.5 mg all other days   Next INR check:  7/14/2025               Telephone call with Christine Saavedra (GUARDIAN) who verbalizes understanding and agrees to plan and who agrees to plan and repeated back plan correctly    Lab visit scheduled    Education provided: Please call back if any changes to your diet, medications or how you've been taking warfarin    Plan made per [facility] anticoagulation protocol    Delores Germain RN  6/30/2025  Anticoagulation Clinic  [employer] for routing messages: bill BASURTO  [facility] patient phone line: [phone]        _______________________________________________________________________     Anticoagulation Episode Summary       Current INR goal:  2.5-3.5   TTR:  65.3% (7.6 mo)   Target end date:  Indefinite   Send INR reminders to:  NIKKI BASURTO    Indications    S/P MVR (mitral valve replacement) [Z95.2]  S/P mitral valve replacement (Resolved) [Z95.2]             Comments:  --             Anticoagulation Care Providers       Provider Role Specialty Phone number    Cee Velez MD [facility]  [phone]

## 2025-07-14 ENCOUNTER — ANTICOAGULATION THERAPY VISIT (OUTPATIENT)
Dept: ANTICOAGULATION | Facility: OTHER | Age: 45
End: 2025-07-14
Attending: FAMILY MEDICINE
Payer: MEDICARE

## 2025-07-14 ENCOUNTER — LAB (OUTPATIENT)
Dept: LAB | Facility: OTHER | Age: 45
End: 2025-07-14
Attending: FAMILY MEDICINE
Payer: MEDICARE

## 2025-07-14 DIAGNOSIS — I05.9 MITRAL VALVE DISEASE: ICD-10-CM

## 2025-07-14 DIAGNOSIS — Z95.2 S/P MVR (MITRAL VALVE REPLACEMENT): Primary | ICD-10-CM

## 2025-07-14 DIAGNOSIS — Z95.2 S/P MVR (MITRAL VALVE REPLACEMENT): ICD-10-CM

## 2025-07-14 LAB — INR BLD: 2.8 (ref 0.9–1.1)

## 2025-07-14 PROCEDURE — 36416 COLLJ CAPILLARY BLOOD SPEC: CPT | Mod: ZL

## 2025-07-14 PROCEDURE — 85610 PROTHROMBIN TIME: CPT | Mod: ZL

## 2025-07-14 NOTE — PROGRESS NOTES
ANTICOAGULATION MANAGEMENT     Efren Saavedra 45 year old female is on warfarin with therapeutic INR result. (Goal INR 2.5-3.5)    Recent labs: (last 7 days)     07/14/25  0926   INR 2.8*       ASSESSMENT     Source(s): Chart Review and Patient/Caregiver Call     Warfarin doses taken: Warfarin taken as instructed  Diet: No new diet changes identified  Medication/supplement changes: None noted  New illness, injury, or hospitalization: Hangnail that looks red, doesn't look infected. Putting baking soda on it and it looks like it is getting better.   Signs or symptoms of bleeding or clotting: No  Previous result: Supratherapeutic  Additional findings: None       PLAN     Recommended plan for temporary change(s) affecting INR     Dosing Instructions: Continue your current warfarin dose with next INR in 2 weeks       Summary  As of 7/14/2025      Full warfarin instructions:  4.5 mg every Mon, Wed, Fri; 3 mg all other days   Next INR check:  7/28/2025               Telephone call with Christine Hubbardauley (GUARDIAN) who verbalizes understanding and agrees to plan    Lab visit scheduled    Education provided: Please call back if any changes to your diet, medications or how you've been taking warfarin    Plan made per [facility] anticoagulation protocol    Ila Tabares RN  7/14/2025  Anticoagulation Clinic  [employer] for routing messages: bill BASURTO  [facility] patient phone line: [phone]        _______________________________________________________________________     Anticoagulation Episode Summary       Current INR goal:  2.5-3.5   TTR:  66.6% (8 mo)   Target end date:  Indefinite   Send INR reminders to:  NIKKI BASURTO    Indications    S/P MVR (mitral valve replacement) [Z95.2]  S/P mitral valve replacement (Resolved) [Z95.2]             Comments:  --             Anticoagulation Care Providers       Provider Role Specialty Phone number    Cee Velez MD [facility] [phone]

## 2025-07-15 ENCOUNTER — HOSPITAL ENCOUNTER (EMERGENCY)
Facility: HOSPITAL | Age: 45
Discharge: HOME | End: 2025-07-15
Attending: PHYSICIAN ASSISTANT
Payer: MEDICARE

## 2025-07-15 ENCOUNTER — TELEPHONE (OUTPATIENT)
Dept: FAMILY MEDICINE | Facility: OTHER | Age: 45
End: 2025-07-15

## 2025-07-15 VITALS
WEIGHT: 76 LBS | SYSTOLIC BLOOD PRESSURE: 115 MMHG | HEART RATE: 93 BPM | RESPIRATION RATE: 18 BRPM | BODY MASS INDEX: 15.93 KG/M2 | DIASTOLIC BLOOD PRESSURE: 71 MMHG | TEMPERATURE: 97.1 F

## 2025-07-15 DIAGNOSIS — Z95.2 S/P MVR (MITRAL VALVE REPLACEMENT): Primary | ICD-10-CM

## 2025-07-15 DIAGNOSIS — L03.012 PARONYCHIA OF LEFT THUMB: ICD-10-CM

## 2025-07-15 PROCEDURE — G0463 HOSPITAL OUTPT CLINIC VISIT: HCPCS | Performed by: PHYSICIAN ASSISTANT

## 2025-07-15 PROCEDURE — 10060 I&D ABSCESS SIMPLE/SINGLE: CPT | Performed by: PHYSICIAN ASSISTANT

## 2025-07-15 PROCEDURE — 250N000009 HC RX 250: Performed by: PHYSICIAN ASSISTANT

## 2025-07-15 PROCEDURE — 999N000104 HC STATISTIC NO CHARGE: Performed by: PHYSICIAN ASSISTANT

## 2025-07-15 RX ORDER — DOXYCYCLINE 100 MG/1
100 CAPSULE ORAL 2 TIMES DAILY
Qty: 14 CAPSULE | Refills: 0 | Status: SHIPPED | OUTPATIENT
Start: 2025-07-15 | End: 2025-07-22

## 2025-07-15 RX ADMIN — LIDOCAINE HYDROCHLORIDE 5 ML: 10 INJECTION, SOLUTION EPIDURAL; INFILTRATION; INTRACAUDAL; PERINEURAL at 14:00

## 2025-07-15 ASSESSMENT — ENCOUNTER SYMPTOMS
FEVER: 0
WOUND: 1

## 2025-07-15 ASSESSMENT — ACTIVITIES OF DAILY LIVING (ADL): ADLS_ACUITY_SCORE: 57

## 2025-07-15 NOTE — TELEPHONE ENCOUNTER
Writer spoke with mother, urged to go to UC as no availability to be seen in clinic.  Rochelle Nelson LPN

## 2025-07-15 NOTE — TELEPHONE ENCOUNTER
10:09 AM    Reason for Call: OVERBOOK    Patient is having the following symptoms: patient may have an infected thumb.    The patient is requesting an appointment for as soon as possible with any provider.    Was an appointment offered for this call? No  If yes : Appointment type              Date    Preferred method for responding to this message: Telephone Call    What is your phone number [phone]     If we cannot reach you directly, may we leave a detailed response at the number you provided? Yes    Can this message wait until your PCP/provider returns, if unavailable today? No, please return call today (07/15/25) if possible Thank you    Jenni Lala

## 2025-07-15 NOTE — ED PROVIDER NOTES
History     Chief Complaint   Patient presents with    Hand Pain     HPI  Efren Saavedra is a 45 year old female who is here with what looks like a paronychia around the left thumbnail on the lateral side. They have been using baking soda and antibiotic ointment.  No injury.  Patient is on blood thinners for mitral valve replacement.    Allergies:  Allergies   Allergen Reactions    Azithromycin      Abdominal pain/ cramping     Clotrimazole Rash    Omnicef [Cefdinir] Rash     Itchy and bad rash       Problem List:    Patient Active Problem List    Diagnosis Date Noted    CRP elevated 12/05/2024     Priority: Medium    CHF (congestive heart failure) (H) 12/03/2024     Priority: Medium    Anemia 12/03/2024     Priority: Medium    H/O mitral valve replacement 12/03/2024     Priority: Medium    Anticoagulated on Coumadin 12/03/2024     Priority: Medium    Chronic idiopathic constipation 11/25/2024     Priority: Medium    Congenital stenosis of mitral valve 11/18/2024     Priority: Medium    Cardiomegaly 11/18/2024     Priority: Medium    Subtherapeutic international normalized ratio (INR) 10/30/2024     Priority: Medium    S/P MVR (mitral valve replacement) 10/29/2024     Priority: Medium    Mitral valve stenosis, unspecified etiology 10/23/2024     Priority: Medium    Mitral stenosis 10/15/2024     Priority: Medium    Seizure (H) 02/27/2015     Priority: Medium     Overview:   At least two events (maybe 3), mom opted no treatment or mri unless further events      Osteoporosis 11/23/2013     Priority: Medium     Overview:   IMO Update      Congenital hearing loss 05/14/2013     Priority: Medium    Impacted cerumen 05/14/2013     Priority: Medium    Mixed conductive and sensorineural hearing loss, 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 Echo/doppler 4/19/05.   IMO Update 10/11      Other kyphoscoliosis and scoliosis 03/30/2005     Priority: Medium     Overview:   Refer to Dr. Michel. Scoliosis x-ray 4/19/05.      Unspecified intellectual disabilities 03/30/2005     Priority: Medium     Overview:   IMO Update 10/11      Dysmenorrhea 08/11/2004     Priority: Medium     Overview:   Will switch from Alesse to Loestrin      Agenesis of corpus callosum (H) 04/08/2004     Priority: Medium     Overview:       Other psoriasis 01/08/2004     Priority: Medium     Overview:   Scalp psoriasis. Under the care of Dr. Raines, Dermatology      Irregular menstrual cycle 04/15/2002     Priority: Medium        Past Medical History:    Past Medical History:   Diagnosis Date    Seizures (H)        Past Surgical History:    Past Surgical History:   Procedure Laterality Date    CV CORONARY ANGIOGRAM N/A 10/18/2024    Procedure: Coronary Angiogram;  Surgeon: Hernandez Rivera MD;  Location: [facility]    CV RIGHT HEART CATH MEASUREMENTS RECORDED N/A 10/18/2024    Procedure: Right Heart Catheterization;  Surgeon: Hernandez Rivera MD;  Location: [facility]    feeding tube      REPLACE VALVE MITRAL N/A 10/23/2024    Procedure: Median Sternotomy, Cardiopulmonary Bypass, Mitral Valve Replacement with [employer] [place] Mechanical Valve size 23mm, Interoperative Transesophageal Echocardiogram per Anesthesia;  Surgeon: Deangelo Craig MD;  Location: [facility] OR    TRANSESOPHAGEAL ECHOCARDIOGRAM INTRAOPERATIVE N/A 8/13/2024    Procedure: ECHOCARDIOGRAM, TRANSESOPHAGEAL, WITH ANESTHESIA;  Surgeon: GENERIC ANESTHESIA PROVIDER;  Location: [facility] OR    ventilation tubes, bilateral         Family History:    Family History   Problem Relation Age of Onset    Cerebrovascular Disease Paternal Grandmother         CVA    Heart Disease Maternal Grandfather         disease    Hypertension Father     Other - See Comments Father          post polio    Parkinsonism Maternal Grandmother     Heart Disease Paternal Grandfather        Social History:  Marital Status:  Single [1]  Social History     Tobacco Use    Smoking status: Never     Passive exposure: Never    Smokeless tobacco: Never    Tobacco comments:     no passive exposure   Vaping Use    Vaping status: Never Used   Substance Use Topics    Alcohol use: No    Drug use: No        Medications:    doxycycline hyclate (VIBRAMYCIN) 100 MG capsule  acetaminophen (TYLENOL) 325 MG tablet  acetaminophen (TYLENOL) 325 MG tablet  amoxicillin (AMOXIL) 500 MG capsule  aspirin (ASA) 81 MG chewable tablet  furosemide (LASIX) 20 MG tablet  ketoconazole (NIZORAL) 2 % external cream  levETIRAcetam (KEPPRA) 250 MG tablet  metoprolol tartrate (LOPRESSOR) 50 MG tablet  norethindrone-ethinyl estradiol (JUNEL FE 1/20) 1-20 MG-MCG tablet  warfarin ANTICOAGULANT (COUMADIN) 3 MG tablet          Review of Systems   Constitutional:  Negative for fever.   Skin:  Positive for wound.   All other systems reviewed and are negative.      Physical Exam   BP: 115/71  Pulse: 93  Temp: 97.1  F (36.2  C)  Resp: 18  Weight: 34.5 kg (76 lb)      Physical Exam  Vitals and nursing note reviewed.   Constitutional:       Appearance: Normal appearance.   HENT:      Head: Normocephalic.   Cardiovascular:      Rate and Rhythm: Normal rate.   Pulmonary:      Effort: Pulmonary effort is normal.   Musculoskeletal:      Comments:   Paronychia around the left thumb lateral corner.   Neurological:      Mental Status: She is alert.   Psychiatric:         Mood and Affect: Mood normal.         Behavior: Behavior normal.         ED Course        [facility]    PROCEDURE: -Incision/Drainage    Date/Time: 7/15/2025 1:59 PM    Performed by: Rony Rushing PA-C  Authorized by: Rony Rushing PA-C    Risks, benefits and alternatives discussed.      UNIVERSAL PROTOCOL   Site Marked: No  Prior Images Obtained and Reviewed:  No  Required  items: Required blood products, implants, devices and special equipment available    Patient identity confirmed:  Arm band  NA - No sedation, light sedation, or local anesthesia  Confirmation Checklist:  Patient's identity using two indicators  Universal Protocol: the Joint Commission Universal Protocol was followed      LOCATION:      Indications for incision and drainage: paronychia.    Size:  0.5    Location:  Upper extremity    Upper extremity location:  Finger    Finger location:  L thumb    PROCEDURE TYPE:     Complexity:  Simple    ANESTHESIA (see MAR for exact dosages):     Anesthesia method:  Local infiltration    Local anesthetic:  Lidocaine 1% w/o epi    PROCEDURE DETAILS:     Needle aspiration: no      Incision types:  Stab incision    Incision depth:  Subcutaneous    Scalpel blade:  11    Wound management:  Probed and deloculated    Drainage:  Purulent and bloody    Drainage amount:  Scant    Wound treatment:  Wound left open    PROCEDURE  Describe Procedure: Opened small developing paronychia. Some purulent drainage  Patient Tolerance:  Patient tolerated the procedure well with no immediate complications          No results found for this or any previous visit (from the past 24 hours).    Medications   lidocaine 1 % 5 mL (5 mLs Subcutaneous $Given 7/15/25 1400)       Assessments & Plan (with Medical Decision Making)     I have reviewed the nursing notes.    I have reviewed the findings, diagnosis, plan and need for follow up with the patient.      Medical Decision Making      Patient here with small developing paronychia of the left thumbnail.  I performed an I&D with a little bit of purulent return, we will put her on Bactrim.  Covered it with a Band-Aid.  She can follow-up for wound recheck in the clinic.        New Prescriptions    DOXYCYCLINE HYCLATE (VIBRAMYCIN) 100 MG CAPSULE    Take 1 capsule (100 mg) by mouth 2 times daily for 7 days.       Final diagnoses:   Paronychia of left thumb        7/15/2025   [facility]       Rony Rushing PA-C  07/15/25 2306

## 2025-07-15 NOTE — DISCHARGE INSTRUCTIONS
Cover the finger with a bandaid for a few days, change it as needed. Take 1 week of doxycycline antibiotic. If this gets worse then go see clinic doctor or come back.

## 2025-07-15 NOTE — TELEPHONE ENCOUNTER
ANTICOAGULATION  MANAGEMENT: Discharge Review    Efren Saavedra chart reviewed for anticoagulation continuity of care    Emergency room visit on 7/15/25 for paronychia of left thumb.    Discharge disposition: Home    Results:    Recent labs: (last 7 days)     07/14/25  0926   INR 2.8*     Anticoagulation inpatient management:     not applicable     Anticoagulation discharge instructions: Not addressed in the ED discharge notes    Warfarin dosing: home regimen continued   Bridging: No   INR goal change: No      Medication changes affecting anticoagulation: Yes: Doxycycline x 7 days   Concomitant use of doxycycline and warfarin (anticoagulant) may increase risk of bleeding. Hence, patients who are on warfarin may require downward adjustment of their warfarin dosage (Prod Info ORACEA  oral capsules, 2025).     Additional factors affecting anticoagulation: No     PLAN     Recommend to check INR on 7/18/25    Spoke with Mother Christine    Anticoagulation Calendar updated    Kendra Ortez RN  7/15/2025  Anticoagulation Clinic  [facility] for routing messages: bill BASURTO  [facility] patient phone line: [phone]

## 2025-07-15 NOTE — ED TRIAGE NOTES
Pt presents with swollen left thumb pain and swelling x1 week. Unknown injured. Pt has been using antibiotic ointment and bakingsoda.

## 2025-07-18 ENCOUNTER — LAB (OUTPATIENT)
Dept: LAB | Facility: OTHER | Age: 45
End: 2025-07-18
Attending: FAMILY MEDICINE
Payer: MEDICARE

## 2025-07-18 DIAGNOSIS — I05.9 MITRAL VALVE DISEASE: ICD-10-CM

## 2025-07-18 DIAGNOSIS — Z95.2 S/P MVR (MITRAL VALVE REPLACEMENT): ICD-10-CM

## 2025-07-18 LAB — INR BLD: 2.8 (ref 0.9–1.1)

## 2025-07-18 PROCEDURE — 36416 COLLJ CAPILLARY BLOOD SPEC: CPT | Mod: ZL

## 2025-07-18 PROCEDURE — 85610 PROTHROMBIN TIME: CPT | Mod: ZL

## 2025-07-19 DIAGNOSIS — Z95.2 S/P MVR (MITRAL VALVE REPLACEMENT): ICD-10-CM

## 2025-07-21 RX ORDER — WARFARIN SODIUM 3 MG/1
TABLET ORAL
Qty: 150 TABLET | Refills: 1 | Status: SHIPPED | OUTPATIENT
Start: 2025-07-21

## 2025-07-21 NOTE — TELEPHONE ENCOUNTER
JEANCARLOSTOVEN 3MG TABLET       Last Written Prescription Date:  0  Last Fill Quantity: 0,   # refills: 0  Last Office Visit: 01/15/2025  Future Office visit:       Routing refill request to provider for review/approval because:    Vitamin K Antagonists [id number]/19/2025 07:25 PM   Protocol Details INR is within goal in the past 6 weeks    Medication is active on med list and the sig matches. RN to manually verify dose and sig if red X/fail.    Medication indicated for diagnosis    Always fail criteria - route to anticoagulation team        Kimberly Boecker, RN

## 2025-07-25 ENCOUNTER — LAB (OUTPATIENT)
Dept: LAB | Facility: OTHER | Age: 45
End: 2025-07-25
Attending: PHYSICIAN ASSISTANT
Payer: MEDICARE

## 2025-07-25 DIAGNOSIS — Z95.2 S/P MVR (MITRAL VALVE REPLACEMENT): ICD-10-CM

## 2025-07-25 DIAGNOSIS — I05.9 MITRAL VALVE DISEASE: ICD-10-CM

## 2025-07-25 LAB — INR BLD: 3.6 (ref 0.9–1.1)

## 2025-07-25 PROCEDURE — 85610 PROTHROMBIN TIME: CPT | Mod: ZL

## 2025-07-25 PROCEDURE — 36416 COLLJ CAPILLARY BLOOD SPEC: CPT | Mod: ZL

## 2025-08-05 DIAGNOSIS — Z30.9 ENCOUNTER FOR CONTRACEPTIVE MANAGEMENT, UNSPECIFIED TYPE: ICD-10-CM

## 2025-08-06 RX ORDER — NORETHINDRONE ACETATE/ETHINYL ESTRADIOL AND FERROUS FUMARATE 1MG-20(21)
1 KIT ORAL DAILY
Qty: 90 TABLET | Refills: 0 | Status: SHIPPED | OUTPATIENT
Start: 2025-08-06

## 2025-08-11 ENCOUNTER — LAB (OUTPATIENT)
Dept: LAB | Facility: OTHER | Age: 45
End: 2025-08-11
Attending: FAMILY MEDICINE
Payer: MEDICARE

## 2025-08-11 ENCOUNTER — ANTICOAGULATION THERAPY VISIT (OUTPATIENT)
Dept: ANTICOAGULATION | Facility: OTHER | Age: 45
End: 2025-08-11
Attending: FAMILY MEDICINE
Payer: MEDICARE

## 2025-08-11 DIAGNOSIS — Z95.2 S/P MVR (MITRAL VALVE REPLACEMENT): Primary | ICD-10-CM

## 2025-08-11 DIAGNOSIS — Z95.2 S/P MVR (MITRAL VALVE REPLACEMENT): ICD-10-CM

## 2025-08-11 DIAGNOSIS — I05.9 MITRAL VALVE DISEASE: ICD-10-CM

## 2025-08-11 LAB — INR BLD: 2.4 (ref 0.9–1.1)

## 2025-08-11 PROCEDURE — 85610 PROTHROMBIN TIME: CPT | Mod: ZL

## 2025-08-11 PROCEDURE — 36416 COLLJ CAPILLARY BLOOD SPEC: CPT | Mod: ZL

## 2025-08-22 ENCOUNTER — LAB (OUTPATIENT)
Dept: LAB | Facility: OTHER | Age: 45
End: 2025-08-22
Attending: FAMILY MEDICINE
Payer: MEDICARE

## 2025-08-22 ENCOUNTER — OFFICE VISIT (OUTPATIENT)
Dept: FAMILY MEDICINE | Facility: OTHER | Age: 45
End: 2025-08-22
Attending: FAMILY MEDICINE
Payer: MEDICARE

## 2025-08-22 VITALS
RESPIRATION RATE: 12 BRPM | SYSTOLIC BLOOD PRESSURE: 98 MMHG | DIASTOLIC BLOOD PRESSURE: 60 MMHG | BODY MASS INDEX: 15.7 KG/M2 | OXYGEN SATURATION: 98 % | HEART RATE: 76 BPM | TEMPERATURE: 98.3 F | WEIGHT: 74.9 LBS

## 2025-08-22 DIAGNOSIS — L40.9 PSORIASIS: Primary | ICD-10-CM

## 2025-08-22 DIAGNOSIS — Z95.2 S/P MVR (MITRAL VALVE REPLACEMENT): ICD-10-CM

## 2025-08-22 DIAGNOSIS — N92.6 ABNORMAL MENSTRUAL CYCLE: ICD-10-CM

## 2025-08-22 DIAGNOSIS — I05.9 MITRAL VALVE DISEASE: ICD-10-CM

## 2025-08-22 LAB
ANION GAP SERPL CALCULATED.3IONS-SCNC: 12 MMOL/L (ref 7–15)
BASOPHILS # BLD AUTO: 0.05 10E3/UL (ref 0–0.2)
BASOPHILS NFR BLD AUTO: 0.6 %
BUN SERPL-MCNC: 12.1 MG/DL (ref 6–20)
CALCIUM SERPL-MCNC: 9.2 MG/DL (ref 8.8–10.4)
CHLORIDE SERPL-SCNC: 104 MMOL/L (ref 98–107)
CREAT SERPL-MCNC: 0.62 MG/DL (ref 0.51–0.95)
EGFRCR SERPLBLD CKD-EPI 2021: >90 ML/MIN/1.73M2
EOSINOPHIL # BLD AUTO: 0.14 10E3/UL (ref 0–0.7)
EOSINOPHIL NFR BLD AUTO: 1.5 %
ERYTHROCYTE [DISTWIDTH] IN BLOOD BY AUTOMATED COUNT: 13.2 % (ref 10–15)
GLUCOSE SERPL-MCNC: 105 MG/DL (ref 70–99)
HCO3 SERPL-SCNC: 22 MMOL/L (ref 22–29)
HCT VFR BLD AUTO: 41.2 % (ref 35–47)
HGB BLD-MCNC: 14.2 G/DL (ref 11.7–15.7)
IMM GRANULOCYTES # BLD: 0.03 10E3/UL
IMM GRANULOCYTES NFR BLD: 0.3 %
INR BLD: 2.5 (ref 0.9–1.1)
LYMPHOCYTES # BLD AUTO: 1.11 10E3/UL (ref 0.8–5.3)
LYMPHOCYTES NFR BLD AUTO: 12.3 %
MCH RBC QN AUTO: 29.8 PG (ref 26.5–33)
MCHC RBC AUTO-ENTMCNC: 34.5 G/DL (ref 31.5–36.5)
MCV RBC AUTO: 86.6 FL (ref 78–100)
MONOCYTES # BLD AUTO: 1.06 10E3/UL (ref 0–1.3)
MONOCYTES NFR BLD AUTO: 11.7 %
NEUTROPHILS # BLD AUTO: 6.67 10E3/UL (ref 1.6–8.3)
NEUTROPHILS NFR BLD AUTO: 73.6 %
NRBC # BLD AUTO: <0.03 10E3/UL
NRBC BLD AUTO-RTO: 0 /100
PLATELET # BLD AUTO: 285 10E3/UL (ref 150–450)
POTASSIUM SERPL-SCNC: 4.1 MMOL/L (ref 3.4–5.3)
RBC # BLD AUTO: 4.76 10E6/UL (ref 3.8–5.2)
SODIUM SERPL-SCNC: 138 MMOL/L (ref 135–145)
TSH SERPL DL<=0.005 MIU/L-ACNC: 3.86 UIU/ML (ref 0.3–4.2)
WBC # BLD AUTO: 9.06 10E3/UL (ref 4–11)

## 2025-08-22 PROCEDURE — 84146 ASSAY OF PROLACTIN: CPT | Mod: ZL | Performed by: FAMILY MEDICINE

## 2025-08-22 PROCEDURE — 36415 COLL VENOUS BLD VENIPUNCTURE: CPT | Mod: ZL | Performed by: FAMILY MEDICINE

## 2025-08-22 PROCEDURE — 3078F DIAST BP <80 MM HG: CPT | Performed by: FAMILY MEDICINE

## 2025-08-22 PROCEDURE — 80048 BASIC METABOLIC PNL TOTAL CA: CPT | Mod: ZL | Performed by: FAMILY MEDICINE

## 2025-08-22 PROCEDURE — 85610 PROTHROMBIN TIME: CPT | Mod: ZL

## 2025-08-22 PROCEDURE — 83001 ASSAY OF GONADOTROPIN (FSH): CPT | Mod: ZL | Performed by: FAMILY MEDICINE

## 2025-08-22 PROCEDURE — 3074F SYST BP LT 130 MM HG: CPT | Performed by: FAMILY MEDICINE

## 2025-08-22 PROCEDURE — 82166 ASSAY ANTI-MULLERIAN HORM: CPT | Mod: ZL | Performed by: FAMILY MEDICINE

## 2025-08-22 PROCEDURE — 99214 OFFICE O/P EST MOD 30 MIN: CPT | Performed by: FAMILY MEDICINE

## 2025-08-22 PROCEDURE — G2211 COMPLEX E/M VISIT ADD ON: HCPCS | Performed by: FAMILY MEDICINE

## 2025-08-22 PROCEDURE — 85004 AUTOMATED DIFF WBC COUNT: CPT | Mod: ZL | Performed by: FAMILY MEDICINE

## 2025-08-22 PROCEDURE — 36416 COLLJ CAPILLARY BLOOD SPEC: CPT | Mod: ZL

## 2025-08-22 PROCEDURE — G0463 HOSPITAL OUTPT CLINIC VISIT: HCPCS

## 2025-08-22 PROCEDURE — 84443 ASSAY THYROID STIM HORMONE: CPT | Mod: ZL | Performed by: FAMILY MEDICINE

## 2025-08-22 RX ORDER — CLOBETASOL PROPIONATE 0.05 G/100ML
SHAMPOO TOPICAL
Qty: 118 ML | Refills: 2 | Status: SHIPPED | OUTPATIENT
Start: 2025-08-22

## 2025-08-23 LAB
MIS SERPL-MCNC: <0.03 NG/ML (ref 0.03–5.5)
PROLACTIN SERPL 3RD IS-MCNC: 56 NG/ML (ref 5–23)

## 2025-08-25 ENCOUNTER — TELEPHONE (OUTPATIENT)
Dept: FAMILY MEDICINE | Facility: OTHER | Age: 45
End: 2025-08-25

## 2025-08-25 LAB — FSH SERPL IRP2-ACNC: <0.3 MIU/ML

## (undated) DEVICE — BLADE SAW STRK STERNAL 6207-97-101

## (undated) DEVICE — DRAPE IOBAN INCISE 23X17" 6650EZ

## (undated) DEVICE — TIES BANDING T50R

## (undated) DEVICE — DRAPE FLUID WARMING 52 X 60" ORS-321

## (undated) DEVICE — SURGICEL POWDER ABSORBABLE HEMOSTAT 3GM 3013SP

## (undated) DEVICE — GUIDEWIRE VASC 0.025X260X15CM J-TIP G02384

## (undated) DEVICE — SU PROLENE 4-0 SHDA 36" 8521H

## (undated) DEVICE — Device

## (undated) DEVICE — WIPES FOLEY CARE SURESTEP PROVON DFC100

## (undated) DEVICE — SU PROLENE 6-0 C-1DA 30" 8706H

## (undated) DEVICE — KIT HAND CONTROL ACIST 014644 AR-P54

## (undated) DEVICE — SU ETHIBOND 0 CT-1 CR 8X18" CX21D

## (undated) DEVICE — CABLE MYO/LEAD PACING WHITE DISP 019-530

## (undated) DEVICE — DRSG TELFA 3X8" 1238

## (undated) DEVICE — SU PROLENE 3-0 SHDA 36" 8522H

## (undated) DEVICE — SOL NACL 0.9% 10ML VIAL 0409-4888-02

## (undated) DEVICE — DRSG DRAIN 4X4" 7086

## (undated) DEVICE — DRSG ABDOMINAL 07 1/2X8" 7197D

## (undated) DEVICE — CATH ANGIO INFINITI 3DRC 4FRX100CM 538476

## (undated) DEVICE — ESU PENCIL SMOKE EVAC W/ROCKER SWITCH 0703-047-000

## (undated) DEVICE — NDL COUNTER 20CT 31142493

## (undated) DEVICE — SUCTION CATH AIRLIFE TRI-FLO W/CONTROL PORT 14FR  T60C

## (undated) DEVICE — INTRO SHEATH 7FRX25CM PINNACLE RSS706

## (undated) DEVICE — SU SILK 0 TIE 6X30" A306H

## (undated) DEVICE — INTRO SHEATH AVANTI 4FRX23CM 504604T

## (undated) DEVICE — TUBING PRESSURE 30"

## (undated) DEVICE — TOURNIQUET VASCULAR KIT ARGYLE 8888-585000

## (undated) DEVICE — LEAD PACER MYOCARDIAL BIPOLAR TEMPORARY 53CM 6495F

## (undated) DEVICE — PROTECTOR ARM ONE-STEP TRENDELENBURG 40418 (COI)

## (undated) DEVICE — DEVICE TISSUE STABILIZATION OCTOBASE 28707

## (undated) DEVICE — SU ETHIBOND 2-0 SHDA 30" X563H

## (undated) DEVICE — SUCTION MANIFOLD NEPTUNE 2 SYS 4 PORT 0702-020-000

## (undated) DEVICE — ESU ELEC BLADE E-SEP INSULATED NEPTUNE 70MM 0703-070-002

## (undated) DEVICE — SOL WATER IRRIG 1000ML BOTTLE 2F7114

## (undated) DEVICE — TAPE MEDIPORE 4"X2YD 2864

## (undated) DEVICE — SU PROLENE 5-0 RB-2DA 30" 8710H

## (undated) DEVICE — SUCTION DRY CHEST DRAIN OASIS 3600-100

## (undated) DEVICE — WIRE GUIDE 0.025"X150CM EMERALD J TIP 502524

## (undated) DEVICE — SURGICEL HEMOSTAT 4X8" 1952S

## (undated) DEVICE — GLOVE BIOGEL PI SZ 7.0 40870

## (undated) DEVICE — SU VICRYL+ 3-0 FS1 27IN UND VCP442H

## (undated) DEVICE — TOURNIQUET VASCULAR KIT 7 1/2" 79012

## (undated) DEVICE — ELTRD DFBR ADLT 15.2X10.8CM ADH PD RDTRNS PCH PADPRO UNV NS

## (undated) DEVICE — BONE WAX 2.5GM W31G

## (undated) DEVICE — INTRO SHEATH 7FRX10CM PINNACLE RSS702

## (undated) DEVICE — TUBING SUCTION DRAINAGE PLEURAL DUAL 8884714200

## (undated) DEVICE — CATH TRAY FOLEY SURESTEP 16FR W/TMP PRB STLK LATEX A319416AM

## (undated) DEVICE — SU STEEL 6 CCS 4X18" M654G

## (undated) DEVICE — MANIFOLD KIT ANGIO AUTOMATED 014613

## (undated) DEVICE — DECANTER BAG 2002S

## (undated) DEVICE — PREP CHLORAPREP 26ML TINTED HI-LITE ORANGE 930815

## (undated) DEVICE — INTRO GLIDESHEATH SLENDER 6FR 10X45CM 60-1060

## (undated) DEVICE — SU DERMABOND ADVANCED .7ML DNX12

## (undated) DEVICE — INSERT FOGARTY 86MM TRACTION DBL SAFEJAW DSAFE86

## (undated) DEVICE — PACK ADULT HEART UMMC PV15CG92D

## (undated) DEVICE — SU PROLENE 4-0 RB-1DA 36" 8557H

## (undated) DEVICE — SOL NACL 0.9% IRRIG 1000ML BOTTLE 2F7124

## (undated) DEVICE — CATH ANGIO INFINITI JL3.5 4FRX100CM 538418

## (undated) DEVICE — PACK HEART LEFT CUSTOM

## (undated) DEVICE — LINEN TOWEL PACK X6 WHITE 5487

## (undated) DEVICE — SU VICRYL 0 CTX 36" J370H

## (undated) DEVICE — LINEN TOWEL PACK X30 5481

## (undated) DEVICE — DRAIN CHEST TUBE 28FR STR 8028

## (undated) DEVICE — SOL NACL 0.9% IRRIG 3000ML BAG 2B7477

## (undated) DEVICE — SU DEVICE ENDO COR KNOT QUICK LOAD 030850

## (undated) DEVICE — SU DEVICE COR-KNOT MINI 4X14MM 031350

## (undated) DEVICE — TUBING INSUFFLATION PNEUMOCLEAR 0620050100

## (undated) RX ORDER — VANCOMYCIN HYDROCHLORIDE 1 G/20ML
INJECTION, POWDER, LYOPHILIZED, FOR SOLUTION INTRAVENOUS
Status: DISPENSED
Start: 2024-10-23

## (undated) RX ORDER — SODIUM CHLORIDE, SODIUM GLUCONATE, SODIUM ACETATE, POTASSIUM CHLORIDE AND MAGNESIUM CHLORIDE 526; 502; 368; 37; 30 MG/100ML; MG/100ML; MG/100ML; MG/100ML; MG/100ML
INJECTION, SOLUTION INTRAVENOUS
Status: DISPENSED

## (undated) RX ORDER — GABAPENTIN 100 MG/1
CAPSULE ORAL
Status: DISPENSED
Start: 2024-10-23

## (undated) RX ORDER — CHLORHEXIDINE GLUCONATE ORAL RINSE 1.2 MG/ML
SOLUTION DENTAL
Status: DISPENSED
Start: 2024-10-23

## (undated) RX ORDER — FENTANYL CITRATE 50 UG/ML
INJECTION, SOLUTION INTRAMUSCULAR; INTRAVENOUS
Status: DISPENSED
Start: 2024-10-18

## (undated) RX ORDER — ACETAMINOPHEN 325 MG/1
TABLET ORAL
Status: DISPENSED
Start: 2024-10-23

## (undated) RX ORDER — ALBUMIN (HUMAN) 12.5 G/50ML
SOLUTION INTRAVENOUS
Status: DISPENSED

## (undated) RX ORDER — FENTANYL CITRATE-0.9 % NACL/PF 10 MCG/ML
PLASTIC BAG, INJECTION (ML) INTRAVENOUS
Status: DISPENSED

## (undated) RX ORDER — PROTAMINE SULFATE 10 MG/ML
INJECTION, SOLUTION INTRAVENOUS
Status: DISPENSED

## (undated) RX ORDER — HEPARIN SODIUM 1000 [USP'U]/ML
INJECTION, SOLUTION INTRAVENOUS; SUBCUTANEOUS
Status: DISPENSED
Start: 2024-10-23

## (undated) RX ORDER — FENTANYL CITRATE 50 UG/ML
INJECTION, SOLUTION INTRAMUSCULAR; INTRAVENOUS
Status: DISPENSED
Start: 2024-10-23

## (undated) RX ORDER — FAMOTIDINE 20 MG/1
TABLET, FILM COATED ORAL
Status: DISPENSED
Start: 2024-10-23

## (undated) RX ORDER — HYDROMORPHONE HYDROCHLORIDE 1 MG/ML
INJECTION, SOLUTION INTRAMUSCULAR; INTRAVENOUS; SUBCUTANEOUS
Status: DISPENSED
Start: 2024-10-23

## (undated) RX ORDER — CALCIUM CHLORIDE 100 MG/ML
INJECTION INTRAVENOUS; INTRAVENTRICULAR
Status: DISPENSED

## (undated) RX ORDER — FENTANYL CITRATE 50 UG/ML
INJECTION, SOLUTION INTRAMUSCULAR; INTRAVENOUS
Status: DISPENSED
Start: 2024-08-13

## (undated) RX ORDER — CLINDAMYCIN PHOSPHATE 900 MG/50ML
INJECTION, SOLUTION INTRAVENOUS
Status: DISPENSED
Start: 2024-10-23